# Patient Record
Sex: MALE | Race: BLACK OR AFRICAN AMERICAN | NOT HISPANIC OR LATINO | Employment: UNEMPLOYED | URBAN - METROPOLITAN AREA
[De-identification: names, ages, dates, MRNs, and addresses within clinical notes are randomized per-mention and may not be internally consistent; named-entity substitution may affect disease eponyms.]

---

## 2021-04-18 ENCOUNTER — HOSPITAL ENCOUNTER (EMERGENCY)
Facility: CLINIC | Age: 51
Discharge: HOME | End: 2021-04-18
Attending: EMERGENCY MEDICINE | Admitting: EMERGENCY MEDICINE
Payer: COMMERCIAL

## 2021-04-18 VITALS — SYSTOLIC BLOOD PRESSURE: 156 MMHG | OXYGEN SATURATION: 98 % | HEART RATE: 92 BPM | DIASTOLIC BLOOD PRESSURE: 80 MMHG

## 2021-04-18 DIAGNOSIS — I63.9 CEREBROVASCULAR ACCIDENT (CVA), UNSPECIFIED MECHANISM (H): ICD-10-CM

## 2021-04-18 DIAGNOSIS — I10 HYPERTENSION, UNSPECIFIED TYPE: ICD-10-CM

## 2021-04-18 DIAGNOSIS — Z91.148 NON COMPLIANCE W MEDICATION REGIMEN: ICD-10-CM

## 2021-04-18 LAB
ANION GAP SERPL CALCULATED.3IONS-SCNC: 5 MMOL/L (ref 3–14)
APTT PPP: 30 SEC (ref 22–37)
BASOPHILS # BLD AUTO: 0.1 10E9/L (ref 0–0.2)
BASOPHILS NFR BLD AUTO: 0.4 %
BUN SERPL-MCNC: 19 MG/DL (ref 7–30)
CALCIUM SERPL-MCNC: 9.9 MG/DL (ref 8.5–10.1)
CHLORIDE SERPL-SCNC: 97 MMOL/L (ref 94–109)
CO2 SERPL-SCNC: 30 MMOL/L (ref 20–32)
CREAT SERPL-MCNC: 0.8 MG/DL (ref 0.66–1.25)
DIFFERENTIAL METHOD BLD: ABNORMAL
EOSINOPHIL # BLD AUTO: 0.2 10E9/L (ref 0–0.7)
EOSINOPHIL NFR BLD AUTO: 1.2 %
ERYTHROCYTE [DISTWIDTH] IN BLOOD BY AUTOMATED COUNT: 11.4 % (ref 10–15)
GFR SERPL CREATININE-BSD FRML MDRD: >90 ML/MIN/{1.73_M2}
GLUCOSE SERPL-MCNC: 280 MG/DL (ref 70–99)
HCT VFR BLD AUTO: 41.2 % (ref 40–53)
HGB BLD-MCNC: 13.9 G/DL (ref 13.3–17.7)
IMM GRANULOCYTES # BLD: 0.1 10E9/L (ref 0–0.4)
IMM GRANULOCYTES NFR BLD: 0.4 %
INR PPP: 1.05 (ref 0.86–1.14)
LABORATORY COMMENT REPORT: NORMAL
LYMPHOCYTES # BLD AUTO: 2.8 10E9/L (ref 0.8–5.3)
LYMPHOCYTES NFR BLD AUTO: 20.8 %
MCH RBC QN AUTO: 32.5 PG (ref 26.5–33)
MCHC RBC AUTO-ENTMCNC: 33.7 G/DL (ref 31.5–36.5)
MCV RBC AUTO: 96 FL (ref 78–100)
MONOCYTES # BLD AUTO: 1 10E9/L (ref 0–1.3)
MONOCYTES NFR BLD AUTO: 6.9 %
NEUTROPHILS # BLD AUTO: 9.6 10E9/L (ref 1.6–8.3)
NEUTROPHILS NFR BLD AUTO: 70.3 %
NRBC # BLD AUTO: 0 10*3/UL
NRBC BLD AUTO-RTO: 0 /100
PLATELET # BLD AUTO: 366 10E9/L (ref 150–450)
POTASSIUM SERPL-SCNC: 3.7 MMOL/L (ref 3.4–5.3)
RBC # BLD AUTO: 4.28 10E12/L (ref 4.4–5.9)
SARS-COV-2 RNA RESP QL NAA+PROBE: NEGATIVE
SODIUM SERPL-SCNC: 132 MMOL/L (ref 133–144)
SPECIMEN SOURCE: NORMAL
TROPONIN I SERPL-MCNC: <0.015 UG/L (ref 0–0.04)
WBC # BLD AUTO: 13.7 10E9/L (ref 4–11)

## 2021-04-18 PROCEDURE — 84484 ASSAY OF TROPONIN QUANT: CPT | Performed by: EMERGENCY MEDICINE

## 2021-04-18 PROCEDURE — 250N000011 HC RX IP 250 OP 636: Performed by: EMERGENCY MEDICINE

## 2021-04-18 PROCEDURE — 80048 BASIC METABOLIC PNL TOTAL CA: CPT | Performed by: EMERGENCY MEDICINE

## 2021-04-18 PROCEDURE — 85025 COMPLETE CBC W/AUTO DIFF WBC: CPT | Performed by: EMERGENCY MEDICINE

## 2021-04-18 PROCEDURE — 85730 THROMBOPLASTIN TIME PARTIAL: CPT | Performed by: EMERGENCY MEDICINE

## 2021-04-18 PROCEDURE — 85610 PROTHROMBIN TIME: CPT | Performed by: EMERGENCY MEDICINE

## 2021-04-18 PROCEDURE — 87635 SARS-COV-2 COVID-19 AMP PRB: CPT | Performed by: EMERGENCY MEDICINE

## 2021-04-18 PROCEDURE — C9803 HOPD COVID-19 SPEC COLLECT: HCPCS

## 2021-04-18 PROCEDURE — 93005 ELECTROCARDIOGRAM TRACING: CPT

## 2021-04-18 PROCEDURE — 99284 EMERGENCY DEPT VISIT MOD MDM: CPT | Mod: 25

## 2021-04-18 PROCEDURE — 96374 THER/PROPH/DIAG INJ IV PUSH: CPT

## 2021-04-18 RX ORDER — ONDANSETRON 4 MG/1
4 TABLET, ORALLY DISINTEGRATING ORAL ONCE
Status: DISCONTINUED | OUTPATIENT
Start: 2021-04-18 | End: 2021-04-18 | Stop reason: HOSPADM

## 2021-04-18 RX ORDER — ONDANSETRON 2 MG/ML
4 INJECTION INTRAMUSCULAR; INTRAVENOUS ONCE
Status: COMPLETED | OUTPATIENT
Start: 2021-04-18 | End: 2021-04-18

## 2021-04-18 RX ADMIN — ONDANSETRON 4 MG: 2 INJECTION INTRAMUSCULAR; INTRAVENOUS at 17:03

## 2021-04-18 ASSESSMENT — ENCOUNTER SYMPTOMS
SHORTNESS OF BREATH: 0
UNEXPECTED WEIGHT CHANGE: 1
ABDOMINAL PAIN: 0
WEAKNESS: 1
NAUSEA: 1
VOMITING: 0
DIARRHEA: 0

## 2021-04-18 NOTE — CONSULTS
[facility]    Stroke Telephone Note    I was called by Eriberto Vaughn Md on 04/18/21 regarding patient Josue Parisi. The patient is a 50 year old male with residual right sided weakness from old stroke. Patient reported to ED team that about a week ago he could not walk because of worsening of hs right sided weakness. Since then he has improved and now is able to walk again but still not back to baseline. The patient indicated that he had stopped all his medications including blood thinners because of side effects. Patient is brought to the ED today accompanied by his mother.     Based on care everywhere, patient had stroke in 2013 affecting the right hemisphere secondary to right sided cervical carotid occlusion. Two weeks after that he had left lentiform infarction.     In 2019, he had another brain MRI due to increased right sided weakness, nausea, and altered mental state. The study showed no new pathology, just the old ischemic changes.     He was last seen by Dr Carlos A Shirley, neurologist at [facility], in 2019. At that time he was on plavix.     Of note, patient also has known DM, HTN, HLD, tobacco use, anxiety and depression.       Thrombolytic Treatment   outside the window for thrombolysis    Endovascular Treatment  outside the window for any therapy    Impression  Worsening baseline right sided weakness. Cause unknown yet. Patient has vascular risk factors, prior stroke, and is not compliant on his medications. He can be having another storke now. If this is a stroke, he is outside any treatment window (onset 1 week ago). Patient has history of presenting with increased weakness before and MRI was negative. Therefore, this presentation may also be not due to stroke.     Recommendations   MRI brain with contrast.   MRA head without contrast.   MRA neck with contrast.   Aspirin 325 mg/d.     If the MRI is negative for stroke, further workup per ED team. If the  "patient does have a stroke, then he needs an admission and routine stroke workup.       My recommendations are based on the information provided over the phone by Josue Parisi's in-person providers. They are not intended to replace the clinical judgment of his in-person providers. I was not requested to personally see or examine the patient at this time.      Marli Douglas MD, Msc, FAMARTY, FAAN   of Neurology  [facility]     04/18/2021 4:39 PM  To page me or covering stroke neurology team member, click here: AMCOM  Choose \"On Call\" tab at top, then search dropdown box for \"Neurology Adult\" & press Enter, look for Neuro ICU/Stroke  "

## 2021-04-18 NOTE — ED PROVIDER NOTES
History   Chief Complaint:  Transient Ischemic Attack     HPI   Josue Parisi is a 50 year old male with history of stroke x2 not currently anticoagulated, diabetes mellitus, internal carotid artery dissection, and hypertension who presents with balance issues and trouble walking. The patient reports that 6 days ago he woke up and found that he was unable to walk. He went back to sleep and upon waking was able to make himself walk. He states that his right foot and leg would not work. He notes that he was able to move his knee and leg, but that he was unsteady on his feet and felt off balance. The patient notes that this lasted a few hours, but never fully improved to his baseline. Additionally, the patient notes that he has had nausea and has lost more weight recently. He cites a recent diet change and working 12 hours a day on his feet as reasons for his weight loss. He also mentions having some back pain that has been ongoing for some time and some possible loss of taste/smell. The patient also reports having some boils under his left arm that have not improved on a medication that his doctor provided him. The patient has a history of 2 prior strokes and reports that he stopped taking his blood thinner without being advised by his physician several years ago. He notes having some right sided weakness in his arms and legs as a result of his prior strokes. The patient otherwise denies any vomiting, abdominal pain, diarrhea, chest pain, shortness of breath or urinary issues.     Review of Systems   Constitutional: Positive for unexpected weight change.   Respiratory: Negative for shortness of breath.    Cardiovascular: Negative for chest pain.   Gastrointestinal: Positive for nausea. Negative for abdominal pain, diarrhea and vomiting.   Genitourinary: Negative.    Musculoskeletal: Positive for gait problem.   Neurological: Positive for weakness.   All other systems reviewed and are  negative.        Allergies:  The patient has no known allergies.     Medications:  Albuterol  Alprazolam  Bacitracin  Phenergan   Propranolol    Past Medical History:    Cerebral infarction  Hypertension   Low back pain  Internal carotid artery dissection  Tobacco abuse  Dysarthria  Expressive aphasia  Diabetes mellitus  Anxiety   Marijuana use  Depressive disorder  Toxic metabolic encephalopathy     Past Surgical History:    The patient denies past surgical history.     Family History:    Father: depression    Social History:  He is with his mother.  The patient is a smoker.     Physical Exam   Vitals:  BP (!) 156/80   Pulse 92   SpO2 98%           Physical Exam  General: The patient is alert, in no respiratory distress.    HENT: Mucous membranes moist.    Cardiovascular: Regular rate and rhythm. Good pulses in all four extremities. Normal capillary refill and skin turgor.     Respiratory: Lungs are clear. No nasal flaring. No retractions. No wheezing, no crackles.    Gastrointestinal: Abdomen soft. No guarding, no rebound. No palpable hernias. Non tender.     Musculoskeletal: No gross deformity.     Skin: No rashes or petechiae.     Neurologic: The patient is alert and oriented x3. GCS 15. No testable cranial nerve deficit. Follows commands with clear and appropriate speech. Gives appropriate answers. Good strength in all extremities. No gross neurologic deficit. Gross sensation intact. Pupils are round and reactive. No meningismus.   Weaker hip extension, knee flexion and plantar flexion on the right. He can stand and walk and is steady.     Lymphatic: No cervical adenopathy. No lower extremity swelling.    Psychiatric: The patient is non-tearful.      Emergency Department Course   ECG  ECG taken at 1627, ECG read at 1627  Normal sinus rhythm  Possible left atrial enlargement    Rate 91 bpm. IA interval 158 ms. QRS duration 76 ms. QT/QTc 344/423 ms. P-R-T axes 58 21 28.     Imaging:  MR Brain WO & W  Contrast  Not completed    MRA Brain ([facility] [place]) WO & W Contrast  Not completed     MRA Neck ([place]) WO & W Contrast  Not completed    Laboratory:  CBC: WBC 13.7 (H), HGB 13.9,   BMP: Sodium 132 (L), Glucose 280 (H) o/w WNL (Creatinine 0.80)     Troponin (Collected 1553): <0.015  INR: 1.05    PTT: 30  UA with microscopic: Not completed    Drug abuse screen 77 urine: Not completed  Asymptomatic COVID19 Virus PCR by nasopharyngeal swab: Negative    Emergency Department Course:    Reviewed:  I reviewed nursing notes, vitals, past medical history and care everywhere    Assessments:  1600 I obtained history and examined the patient as noted above.   1630 I spoke with the patient again and discussed the importance of taking an Aspirin and the possibility of another stroke if he does not.   1910 I was informed that the patient was acting aggressively toward the nursing staff and asking to go home. I will discharge him AMA.     Consults:   1621 I spoke with Dr. Douglas, stroke neurology, regarding the patient. He states that if MRI scans are negative the patient should be started on Aspirin and that there is no reason to admit him at this time.     Interventions:  1703 Zofran 4 mg IV     Disposition:  The patient left AMA.       Impression & Plan     Medical Decision Making:  The patient's records were reviewed and I did interview him.  He originally had said that he could not walk and then on further questioning said that he had problems only in his right leg.  The right leg symptoms were there previously when he did have his previous stroke but are worse more recently.  It sounds like they have started a week ago and there has been some improvement.  Thankfully here his speech vision upper extremity strength was intact.  The patient was able to walk and there was no signs of a devastating injury.  I was concerned about a stroke and consulted stroke neurology who did recommend an MRI MRA.  I considered the  patient could have inflammation or infection worsening his symptoms.  I considered new diagnoses such as cord compromising lesions discitis amongst other processes.  The patient was counseled about his blood pressure being high the need to quit smoking and the risk to his health by not taking his medication as prescribed including his anticoagulants.  I discussed with him that stroke neurology had wanted him to take aspirin.  The patient however became agitated and while I was in the room with a critical patient he left AMA.  The patient walked out.  I already had the above discussions with him but was not able to give him any follow-up final follow-up instructions.  He was a capable decision maker and had already stressed the need to take aspirin.  He did leave AGAINST MEDICAL ADVICE and without current imaging of MRI MRA so I cannot exclude a current stroke.    Covid-19  Josue Parisi was evaluated during a global COVID-19 pandemic, which necessitated consideration that the patient might be at risk for infection with the SARS-CoV-2 virus that causes COVID-19.   Applicable protocols for evaluation were followed during the patient's care.   COVID-19 was considered as part of the patient's evaluation. The plan for testing is:  a test was obtained during this visit.    Diagnosis:    ICD-10-CM    1. Cerebrovascular accident (CVA), unspecified mechanism (H)  I63.9    2. Hypertension, unspecified type  I10    3. Non compliance w medication regimen  Z91.14        Discharge Medications:  None     Scribe Disclosure:  Shahriar VELASQUEZ, am serving as a scribe at 3:59 PM on 4/18/2021 to document services personally performed by Eriberto Vaughn MD based on my observations and the provider's statements to me.            Eriberto Vaughn MD  04/18/21 9085

## 2021-04-18 NOTE — ED TRIAGE NOTES
Pt presents for concern of an episode of being unable to walk. States that he woke up on Monday and noted he wasn't able to walk due to one leg being unable to move. Pt states this lasted for a few hours before improving. Pt states hx of 2 previous strokes in 2012. Also concerned because he has had some nausea for x2 weeks. Additionally states he has several boils in his left arm pit that are not improving with medications he was given by another doctor. ABC intact, A&Ox4.

## 2021-04-19 LAB — INTERPRETATION ECG - MUSE: NORMAL

## 2021-04-19 NOTE — ED NOTES
"Pt came out of room demanding IV again be removed \"because it hurts\". Attempted to explain to pt that the IV was needed in order to perform the MRI. Pt came twords me yelling stating \"I don't care, take it out\". Again attempted to explain need for IV, pt stood up and came at me when I requested security whom stepped in to deescalate the situation. Pt signed AMA form with charge nurse and left the dept.   "

## 2022-02-05 ENCOUNTER — APPOINTMENT (OUTPATIENT)
Dept: MRI IMAGING | Facility: CLINIC | Age: 52
DRG: 065 | End: 2022-02-05
Attending: PHYSICIAN ASSISTANT
Payer: COMMERCIAL

## 2022-02-05 ENCOUNTER — HOSPITAL ENCOUNTER (INPATIENT)
Facility: CLINIC | Age: 52
LOS: 2 days | Discharge: HOME | DRG: 065 | End: 2022-02-07
Attending: PHYSICIAN ASSISTANT | Admitting: INTERNAL MEDICINE
Payer: COMMERCIAL

## 2022-02-05 DIAGNOSIS — M79.2 NEUROPATHIC PAIN: Primary | ICD-10-CM

## 2022-02-05 DIAGNOSIS — I63.9 CEREBROVASCULAR ACCIDENT (H): ICD-10-CM

## 2022-02-05 DIAGNOSIS — I66.9 STENOSIS OF CEREBRAL ARTERY: ICD-10-CM

## 2022-02-05 DIAGNOSIS — I63.9 ACUTE CEREBROVASCULAR ACCIDENT (CVA) DUE TO ISCHEMIA (H): ICD-10-CM

## 2022-02-05 LAB
ALBUMIN SERPL-MCNC: 3.5 G/DL (ref 3.4–5)
ALP SERPL-CCNC: 78 U/L (ref 40–150)
ALT SERPL W P-5'-P-CCNC: 18 U/L (ref 0–70)
AMPHETAMINES UR QL SCN: ABNORMAL
ANION GAP SERPL CALCULATED.3IONS-SCNC: 6 MMOL/L (ref 3–14)
AST SERPL W P-5'-P-CCNC: 7 U/L (ref 0–45)
BARBITURATES UR QL: ABNORMAL
BASOPHILS # BLD AUTO: 0.1 10E3/UL (ref 0–0.2)
BASOPHILS NFR BLD AUTO: 1 %
BENZODIAZ UR QL: ABNORMAL
BILIRUB SERPL-MCNC: 0.3 MG/DL (ref 0.2–1.3)
BUN SERPL-MCNC: 15 MG/DL (ref 7–30)
CALCIUM SERPL-MCNC: 9.6 MG/DL (ref 8.5–10.1)
CANNABINOIDS UR QL SCN: ABNORMAL
CHLORIDE BLD-SCNC: 98 MMOL/L (ref 94–109)
CK SERPL-CCNC: 38 U/L (ref 30–300)
CO2 SERPL-SCNC: 28 MMOL/L (ref 20–32)
COCAINE UR QL: ABNORMAL
CREAT SERPL-MCNC: 0.74 MG/DL (ref 0.66–1.25)
EOSINOPHIL # BLD AUTO: 0.2 10E3/UL (ref 0–0.7)
EOSINOPHIL NFR BLD AUTO: 2 %
ERYTHROCYTE [DISTWIDTH] IN BLOOD BY AUTOMATED COUNT: 11.9 % (ref 10–15)
GFR SERPL CREATININE-BSD FRML MDRD: >90 ML/MIN/1.73M2
GLUCOSE BLD-MCNC: 383 MG/DL (ref 70–99)
HCT VFR BLD AUTO: 42.8 % (ref 40–53)
HGB BLD-MCNC: 14.3 G/DL (ref 13.3–17.7)
HOLD SPECIMEN: NORMAL
HOLD SPECIMEN: NORMAL
IMM GRANULOCYTES # BLD: 0.1 10E3/UL
IMM GRANULOCYTES NFR BLD: 1 %
INR PPP: 0.97 (ref 0.85–1.15)
LYMPHOCYTES # BLD AUTO: 2.7 10E3/UL (ref 0.8–5.3)
LYMPHOCYTES NFR BLD AUTO: 24 %
MCH RBC QN AUTO: 31.2 PG (ref 26.5–33)
MCHC RBC AUTO-ENTMCNC: 33.4 G/DL (ref 31.5–36.5)
MCV RBC AUTO: 93 FL (ref 78–100)
MONOCYTES # BLD AUTO: 0.7 10E3/UL (ref 0–1.3)
MONOCYTES NFR BLD AUTO: 6 %
NEUTROPHILS # BLD AUTO: 7.4 10E3/UL (ref 1.6–8.3)
NEUTROPHILS NFR BLD AUTO: 66 %
NRBC # BLD AUTO: 0 10E3/UL
NRBC BLD AUTO-RTO: 0 /100
OPIATES UR QL SCN: ABNORMAL
PLATELET # BLD AUTO: 348 10E3/UL (ref 150–450)
POTASSIUM BLD-SCNC: 3.7 MMOL/L (ref 3.4–5.3)
PROT SERPL-MCNC: 8 G/DL (ref 6.8–8.8)
RBC # BLD AUTO: 4.58 10E6/UL (ref 4.4–5.9)
SODIUM SERPL-SCNC: 132 MMOL/L (ref 133–144)
TROPONIN I SERPL HS-MCNC: 7 NG/L
TSH SERPL DL<=0.005 MIU/L-ACNC: 0.5 MU/L (ref 0.4–4)
WBC # BLD AUTO: 11.1 10E3/UL (ref 4–11)

## 2022-02-05 PROCEDURE — 250N000011 HC RX IP 250 OP 636: Performed by: PHYSICIAN ASSISTANT

## 2022-02-05 PROCEDURE — 85610 PROTHROMBIN TIME: CPT | Performed by: PHYSICIAN ASSISTANT

## 2022-02-05 PROCEDURE — 120N000001 HC R&B MED SURG/OB

## 2022-02-05 PROCEDURE — 36415 COLL VENOUS BLD VENIPUNCTURE: CPT | Performed by: PHYSICIAN ASSISTANT

## 2022-02-05 PROCEDURE — 96365 THER/PROPH/DIAG IV INF INIT: CPT

## 2022-02-05 PROCEDURE — 83036 HEMOGLOBIN GLYCOSYLATED A1C: CPT | Performed by: PHYSICIAN ASSISTANT

## 2022-02-05 PROCEDURE — 70547 MR ANGIOGRAPHY NECK W/O DYE: CPT

## 2022-02-05 PROCEDURE — 99223 1ST HOSP IP/OBS HIGH 75: CPT | Mod: AI | Performed by: PHYSICIAN ASSISTANT

## 2022-02-05 PROCEDURE — 80053 COMPREHEN METABOLIC PANEL: CPT | Performed by: PHYSICIAN ASSISTANT

## 2022-02-05 PROCEDURE — 84443 ASSAY THYROID STIM HORMONE: CPT | Performed by: PHYSICIAN ASSISTANT

## 2022-02-05 PROCEDURE — 70544 MR ANGIOGRAPHY HEAD W/O DYE: CPT

## 2022-02-05 PROCEDURE — 250N000013 HC RX MED GY IP 250 OP 250 PS 637: Performed by: PHYSICIAN ASSISTANT

## 2022-02-05 PROCEDURE — 80307 DRUG TEST PRSMV CHEM ANLYZR: CPT | Performed by: PHYSICIAN ASSISTANT

## 2022-02-05 PROCEDURE — C9803 HOPD COVID-19 SPEC COLLECT: HCPCS

## 2022-02-05 PROCEDURE — 70551 MRI BRAIN STEM W/O DYE: CPT

## 2022-02-05 PROCEDURE — 87635 SARS-COV-2 COVID-19 AMP PRB: CPT | Performed by: PHYSICIAN ASSISTANT

## 2022-02-05 PROCEDURE — 82550 ASSAY OF CK (CPK): CPT | Performed by: PHYSICIAN ASSISTANT

## 2022-02-05 PROCEDURE — 85004 AUTOMATED DIFF WBC COUNT: CPT | Performed by: PHYSICIAN ASSISTANT

## 2022-02-05 PROCEDURE — 99285 EMERGENCY DEPT VISIT HI MDM: CPT | Mod: 25

## 2022-02-05 PROCEDURE — 84484 ASSAY OF TROPONIN QUANT: CPT | Performed by: PHYSICIAN ASSISTANT

## 2022-02-05 RX ORDER — ATORVASTATIN CALCIUM 40 MG/1
80 TABLET, FILM COATED ORAL EVERY EVENING
Status: DISCONTINUED | OUTPATIENT
Start: 2022-02-05 | End: 2022-02-05

## 2022-02-05 RX ORDER — ATORVASTATIN CALCIUM 40 MG/1
80 TABLET, FILM COATED ORAL ONCE
Status: COMPLETED | OUTPATIENT
Start: 2022-02-05 | End: 2022-02-05

## 2022-02-05 RX ORDER — GADOBUTROL 604.72 MG/ML
10 INJECTION INTRAVENOUS ONCE
Status: DISCONTINUED | OUTPATIENT
Start: 2022-02-05 | End: 2022-02-05

## 2022-02-05 RX ORDER — GABAPENTIN 100 MG/1
200 CAPSULE ORAL AT BEDTIME
Status: DISCONTINUED | OUTPATIENT
Start: 2022-02-06 | End: 2022-02-06

## 2022-02-05 RX ORDER — HEPARIN SODIUM 10000 [USP'U]/100ML
0-5000 INJECTION, SOLUTION INTRAVENOUS CONTINUOUS
Status: DISCONTINUED | OUTPATIENT
Start: 2022-02-05 | End: 2022-02-06

## 2022-02-05 RX ORDER — ASPIRIN 81 MG/1
324 TABLET, CHEWABLE ORAL ONCE
Status: COMPLETED | OUTPATIENT
Start: 2022-02-05 | End: 2022-02-05

## 2022-02-05 RX ORDER — ASPIRIN 81 MG/1
81 TABLET, CHEWABLE ORAL ONCE
Status: DISCONTINUED | OUTPATIENT
Start: 2022-02-05 | End: 2022-02-05

## 2022-02-05 RX ORDER — CLOPIDOGREL BISULFATE 75 MG/1
300 TABLET ORAL ONCE
Status: DISCONTINUED | OUTPATIENT
Start: 2022-02-05 | End: 2022-02-05

## 2022-02-05 RX ORDER — HEPARIN SODIUM 10000 [USP'U]/100ML
0-5000 INJECTION, SOLUTION INTRAVENOUS CONTINUOUS
Status: DISCONTINUED | OUTPATIENT
Start: 2022-02-05 | End: 2022-02-05

## 2022-02-05 RX ADMIN — ASPIRIN 81 MG CHEWABLE TABLET 324 MG: 81 TABLET CHEWABLE at 22:38

## 2022-02-05 RX ADMIN — HEPARIN SODIUM AND DEXTROSE 800 UNITS/HR: 10000; 5 INJECTION INTRAVENOUS at 22:35

## 2022-02-05 RX ADMIN — ATORVASTATIN CALCIUM 80 MG: 40 TABLET, FILM COATED ORAL at 22:38

## 2022-02-05 ASSESSMENT — ACTIVITIES OF DAILY LIVING (ADL): ADLS_ACUITY_SCORE: 12

## 2022-02-06 ENCOUNTER — APPOINTMENT (OUTPATIENT)
Dept: CT IMAGING | Facility: CLINIC | Age: 52
DRG: 065 | End: 2022-02-06
Attending: INTERNAL MEDICINE
Payer: COMMERCIAL

## 2022-02-06 ENCOUNTER — APPOINTMENT (OUTPATIENT)
Dept: PHYSICAL THERAPY | Facility: CLINIC | Age: 52
DRG: 065 | End: 2022-02-06
Attending: PHYSICIAN ASSISTANT
Payer: COMMERCIAL

## 2022-02-06 ENCOUNTER — APPOINTMENT (OUTPATIENT)
Dept: CARDIOLOGY | Facility: CLINIC | Age: 52
DRG: 065 | End: 2022-02-06
Attending: PHYSICIAN ASSISTANT
Payer: COMMERCIAL

## 2022-02-06 LAB
ANION GAP SERPL CALCULATED.3IONS-SCNC: 5 MMOL/L (ref 3–14)
APTT PPP: 51 SECONDS (ref 22–38)
BUN SERPL-MCNC: 17 MG/DL (ref 7–30)
CALCIUM SERPL-MCNC: 9.2 MG/DL (ref 8.5–10.1)
CHLORIDE BLD-SCNC: 103 MMOL/L (ref 94–109)
CHOLEST SERPL-MCNC: 193 MG/DL
CO2 SERPL-SCNC: 28 MMOL/L (ref 20–32)
CREAT SERPL-MCNC: 0.78 MG/DL (ref 0.66–1.25)
ERYTHROCYTE [DISTWIDTH] IN BLOOD BY AUTOMATED COUNT: 11.9 % (ref 10–15)
GFR SERPL CREATININE-BSD FRML MDRD: >90 ML/MIN/1.73M2
GLUCOSE BLD-MCNC: 309 MG/DL (ref 70–99)
GLUCOSE BLDC GLUCOMTR-MCNC: 278 MG/DL (ref 70–99)
GLUCOSE BLDC GLUCOMTR-MCNC: 279 MG/DL (ref 70–99)
GLUCOSE BLDC GLUCOMTR-MCNC: 300 MG/DL (ref 70–99)
GLUCOSE BLDC GLUCOMTR-MCNC: 300 MG/DL (ref 70–99)
GLUCOSE BLDC GLUCOMTR-MCNC: 301 MG/DL (ref 70–99)
GLUCOSE BLDC GLUCOMTR-MCNC: 357 MG/DL (ref 70–99)
HBA1C MFR BLD: 10.4 % (ref 0–5.6)
HBA1C MFR BLD: 10.5 % (ref 0–5.6)
HCT VFR BLD AUTO: 40.2 % (ref 40–53)
HDLC SERPL-MCNC: 38 MG/DL
HGB BLD-MCNC: 12.9 G/DL (ref 13.3–17.7)
LDLC SERPL CALC-MCNC: 126 MG/DL
LVEF ECHO: NORMAL
MCH RBC QN AUTO: 30.4 PG (ref 26.5–33)
MCHC RBC AUTO-ENTMCNC: 32.1 G/DL (ref 31.5–36.5)
MCV RBC AUTO: 95 FL (ref 78–100)
NONHDLC SERPL-MCNC: 155 MG/DL
PLATELET # BLD AUTO: 340 10E3/UL (ref 150–450)
POTASSIUM BLD-SCNC: 3.9 MMOL/L (ref 3.4–5.3)
RBC # BLD AUTO: 4.24 10E6/UL (ref 4.4–5.9)
SARS-COV-2 RNA RESP QL NAA+PROBE: NEGATIVE
SODIUM SERPL-SCNC: 136 MMOL/L (ref 133–144)
TRIGL SERPL-MCNC: 144 MG/DL
UFH PPP CHRO-ACNC: 0.12 IU/ML
UFH PPP CHRO-ACNC: 0.17 IU/ML
WBC # BLD AUTO: 9.5 10E3/UL (ref 4–11)

## 2022-02-06 PROCEDURE — 250N000012 HC RX MED GY IP 250 OP 636 PS 637: Performed by: INTERNAL MEDICINE

## 2022-02-06 PROCEDURE — 250N000011 HC RX IP 250 OP 636: Performed by: INTERNAL MEDICINE

## 2022-02-06 PROCEDURE — 85520 HEPARIN ASSAY: CPT | Performed by: PHYSICIAN ASSISTANT

## 2022-02-06 PROCEDURE — 82310 ASSAY OF CALCIUM: CPT | Performed by: PHYSICIAN ASSISTANT

## 2022-02-06 PROCEDURE — 250N000011 HC RX IP 250 OP 636: Performed by: PHYSICIAN ASSISTANT

## 2022-02-06 PROCEDURE — 36415 COLL VENOUS BLD VENIPUNCTURE: CPT | Performed by: PHYSICIAN ASSISTANT

## 2022-02-06 PROCEDURE — 85520 HEPARIN ASSAY: CPT | Performed by: INTERNAL MEDICINE

## 2022-02-06 PROCEDURE — 80061 LIPID PANEL: CPT | Performed by: PHYSICIAN ASSISTANT

## 2022-02-06 PROCEDURE — 74177 CT ABD & PELVIS W/CONTRAST: CPT

## 2022-02-06 PROCEDURE — 97162 PT EVAL MOD COMPLEX 30 MIN: CPT | Mod: GP | Performed by: PHYSICAL THERAPIST

## 2022-02-06 PROCEDURE — 250N000013 HC RX MED GY IP 250 OP 250 PS 637: Performed by: INTERNAL MEDICINE

## 2022-02-06 PROCEDURE — 97116 GAIT TRAINING THERAPY: CPT | Mod: GP | Performed by: PHYSICAL THERAPIST

## 2022-02-06 PROCEDURE — 999N000208 ECHOCARDIOGRAM COMPLETE

## 2022-02-06 PROCEDURE — 36415 COLL VENOUS BLD VENIPUNCTURE: CPT | Performed by: INTERNAL MEDICINE

## 2022-02-06 PROCEDURE — 120N000001 HC R&B MED SURG/OB

## 2022-02-06 PROCEDURE — 85027 COMPLETE CBC AUTOMATED: CPT | Performed by: PHYSICIAN ASSISTANT

## 2022-02-06 PROCEDURE — 99232 SBSQ HOSP IP/OBS MODERATE 35: CPT | Performed by: INTERNAL MEDICINE

## 2022-02-06 PROCEDURE — 250N000012 HC RX MED GY IP 250 OP 636 PS 637: Performed by: PHYSICIAN ASSISTANT

## 2022-02-06 PROCEDURE — 85730 THROMBOPLASTIN TIME PARTIAL: CPT | Performed by: PHYSICIAN ASSISTANT

## 2022-02-06 PROCEDURE — 83036 HEMOGLOBIN GLYCOSYLATED A1C: CPT | Performed by: PHYSICIAN ASSISTANT

## 2022-02-06 PROCEDURE — 250N000013 HC RX MED GY IP 250 OP 250 PS 637: Performed by: PHYSICIAN ASSISTANT

## 2022-02-06 PROCEDURE — 250N000009 HC RX 250: Performed by: INTERNAL MEDICINE

## 2022-02-06 PROCEDURE — 93306 TTE W/DOPPLER COMPLETE: CPT

## 2022-02-06 PROCEDURE — 93306 TTE W/DOPPLER COMPLETE: CPT | Mod: 26 | Performed by: INTERNAL MEDICINE

## 2022-02-06 PROCEDURE — 97530 THERAPEUTIC ACTIVITIES: CPT | Mod: GP | Performed by: PHYSICAL THERAPIST

## 2022-02-06 PROCEDURE — 258N000001 HC RX 258: Performed by: INTERNAL MEDICINE

## 2022-02-06 RX ORDER — HYDRALAZINE HYDROCHLORIDE 20 MG/ML
10-20 INJECTION INTRAMUSCULAR; INTRAVENOUS
Status: DISCONTINUED | OUTPATIENT
Start: 2022-02-06 | End: 2022-02-07 | Stop reason: HOSPADM

## 2022-02-06 RX ORDER — HYDROCHLOROTHIAZIDE 25 MG/1
25 TABLET ORAL DAILY
COMMUNITY
End: 2022-06-24

## 2022-02-06 RX ORDER — ACETAMINOPHEN 325 MG/1
650 TABLET ORAL EVERY 4 HOURS PRN
Status: DISCONTINUED | OUTPATIENT
Start: 2022-02-06 | End: 2022-02-07 | Stop reason: HOSPADM

## 2022-02-06 RX ORDER — DEXTROSE MONOHYDRATE 25 G/50ML
25-50 INJECTION, SOLUTION INTRAVENOUS
Status: DISCONTINUED | OUTPATIENT
Start: 2022-02-06 | End: 2022-02-07 | Stop reason: HOSPADM

## 2022-02-06 RX ORDER — ACYCLOVIR 200 MG/1
30 CAPSULE ORAL ONCE
Status: COMPLETED | OUTPATIENT
Start: 2022-02-06 | End: 2022-02-06

## 2022-02-06 RX ORDER — DOXYCYCLINE 100 MG/1
100 CAPSULE ORAL 2 TIMES DAILY
Status: ON HOLD | COMMUNITY
End: 2022-03-20

## 2022-02-06 RX ORDER — ATORVASTATIN CALCIUM 20 MG/1
20 TABLET, FILM COATED ORAL DAILY
Status: ON HOLD | COMMUNITY
End: 2022-02-07

## 2022-02-06 RX ORDER — NICOTINE 21 MG/24HR
1 PATCH, TRANSDERMAL 24 HOURS TRANSDERMAL DAILY
Status: DISCONTINUED | OUTPATIENT
Start: 2022-02-06 | End: 2022-02-07 | Stop reason: HOSPADM

## 2022-02-06 RX ORDER — NICOTINE POLACRILEX 4 MG
15-30 LOZENGE BUCCAL
Status: DISCONTINUED | OUTPATIENT
Start: 2022-02-06 | End: 2022-02-07 | Stop reason: HOSPADM

## 2022-02-06 RX ORDER — GABAPENTIN 100 MG/1
100 CAPSULE ORAL 3 TIMES DAILY
Status: DISCONTINUED | OUTPATIENT
Start: 2022-02-06 | End: 2022-02-07 | Stop reason: HOSPADM

## 2022-02-06 RX ORDER — LABETALOL HYDROCHLORIDE 5 MG/ML
10-20 INJECTION, SOLUTION INTRAVENOUS EVERY 10 MIN PRN
Status: DISCONTINUED | OUTPATIENT
Start: 2022-02-06 | End: 2022-02-07 | Stop reason: HOSPADM

## 2022-02-06 RX ORDER — IOPAMIDOL 755 MG/ML
500 INJECTION, SOLUTION INTRAVASCULAR ONCE
Status: COMPLETED | OUTPATIENT
Start: 2022-02-06 | End: 2022-02-06

## 2022-02-06 RX ORDER — ACETAMINOPHEN 650 MG/1
650 SUPPOSITORY RECTAL EVERY 4 HOURS PRN
Status: DISCONTINUED | OUTPATIENT
Start: 2022-02-06 | End: 2022-02-07 | Stop reason: HOSPADM

## 2022-02-06 RX ORDER — ASPIRIN 81 MG/1
81 TABLET, CHEWABLE ORAL DAILY
Status: DISCONTINUED | OUTPATIENT
Start: 2022-02-06 | End: 2022-02-07 | Stop reason: HOSPADM

## 2022-02-06 RX ORDER — CETIRIZINE HYDROCHLORIDE 10 MG/1
10 TABLET ORAL DAILY
Status: ON HOLD | COMMUNITY
End: 2022-08-02

## 2022-02-06 RX ORDER — ALPRAZOLAM 1 MG
1 TABLET ORAL
Status: ON HOLD | COMMUNITY
End: 2022-08-02

## 2022-02-06 RX ORDER — ONDANSETRON 2 MG/ML
4 INJECTION INTRAMUSCULAR; INTRAVENOUS EVERY 6 HOURS PRN
Status: DISCONTINUED | OUTPATIENT
Start: 2022-02-06 | End: 2022-02-07 | Stop reason: HOSPADM

## 2022-02-06 RX ORDER — ONDANSETRON 4 MG/1
4 TABLET, ORALLY DISINTEGRATING ORAL EVERY 6 HOURS PRN
Status: DISCONTINUED | OUTPATIENT
Start: 2022-02-06 | End: 2022-02-07 | Stop reason: HOSPADM

## 2022-02-06 RX ORDER — ASPIRIN 81 MG/1
81 TABLET ORAL DAILY
Status: DISCONTINUED | OUTPATIENT
Start: 2022-02-06 | End: 2022-02-07 | Stop reason: HOSPADM

## 2022-02-06 RX ADMIN — INSULIN ASPART 3 UNITS: 100 INJECTION, SOLUTION INTRAVENOUS; SUBCUTANEOUS at 02:56

## 2022-02-06 RX ADMIN — GABAPENTIN 100 MG: 100 CAPSULE ORAL at 21:52

## 2022-02-06 RX ADMIN — INSULIN GLARGINE 15 UNITS: 100 INJECTION, SOLUTION SUBCUTANEOUS at 21:52

## 2022-02-06 RX ADMIN — METFORMIN HYDROCHLORIDE 500 MG: 500 TABLET, FILM COATED ORAL at 08:08

## 2022-02-06 RX ADMIN — INSULIN ASPART 3 UNITS: 100 INJECTION, SOLUTION INTRAVENOUS; SUBCUTANEOUS at 21:52

## 2022-02-06 RX ADMIN — SODIUM CHLORIDE 30 ML: 9 INJECTION, SOLUTION INTRAMUSCULAR; INTRAVENOUS; SUBCUTANEOUS at 09:03

## 2022-02-06 RX ADMIN — GABAPENTIN 200 MG: 100 CAPSULE ORAL at 01:04

## 2022-02-06 RX ADMIN — SODIUM CHLORIDE 60 ML: 9 INJECTION, SOLUTION INTRAVENOUS at 15:39

## 2022-02-06 RX ADMIN — NICOTINE 1 PATCH: 14 PATCH, EXTENDED RELEASE TRANSDERMAL at 08:07

## 2022-02-06 RX ADMIN — ACETAMINOPHEN 650 MG: 325 TABLET, FILM COATED ORAL at 16:35

## 2022-02-06 RX ADMIN — ENOXAPARIN SODIUM 70 MG: 80 INJECTION SUBCUTANEOUS at 23:01

## 2022-02-06 RX ADMIN — ASPIRIN 81 MG: 81 TABLET, COATED ORAL at 01:05

## 2022-02-06 RX ADMIN — HEPARIN SODIUM AND DEXTROSE 1100 UNITS/HR: 10000; 5 INJECTION INTRAVENOUS at 14:05

## 2022-02-06 RX ADMIN — GABAPENTIN 100 MG: 100 CAPSULE ORAL at 16:35

## 2022-02-06 RX ADMIN — IOPAMIDOL 79 ML: 755 INJECTION, SOLUTION INTRAVENOUS at 15:39

## 2022-02-06 RX ADMIN — NICOTINE 1 PATCH: 14 PATCH, EXTENDED RELEASE TRANSDERMAL at 01:05

## 2022-02-06 ASSESSMENT — ACTIVITIES OF DAILY LIVING (ADL)
ADLS_ACUITY_SCORE: 7
ADLS_ACUITY_SCORE: 5
ADLS_ACUITY_SCORE: 7
ADLS_ACUITY_SCORE: 5
ADLS_ACUITY_SCORE: 7
ADLS_ACUITY_SCORE: 5
ADLS_ACUITY_SCORE: 7
ADLS_ACUITY_SCORE: 12
ADLS_ACUITY_SCORE: 7

## 2022-02-06 NOTE — ED NOTES
[facility]  ED Nurse Handoff Report    Josue Parisi is a 51 year old male   ED Chief complaint: Abnormal Arm Movements  . ED Diagnosis:   Final diagnoses:   Cerebrovascular accident (H)   Stenosis of cerebral artery     Allergies: No Known Allergies    Code Status: Full Code  Activity level - Baseline/Home:  Independent. Activity Level - Current:   Stand by Assist. Lift room needed: No. Bariatric: No   Needed: No   Isolation: No. Infection: Not Applicable.     Vital Signs:   Vitals:    02/05/22 1915 02/05/22 1930 02/05/22 2000 02/05/22 2208   BP: (!) 155/94  (!) 147/94    Pulse: 110  105    Resp:       Temp:       TempSrc:       SpO2: 98% 98% 96%    Weight:    68 kg (150 lb)       Cardiac Rhythm:  ,      Pain level:    Patient confused: No. Patient Falls Risk: Yes.   Elimination Status: Has voided   Patient Report - Initial Complaint: Abnormal arm movements. Focused Assessment: Josue Parisi is a 51 year old male with history of hypertension, CVA, who presents after an episode of abnormal arm movement. The patient states that approximately 1 hour prior to arrival the patient was sitting on the couch when he had fairly sudden onset of involuntary movements in his left arm. He states that his arm was moving around rapidly and smacked himself in the face numerous times. This lasted for about 1/2-hour before resolving. He reports that he felt slightly dizzy during the episodes of this is now resolved. He denies vision changes, new numbness or weakness, headache, neck pain, chest pain, back pain, shortness of breath, fever, recent medication changes. He does not always take his hypertension medications like he should. He does not have a prior history of seizures. He denies alcohol or drug use. He has a prior history of strokes and TIAs with left-sided weakness and numbness though he notes this does not feel changed from his baseline.      General:          Awake, alert, pleasant,  non-toxic.  Head:              Scalp is NC/AT  Eyes:               Conjunctiva normal, PERRL  ENT:                The external nose and ears are normal.   Neck:              Normal range of motion without rigidity.  CV:                  Regular rate and rhythm                          No pathologic murmur, rubs, or gallops.  Resp:              Breath sounds are clear bilaterally                          Non-labored, no retractions or accessory muscle use  Abdomen:      Abdomen is soft, no distension, no tenderness, no masses. No CVA tenderness.  MS:                  No lower extremity edema or asymmetric calf swelling. Normal ROM in all joints without effusions.                          No midline cervical, thoracic, or lumbar tenderness  Skin:               Warm and dry, No rash or lesions noted.  Neuro: Alert and oriented x3.  GCS 15.  5/5 strength BL in UE and LE.  Decreased sensation to LUE (Baseline) Cranial nerves 2-12 intact.  Normal finger to nose testing.  Gait normal.  Psych:            Awake. Alert. Normal affect. Appropriate interactions.  Tests Performed: labs, MRI. Abnormal Results:   MRA Angiogram Neck w/o Contrast   Final Result   IMPRESSION:   1.  Occlusion of the right internal carotid artery near the origin, unchanged compared to the prior exam.      MR Head w/o Contrast Angiogram   Final Result   IMPRESSION:   1.  Occlusion of the right internal carotid artery with reconstitution at the ophthalmic segment, unchanged.   2.  Severe stenosis versus filling defect at the right middle cerebral artery bifurcation, new compared to the prior exam.   3.  Bilateral severe stenoses versus occlusions of the bilateral anterior cerebral arteries at the A1/A2 junctions with reconstitution of the right A2 segment, new compared to the prior exam.      MR Brain w/o Contrast   Final Result   IMPRESSION:   1.  Multiple acute infarcts involving the right hemisphere in a watershed distribution.   2.  No evidence of  hemorrhage or significant mass effect.   3.  Abnormal right internal carotid artery flow void and anterior cerebral artery flow-voids (refer to MRA report).   4.  Multiple small old infarcts.        Labs Ordered and Resulted from Time of ED Arrival to Time of ED Departure   COMPREHENSIVE METABOLIC PANEL - Abnormal       Result Value    Sodium 132 (*)     Potassium 3.7      Chloride 98      Carbon Dioxide (CO2) 28      Anion Gap 6      Urea Nitrogen 15      Creatinine 0.74      Calcium 9.6      Glucose 383 (*)     Alkaline Phosphatase 78      AST 7      ALT 18      Protein Total 8.0      Albumin 3.5      Bilirubin Total 0.3      GFR Estimate >90     CBC WITH PLATELETS AND DIFFERENTIAL - Abnormal    WBC Count 11.1 (*)     RBC Count 4.58      Hemoglobin 14.3      Hematocrit 42.8      MCV 93      MCH 31.2      MCHC 33.4      RDW 11.9      Platelet Count 348      % Neutrophils 66      % Lymphocytes 24      % Monocytes 6      % Eosinophils 2      % Basophils 1      % Immature Granulocytes 1      NRBCs per 100 WBC 0      Absolute Neutrophils 7.4      Absolute Lymphocytes 2.7      Absolute Monocytes 0.7      Absolute Eosinophils 0.2      Absolute Basophils 0.1      Absolute Immature Granulocytes 0.1      Absolute NRBCs 0.0     TSH WITH FREE T4 REFLEX - Normal    TSH 0.50     CK TOTAL - Normal    CK 38     TROPONIN I - Normal    Troponin I High Sensitivity 7     COVID-19 VIRUS (CORONAVIRUS) BY PCR   INR   PARTIAL THROMBOPLASTIN TIME   DRUG ABUSE SCREEN 1 URINE (ED)   .   Treatments provided: see MAR  Family Comments: Pt has been in contact with family  OBS brochure/video discussed/provided to patient:  N/A  ED Medications:   Medications   sodium chloride (PF) 0.9% PF flush 60 mL (has no administration in time range)   heparin infusion 25,000 units in D5W 250 mL ANTICOAGULANT (800 Units/hr Intravenous New Bag 2/5/22 2235)   aspirin (ASA) chewable tablet 324 mg (324 mg Oral Given 2/5/22 2238)   atorvastatin (LIPITOR) tablet 80  mg (80 mg Oral Given 2/5/22 2787)     Drips infusing:  Yes  For the majority of the shift, the patient's behavior Green. Interventions performed were N/a.    Sepsis treatment initiated: No     Patient tested for COVID 19 prior to admission: YES, pending    ED Nurse Name/Phone Number: Fanny Verduzco RN,   11:08 PM    RECEIVING UNIT ED HANDOFF REVIEW    Above ED Nurse Handoff Report was reviewed: Yes  Reviewed by: Leandra Stevens RN on February 5, 2022 at 11:29 PM

## 2022-02-06 NOTE — ED PROVIDER NOTES
Emergency Department Attending Supervision Note  2/5/2022  7:16 PM      I evaluated this patient in conjunction with CARRIE Capps    This is a 51-year-old gentleman with past medical history of hypertension, poorly controlled diabetes and multiple CVAs who presents emergency department with abnormal left arm movement.  Patient describes sudden onset of involuntary hyperbolic left arm movements approximately 1 hour prior to presentation.  These have since resolved.  He now only notes his residual weakness along the left side from his previous strokes.    General: Patient is alert, awake and interactive when I enter the room  Head: The scalp, face, and head appear normal  Eyes: Conjunctivae are normal  ENT: The nose is normal, Pinnae are normal, External acoustic canals are normal  Neck: Trachea midline  CV: Pulses are normal.   Resp: No respiratory distress   Musc: Normal muscular tone, moving all extremities.  Skin: No rash or lesions noted  Neuro: Speech is normal and fluent. Face is symmetric.   Psych: Normal affect.  Appropriate interactions.    Results:  Echocardiogram Complete w Bubble Study - For age < 60 yrs   Final Result      MRA Angiogram Neck w/o Contrast   Final Result   IMPRESSION:   1.  Occlusion of the right internal carotid artery near the origin, unchanged compared to the prior exam.      MR Head w/o Contrast Angiogram   Final Result   IMPRESSION:   1.  Occlusion of the right internal carotid artery with reconstitution at the ophthalmic segment, unchanged.   2.  Severe stenosis versus filling defect at the right middle cerebral artery bifurcation, new compared to the prior exam.   3.  Bilateral severe stenoses versus occlusions of the bilateral anterior cerebral arteries at the A1/A2 junctions with reconstitution of the right A2 segment, new compared to the prior exam.      MR Brain w/o Contrast   Final Result   IMPRESSION:   1.  Multiple acute infarcts involving the right hemisphere in a  watershed distribution.   2.  No evidence of hemorrhage or significant mass effect.   3.  Abnormal right internal carotid artery flow void and anterior cerebral artery flow-voids (refer to MRA report).   4.  Multiple small old infarcts.           My impression  This is a 51-year-old male with past medical history of hypertension, poorly controlled diabetes and multiple CVAs who presents to the emergency department with abnormal involuntary hyperbolic left arm movement.  This resolved prior to his presentation to the emergency department.  However given his past medical history this was highly concerning for stroke versus seizure pathology.  Case was discussed with stroke neurology who recommended MRI/MRA.  Unfortunately this shows multiple new acute infarcts along the right hemisphere in a watershed distribution.  Stroke recommended started on aspirin and heparin.  Will admit to the hospitalist team for further stabilization and work-up.  Patient does not require [facility] as it is unlikely that he will undergo any vascular interventions.        Diagnosis    ICD-10-CM    1. Cerebrovascular accident (H)  I63.9    2. Stenosis of cerebral artery  I66.9        MD Evette Persaud Christopher Joseph, MD  02/06/22 1251

## 2022-02-06 NOTE — PLAN OF CARE
Pt came to the floor at 0000. Pts neuros were intact other than c/o tingling on the lt side which pt reports having at baseline. Pt has a Heparin drip running at 950 units/hr. BS: 300 3 units given. Continue to monitor and continue with POC.

## 2022-02-06 NOTE — PLAN OF CARE
BP (!) 163/93 (BP Location: Right arm)   Pulse 98   Temp 98  F (36.7  C) (Oral)   Resp 18   Wt 71.8 kg (158 lb 6.4 oz)   SpO2 96% ;s    VSS except elevated BP, denies CP, SoB. Tele SR. Neuros intact, baseline numbness to upper/lower L extremity. Weeping cyst/ulcer under R armpit, dressing applied. Heparin running at 1100 unit(s)/hr, recheck at 2000. , 301. Will continue to monitor.       Addendum: IV pulled at 1430 d/t PT complain of pain. Heparin paused and will need to be restarted once IV access is re-established.

## 2022-02-06 NOTE — PROGRESS NOTES
02/06/22 1530   Quick Adds   Type of Visit Initial PT Evaluation   Living Environment   People in home alone   Current Living Arrangements apartment   Home Accessibility stairs within home   Number of Stairs, Within Home, Primary greater than 10 stairs  (2 flights up to apt)   Stair Railings, Within Home, Primary railings safe and in good condition   Transportation Anticipated car, drives self;family or friend will provide   Living Environment Comments lives in a 2nd story apt   Self-Care   Usual Activity Tolerance moderate   Current Activity Tolerance fair   Equipment Currently Used at Home cane, straight   Activity/Exercise/Self-Care Comment reports he has been ambulatory at home with a cane; recent use of cane more secondary to unsteadiness; stairs sometimes difficult   Disability/Function   Concentrating, Remembering or Making Decisions Difficulty yes   Walking or Climbing Stairs ambulation difficulty, requires equipment;stair climbing difficulty, requires equipment   Mobility Management cane   Dressing/Bathing Difficulty no   Toileting issues no   Fall history within last six months no   Change in Functional Status Since Onset of Current Illness/Injury yes   General Information   Onset of Illness/Injury or Date of Surgery 02/05/22   Referring Physician Morelia Garcia PA-C   Patient/Family Therapy Goals Statement (PT) return home to apt   Pertinent History of Current Problem (include personal factors and/or comorbidities that impact the POC) per chart:  Josue Parisi is a 51 year old male with a past medical history significant for multiple strokes in the past (2012, August 2021 ) with reported residual left-sided numbess, he had not follow-up with neurologist post discharge, poorly controlled hypertension, DM, with peripheral neuropathy, medication noncompliance who presents with episode of involuntary movement and jerking in his left arm;  MRI of the brain and neck was performed showed multiple  "small infarcts but also had multiple acute infarct involving the right hemisphere in a watershed distribution no evidence of hemorrhage or significant mass-effect.  MRI shows occlusion of the right internal carotid artery near the origin but that looks about the same as he did in 2014.  Stroke neurology was contacted and reviewed the images and felt that there was no immediate intervention that needed to be done. Also found to be (+) for UDS is positive for benzos and cocaine. He shares that he uses cocaine \"sometimes on the weekend\". See medical record for further information   Existing Precautions/Restrictions fall   Heart Disease Risk Factors Medical history   General Observations patient in bed; needing encouragement to participate   Cognition   Orientation Status (Cognition) oriented x 3;other (see comments)  (reports some STM impairment)   Safety Deficit (Cognition) insight into deficits/self-awareness   Cognitive Status Comments would benefit from OT for further screening of cognition and safety for living alone   Pain Assessment   Patient Currently in Pain Yes, see Vital Sign flowsheet  (L leg pain)   Range of Motion (ROM)   ROM Comment WFL   Strength   Strength Comments L sided weakness from prior stroke; further limited by L leg pain   Bed Mobility   Comment (Bed Mobility) independent   Transfers   Transfer Safety Comments SBA for sit>stand; no LOB   Gait/Stairs (Locomotion)   Comment (Gait/Stairs) CGA for gait; antalgic; L leg pain reported; some path deviations   Balance   Balance Comments 1 LOB which patient able to recover with CGA   Sensory Examination   Sensory Perception Comments decreased sensation on L side reported   Coordination   Coordination Comments no significant coordination deficits noted during gait   Clinical Impression   Criteria for Skilled Therapeutic Intervention yes, treatment indicated   PT Diagnosis (PT) impaired balance; impaired functional mobility and cares   Influenced by the " following impairments impaired balance; L sided weakness from prior stroke; L leg pain; impaired STM   Functional limitations due to impairments impaired independence with mobility and cares   Clinical Presentation Evolving/Changing   Clinical Presentation Rationale clinical judgement; level of assist   Clinical Decision Making (Complexity) moderate complexity   Therapy Frequency (PT) Daily   Predicted Duration of Therapy Intervention (days/wks) 3 days   Planned Therapy Interventions (PT) balance training;stair training;gait training;transfer training;progressive activity/exercise   Anticipated Equipment Needs at Discharge (PT) cane, straight   Risk & Benefits of therapy have been explained evaluation/treatment results reviewed;care plan/treatment goals reviewed;risks/benefits reviewed;current/potential barriers reviewed;participants voiced agreement with care plan;participants included;patient   PT Discharge Planning    PT Discharge Recommendation (DC Rec) home;home with outpatient physical therapy   PT Rationale for DC Rec Anticipate patient will be safe to discharge to home if patient able to complete 2 flights of stairs to demonstrate ability to access his home environment; appears patient close to reported recent level of function; would likely benefit from OP PT to further address balance deficits   PT Brief overview of current status  A x 1; falls risk   Total Evaluation Time   Total Evaluation Time (Minutes) 10

## 2022-02-06 NOTE — ED PROVIDER NOTES
History     Chief Complaint:  Abnormal Arm Movements      HPI   Josue Parisi is a 51 year old male with history of hypertension, CVA, who presents after an episode of abnormal arm movement. The patient states that approximately 1 hour prior to arrival the patient was sitting on the couch when he had fairly sudden onset of involuntary movements in his left arm. He states that his arm was moving around rapidly and smacked himself in the face numerous times. This lasted for about 1/2-hour before resolving. He reports that he felt slightly dizzy during the episodes of this is now resolved. He denies vision changes, new numbness or weakness, headache, neck pain, chest pain, back pain, shortness of breath, fever, recent medication changes. He does not always take his hypertension medications like he should. He does not have a prior history of seizures. He denies alcohol or drug use. He has a prior history of strokes and TIAs with left-sided weakness and numbness though he notes this does not feel changed from his baseline.    Review of Systems   All other systems reviewed and are negative.    Allergies:  No Known Allergies      Medications:    albuterol (2.5 MG/3ML) 0.083% nebulizer solution  ALPRAZOLAM PO  bacitracin 500 UNIT/GM OINT  Multiple Vitamin (MULTI-VITAMIN) per tablet  promethazine-codeine (PHENERGAN WITH CODEINE) 6.25-10 MG/5ML syrup  PROPRANOLOL HCL PO      Past Medical History:    Past Medical History:   Diagnosis Date     CVA (cerebral infarction)      Hypertension        Past Surgical History:    No past surgical history on file.    Family History:    HTN  CVA    Social History:  Presents alone  Denies ETOH or drug use.    Physical Exam     Patient Vitals for the past 24 hrs:   BP Temp Temp src Pulse Resp SpO2 Weight   02/05/22 2208 -- -- -- -- -- -- 68 kg (150 lb)   02/05/22 2000 (!) 147/94 -- -- 105 -- 96 % --   02/05/22 1930 -- -- -- -- -- 98 % --   02/05/22 1915 (!) 155/94 -- -- 110 -- 98 % --    02/05/22 1911 (!) 153/97 98.4  F (36.9  C) Oral 111 16 98 % --   02/05/22 1909 -- 98.3  F (36.8  C) -- -- -- -- --       Physical Exam  General: Awake, alert, pleasant, non-toxic.  Head:  Scalp is NC/AT  Eyes:  Conjunctiva normal, PERRL  ENT:  The external nose and ears are normal.   Neck:  Normal range of motion without rigidity.  CV:  Regular rate and rhythm    No pathologic murmur, rubs, or gallops.  Resp:  Breath sounds are clear bilaterally    Non-labored, no retractions or accessory muscle use  Abdomen: Abdomen is soft, no distension, no tenderness, no masses. No CVA tenderness.  MS:  No lower extremity edema or asymmetric calf swelling. Normal ROM in all joints without effusions.    No midline cervical, thoracic, or lumbar tenderness  Skin:  Warm and dry, No rash or lesions noted.  Neuro: Alert and oriented x3.  GCS 15.  5/5 strength BL in UE and LE.  Decreased sensation to LUE (Baseline) Cranial nerves 2-12 intact.  Normal finger to nose testing.  Gait normal.  Psych: Awake. Alert. Normal affect. Appropriate interactions.    Emergency Department Course   ECG:  ECG taken at 1929, ECG read at 2003  Sinus tachycardia   No significant change as compared to prior, dated 01/31/12.  Rate 109 bpm. SC interval 146 ms. QRS duration 74 ms. QT/QTc 334/449 ms. P-R-T axes 45 3 26.     Imaging:  MRA Angiogram Neck w/o Contrast   Final Result   IMPRESSION:   1.  Occlusion of the right internal carotid artery near the origin, unchanged compared to the prior exam.      MR Head w/o Contrast Angiogram   Final Result   IMPRESSION:   1.  Occlusion of the right internal carotid artery with reconstitution at the ophthalmic segment, unchanged.   2.  Severe stenosis versus filling defect at the right middle cerebral artery bifurcation, new compared to the prior exam.   3.  Bilateral severe stenoses versus occlusions of the bilateral anterior cerebral arteries at the A1/A2 junctions with reconstitution of the right A2 segment, new  compared to the prior exam.      MR Brain w/o Contrast   Final Result   IMPRESSION:   1.  Multiple acute infarcts involving the right hemisphere in a watershed distribution.   2.  No evidence of hemorrhage or significant mass effect.   3.  Abnormal right internal carotid artery flow void and anterior cerebral artery flow-voids (refer to MRA report).   4.  Multiple small old infarcts.          Laboratory:  Labs Ordered and Resulted from Time of ED Arrival to Time of ED Departure   COMPREHENSIVE METABOLIC PANEL - Abnormal       Result Value    Sodium 132 (*)     Potassium 3.7      Chloride 98      Carbon Dioxide (CO2) 28      Anion Gap 6      Urea Nitrogen 15      Creatinine 0.74      Calcium 9.6      Glucose 383 (*)     Alkaline Phosphatase 78      AST 7      ALT 18      Protein Total 8.0      Albumin 3.5      Bilirubin Total 0.3      GFR Estimate >90     CBC WITH PLATELETS AND DIFFERENTIAL - Abnormal    WBC Count 11.1 (*)     RBC Count 4.58      Hemoglobin 14.3      Hematocrit 42.8      MCV 93      MCH 31.2      MCHC 33.4      RDW 11.9      Platelet Count 348      % Neutrophils 66      % Lymphocytes 24      % Monocytes 6      % Eosinophils 2      % Basophils 1      % Immature Granulocytes 1      NRBCs per 100 WBC 0      Absolute Neutrophils 7.4      Absolute Lymphocytes 2.7      Absolute Monocytes 0.7      Absolute Eosinophils 0.2      Absolute Basophils 0.1      Absolute Immature Granulocytes 0.1      Absolute NRBCs 0.0     TSH WITH FREE T4 REFLEX - Normal    TSH 0.50     CK TOTAL - Normal    CK 38     TROPONIN I - Normal    Troponin I High Sensitivity 7     COVID-19 VIRUS (CORONAVIRUS) BY PCR   INR   PARTIAL THROMBOPLASTIN TIME   DRUG ABUSE SCREEN 1 URINE (ED)       Emergency Department Course:  Reviewed:  I reviewed nursing notes, vitals, past history and care everywhere    Assessments:  1920 I obtained history and examined the patient as noted above.   2210 I rechecked the patient and explained findings.  "    Consults:   1935 I spoke with Dr. Douglas stroke neurology regarding the patient.  2200 I spoke with Dr. Douglas stroke neurology regarding the patient.  2230 I spoke with Morelia Garcia PA-C  Interventions:  Medications   sodium chloride (PF) 0.9% PF flush 60 mL (has no administration in time range)   heparin infusion 25,000 units in D5W 250 mL ANTICOAGULANT (800 Units/hr Intravenous New Bag 2/5/22 2235)   aspirin (ASA) chewable tablet 324 mg (324 mg Oral Given 2/5/22 2238)   atorvastatin (LIPITOR) tablet 80 mg (80 mg Oral Given 2/5/22 2238)       Disposition:  The patient was admitted to the hospital under the care of Dr. Parker.    Impression & Plan      Medical Decision Making:  This is a 51-year-old male who presents with uncontrolled left upper extremity arm movements prior to arrival now resolved.  At present he is back to baseline.  His neuro exam is normal except for some left-sided numbness which is baseline for the patient from his prior strokes.  Given complete resolution of symptoms code stroke not activated as not thrombolytic candidate.  I did however discuss with stroke neuro who recommended MRI/MRA.  This was obtained which demonstrated multiple small acute strokes in watershed distribution.  Studies were ordered with contrast but per were not completed as MRI technician reported patient declined as \"He needed a break\".  Also shows unchanged right internal carotid artery occlusion, as well as stenosis versus filling defects of the middle cerebral and anterior cerebral artery areas.  Discussed with stroke neurology they recommend aspirin, heparin, no indication for emergent endovascular therapy or thrombolytics.  They feel he is appropriate for admission at [facility] and does not require transfer at this time.  Positive arm movement symptoms reported by the patient likely due to strokes however myoclonic seizure activity would be in the differential.  After discussion with neurology will hold on " anticonvulsants at this time given unwitnessed symptoms lower suspicion for seizure.  Remainder of work-up unremarkable.  Per stroke neuro will plan for permissive hypertension up to systolic 180, avoidance of hypotension.  Discussed with hospitalist who agrees to accept the patient for admission.  Covid-19  Josue Parisi was evaluated during a global COVID-19 pandemic, which necessitated consideration that the patient might be at risk for infection with the SARS-CoV-2 virus that causes COVID-19.   Applicable protocols for evaluation were followed during the patient's care.   COVID-19 was considered as part of the patient's evaluation. The plan for testing is:  a test was obtained during this visit.    Diagnosis:    ICD-10-CM    1. Cerebrovascular accident (H)  I63.9    2. Stenosis of cerebral artery  I66.9        Discharge Medications:  New Prescriptions    No medications on file         Scribe Disclosure:  Bob VELASQUEZ, am serving as a scribe on 2/5/2022 at 8:01 PM to document services personally performed by Sandeep Mao PA-C based on my observations and the provider's statements to me.        Sandeep Mao PA-C Etten, Clark Ellsworth, PA-C  02/06/22 0023

## 2022-02-06 NOTE — CONSULTS
"      [facility]    Stroke Consult Note    Reason for Consult:  stroke    Chief Complaint: Abnormal Arm Movements       HPI  Josue Parisi is a 51 year old male with past medical history significant for known R ICA occlusion, prior stroke, HTN, HLD, DM, and drug abuse. He presented to the [facility] ED 2/5 for evaluation of left arm spasticity. He tells me that he has a history of mild left hemibody weakness and numbness from a prior stroke. Over the past couple months he has had fluctuating left arm weakness that would resolve. On the day of presentation, he thinks the arm was not weak, but rather he was having difficulty controlling it. He denies other focal neurologic deficits.     Today he appears generally uncomfortable in bed. He denies pain and feels he has better control of his left arm today. UDS is positive for benzos and cocaine. He shares that he uses cocaine \"sometimes on the weekend\".     Stroke Evaluation Summarized    MRI/Head CT MRI brain with multiple acute infarcts in the right hemisphere watershed distribution   Intracranial Vasculature MRA brain with chronic occlusion of the R ICA (unchanged), severe stenosis vs filling defect in the R MCA, and bilateral severe stenosis vs occlusion of the bilateral ACAs at the A1/A2 junction   Cervical Vasculature MRA neck with occlusion of the R ICA near the origin.      Echocardiogram EF 60-65%, normal LA, negative bubble. Small, relatively immobile echodensity in the IVC, thrombus cannot be entirely excluded   EKG/Telemetry Sinus tachycardia   Other Testing Not Applicable     LDL  2/6/2022: 126 mg/dL   A1C  2/6/2022: 10.5 %   Troponin 2/5/2022: 7 ng/L       Impression  Acute ischemic stroke of R MCA watershed territory due to large-artery atherosclerosis - stroke likely secondary to significant intra and extracranial stenosis vs artery to artery embolism vs ESUS (given prior left hemisphere stroke)    Recommendations  - Continue heparin " "gtt for now. Final plan for antiplatelet vs anticoagulation pending further evaluation of IVC echodensity. Had a long conversation regarding the need for compliance with these medications.   - , has not been taking his prescribed statin. Discussed importance of taking Lipitor 80 mg daily. He denies side effects when taking this dose previously.   - Goal A1c <7.0  - Long term goal BP <130/80. Slow titration to be achieved over the next several weeks, would not acutely lower while inpatient given carotid and intracranial occlusions  - Discharge with 30 day cardiac event monitor to evaluate for atrial fibrillation  - Discussed cessation of both cocaine and tobacco for secondary stroke prevention    Patient Follow-up    - in the next 1-2 week(s) with PCP   - With [facility] neurology in 6 weeks (established with this clinic from prior stroke, please assist in scheduling appt)    Thank you for this consult. We will continue to follow peripherally.     SANDY Meraz, CNP  Neurology  To page me or covering stroke neurology team member, click here: AMCOM   Choose \"On Call\" tab at top, then search dropdown box for \"Neurology Adult\", select location, press Enter, then look for stroke/neuro ICU/telestroke.    _____________________________________________________    Clinically Significant Risk Factors Present on Admission                  Past Medical History   Past Medical History:   Diagnosis Date     CVA (cerebral infarction)      Hypertension      Past Surgical History   No past surgical history on file.  Medications   Home Meds  Prior to Admission medications    Medication Sig Start Date End Date Taking? Authorizing Provider   ALPRAZolam (XANAX) 1 MG tablet Take 1 mg by mouth nightly as needed for anxiety   Yes Unknown, Entered By History   atorvastatin (LIPITOR) 20 MG tablet Take 20 mg by mouth daily   Yes Unknown, Entered By History   cetirizine (ZYRTEC) 10 MG tablet Take 10 mg by mouth daily   Yes " Unknown, Entered By History   chlorhexidine (HIBICLENS) 4 % liquid Apply topically daily as needed for wound care   Yes Unknown, Entered By History   doxycycline hyclate (VIBRAMYCIN) 100 MG capsule Take 100 mg by mouth 2 times daily   Yes Unknown, Entered By History   hydrochlorothiazide (HYDRODIURIL) 25 MG tablet Take 25 mg by mouth daily   Yes Unknown, Entered By History   metFORMIN (GLUCOPHAGE) 1000 MG tablet Take 1,000 mg by mouth 2 times daily (with meals)   Yes Unknown, Entered By History       Scheduled Meds    aspirin  81 mg Oral Daily    Or     aspirin  81 mg Oral or NG Tube Daily     gabapentin  200 mg Oral At Bedtime     insulin aspart  1-7 Units Subcutaneous TID AC     insulin aspart  1-5 Units Subcutaneous At Bedtime     insulin glargine  15 Units Subcutaneous At Bedtime     [Held by provider] metFORMIN  500 mg Oral BID w/meals     nicotine  1 patch Transdermal Daily     nicotine   Transdermal Q8H       Infusion Meds    heparin 1,100 Units/hr (02/06/22 1405)     - MEDICATION INSTRUCTIONS -       - MEDICATION INSTRUCTIONS -         PRN Meds  glucose **OR** dextrose **OR** glucagon, labetalol **OR** hydrALAZINE, - MEDICATION INSTRUCTIONS -, - MEDICATION INSTRUCTIONS -, ondansetron **OR** ondansetron    Allergies   No Known Allergies  Family History   No family history on file.  Social History   Social History     Tobacco Use     Smoking status: Current Every Day Smoker     Packs/day: 0.50     Smokeless tobacco: Not on file   Substance Use Topics     Alcohol use: No     Drug use: No       Review of Systems   The 10 point Review of Systems is negative other than noted in the HPI or here.        PHYSICAL EXAMINATION   Temp:  [97.6  F (36.4  C)-98.5  F (36.9  C)] 98  F (36.7  C)  Pulse:  [] 98  Resp:  [16-20] 18  BP: (147-177)/(55-97) 163/93  SpO2:  [96 %-100 %] 96 %    Neuro Exam  Mental Status:  alert, oriented x 3, follows commands, speech clear and fluent, naming and repetition normal  Cranial  Nerves:  visual fields intact (tested by nurse), EOMI with normal smooth pursuit, facial sensation intact and symmetric (tested by nurse), hearing not formally tested but intact to conversation, no dysarthria, shoulder shrug equal bilaterally, tongue protrusion midline, L NLF flattening  Motor:  no abnormal movements, no drift in upper or lower extremities, decreased fine motor movements in LUE  Reflexes:  unable to test (telestroke)  Sensory:  no extinction on double simultaneous stimulation (assessed by nurse), reports sensation to left hemibody is 50% compared to right hemibody  Coordination:  decreased accuracy of HTN with LLE (suspect secondary to weakness)  Station/Gait:  unable to test due to telestroke    Dysphagia Screen  Per Nursing    Stroke Scales    NIHSS  Interval     Interval Comments     1a. Level of Consciousness 0-->Alert, keenly responsive   1b. LOC Questions 0-->Answers both questions correctly   1c. LOC Commands 0-->Performs both tasks correctly   2.   Best Gaze 0-->Normal   3.   Visual 0-->No visual loss   4.   Facial Palsy 1-->Minor paralysis (flattened nasolabial fold, asymmetry on smiling)   5a. Motor Arm, Left 0-->No drift, limb holds 90 (or 45) degrees for full 10 secs   5b. Motor Arm, Right 0-->No drift, limb holds 90 (or 45) degrees for full 10 secs   6a. Motor Leg, Left 0-->No drift, leg holds 30 degree position for full 5 secs   6b. Motor Leg, right 0-->No drift, leg holds 30 degree position for full 5 secs   7.   Limb Ataxia 0-->Absent   8.   Sensory 1-->Mild-to-moderate sensory loss, patient feels pinprick is less sharp or is dull on the affected side, or there is a loss of superficial pain with pinprick, but patient is aware of being touched   9.   Best Language 0-->No aphasia, normal   10. Dysarthria 0-->Normal   11. Extinction and Inattention  0-->No abnormality   Total 2 (02/06/22 1624)       Modified Roberto Score (Pre-morbid)  2-Slight disability; unable to carry out all  previous activities, but able to look after own affairs    Imaging  I personally reviewed all imaging; relevant findings per HPI.    Labs Data   CBC  Recent Labs   Lab 02/06/22  0617 02/05/22 1920   WBC 9.5 11.1*   RBC 4.24* 4.58   HGB 12.9* 14.3   HCT 40.2 42.8    348     Basic Metabolic Panel   Recent Labs   Lab 02/06/22  1214 02/06/22  0723 02/06/22  0617 02/06/22  0056 02/05/22 1920   NA  --   --  136  --  132*   POTASSIUM  --   --  3.9  --  3.7   CHLORIDE  --   --  103  --  98   CO2  --   --  28  --  28   BUN  --   --  17  --  15   CR  --   --  0.78  --  0.74   * 279* 309*   < > 383*   DAT  --   --  9.2  --  9.6    < > = values in this interval not displayed.     Liver Panel  Recent Labs   Lab 02/05/22 1920   PROTTOTAL 8.0   ALBUMIN 3.5   BILITOTAL 0.3   ALKPHOS 78   AST 7   ALT 18     INR    Recent Labs   Lab Test 02/05/22  1919 04/18/21  1553 09/17/14  1343   INR 0.97 1.05 1.06      Lipid Profile    Recent Labs   Lab Test 02/06/22  0617   CHOL 193   HDL 38*   *   TRIG 144     A1C    Recent Labs   Lab Test 02/06/22  0617 02/05/22 1920   A1C 10.5* 10.4*     Troponin I  No results for input(s): TROPONIN, GHTROP in the last 168 hours.       Stroke Consult Data Data   Telestroke Service Details  (for non-emergent stroke consult with tele)  Video start time 02/06/22   1000   Video end time 02/06/22   1020   Type of service telemedicine diagnostic assessment of acute neurological changes   Reason telemedicine is appropriate patient requires assessment with a specialist for diagnosis and treatment of neurological symptoms   Mode of transmission secure interactive audio and video communication per Nancy   Originating site (patient location) [facility]    Distant site (provider location) [facility]     I have personally spent a total of 50 minutes providing care and consulting with this patient's medical providers today, with more than 50% of this time  spent in consultation, coordination of care, and discussion with the patient and/or family regarding diagnostic results, prognosis, symptom management, risks and benefits of management options, and development of plan of care.

## 2022-02-06 NOTE — CONSULTS
"    [facility]    Stroke Telephone Note    I was called by Sandeep Mao on 02/05/22 regarding patient Josue Parisi. The patient is a 51 year old male with history of HTN, R cervical ICA occlusion and prior stroke. The patient reported that 1 hour before presenting to the ED he had difficulty using his left UE because it was moving repetitively against his will. Patient is not compliant on his medications.         Imaging Findings   MRI brain: multiple punctate infarctions in the R MCA territory with several of them appear in a watershed distribution.   MRA neck: R cervical ICA occlusion.   MRA head: Distal R MCA occlusion.       Impression  Acute ischemic stroke of R MCA territory due to undetermined etiology Possibly hemodynamic vs artery to artery embolization.       Recommendations   - Use orderset: \"Ischemic Stroke Routine Admission\" or \"Ischemic Stroke No Thrombolytics/No Thrombectomy ICU Admission\"  - Neurochecks and Vital Signs every 4 hours   - BP goal 140-180/ mmHg  - Heparin gtt (no bolus, low intensity)   - Aspirin 325 mg once now in the ED  - Statin: atorvastatin 80  - MRI Brain with and without contrast  - TTE (with Bubble Study if age 60 yrs or less)  - Telemetry, EKG  - Bedside Glucose Monitoring  - A1c, Lipid Panel, Troponin x 3  - PT/OT/SLP  - Stroke Education  - Euthermia, Euglycemia    My recommendations are based on the information provided over the phone by Josue Parisi's in-person providers. They are not intended to replace the clinical judgment of his in-person providers. I was not requested to personally see or examine the patient at this time.        Marli Douglas MD, Msc, FAHA, FAAN   of Neurology  [facility]     02/05/2022 10:52 PM  To page me or covering stroke neurology team member, click here: AMCOM  Choose \"On Call\" tab at top, then search dropdown box for \"Neurology Adult\" & press Enter, look for Neuro " ICU/Stroke

## 2022-02-06 NOTE — PHARMACY-ADMISSION MEDICATION HISTORY
Admission medication history interview status for this patient is complete. See [facility] admission navigator for allergy information, prior to admission medications and immunization status.     Medication history interview done, indicate source(s): Patient  Medication history resources (including written lists, pill bottles, clinic record):SureScripts and Care Everywhere  Pharmacy: [facility]    Changes made to PTA medication list:  Added: atorvastatin; cetirizine; chlorhexidine; doxycycline; hydrochlorothiazide; metformin  Changed: None  Reported as Not Taking: None  Removed: albuterol; bacitracin; multivitamin; promethazine-codeine; propranolol    Actions taken by pharmacist (provider contacted, etc): Spoke with pt to verify med list.     Additional medication history information: Per pt he only takes his atorvastatin when he thinks he needs it so every once and awhile. He states he takes too many medications. He stopped taking his plavix on his own because it was causing him to bruise easily. Pt also stated that he never started gabapentin, but he believes he has a bottle at home. Pt also said his provider won't prescribe his promethazine-codeine anymore.    Medication reconciliation/reorder completed by provider prior to medication history?  Y   (Y/N)     For patients on insulin therapy: N    Prior to Admission medications    Medication Sig Last Dose Taking? Auth Provider   ALPRAZolam (XANAX) 1 MG tablet Take 1 mg by mouth nightly as needed for anxiety 2/5/2022 at pm Yes Unknown, Entered By History   atorvastatin (LIPITOR) 20 MG tablet Take 20 mg by mouth daily Past Week at Unknown time Yes Unknown, Entered By History   cetirizine (ZYRTEC) 10 MG tablet Take 10 mg by mouth daily 2/5/2022 at Unknown time Yes Unknown, Entered By History   chlorhexidine (HIBICLENS) 4 % liquid Apply topically daily as needed for wound care 2/5/2022 at Unknown time Yes Unknown, Entered By History   doxycycline hyclate (VIBRAMYCIN)  100 MG capsule Take 100 mg by mouth 2 times daily 2/5/2022 at Unknown time Yes Unknown, Entered By History   hydrochlorothiazide (HYDRODIURIL) 25 MG tablet Take 25 mg by mouth daily 2/4/2022 Yes Unknown, Entered By History   metFORMIN (GLUCOPHAGE) 1000 MG tablet Take 1,000 mg by mouth 2 times daily (with meals) 2/5/2022 at Unknown time Yes Unknown, Entered By History

## 2022-02-06 NOTE — H&P
[facility]  Admission History and Physical Examination    NAME: Josue Parisi   : 1970   MRN: [id number]     Date of Admission:  2022    Assessment & Plan   Josue Parisi is a 51 year old male with a past medical history significant for multiple strokes in the past (, 2021 ) with reported residual left-sided numbess, he had not follow-up with neurologist post discharge, poorly controlled hypertension, DM, with peripheral neuropathy, medication noncompliance who presents with episode of involuntary movement and jerking in his left arm.     On arrival to emergency room his labs are fairly unremarkable except mild leukocytosis mild hyponatremia 132 but elevated blood glucose at 383.  EKG shows sinus tachycardia but no ischemic changes.  MRI of the brain and neck was performed showed multiple small infarcts but also had multiple acute infarct involving the right hemisphere in a watershed distribution no evidence of hemorrhage or significant mass-effect.  MRI shows occlusion of the right internal carotid artery near the origin but that looks about the same as he did in .  Stroke neurology was contacted and reviewed the images there is no obvious immediate intervention that need to be done.  He recommended initiation of heparin drip due to concerns for potential embolization and admitted to the hospital for further evaluation.     After patient was admitted to the hospital his drug screen test did come back positive for benzodiazepine and cocaine    #Acute right-sided stroke concerning for embolic etiology given watershed distribution  -Distribution of the stroke is concerning for embolic source.  Now with positive cocaine in urine tox raises the question of cardiac etiology  -Continue heparin drip as recommended by neurology  -TTE with bubble for now, however may need to consider FILEMON for better clot assessment  -Continue baby aspirin  -Monitor blood pressure, treat for  SBP greater than 180   - high-dose statin at 80 mg  -PT evaluation as patient states that he has baseline left-sided weakness and recent falls (?)    #Hypertension  -Blood pressure elevated with systolic in the 140s to 150s, okay for now allowing for permissive hypertension in the short-term  -His medication is not even listed he states that he takes hydrochlorothiazide and something else but is not consistent  -We'll add as needed hydralazine for SBP greater than 180 but otherwise we'll see where his blood pressure lies and reinitiate antihypertensive    #Diabetes mellitus  -He tells me he is on Metformin once a day doesn't know dosage  -We'll check hemoglobin A1c, currently blood sugar high in the 300s  -We'll start Metformin 500 twice daily for now, will need to titrate in the future    #Left leg paresthesia, numbness, thigh pain  -Patient is a very poor historian and is somewhat tangential.  I'm not getting a good sense of what is chronic and baseline versus acute  -He is describing left anterior thigh pain with left distal foot paresthesia  -Some notes states that this is residual of previous CVA or could be diabetic neuropathy  -No weakness on exam  -Trial of gabapentin for neuropathic pain  -If he does have significant left-sided weakness or true pain, consider imaging his LS spine to make sure were not missing anything there given his poor history    #Ongoing tobacco abuse  #Admits to cocaine use but doesn't remember when, confirmed by drug screen  -Half a pack a day tobacco   -Encourage smoking cessation    Awaiting formal pharmacy reconciliation to resume home medications.     DVT Prophylaxis: Heparin gtt  Code Status: Full Code  COVID PCR TESTING STATUS: Pending, Asymptomatic. No precautions.     Expected Discharge: 1-2 days   Anticipated discharge location:  Awaiting care coordination garrett Garcia PA-C    Primary Care Physician   Von Aguirre    Chief Complaint   Left arm  spasm/jerks    History is obtained from the patient    Discussed with Sandeep Mao in the ED, full chart review including lab work, imaging, and vital signs were reviewed.     History of Present Illness     Josue Parisi is a 51 year old male with a past medical history significant for multiple strokes in the past (2012, August 2021 ) with reported residual left-sided numbess, he had not follow-up with neurologist post discharge, poorly controlled hypertension, DM, with peripheral neuropathy, medication noncompliance who presents with episode of involuntary movement and jerking in his left arm.     Patient is a fairly poor historian and he is somewhat tangential.  Note that he became somewhat defensive during the middle of his conversation and mention that he did not trust doctors.     He states that he started having involuntary left arm movement and spasms this evening.  He felt a little dizzy but no vision changes no palpitation, no recent fevers chills or illness prior to presentation.  He states that he doesn't take all of his meds consistently and he felt like he doesn't need all of them.  He does recognize that he doesn't take his blood thinners namely his aspirin as he is supposed to and recognizes that he forgets.  He also complains of chronic left-sided paresthesia numbness and left leg pain.  Notes in the past have describe this is chronic but he tells me that this is worse in the last 6 months.    On arrival to emergency room his labs are fairly unremarkable except mild leukocytosis mild hyponatremia 132 but elevated blood glucose at 383.  EKG shows sinus tachycardia but no ischemic changes.  MRI of the brain and neck was performed showed multiple small infarcts but also had multiple acute infarct involving the right hemisphere in a watershed distribution no evidence of hemorrhage or significant mass-effect.  MRI shows occlusion of the right internal carotid artery near the origin but that looks  about the same as he did in 2014.  Stroke neurology was contacted and reviewed the images there is no obvious immediate intervention that need to be done.  He recommended initiation of heparin drip due to concerns for potential embolization and admitted to the hospital for further evaluation.     Past Medical History    I have reviewed this patient's medical history and updated it with pertinent information if needed.   Past Medical History:   Diagnosis Date     CVA (cerebral infarction)      Hypertension        Past Surgical History   I have reviewed this patient's surgical history and updated it with pertinent information if needed.  No past surgical history on file.    Prior to Admission Medications   Prior to Admission Medications   Prescriptions Last Dose Informant Patient Reported? Taking?   ALPRAZOLAM PO   Yes No   Sig: Take  by mouth.   Multiple Vitamin (MULTI-VITAMIN) per tablet   Yes No   Sig: Take 1 tablet by mouth daily.   PROPRANOLOL HCL PO   Yes No   Sig: Take  by mouth.   albuterol (2.5 MG/3ML) 0.083% nebulizer solution   Yes No   Sig: Take 1 ampule by nebulization every 6 hours as needed.   bacitracin 500 UNIT/GM OINT   No No   Sig: Apply 1 g topically 2 times daily   promethazine-codeine (PHENERGAN WITH CODEINE) 6.25-10 MG/5ML syrup   Yes No   Sig: Take 5 mLs by mouth 4 times daily as needed.      Facility-Administered Medications: None     Allergies   No Known Allergies    Social History   I have reviewed this patient's social history and updated it with pertinent information if needed. Josue JAUREGUI [place]  reports that he has been smoking. He has been smoking about 0.50 packs per day. He does not have any smokeless tobacco history on file. He reports that he does not drink alcohol and does not use drugs.    Family History   I have reviewed this patient's family history and updated it with pertinent information if needed.     Review of Systems   The 10 point Review of Systems is negative other than  noted in the HPI or here.     Physical Exam   Temp: 98.4  F (36.9  C) Temp src: Oral BP: (!) 147/94 Pulse: 105   Resp: 16 SpO2: 96 % O2 Device: None (Room air)    Vital Signs with Ranges  Temp:  [98.3  F (36.8  C)-98.4  F (36.9  C)] 98.4  F (36.9  C)  Pulse:  [105-111] 105  Resp:  [16] 16  BP: (147-155)/(94-97) 147/94  SpO2:  [96 %-98 %] 96 %  150 lbs 0 oz    Constitutional: Awake, alert,  no apparent distress.  Eyes: Conjunctiva and pupils examined and normal.  HEENT: Moist mucous membranes, normal dentition.  Respiratory: Clear to auscultation bilaterally, no crackles or wheezing.  Cardiovascular: Regular rate and rhythm, normal S1 and S2, and no murmur noted.  GI: Soft, non-distended, non-tender, bowel sounds present. No rebound tenderness or guarding.  Lymph/Hematologic: No anterior cervical or supraclavicular adenopathy.  Skin: No rashes, no cyanosis, no edema.  Musculoskeletal: No deformities noted.  No erythema or tenderness. Moving all extremities.  Neurologic:  Slight dec strength on left leg 4/5, +TTP over the anterior left thigh, No focal deficits noted. Speech is clear. Coordination and strength grossly normal.   Psychiatric: Appropriate affect.    Data   Data reviewed today:      EKG: NSR  Imaging:   Recent Results (from the past 24 hour(s))   MR Brain w/o Contrast    Narrative    EXAM: MR BRAIN W/O CONTRAST  LOCATION: [facility]  DATE/TIME: 2/5/2022 8:20 PM    INDICATION: Transient ischemic attack (TIA).  COMPARISON: None.  TECHNIQUE: Routine multiplanar multisequence head MRI without intravenous contrast.    FINDINGS:  Multiple small areas of diffusion restriction are present scattered throughout the right cerebral hemisphere in a relative watershed distribution involving the frontal and right parietal lobes. No convincing left hemispheric infarcts. No evidence of   hemorrhage or significant mass effect. Old infarcts are present involving the left basal ganglia/centrum  semiovale, right total periventricular white matter thalamus, and left cerebellar hemisphere.    Marrow signal is within normal limits. Mild paranasal sinus mucosal thickening. Abnormal right internal carotid artery flow void and bilateral anterior cerebral artery flow-voids.      Impression    IMPRESSION:  1.  Multiple acute infarcts involving the right hemisphere in a watershed distribution.  2.  No evidence of hemorrhage or significant mass effect.  3.  Abnormal right internal carotid artery flow void and anterior cerebral artery flow-voids (refer to MRA report).  4.  Multiple small old infarcts.   MR Head w/o Contrast Angiogram    Narrative    EXAM: MRA BRAIN ([place] [facility]) WO CONTRAST  LOCATION: [facility]  DATE/TIME: 2/5/2022 8:20 PM    INDICATION: Neuro deficit, acute, stroke suspected.  COMPARISON: CTA of the head and neck 09/17/2014.  TECHNIQUE: 3D time-of-flight head MRA without intravenous contrast.    FINDINGS:  Right internal carotid artery is occluded and reconstituted at the opthalmic segment, unchanged. Severe stenosis versus filling defect at the right middle cerebral artery M1/M2 junction, new compared to the prior exam. The left internal carotid artery   and left middle cerebral artery are patent. Bilateral severe stenoses versus occlusions of the bilateral lateral anterior cerebral arteries at the A1/A2 junctions with reconstitution of the right A2 segment, new compared to the prior exam. The bilateral   vertebral arteries, basilar artery, and posterior cerebral arteries are patent. Fetal origin of the right posterior cerebral artery. No evidence of aneurysm or vascular malformation.      Impression    IMPRESSION:  1.  Occlusion of the right internal carotid artery with reconstitution at the ophthalmic segment, unchanged.  2.  Severe stenosis versus filling defect at the right middle cerebral artery bifurcation, new compared to the prior exam.  3.  Bilateral severe  stenoses versus occlusions of the bilateral anterior cerebral arteries at the A1/A2 junctions with reconstitution of the right A2 segment, new compared to the prior exam.   MRA Angiogram Neck w/o Contrast    Narrative    EXAM: MRA NECK (CAROTIDS) WO CONTRAST  LOCATION: [facility]  DATE/TIME: 2/5/2022 8:19 PM    INDICATION: Neuro deficit, acute, stroke suspected.  COMPARISON: CTA images from 09/17/2014.  TECHNIQUE: Neck MRA without IV contrast. Stenosis measurements made according to NASCET criteria unless otherwise specified.    FINDINGS:  The right internal carotid artery is occluded near the origin. The bilateral common carotid, left internal carotid, bilateral external carotid, and bilateral vertebral arteries are patent. Left vertebral artery is dominant. Cervical spine degenerative   changes.      Impression    IMPRESSION:  1.  Occlusion of the right internal carotid artery near the origin, unchanged compared to the prior exam.       Recent Labs   Lab 02/05/22  1920   WBC 11.1*   HGB 14.3   MCV 93      *   POTASSIUM 3.7   CHLORIDE 98   CO2 28   BUN 15   CR 0.74   ANIONGAP 6   DAT 9.6   *   ALBUMIN 3.5   PROTTOTAL 8.0   BILITOTAL 0.3   ALKPHOS 78   ALT 18   AST 7           Morelia Garcia PA-C  [facility]  February 5, 2022  Securely message with the [employer] Web Console (learn more here)  Text page via [employer] Paging/Directory

## 2022-02-06 NOTE — PROGRESS NOTES
[facility]  Hospitalist Progress Note  Israel Neal MD 02/06/2022    Reason for Stay (Diagnosis): CVA         Assessment and Plan:      Summary of Stay: Josue Parisi is a 51 year old male with a past medical history significant for multiple strokes in the past (2012, August 2021 ) with reported residual left-sided numbess, he had not follow-up with neurologist post discharge, poorly controlled hypertension, DM, with peripheral neuropathy, medication noncompliance who presents with episode of involuntary movement and jerking in his left arm.      On arrival to emergency room his labs are fairly unremarkable except mild leukocytosis mild hyponatremia 132 but elevated blood glucose at 383.  EKG showed sinus tachycardia but no ischemic changes.  MRI of the brain and neck was performed showed multiple small infarcts but also had multiple acute infarct involving the right hemisphere in a watershed distribution no evidence of hemorrhage or significant mass-effect.  MRI shows occlusion of the right internal carotid artery near the origin but that looks about the same as he did in 2014.  Stroke neurology was contacted and reviewed the images and felt that there was no immediate intervention that needed to be done.  Neuroloogy recommended initiation of heparin drip due to concerns for potential embolization and admitted to the hospital for further evaluation.      After patient was admitted to the hospital his drug screen test did come back positive for benzodiazepine and cocaine    TTE today shows no source of embolization but did shows possible artifact vs thrombus in IVC.  I spoke with cardiology who recommends CT to further evaluate this.  It is not thought that this potential abnormality is a cause of CVA as bubble study on TTE was neg    Plans today:  - neuro consult  - pt counseled on stopping cocaine use in the setting of acute CVA  - abd CT with contrast - evaluate abnormality of IVC  noted on TTE (artifact vs thrombus)  - start lantus 15 unit(s) qhs     #Acute right-sided stroke concerning for embolic etiology given watershed distribution  -Distribution of the stroke is concerning for embolic source.    -Continue heparin drip as recommended by neurology  -TTE with bubble neg for embolic source  -Continue baby aspirin  - high-dose statin at 80 mg  -PT evaluation as patient states that he has baseline left-sided weakness and recent falls    #LUE jerking/involuntary movement  - unclear etiology and await neuro input.  Consider partial seizure related to CVA    #abnormal TTE  - see above  - chest CT ordered     #Hypertension  -Blood pressure elevated with systolic in the  150-160ss, okay for now allowing for permissive hypertension in the short-term     #Diabetes mellitus  - metformin started; hold for now given plans for CT     #Left leg paresthesia, numbness, thigh pain  - chronic per pt report  - possible neuropathic pain     #Ongoing tobacco abuse    #Cocaine use          DVT Prophylaxis: Heparin gtt  Code Status: Full Code  DISPO:  When OK with neurology and after work up completed.  Tomorrow anticipated        Interval History (Subjective):      suboptimal historian, tangential speech.  Describes that he came to ER due to jerking and uncontrolled movements of LUE.  No hx of seizure and never had sx like this before                  Physical Exam:      Last Vital Signs:  BP (!) 163/93 (BP Location: Right arm)   Pulse 98   Temp 98  F (36.7  C) (Oral)   Resp 18   Wt 71.8 kg (158 lb 6.4 oz)   SpO2 96%       Intake/Output Summary (Last 24 hours) at 2/6/2022 1419  Last data filed at 2/6/2022 1205  Gross per 24 hour   Intake 360 ml   Output --   Net 360 ml       Constitutional: Awake, alert, cooperative, no apparent distress   Respiratory: Clear to auscultation bilaterally, no crackles or wheezing   Cardiovascular: Regular rate and rhythm, normal S1 and S2, and no murmur noted   Abdomen: Normal  bowel sounds, soft, non-distended, non-tender   Skin: No rashes, no cyanosis, dry to touch   Neuro: Alert and oriented x3, no weakness, numbness, memory loss   Extremities: No edema, normal range of motion   Other(s):        All other systems: Negative          Medications:      All current medications were reviewed with changes reflected in problem list.         Data:      All new lab and imaging data was reviewed.   Labs:  Recent Labs   Lab 02/06/22  1214 02/06/22  0723 02/06/22  0617 02/06/22  0056 02/05/22  1920   NA  --   --  136  --  132*   POTASSIUM  --   --  3.9  --  3.7   CHLORIDE  --   --  103  --  98   CO2  --   --  28  --  28   ANIONGAP  --   --  5  --  6   * 279* 309*   < > 383*   BUN  --   --  17  --  15   CR  --   --  0.78  --  0.74   GFRESTIMATED  --   --  >90  --  >90   DAT  --   --  9.2  --  9.6    < > = values in this interval not displayed.     Recent Labs   Lab 02/06/22  0617   WBC 9.5   HGB 12.9*   HCT 40.2   MCV 95         Imaging:   Recent Results (from the past 24 hour(s))   MR Brain w/o Contrast    Narrative    EXAM: MR BRAIN W/O CONTRAST  LOCATION: [facility]  DATE/TIME: 2/5/2022 8:20 PM    INDICATION: Transient ischemic attack (TIA).  COMPARISON: None.  TECHNIQUE: Routine multiplanar multisequence head MRI without intravenous contrast.    FINDINGS:  Multiple small areas of diffusion restriction are present scattered throughout the right cerebral hemisphere in a relative watershed distribution involving the frontal and right parietal lobes. No convincing left hemispheric infarcts. No evidence of   hemorrhage or significant mass effect. Old infarcts are present involving the left basal ganglia/centrum semiovale, right total periventricular white matter thalamus, and left cerebellar hemisphere.    Marrow signal is within normal limits. Mild paranasal sinus mucosal thickening. Abnormal right internal carotid artery flow void and bilateral anterior cerebral  artery flow-voids.      Impression    IMPRESSION:  1.  Multiple acute infarcts involving the right hemisphere in a watershed distribution.  2.  No evidence of hemorrhage or significant mass effect.  3.  Abnormal right internal carotid artery flow void and anterior cerebral artery flow-voids (refer to MRA report).  4.  Multiple small old infarcts.   MR Head w/o Contrast Angiogram    Narrative    EXAM: MRA BRAIN ([place] OF FOY) WO CONTRAST  LOCATION: [facility]  DATE/TIME: 2/5/2022 8:20 PM    INDICATION: Neuro deficit, acute, stroke suspected.  COMPARISON: CTA of the head and neck 09/17/2014.  TECHNIQUE: 3D time-of-flight head MRA without intravenous contrast.    FINDINGS:  Right internal carotid artery is occluded and reconstituted at the opthalmic segment, unchanged. Severe stenosis versus filling defect at the right middle cerebral artery M1/M2 junction, new compared to the prior exam. The left internal carotid artery   and left middle cerebral artery are patent. Bilateral severe stenoses versus occlusions of the bilateral lateral anterior cerebral arteries at the A1/A2 junctions with reconstitution of the right A2 segment, new compared to the prior exam. The bilateral   vertebral arteries, basilar artery, and posterior cerebral arteries are patent. Fetal origin of the right posterior cerebral artery. No evidence of aneurysm or vascular malformation.      Impression    IMPRESSION:  1.  Occlusion of the right internal carotid artery with reconstitution at the ophthalmic segment, unchanged.  2.  Severe stenosis versus filling defect at the right middle cerebral artery bifurcation, new compared to the prior exam.  3.  Bilateral severe stenoses versus occlusions of the bilateral anterior cerebral arteries at the A1/A2 junctions with reconstitution of the right A2 segment, new compared to the prior exam.   MRA Angiogram Neck w/o Contrast    Narrative    EXAM: MRA NECK (CAROTIDS) WO  CONTRAST  LOCATION: [facility]  DATE/TIME: 2022 8:19 PM    INDICATION: Neuro deficit, acute, stroke suspected.  COMPARISON: CTA images from 2014.  TECHNIQUE: Neck MRA without IV contrast. Stenosis measurements made according to NASCET criteria unless otherwise specified.    FINDINGS:  The right internal carotid artery is occluded near the origin. The bilateral common carotid, left internal carotid, bilateral external carotid, and bilateral vertebral arteries are patent. Left vertebral artery is dominant. Cervical spine degenerative   changes.      Impression    IMPRESSION:  1.  Occlusion of the right internal carotid artery near the origin, unchanged compared to the prior exam.   Echocardiogram Complete w Bubble Study - For age < 60 yrs   Result Value    LVEF  60-65%    Narrative    [id number]  [id number]  [id number]  [id number]^VINICIO^DELIA^PARISH                                                                       Version 2     [facility]  Echocardiography Laboratory  [address] [place] [address]     Name: JERONIMO CEJA  MRN: [id number]  : 1970  Study Date: 2022 08:06 AM  Age: 51 yrs  Gender: Male  Patient [facility]: [facility]  Reason For Study: Cerebrovascular Incident  Ordering Physician: DELIA MOONEY  Referring Physician: Von Aguirre  Performed By: Verona Jacobson RDCS     BSA: 1.8 m2  Height: 65 in  Weight: 158 lb  HR: 81  BP: 154/84 mmHg  ______________________________________________________________________________  Procedure  Complete Portable Bubble Echo Adult.  ______________________________________________________________________________  Interpretation Summary     1. No etiology for patient's present neurological symptoms identified on the  present exam.  2. Normal LV size, wall thickness, and systolic function.  3. No regional wall motion abnormalities noted.  4. Normal RV size and function.  5. Normal bi-atrial size. Left  atrial appendage cannot be assessed for  thrombus on this study.  6. Mild trileaflet aortic stenosis and moderate-severe mitral annular  calcification, but no evidence of vegetation or hemodynamically significant  valve dysfunction.  7. Negative bubble study.  8. There is a small, relatively immobile echodensity in the IVC, approximately  2 cm from the IVC/RA junction. This could be an artifact, but thrombus cannot  be entirely excluded. Given the negative bubble study, even if this were to  represent thrombus, paradoxical embolism as a source for patient's recurrent  CVA is extremely unlikely. This echodensity could be evaluated further with CT  Venogram.     Results were discussed with patient's hospital medicine physician, Dr. Israel Neal.     No prior study available for comparison.  ______________________________________________________________________________  Left Ventricle  The left ventricle is normal in size. There is normal left ventricular wall  thickness. Left ventricular systolic function is normal. The visual ejection  fraction is 60-65%. Diastolic function not assessed due to significant mitral  annular calcification. No regional wall motion abnormalities noted. Subtle  wall motion abnormalities cannot be entirely excluded due to lack of echo  contrast.     Right Ventricle  The right ventricle is normal size. The right ventricular systolic function is  normal.     Atria  Normal left atrial size. Right atrial size is normal. There is no color  Doppler evidence of an atrial shunt. A contrast injection (Bubble Study) was  performed that was negative for flow across the interatrial septum.     Mitral Valve  There is moderate to severe mitral annular calcification. There is trace  mitral regurgitation. There is no mitral valve stenosis.     Tricuspid Valve  The tricuspid valve is not well visualized, but is grossly normal. There is  trace tricuspid regurgitation. Right ventricular systolic pressure  could not  be approximated due to inadequate tricuspid regurgitation. IVC diameter <2.1  cm collapsing >50% with sniff suggests a normal RA pressure of 3 mmHg.     Aortic Valve  There is mild trileaflet aortic sclerosis. No aortic regurgitation is present.  No aortic stenosis is present.     Pulmonic Valve  The pulmonic valve is not well seen, but is grossly normal. There is no  pulmonic valvular regurgitation.     Vessels  The aortic root is normal size. Normal size ascending aorta.     Pericardium  There is no pericardial effusion.     ______________________________________________________________________________  MMode/2D Measurements & Calculations  IVSd: 0.96 cm  LVIDd: 4.2 cm  LVIDs: 3.5 cm  LVPWd: 0.96 cm  FS: 15.2 %  LV mass(C)d: 127.9 grams  LV mass(C)dI: 71.5 grams/m2     Ao root diam: 2.9 cm  LA dimension: 3.3 cm  asc Aorta Diam: 2.9 cm  LA/Ao: 1.2  LVOT diam: 2.1 cm  LVOT area: 3.5 cm2     EF(MOD-bp): 58.0 %  LA Volume (BP): 29.5 ml  LA Volume Index (BP): 16.5 ml/m2  RWT: 0.46     Doppler Measurements & Calculations  MV E max alla: 70.8 cm/sec  MV A max alla: 87.3 cm/sec  MV E/A: 0.81     MV dec time: 0.17 sec  LV V1 max P.2 mmHg  LV V1 max: 74.4 cm/sec  LV V1 VTI: 13.8 cm  SV(LVOT): 48.0 ml  SI(LVOT): 26.8 ml/m2  PA acc time: 0.10 sec  E/E' av.0  Lateral E/e': 11.4  Medial E/e': 12.5     ______________________________________________________________________________  Report approved by: MD Christiano Mccormick 2022 12:34 PM

## 2022-02-06 NOTE — ED TRIAGE NOTES
"Pt poor historian. Pt states he does not take his medications as prescribed because he believes medical providers, \"Are trying to just get money.\" Pt has possibly had multiple TIA's but they are self diagnosed. Pt is poor historian and when asked questions responds with answers not related to the question. Pt very suspicious of medical staff and untrusting. ABC in tact. A/OX4  "

## 2022-02-06 NOTE — ED NOTES
"Writer gave pt turkey sandwich meal. Pt rolled eyes and states, \"Argh.\" Writer left sandwich in room. Pt requested a phone. Writer provided a phone.   "

## 2022-02-06 NOTE — ED NOTES
Bed: ED35  Expected date: 2/5/22  Expected time: 6:43 PM  Means of arrival: Ambulance  Comments:  [id number]

## 2022-02-07 ENCOUNTER — APPOINTMENT (OUTPATIENT)
Dept: PHYSICAL THERAPY | Facility: CLINIC | Age: 52
DRG: 065 | End: 2022-02-07
Payer: COMMERCIAL

## 2022-02-07 VITALS
HEART RATE: 85 BPM | RESPIRATION RATE: 18 BRPM | WEIGHT: 158.4 LBS | SYSTOLIC BLOOD PRESSURE: 169 MMHG | TEMPERATURE: 98.1 F | OXYGEN SATURATION: 100 % | DIASTOLIC BLOOD PRESSURE: 99 MMHG

## 2022-02-07 LAB
GLUCOSE BLDC GLUCOMTR-MCNC: 261 MG/DL (ref 70–99)
GLUCOSE BLDC GLUCOMTR-MCNC: 316 MG/DL (ref 70–99)
GLUCOSE BLDC GLUCOMTR-MCNC: 332 MG/DL (ref 70–99)

## 2022-02-07 PROCEDURE — 97116 GAIT TRAINING THERAPY: CPT | Mod: GP | Performed by: PHYSICAL THERAPIST

## 2022-02-07 PROCEDURE — 250N000011 HC RX IP 250 OP 636: Performed by: INTERNAL MEDICINE

## 2022-02-07 PROCEDURE — 250N000013 HC RX MED GY IP 250 OP 250 PS 637: Performed by: INTERNAL MEDICINE

## 2022-02-07 PROCEDURE — 99238 HOSP IP/OBS DSCHRG MGMT 30/<: CPT | Performed by: INTERNAL MEDICINE

## 2022-02-07 PROCEDURE — 97530 THERAPEUTIC ACTIVITIES: CPT | Mod: GP | Performed by: PHYSICAL THERAPIST

## 2022-02-07 PROCEDURE — 250N000013 HC RX MED GY IP 250 OP 250 PS 637: Performed by: PHYSICIAN ASSISTANT

## 2022-02-07 RX ORDER — ASPIRIN 325 MG
325 TABLET ORAL DAILY
Status: ON HOLD | COMMUNITY
Start: 2022-02-07 | End: 2022-08-02

## 2022-02-07 RX ORDER — CLOPIDOGREL BISULFATE 75 MG/1
75 TABLET ORAL DAILY
Qty: 90 TABLET | Refills: 0 | Status: SHIPPED | OUTPATIENT
Start: 2022-02-07 | End: 2022-06-24

## 2022-02-07 RX ORDER — GABAPENTIN 100 MG/1
100 CAPSULE ORAL 3 TIMES DAILY
Qty: 90 CAPSULE | Refills: 1 | Status: ON HOLD | OUTPATIENT
Start: 2022-02-07 | End: 2022-06-19

## 2022-02-07 RX ORDER — ATORVASTATIN CALCIUM 80 MG/1
80 TABLET, FILM COATED ORAL DAILY
Qty: 30 TABLET | Refills: 1 | Status: SHIPPED | OUTPATIENT
Start: 2022-02-07

## 2022-02-07 RX ADMIN — ENOXAPARIN SODIUM 70 MG: 80 INJECTION SUBCUTANEOUS at 09:03

## 2022-02-07 RX ADMIN — GABAPENTIN 100 MG: 100 CAPSULE ORAL at 09:04

## 2022-02-07 RX ADMIN — ASPIRIN 81 MG: 81 TABLET, COATED ORAL at 09:04

## 2022-02-07 RX ADMIN — NICOTINE 1 PATCH: 14 PATCH, EXTENDED RELEASE TRANSDERMAL at 09:04

## 2022-02-07 RX ADMIN — SALINE NASAL SPRAY 1 SPRAY: 1.5 SOLUTION NASAL at 00:42

## 2022-02-07 ASSESSMENT — ACTIVITIES OF DAILY LIVING (ADL)
ADLS_ACUITY_SCORE: 7

## 2022-02-07 NOTE — PLAN OF CARE
BP (!) 169/99 (BP Location: Left arm)   Pulse 85   Temp 98.1  F (36.7  C) (Oral)   Resp 18   Wt 71.8 kg (158 lb 6.4 oz)   SpO2 100%     PT cleared for discharge by provider. Medications and discharge paperwork reviewed with PT, and PT verbalized understanding. PT left via cab at 1400.

## 2022-02-07 NOTE — PROGRESS NOTES
Pt seen and examined.      He is upset that nursing staff knows about his cocaine use.  I explained that the nursing staff has access to lab results and it is important for both physician and nursing staff to be aware of results like this as it impacts patient care.    I strongly encouraged him to stop using cocaine, stop smoking, and take his medications as prescribed.    Can discharge home today

## 2022-02-07 NOTE — PROVIDER NOTIFICATION
"Admitting MD paged:   \"FYI - Pt requested to remove tele and stop heparin gtt to use bathroom. Now refusing reapplication of tele. And asking if he could \"get a break from the IV.\"  Hep10A to be drawn at 2245, but gtt is paused. Do we want to d/c tele? Thanks.\"  "

## 2022-02-07 NOTE — PLAN OF CARE
Physical Therapy Discharge Summary    Reason for therapy discharge:    Discharged to home.    Progress towards therapy goal(s). See goals on Care Plan in [facility] electronic health record for goal details.  Goals partially met.  Barriers to achieving goals:   discharge from facility.    Therapy recommendation(s):    Continued therapy is recommended.  Rationale/Recommendations:  OP PT recommended to increase L LE strength and progress gait and balance.

## 2022-02-07 NOTE — PLAN OF CARE
VSS. Left sided wkns, otherwise neuros intact. Tele: SR. Refuses PIV access. Refuses alarms. Pt makes needs known, will cont plan of care.

## 2022-02-07 NOTE — PROGRESS NOTES
Discussed with hospitalist after CT abdomen and pelvis w contrast unremarkable for IVC thrombus. From stroke neurology standpoint given that no PFO, an IVC thrombus would not be suspected etiology of stroke and from our standpoint would recommend DAPT with ASA 81 plavix  x 90 days then  mg daily monotherapy thereafter. If ongoing concerns of IVC thrombus could consider further work-up with CT venogram, and would be okay from our standpoint to anticoagulate if thrombus indeed found. No further stroke work-up needed. Stroke neurology will sign off. Please refer to stroke neurology note 2/6/22 for further discharge recommendations.

## 2022-02-07 NOTE — DISCHARGE SUMMARY
Service Date: 02/07/2022  Discharge Date: 02/07/2022    PRINCIPAL FINAL DIAGNOSES:  1.  Acute ischemic cerebrovascular accident.  2.  Polysubstance abuse.  Urine toxicology screen positive for cocaine this admission.  3.  Hypertension.  4.  Diabetes mellitus, with hemoglobin A1c near 10.  5.  Left lower extremity neuropathic pain symptoms, chronic.  6.  Active tobacco user/smoker.  7.  Abnormal finding on transthoracic echocardiogram of the IVC, suspect likely artifact.  Followup abdominal CT scan with contrast demonstrated no abnormality of IVC.  8.  History of previous ischemic cerebrovascular accident.  9.  Peripheral neuropathy.    PRINCIPAL PROCEDURES THIS ADMISSION:  1.  COVID-19 testing that was negative.  2.  Urine toxicology screen positive for benzodiazepines and cocaine.  3.  Stroke Neurology consultation.  4.  MRI of the brain showing multiple acute infarcts involving the right hemisphere in a watershed distribution.  5.  MRA of the neck showing occlusion of the right internal carotid artery near the origin, unchanged compared to prior study from 2014.  6.  Head MRA showing occlusion of the right internal carotid artery with reconstitution of the ophthalmic segment, unchanged from previous study 2014.  Also found to have severe stenosis versus filling defect of the right middle cerebral artery bifurcation and bilateral severe stenosis versus occlusion of the bilateral anterior cerebral arteries of the A1-A2 junctions.  7.  Transthoracic echocardiogram showing ejection fraction 60% to 65%.  A small relatively mobile echodensity in the IVC approximately 2 cm from the IVC-RA junction noted.  This could be artifact, but thrombus cannot be entirely excluded.  8.  Abdominal pelvic CT scan with contrast, which was performed to evaluate possible abnormality on echocardiogram of the IVC noted above.  Abdominopelvic CT scan showed no abnormality of the IVC.  9.  PT consult.    REASON FOR ADMISSION:  Please see  dictated history and physical.  In brief, Mr. Parisi is a 51-year-old male with the above medical history including previous stroke, who presented to the hospital with concerns about weakness and lack of control of left upper extremity.    HOSPITAL COURSE:  Acute ischemic CVA:  The patient's workup included a brain MRI, which demonstrated findings consistent with acute CVA.  The patient was seen by Neurology.  Neurology felt that the etiology of his CVA is intracranial atherosclerosis, which appeared to be progressive based on previous imaging studies.  Neurology recommended discharge on aspirin full dose and Plavix together for 3 months and then aspirin alone thereafter.    The patient was also strongly encouraged to discontinue cigarette smoking, discontinue cocaine use, and compliance with medications.  There have been issues with poor compliance with medications in the past.    The patient's symptoms improved while hospitalized and appears stable for discharge from the hospital today.    DISCHARGE MEDICATIONS:  1.  Aspirin 325 mg daily.  2.  Plavix 75 mg daily.  3.  Gabapentin 100 mg 3 times a day.  New medication for neuropathic pain symptoms.  4.  Atorvastatin 80 mg daily.  Dose increased from 20 mg previously.  5.  Xanax 1 mg nightly as needed for anxiety.  6.  Zyrtec 10 mg daily.  7.  Doxycycline 100 mg twice a day.  8.  Hydrochlorothiazide 25 mg daily.  9.  Metformin 1000 mg twice a day.    FOLLOWUP INSTRUCTIONS:  1.  See primary MD in 1-2 weeks after discharge from the hospital to review blood pressure and diabetic management.  2.  Take aspirin every day.  3.  Being prescribed Plavix, take this once a day for the next 3 months, then stop.  4.  Stop smoking cigarettes and stop using cocaine.  Both will put him at much higher risk for developing another stroke or having a heart attack.  5.  Do not take metformin today, as the dye he received from the CT scan can interfere with this medication.  Okay to  resume metformin on 2022.    I examined the patient on the day of discharge.    Israel Neal MD        D: 2022   T: 2022   MT: [id number]    Name:     JERONIMO CEJA  MRN:      [id number]        Account:      [id number]   :      1970           Service Date: 2022                                  Discharge Date: 2022     Document: [id number]

## 2022-02-07 NOTE — PROGRESS NOTES
Cross cover note.    Notified patient is refusing telemetry monitoring and further heparin drip at this time.    Chart reviewed.  Currently here with acute stroke.  Stroke neurology recommending aggressive anticoagulation with heparin products at this time.    Stop heparin drip.  Start subcutaneous enoxaparin 1 mg/kg every 12 hours.

## 2022-02-08 ENCOUNTER — PATIENT OUTREACH (OUTPATIENT)
Dept: CARE COORDINATION | Facility: CLINIC | Age: 52
End: 2022-02-08
Payer: COMMERCIAL

## 2022-02-08 DIAGNOSIS — Z71.89 OTHER SPECIFIED COUNSELING: ICD-10-CM

## 2022-02-08 LAB
ATRIAL RATE - MUSE: 109 BPM
DIASTOLIC BLOOD PRESSURE - MUSE: NORMAL MMHG
INTERPRETATION ECG - MUSE: NORMAL
P AXIS - MUSE: 45 DEGREES
PR INTERVAL - MUSE: 146 MS
QRS DURATION - MUSE: 74 MS
QT - MUSE: 334 MS
QTC - MUSE: 449 MS
R AXIS - MUSE: 3 DEGREES
SYSTOLIC BLOOD PRESSURE - MUSE: NORMAL MMHG
T AXIS - MUSE: 26 DEGREES
VENTRICULAR RATE- MUSE: 109 BPM

## 2022-02-08 NOTE — PROGRESS NOTES
Clinic Care Coordination Contact  [facility]/Voicemail       Clinical Data: Care Coordinator Outreach  Outreach attempted x 1.  Left message on patient's voicemail with call back information and requested return call.  Plan:Care Coordinator will try to reach patient again in 1-2 business days.    Reva Amaya  Community Health Worker  [facility] Care [facility]  [phone]

## 2022-02-09 NOTE — PROGRESS NOTES
Clinic Care Coordination Contact  [facility]/Voicemail       Clinical Data: Care Coordinator Outreach  Outreach attempted x 2.  Left message on patient's voicemail with call back information and requested return call.  Plan: Care Coordinator will do no further outreaches at this time.    Reva Amaya  Community Health Worker  [facility] Care [facility]  [phone]

## 2022-03-19 ENCOUNTER — APPOINTMENT (OUTPATIENT)
Dept: CT IMAGING | Facility: CLINIC | Age: 52
DRG: 064 | End: 2022-03-19
Attending: EMERGENCY MEDICINE
Payer: COMMERCIAL

## 2022-03-19 ENCOUNTER — APPOINTMENT (OUTPATIENT)
Dept: MRI IMAGING | Facility: CLINIC | Age: 52
DRG: 064 | End: 2022-03-19
Attending: EMERGENCY MEDICINE
Payer: COMMERCIAL

## 2022-03-19 ENCOUNTER — HOSPITAL ENCOUNTER (INPATIENT)
Facility: CLINIC | Age: 52
LOS: 4 days | Discharge: LEFT BEFORE DISCHARGE | DRG: 064 | End: 2022-03-23
Attending: EMERGENCY MEDICINE | Admitting: INTERNAL MEDICINE
Payer: COMMERCIAL

## 2022-03-19 DIAGNOSIS — M25.562 LEFT KNEE PAIN, UNSPECIFIED CHRONICITY: Primary | ICD-10-CM

## 2022-03-19 DIAGNOSIS — R29.898 LEFT LEG WEAKNESS: ICD-10-CM

## 2022-03-19 DIAGNOSIS — I63.9 ACUTE STROKE DUE TO ISCHEMIA (H): ICD-10-CM

## 2022-03-19 LAB
ANION GAP SERPL CALCULATED.3IONS-SCNC: 10 MMOL/L (ref 3–14)
BASOPHILS # BLD AUTO: 0.1 10E3/UL (ref 0–0.2)
BASOPHILS NFR BLD AUTO: 0 %
BUN SERPL-MCNC: 17 MG/DL (ref 7–30)
CALCIUM SERPL-MCNC: 9.2 MG/DL (ref 8.5–10.1)
CHLORIDE BLD-SCNC: 102 MMOL/L (ref 94–109)
CO2 SERPL-SCNC: 26 MMOL/L (ref 20–32)
CREAT SERPL-MCNC: 0.85 MG/DL (ref 0.66–1.25)
EOSINOPHIL # BLD AUTO: 0.4 10E3/UL (ref 0–0.7)
EOSINOPHIL NFR BLD AUTO: 3 %
ERYTHROCYTE [DISTWIDTH] IN BLOOD BY AUTOMATED COUNT: 12 % (ref 10–15)
GFR SERPL CREATININE-BSD FRML MDRD: >90 ML/MIN/1.73M2
GLUCOSE BLD-MCNC: 193 MG/DL (ref 70–99)
HCT VFR BLD AUTO: 36.5 % (ref 40–53)
HGB BLD-MCNC: 11.7 G/DL (ref 13.3–17.7)
HOLD SPECIMEN: NORMAL
HOLD SPECIMEN: NORMAL
IMM GRANULOCYTES # BLD: 0.1 10E3/UL
IMM GRANULOCYTES NFR BLD: 0 %
LYMPHOCYTES # BLD AUTO: 4.4 10E3/UL (ref 0.8–5.3)
LYMPHOCYTES NFR BLD AUTO: 33 %
MCH RBC QN AUTO: 30.9 PG (ref 26.5–33)
MCHC RBC AUTO-ENTMCNC: 32.1 G/DL (ref 31.5–36.5)
MCV RBC AUTO: 96 FL (ref 78–100)
MONOCYTES # BLD AUTO: 1.1 10E3/UL (ref 0–1.3)
MONOCYTES NFR BLD AUTO: 8 %
NEUTROPHILS # BLD AUTO: 7.4 10E3/UL (ref 1.6–8.3)
NEUTROPHILS NFR BLD AUTO: 56 %
NRBC # BLD AUTO: 0 10E3/UL
NRBC BLD AUTO-RTO: 0 /100
PLATELET # BLD AUTO: 326 10E3/UL (ref 150–450)
POTASSIUM BLD-SCNC: 3.5 MMOL/L (ref 3.4–5.3)
RBC # BLD AUTO: 3.79 10E6/UL (ref 4.4–5.9)
SARS-COV-2 RNA RESP QL NAA+PROBE: NEGATIVE
SODIUM SERPL-SCNC: 138 MMOL/L (ref 133–144)
WBC # BLD AUTO: 13.3 10E3/UL (ref 4–11)

## 2022-03-19 PROCEDURE — 250N000011 HC RX IP 250 OP 636: Performed by: EMERGENCY MEDICINE

## 2022-03-19 PROCEDURE — 258N000003 HC RX IP 258 OP 636: Performed by: EMERGENCY MEDICINE

## 2022-03-19 PROCEDURE — 99285 EMERGENCY DEPT VISIT HI MDM: CPT | Mod: 25

## 2022-03-19 PROCEDURE — 87635 SARS-COV-2 COVID-19 AMP PRB: CPT | Performed by: EMERGENCY MEDICINE

## 2022-03-19 PROCEDURE — 96374 THER/PROPH/DIAG INJ IV PUSH: CPT | Mod: 59

## 2022-03-19 PROCEDURE — A9585 GADOBUTROL INJECTION: HCPCS | Performed by: EMERGENCY MEDICINE

## 2022-03-19 PROCEDURE — 80048 BASIC METABOLIC PNL TOTAL CA: CPT | Performed by: EMERGENCY MEDICINE

## 2022-03-19 PROCEDURE — 255N000002 HC RX 255 OP 636: Performed by: EMERGENCY MEDICINE

## 2022-03-19 PROCEDURE — 120N000001 HC R&B MED SURG/OB

## 2022-03-19 PROCEDURE — 70450 CT HEAD/BRAIN W/O DYE: CPT

## 2022-03-19 PROCEDURE — 85025 COMPLETE CBC W/AUTO DIFF WBC: CPT | Performed by: EMERGENCY MEDICINE

## 2022-03-19 PROCEDURE — 70553 MRI BRAIN STEM W/O & W/DYE: CPT

## 2022-03-19 PROCEDURE — 36415 COLL VENOUS BLD VENIPUNCTURE: CPT | Performed by: EMERGENCY MEDICINE

## 2022-03-19 PROCEDURE — 93005 ELECTROCARDIOGRAM TRACING: CPT

## 2022-03-19 PROCEDURE — 96361 HYDRATE IV INFUSION ADD-ON: CPT

## 2022-03-19 PROCEDURE — 99223 1ST HOSP IP/OBS HIGH 75: CPT | Mod: AI | Performed by: INTERNAL MEDICINE

## 2022-03-19 PROCEDURE — 70549 MR ANGIOGRAPH NECK W/O&W/DYE: CPT

## 2022-03-19 PROCEDURE — 70544 MR ANGIOGRAPHY HEAD W/O DYE: CPT

## 2022-03-19 PROCEDURE — C9803 HOPD COVID-19 SPEC COLLECT: HCPCS

## 2022-03-19 RX ORDER — LOPERAMIDE HCL 2 MG
4 CAPSULE ORAL ONCE
Status: COMPLETED | OUTPATIENT
Start: 2022-03-19 | End: 2022-03-20

## 2022-03-19 RX ORDER — NICOTINE 21 MG/24HR
1 PATCH, TRANSDERMAL 24 HOURS TRANSDERMAL DAILY
Status: DISCONTINUED | OUTPATIENT
Start: 2022-03-20 | End: 2022-03-19

## 2022-03-19 RX ORDER — NICOTINE 21 MG/24HR
1 PATCH, TRANSDERMAL 24 HOURS TRANSDERMAL DAILY
Status: DISCONTINUED | OUTPATIENT
Start: 2022-03-19 | End: 2022-03-22

## 2022-03-19 RX ORDER — GADOBUTROL 604.72 MG/ML
10 INJECTION INTRAVENOUS ONCE
Status: COMPLETED | OUTPATIENT
Start: 2022-03-19 | End: 2022-03-19

## 2022-03-19 RX ORDER — LORAZEPAM 2 MG/ML
1 INJECTION INTRAMUSCULAR ONCE
Status: COMPLETED | OUTPATIENT
Start: 2022-03-19 | End: 2022-03-19

## 2022-03-19 RX ADMIN — LORAZEPAM 1 MG: 2 INJECTION INTRAMUSCULAR; INTRAVENOUS at 22:25

## 2022-03-19 RX ADMIN — SODIUM CHLORIDE 1000 ML: 9 INJECTION, SOLUTION INTRAVENOUS at 21:59

## 2022-03-19 RX ADMIN — GADOBUTROL 10 ML: 604.72 INJECTION INTRAVENOUS at 22:27

## 2022-03-19 ASSESSMENT — ACTIVITIES OF DAILY LIVING (ADL)
DOING_ERRANDS_INDEPENDENTLY_DIFFICULTY: NO
DIFFICULTY_EATING/SWALLOWING: NO
EQUIPMENT_CURRENTLY_USED_AT_HOME: CANE, STRAIGHT
WEAR_GLASSES_OR_BLIND: NO
FALL_HISTORY_WITHIN_LAST_SIX_MONTHS: YES
TRANSFERRING: 1-->ASSISTANCE (EQUIPMENT/PERSON) NEEDED
CONCENTRATING,_REMEMBERING_OR_MAKING_DECISIONS_DIFFICULTY: YES
TRANSFERRING: 0-->ASSISTANCE NEEDED (DEVELOPMETNALLY APPROPRIATE)
NUMBER_OF_TIMES_PATIENT_HAS_FALLEN_WITHIN_LAST_SIX_MONTHS: 5
ADLS_ACUITY_SCORE: 12
DRESSING/BATHING_DIFFICULTY: NO
CHANGE_IN_FUNCTIONAL_STATUS_SINCE_ONSET_OF_CURRENT_ILLNESS/INJURY: YES
TOILETING_ISSUES: NO
WALKING_OR_CLIMBING_STAIRS_DIFFICULTY: YES
WALKING_OR_CLIMBING_STAIRS: TRANSFERRING DIFFICULTY, REQUIRES EQUIPMENT
ADLS_ACUITY_SCORE: 12

## 2022-03-19 ASSESSMENT — ENCOUNTER SYMPTOMS
HEADACHES: 0
VOMITING: 0
WEAKNESS: 1
WOUND: 1
COLOR CHANGE: 1
DIARRHEA: 0

## 2022-03-19 NOTE — ED TRIAGE NOTES
"Pt here with c/o L leg pain after falling from standing. \"whole fucking leg hurts\". Ecchymosis to L underarm. States didn't hit head, LOC.  Pt on blood thinner, unsure which one. Recent falls, yesterday most recent. Hx CVA and sustained issues with ambulation since. CMS intact. ABC intact.   "

## 2022-03-19 NOTE — ED PROVIDER NOTES
History     Chief Complaint:  Gait Concern    The history is provided by the patient. History limited by: patient is a poor historian due to his frustration with my questions.      Josue Parisi is an 51 year old male, anticoagulated on Plavix and Aspirin, with history of multiple CVA's , carotid artery dissection, type II diabetes mellitus, hypertension, hyperlipidemia, drug use,  tobacco abuse who presents to the ED as he has been concerned about his stability while ambulating and is requesting crutches. Upon further questioning, patient states that he has had multiple falls recently and tripped and fell multiple times yesterday because of generalized weakness in his legs, primarily the left leg which tends to get wobbly on him and has been unable to support his weight without buckling.  He states has had left leg weakness for quite some time which is a residual following a prior stroke. However he states that his left leg has been weaker than usual, although he cannot say for how long. He states he does not have any bone pain from the falls and he does not have any concern for fractures.  He denies hitting his head during any of the falls. He states that he recently signed up for rehabilitation to help with his weakness following his prior stroke but has not yet been able to attend his first appointment, which is scheduled for Monday. Here in the ED, the patient denies any pain symptoms and is primarily concerned about his leg weakness and his ability to ambulate.  The patient briefly mentions concerns regarding bruising to his upper left arm and some drainage from chronic wounds located in his right axilla. He denies any headaches, vision changes, new numbness,abdominal pain, or vomiting.  He has had some diarrhea today.    Per chart review, the patient was last admitted to the ED on 02/07/2022 for an acute ischemic cerebrovascular accident. He has a history of ischemic cerebrovascular accidents prior to  last month as well. At the time of discharge. he was prescribed Plavix and aspirin to take daily. To reduce the chances of future CVA, he was advised to quit smoking and using cocaine.    Review of Systems   Gastrointestinal: Negative for diarrhea and vomiting.   Musculoskeletal: Positive for gait problem.   Skin: Positive for color change and wound.   Neurological: Positive for weakness. Negative for headaches.   All other systems reviewed and are negative.    Allergies:  The patient has no known allergies.    Medications:  Dulcolax  Promethazine-codeine   Chlorhexidine gluconate   Metformin  Canagliflozin  Amlodipine  Alprazolam   Augmentin  Lipitor  Cetirizine  Plavix  Doxycycline hyclate  Gabapentin  Hydrochlorothiazide  Viagra    Past Medical History:     Toxic metabolic encephalopathy  Depression  Anxiety  Allergic rhinitis  CVA  Hypertension  Dysarthria  Tobacco abuse  Expressive aphasia  Hyperlipidemia  Type 2 diabetes  Internal carotid artery dissection   Marijuana use    Family History:    Father: depression    Social History:  Presents to the ED alone.   Arrived to the ED via car.  He smokes over one pack of cigarettes per day.    Physical Exam     Patient Vitals for the past 24 hrs:   BP Temp Temp src Pulse Resp SpO2   03/19/22 1930 (!) 152/91 -- -- -- -- --   03/19/22 1836 (!) 131/93 98.2  F (36.8  C) Temporal 106 16 99 %     Physical Exam  Nursing note and vitals reviewed.  Constitutional:  Appears well-developed and well-nourished.   HENT:   Head:    Atraumatic.   Mouth/Throat:   Oropharynx is clear and moist. No oropharyngeal exudate.   Eyes:    Pupils are equal, round, and reactive to light.   Neck:    Normal range of motion. Neck supple.      No tracheal deviation present. No thyromegaly present.   Cardiovascular:  Normal rate, regular rhythm, no murmur   Pulmonary/Chest: Breath sounds are clear and equal without wheezes or crackles.  Abdominal:   Soft. Bowel sounds are normal. Exhibits no  distension and      no mass. There is no tenderness.      There is no rebound and no guarding.   Musculoskeletal:  Exhibits no edema. Bruising to his left upper arm medially. No bony tenderness to the arms, legs, or back.  Lymphadenopathy:  No cervical adenopathy.   Neurological:   Alert and oriented to person, place, and time. GCS 15.  CN 2-12 intact.  and proximal upper extremity strength strong and equal.  He has left leg weakness which he states is due to previous stroke. Strength is intact in the right leg. 4/5 strength on the left leg and 5/5 strength on the right leg. Sensation intact and equal to the face, arms and legs.  No facial droop or weakness. Normal speech.  Follows commands and answers questions normally.   Skin:    Skin is warm and dry. No rash noted. No pallor.     Emergency Department Course     ECG:  Taken: 21:56, Read 22:00  Normal sinus rhythm   Normal ECG  Rate 96 bpm. MO interval 158. QRS duration 76. QT/QTc 336/424. P-R-T axes 58 16 26.      Imaging:  Head CT w/o contrast   Final Result   IMPRESSION:   1.  A few subtle hypodensities right cerebral hemisphere, corresponding to acute/subacute infarctions on prior MRI.   2.  A mild, more conspicuous hypodensity parasagittal right frontal lobe near vertex appears tube in the area which appeared normal on prior MRI and could be due to new mild acute/subacute infarction.   3.  No definite evidence of hemorrhage or significant mass effect.      MR Brain w/o & w Contrast    (Results Pending)   MRA Neck (Carotids) wo & w Contrast    (Results Pending)   MR Head w/o Contrast Angiogram    (Results Pending)   Report per radiology    Laboratory:  Labs Ordered and Resulted from Time of ED Arrival to Time of ED Departure   BASIC METABOLIC PANEL - Abnormal       Result Value    Sodium 138      Potassium 3.5      Chloride 102      Carbon Dioxide (CO2) 26      Anion Gap 10      Urea Nitrogen 17      Creatinine 0.85      Calcium 9.2      Glucose 193 (*)      GFR Estimate >90     CBC WITH PLATELETS AND DIFFERENTIAL - Abnormal    WBC Count 13.3 (*)     RBC Count 3.79 (*)     Hemoglobin 11.7 (*)     Hematocrit 36.5 (*)     MCV 96      MCH 30.9      MCHC 32.1      RDW 12.0      Platelet Count 326      % Neutrophils 56      % Lymphocytes 33      % Monocytes 8      % Eosinophils 3      % Basophils 0      % Immature Granulocytes 0      NRBCs per 100 WBC 0      Absolute Neutrophils 7.4      Absolute Lymphocytes 4.4      Absolute Monocytes 1.1      Absolute Eosinophils 0.4      Absolute Basophils 0.1      Absolute Immature Granulocytes 0.1      Absolute NRBCs 0.0     COVID-19 VIRUS (CORONAVIRUS) BY PCR - Normal    SARS CoV2 PCR Negative          Emergency Department Course:       Reviewed:  I reviewed nursing notes, vitals, past medical history and Care Everywhere    Assessments:  1840 I obtained history and examined the patient as noted above.   1925 I rechecked the patient and explained findings.   2128 The patient was rechecked and updated.   2135 Nurse informed me that the patient wants to leave AMA as he would like to go smoke a cigarette. I rechecked the patient.    Consults:  2121 I spoke with Dr. Douglas from the stroke neurology service regarding the patient's presentation and plan of care.   2153 I spoke with Dr. Win from the hospitalist service regarding the patient's presentation, findings here in the ED, and plan of care.     Interventions:  22:25 Ativan 1 mg IV  22:28 NS 1 L IV   Ordered Nicotine patch transdermal    Disposition:  The patient was admitted to the hospital under the care of Dr. Win.     Impression & Plan     Medical Decision Making:  I found this patient to have CT findings concerning for a recent stroke in the right frontal region, which would account for his worsening left leg weakness, therefore I consulted Stroke Neurology and the patient will be admitted to the hospital under the care of the Hospitalist Dr. Win.  I ordered MRI/MRA  of his brain and head and neck blood vessels, and basic blood work.  The plan is that he will be kept on his Plavix and Aspirin, which he reports that he has been taking.  The patient has had a couple episodes of diarrhea today, but has not been on any antibiotics and denies fever/chills, N/V, abd pain.    Diagnosis:    ICD-10-CM    1. Acute stroke due to ischemia (H)  I63.9    2. Left leg weakness  R29.898      Scribe Disclosure:  Dedra VELASQUEZ, am serving as the scribe  for Sandro Ron, the scribe trainee, at 8:58 PM on 3/19/2022 to document services personally performed by Honey Garcia MD based on our observations and the provider's statements to us.     Honey Garcia MD  03/19/22 4338

## 2022-03-20 ENCOUNTER — APPOINTMENT (OUTPATIENT)
Dept: CT IMAGING | Facility: CLINIC | Age: 52
DRG: 064 | End: 2022-03-20
Attending: NURSE PRACTITIONER
Payer: COMMERCIAL

## 2022-03-20 LAB
ALBUMIN SERPL-MCNC: 2.9 G/DL (ref 3.4–5)
ALBUMIN UR-MCNC: NEGATIVE MG/DL
ALP SERPL-CCNC: 58 U/L (ref 40–150)
ALT SERPL W P-5'-P-CCNC: 19 U/L (ref 0–70)
ANION GAP SERPL CALCULATED.3IONS-SCNC: 6 MMOL/L (ref 3–14)
APPEARANCE UR: CLEAR
AST SERPL W P-5'-P-CCNC: 9 U/L (ref 0–45)
BILIRUB DIRECT SERPL-MCNC: 0.1 MG/DL (ref 0–0.2)
BILIRUB SERPL-MCNC: 0.4 MG/DL (ref 0.2–1.3)
BILIRUB UR QL STRIP: NEGATIVE
BUN SERPL-MCNC: 15 MG/DL (ref 7–30)
CALCIUM SERPL-MCNC: 8.9 MG/DL (ref 8.5–10.1)
CHLORIDE BLD-SCNC: 104 MMOL/L (ref 94–109)
CHOLEST SERPL-MCNC: 93 MG/DL
CO2 SERPL-SCNC: 28 MMOL/L (ref 20–32)
COLOR UR AUTO: ABNORMAL
CREAT SERPL-MCNC: 0.71 MG/DL (ref 0.66–1.25)
ERYTHROCYTE [DISTWIDTH] IN BLOOD BY AUTOMATED COUNT: 11.5 % (ref 10–15)
GFR SERPL CREATININE-BSD FRML MDRD: >90 ML/MIN/1.73M2
GLUCOSE BLD-MCNC: 271 MG/DL (ref 70–99)
GLUCOSE BLDC GLUCOMTR-MCNC: 193 MG/DL (ref 70–99)
GLUCOSE BLDC GLUCOMTR-MCNC: 195 MG/DL (ref 70–99)
GLUCOSE BLDC GLUCOMTR-MCNC: 222 MG/DL (ref 70–99)
GLUCOSE BLDC GLUCOMTR-MCNC: 268 MG/DL (ref 70–99)
GLUCOSE BLDC GLUCOMTR-MCNC: 302 MG/DL (ref 70–99)
GLUCOSE UR STRIP-MCNC: >=1000 MG/DL
HBA1C MFR BLD: 8.8 % (ref 0–5.6)
HCT VFR BLD AUTO: 34.3 % (ref 40–53)
HDLC SERPL-MCNC: 34 MG/DL
HGB BLD-MCNC: 11 G/DL (ref 13.3–17.7)
HGB UR QL STRIP: NEGATIVE
KETONES UR STRIP-MCNC: NEGATIVE MG/DL
LDLC SERPL CALC-MCNC: 46 MG/DL
LEUKOCYTE ESTERASE UR QL STRIP: NEGATIVE
MCH RBC QN AUTO: 30.6 PG (ref 26.5–33)
MCHC RBC AUTO-ENTMCNC: 32.1 G/DL (ref 31.5–36.5)
MCV RBC AUTO: 96 FL (ref 78–100)
NITRATE UR QL: NEGATIVE
NONHDLC SERPL-MCNC: 59 MG/DL
PH UR STRIP: 6 [PH] (ref 5–7)
PLATELET # BLD AUTO: 324 10E3/UL (ref 150–450)
POTASSIUM BLD-SCNC: 3.4 MMOL/L (ref 3.4–5.3)
POTASSIUM BLD-SCNC: 3.6 MMOL/L (ref 3.4–5.3)
PROT SERPL-MCNC: 6.7 G/DL (ref 6.8–8.8)
RBC # BLD AUTO: 3.59 10E6/UL (ref 4.4–5.9)
RBC URINE: <1 /HPF
SODIUM SERPL-SCNC: 138 MMOL/L (ref 133–144)
SP GR UR STRIP: 1.02 (ref 1–1.03)
TRIGL SERPL-MCNC: 66 MG/DL
UROBILINOGEN UR STRIP-MCNC: NORMAL MG/DL
WBC # BLD AUTO: 10.6 10E3/UL (ref 4–11)
WBC URINE: <1 /HPF

## 2022-03-20 PROCEDURE — 999N000226 HC STATISTIC SLP IP EVAL DEFER: Performed by: SPEECH-LANGUAGE PATHOLOGIST

## 2022-03-20 PROCEDURE — 99233 SBSQ HOSP IP/OBS HIGH 50: CPT | Performed by: INTERNAL MEDICINE

## 2022-03-20 PROCEDURE — 250N000013 HC RX MED GY IP 250 OP 250 PS 637: Performed by: EMERGENCY MEDICINE

## 2022-03-20 PROCEDURE — 81003 URINALYSIS AUTO W/O SCOPE: CPT | Performed by: INTERNAL MEDICINE

## 2022-03-20 PROCEDURE — 82248 BILIRUBIN DIRECT: CPT | Performed by: INTERNAL MEDICINE

## 2022-03-20 PROCEDURE — 250N000013 HC RX MED GY IP 250 OP 250 PS 637: Performed by: INTERNAL MEDICINE

## 2022-03-20 PROCEDURE — BW191ZZ FLUOROSCOPY OF HEAD AND NECK USING LOW OSMOLAR CONTRAST: ICD-10-PCS | Performed by: RADIOLOGY

## 2022-03-20 PROCEDURE — 85027 COMPLETE CBC AUTOMATED: CPT | Performed by: INTERNAL MEDICINE

## 2022-03-20 PROCEDURE — 80053 COMPREHEN METABOLIC PANEL: CPT | Performed by: INTERNAL MEDICINE

## 2022-03-20 PROCEDURE — 250N000009 HC RX 250: Performed by: INTERNAL MEDICINE

## 2022-03-20 PROCEDURE — 70496 CT ANGIOGRAPHY HEAD: CPT

## 2022-03-20 PROCEDURE — 250N000011 HC RX IP 250 OP 636: Performed by: INTERNAL MEDICINE

## 2022-03-20 PROCEDURE — 250N000012 HC RX MED GY IP 250 OP 636 PS 637: Performed by: INTERNAL MEDICINE

## 2022-03-20 PROCEDURE — 36415 COLL VENOUS BLD VENIPUNCTURE: CPT | Performed by: INTERNAL MEDICINE

## 2022-03-20 PROCEDURE — 80061 LIPID PANEL: CPT | Performed by: INTERNAL MEDICINE

## 2022-03-20 PROCEDURE — G0427 INPT/ED TELECONSULT70: HCPCS | Performed by: PSYCHIATRY & NEUROLOGY

## 2022-03-20 PROCEDURE — 83036 HEMOGLOBIN GLYCOSYLATED A1C: CPT | Performed by: NURSE PRACTITIONER

## 2022-03-20 PROCEDURE — 120N000001 HC R&B MED SURG/OB

## 2022-03-20 PROCEDURE — 84132 ASSAY OF SERUM POTASSIUM: CPT | Performed by: INTERNAL MEDICINE

## 2022-03-20 PROCEDURE — 99207 PR SC NO CHARGE VISIT: CPT | Performed by: PSYCHIATRY & NEUROLOGY

## 2022-03-20 RX ORDER — NALOXONE HYDROCHLORIDE 0.4 MG/ML
0.4 INJECTION, SOLUTION INTRAMUSCULAR; INTRAVENOUS; SUBCUTANEOUS
Status: DISCONTINUED | OUTPATIENT
Start: 2022-03-20 | End: 2022-03-23 | Stop reason: HOSPADM

## 2022-03-20 RX ORDER — POLYETHYLENE GLYCOL 3350 17 G/17G
17 POWDER, FOR SOLUTION ORAL DAILY
Status: DISCONTINUED | OUTPATIENT
Start: 2022-03-20 | End: 2022-03-23 | Stop reason: HOSPADM

## 2022-03-20 RX ORDER — CETIRIZINE HYDROCHLORIDE 10 MG/1
10 TABLET ORAL DAILY
Status: DISCONTINUED | OUTPATIENT
Start: 2022-03-20 | End: 2022-03-23 | Stop reason: HOSPADM

## 2022-03-20 RX ORDER — ALPRAZOLAM 1 MG
1 TABLET ORAL
Status: DISCONTINUED | OUTPATIENT
Start: 2022-03-20 | End: 2022-03-23 | Stop reason: HOSPADM

## 2022-03-20 RX ORDER — CALCIUM CARBONATE 500 MG/1
1000 TABLET, CHEWABLE ORAL 4 TIMES DAILY PRN
Status: DISCONTINUED | OUTPATIENT
Start: 2022-03-20 | End: 2022-03-23 | Stop reason: HOSPADM

## 2022-03-20 RX ORDER — DEXTROSE MONOHYDRATE 25 G/50ML
25-50 INJECTION, SOLUTION INTRAVENOUS
Status: DISCONTINUED | OUTPATIENT
Start: 2022-03-20 | End: 2022-03-23 | Stop reason: HOSPADM

## 2022-03-20 RX ORDER — LIDOCAINE 40 MG/G
CREAM TOPICAL
Status: DISCONTINUED | OUTPATIENT
Start: 2022-03-20 | End: 2022-03-23 | Stop reason: HOSPADM

## 2022-03-20 RX ORDER — NICOTINE POLACRILEX 4 MG
15-30 LOZENGE BUCCAL
Status: DISCONTINUED | OUTPATIENT
Start: 2022-03-20 | End: 2022-03-23 | Stop reason: HOSPADM

## 2022-03-20 RX ORDER — ONDANSETRON 2 MG/ML
4 INJECTION INTRAMUSCULAR; INTRAVENOUS EVERY 6 HOURS PRN
Status: DISCONTINUED | OUTPATIENT
Start: 2022-03-20 | End: 2022-03-23 | Stop reason: HOSPADM

## 2022-03-20 RX ORDER — LABETALOL 20 MG/4 ML (5 MG/ML) INTRAVENOUS SYRINGE
10-20 EVERY 10 MIN PRN
Status: DISCONTINUED | OUTPATIENT
Start: 2022-03-20 | End: 2022-03-23 | Stop reason: HOSPADM

## 2022-03-20 RX ORDER — AMOXICILLIN 250 MG
1-2 CAPSULE ORAL 2 TIMES DAILY
Status: DISCONTINUED | OUTPATIENT
Start: 2022-03-20 | End: 2022-03-23 | Stop reason: HOSPADM

## 2022-03-20 RX ORDER — NALOXONE HYDROCHLORIDE 0.4 MG/ML
0.2 INJECTION, SOLUTION INTRAMUSCULAR; INTRAVENOUS; SUBCUTANEOUS
Status: DISCONTINUED | OUTPATIENT
Start: 2022-03-20 | End: 2022-03-23 | Stop reason: HOSPADM

## 2022-03-20 RX ORDER — PROCHLORPERAZINE 25 MG
25 SUPPOSITORY, RECTAL RECTAL EVERY 12 HOURS PRN
Status: DISCONTINUED | OUTPATIENT
Start: 2022-03-20 | End: 2022-03-23 | Stop reason: HOSPADM

## 2022-03-20 RX ORDER — ONDANSETRON 4 MG/1
4 TABLET, ORALLY DISINTEGRATING ORAL EVERY 6 HOURS PRN
Status: DISCONTINUED | OUTPATIENT
Start: 2022-03-20 | End: 2022-03-23 | Stop reason: HOSPADM

## 2022-03-20 RX ORDER — HYDRALAZINE HYDROCHLORIDE 20 MG/ML
10-20 INJECTION INTRAMUSCULAR; INTRAVENOUS
Status: DISCONTINUED | OUTPATIENT
Start: 2022-03-20 | End: 2022-03-23 | Stop reason: HOSPADM

## 2022-03-20 RX ORDER — BISACODYL 10 MG
10 SUPPOSITORY, RECTAL RECTAL DAILY PRN
Status: DISCONTINUED | OUTPATIENT
Start: 2022-03-20 | End: 2022-03-23 | Stop reason: HOSPADM

## 2022-03-20 RX ORDER — ATORVASTATIN CALCIUM 40 MG/1
80 TABLET, FILM COATED ORAL DAILY
Status: DISCONTINUED | OUTPATIENT
Start: 2022-03-20 | End: 2022-03-23 | Stop reason: HOSPADM

## 2022-03-20 RX ORDER — GABAPENTIN 100 MG/1
100 CAPSULE ORAL 3 TIMES DAILY
Status: DISCONTINUED | OUTPATIENT
Start: 2022-03-20 | End: 2022-03-23 | Stop reason: HOSPADM

## 2022-03-20 RX ORDER — ACETAMINOPHEN 325 MG/1
650 TABLET ORAL EVERY 4 HOURS PRN
Status: DISCONTINUED | OUTPATIENT
Start: 2022-03-20 | End: 2022-03-23 | Stop reason: HOSPADM

## 2022-03-20 RX ORDER — PROCHLORPERAZINE MALEATE 5 MG
10 TABLET ORAL EVERY 6 HOURS PRN
Status: DISCONTINUED | OUTPATIENT
Start: 2022-03-20 | End: 2022-03-23 | Stop reason: HOSPADM

## 2022-03-20 RX ORDER — IOPAMIDOL 755 MG/ML
500 INJECTION, SOLUTION INTRAVASCULAR ONCE
Status: COMPLETED | OUTPATIENT
Start: 2022-03-20 | End: 2022-03-20

## 2022-03-20 RX ORDER — CLOPIDOGREL BISULFATE 75 MG/1
75 TABLET ORAL DAILY
Status: DISCONTINUED | OUTPATIENT
Start: 2022-03-20 | End: 2022-03-20

## 2022-03-20 RX ORDER — OXYCODONE HYDROCHLORIDE 5 MG/1
5 TABLET ORAL EVERY 6 HOURS PRN
Status: DISCONTINUED | OUTPATIENT
Start: 2022-03-20 | End: 2022-03-23 | Stop reason: HOSPADM

## 2022-03-20 RX ORDER — POTASSIUM CHLORIDE 1500 MG/1
40 TABLET, EXTENDED RELEASE ORAL ONCE
Status: COMPLETED | OUTPATIENT
Start: 2022-03-20 | End: 2022-03-20

## 2022-03-20 RX ORDER — ASPIRIN 325 MG
325 TABLET ORAL DAILY
Status: DISCONTINUED | OUTPATIENT
Start: 2022-03-20 | End: 2022-03-23 | Stop reason: HOSPADM

## 2022-03-20 RX ADMIN — GABAPENTIN 100 MG: 100 CAPSULE ORAL at 20:08

## 2022-03-20 RX ADMIN — CETIRIZINE HYDROCHLORIDE 10 MG: 10 TABLET, FILM COATED ORAL at 12:57

## 2022-03-20 RX ADMIN — INSULIN ASPART 3 UNITS: 100 INJECTION, SOLUTION INTRAVENOUS; SUBCUTANEOUS at 03:37

## 2022-03-20 RX ADMIN — POTASSIUM CHLORIDE 40 MEQ: 1500 TABLET, EXTENDED RELEASE ORAL at 16:13

## 2022-03-20 RX ADMIN — CLOPIDOGREL BISULFATE 75 MG: 75 TABLET ORAL at 12:57

## 2022-03-20 RX ADMIN — LOPERAMIDE HYDROCHLORIDE 4 MG: 2 CAPSULE ORAL at 00:03

## 2022-03-20 RX ADMIN — NICOTINE 1 PATCH: 14 PATCH, EXTENDED RELEASE TRANSDERMAL at 00:04

## 2022-03-20 RX ADMIN — GABAPENTIN 100 MG: 100 CAPSULE ORAL at 12:57

## 2022-03-20 RX ADMIN — METFORMIN HYDROCHLORIDE 1000 MG: 500 TABLET, FILM COATED ORAL at 12:57

## 2022-03-20 RX ADMIN — ATORVASTATIN CALCIUM 80 MG: 40 TABLET, FILM COATED ORAL at 12:58

## 2022-03-20 RX ADMIN — SODIUM CHLORIDE 80 ML: 9 INJECTION, SOLUTION INTRAVENOUS at 12:19

## 2022-03-20 RX ADMIN — IOPAMIDOL 70 ML: 755 INJECTION, SOLUTION INTRAVENOUS at 12:19

## 2022-03-20 RX ADMIN — ASPIRIN 325 MG ORAL TABLET 325 MG: 325 PILL ORAL at 12:57

## 2022-03-20 ASSESSMENT — ACTIVITIES OF DAILY LIVING (ADL)
ADLS_ACUITY_SCORE: 12
ADLS_ACUITY_SCORE: 7
ADLS_ACUITY_SCORE: 12
ADLS_ACUITY_SCORE: 7
ADLS_ACUITY_SCORE: 12
ADLS_ACUITY_SCORE: 7
ADLS_ACUITY_SCORE: 7

## 2022-03-20 NOTE — CONSULTS
[facility]    Stroke Consult Note    Reason for Consult:  Stroke    Chief Complaint: No chief complaint on file.       HPI   Josue Parisi is a 51 year old male with PMH of multiple strokes (2012, 7/13, 7/21, and 2/5/2022), DM2, HTN, HLD, polysubstance abuse, and depression. He presented to [facility] on 3/19/2022 with unsteadiness, left leg weakness and falls. Per chart review the symptoms started over one week ago. ED /93. Patient was recently hospitalized in 2/2022 for R MCA stroke, and was started on  mg, Plavix 75 mg and Lipitor 80 mg during that admission.     On tele exam, patient stated he was not sure when the L sided numbness and tingling began, but he remembers last Thursday, he fell while trying to get in his car and since then he has been falling more frequently. Patient also stated he was not taking his ASA until last Wednesday. He also endorsed ongoing cocaine use and he is current smoker. Left leg drift appreciated on exam, per patient decreased sensation in LUE and LLE. NIHSS=2.     Stroke Evaluation Summarized    MRI/Head CT CT head: A few subtle hypodensities right cerebral hemisphere, corresponding to acute/subacute infarctions on prior MRI. A mild, more conspicuous hypodensity parasagittal right frontal lobe near vertex appears tube in the area which appeared normal on prior MRI and could be due to new mild acute/subacute infarction.    MRI head: New watershed territory infarcts in the deep right frontal/parietal centrum semiovale and right frontal/parietal vertex. Late subacute watershed territory infarcts are resolving with some enhancement of these late subacute infarcts in the posterior right parietal lobe.   Intracranial Vasculature MRA head:   1 No flow in the right ICA as it enters the skull base. It is reconstituted at the ophthalmic segment via right posterior communicating artery collateralization.  2.  Progressive stenosis involving  the right A1 segment with severe/critical stenosis of the bilateral A1-A2 junctions.  3.  High-grade stenosis/near occlusion involving the right M1/M2 junction, unchanged from prior either due to atheromatous disease or persistent intraluminal thrombus. There is arborization of the right MCA branches.   Cervical Vasculature MRA neck: Stable occlusion of the right ICA 1 cm from its origin     Echocardiogram TTE pending   EKG/Telemetry SR   Other Testing Not Applicable     LDL  3/20/2022: 46 mg/dL   A1C  3/20/2022: 8.8 %   Troponin No lab value available in past 48 hrs       Impression  Acute ischemic stroke of R KWADWO territory due to large-artery atherosclerosis, likely secondary to significant intracranial stenosis and cocaine use    Recommendations  - Neurochecks and Vital Signs every 4 hours   - Permissive HTN; goal SBP < 220 mmHg  - Continue daily aspirin 325 mg for secondary stroke prevention     - For compliance, patient can continue with only  mg daily   - Continue PTA statin: atorvastatin 40 mg daily, goal LDL 40-70, <40 increases risk of ICH, titrate outpatient with PCP  - TTE (with Bubble Study if age 60 yrs or less)  - Continue inpatient telemetry, discharge with 30 day cardiac event monitor to evaluate for atrial fibrillation    - Bedside Glucose Monitoring, A1c goal <7.0%, follow up with PCP to achieve goal  - PT/OT/SLP therapies   - Stroke Education  - Euthermia, Euglycemia  - Discussed cessation of both cocaine and tobacco for secondary stroke prevention      Patient Follow-up    - in the next 1-2 week(s) with PCP  - in 4-6 weeks with [facility] or other local neurologist of patient's choice    Thank you for this consult. We will follow peripherally for results of TTE and if no concerns then will sign off. Please contact us with any additional questions.    SANDY Schmitz, CNP  Vascular Neurology  To page me or covering stroke neurology team member, click here: AMCOM  "  Choose \"On Call\" tab at top, then search dropdown box for \"Neurology Adult\", select location, press Enter, then look for stroke/neuro ICU/telestroke.    _____________________________________________________    Clinically Significant Risk Factors Present on Admission              # Platelet Defect: home medication list includes an antiplatelet medication       Past Medical History   Past Medical History:   Diagnosis Date     CVA (cerebral infarction)      Hypertension      Past Surgical History   No past surgical history on file.  Medications   Home Meds  Prior to Admission medications    Medication Sig Start Date End Date Taking? Authorizing Provider   ALPRAZolam (XANAX) 1 MG tablet Take 1 mg by mouth nightly as needed for anxiety    Unknown, Entered By History   aspirin (ASA) 325 MG tablet Take 1 tablet (325 mg) by mouth daily 2/7/22   Israel Neal MD   atorvastatin (LIPITOR) 80 MG tablet Take 1 tablet (80 mg) by mouth daily 2/7/22   Israel Neal MD   cetirizine (ZYRTEC) 10 MG tablet Take 10 mg by mouth daily    Unknown, Entered By History   chlorhexidine (HIBICLENS) 4 % liquid Apply topically daily as needed for wound care    Unknown, Entered By History   clopidogrel (PLAVIX) 75 MG tablet Take 1 tablet (75 mg) by mouth daily Take for 90 days, then stop 2/7/22 5/8/22  Israel Neal MD   doxycycline hyclate (VIBRAMYCIN) 100 MG capsule Take 100 mg by mouth 2 times daily    Unknown, Entered By History   gabapentin (NEURONTIN) 100 MG capsule Take 1 capsule (100 mg) by mouth 3 times daily 2/7/22   Israel Neal MD   hydrochlorothiazide (HYDRODIURIL) 25 MG tablet Take 25 mg by mouth daily    Unknown, Entered By History   metFORMIN (GLUCOPHAGE) 1000 MG tablet Take 1,000 mg by mouth 2 times daily (with meals)    Unknown, Entered By History       Scheduled Meds    aspirin  325 mg Oral Daily     atorvastatin  80 mg Oral Daily     cetirizine  10 mg Oral Daily     clopidogrel  75 mg Oral " Daily     gabapentin  100 mg Oral TID     insulin aspart  1-7 Units Subcutaneous TID AC     insulin aspart  1-5 Units Subcutaneous At Bedtime     metFORMIN  1,000 mg Oral BID w/meals     nicotine  1 patch Transdermal Daily     polyethylene glycol  17 g Oral or NG Tube Daily     senna-docusate  1-2 tablet Oral or NG Tube BID     sodium chloride (PF)  3 mL Intracatheter Q8H       Infusion Meds    - MEDICATION INSTRUCTIONS -       - MEDICATION INSTRUCTIONS -         PRN Meds  acetaminophen, ALPRAZolam, bisacodyl, calcium carbonate, glucose **OR** dextrose **OR** glucagon, labetalol **OR** hydrALAZINE, lidocaine 4%, lidocaine (buffered or not buffered), - MEDICATION INSTRUCTIONS -, - MEDICATION INSTRUCTIONS -, nicotine, ondansetron **OR** ondansetron, prochlorperazine **OR** prochlorperazine **OR** prochlorperazine, sodium chloride (PF)    Allergies   No Known Allergies  Family History   No family history on file.  Social History   Social History     Tobacco Use     Smoking status: Current Every Day Smoker     Packs/day: 0.50     Smokeless tobacco: Not on file   Substance Use Topics     Alcohol use: No     Drug use: No       Review of Systems   The 10 point Review of Systems is negative other than noted in the HPI or here.        PHYSICAL EXAMINATION   Temp:  [97.3  F (36.3  C)-98.2  F (36.8  C)] 97.4  F (36.3  C)  Pulse:  [] 99  Resp:  [16-20] 20  BP: (131-158)/(81-93) 158/88  SpO2:  [96 %-99 %] 97 %    General Exam  General:  patient lying in bed without any acute distress    HEENT:  normocephalic/atraumatic  Pulmonary:  no respiratory distress      Neuro Exam  Mental Status:  alert, oriented x 3, follows commands, speech clear and fluent, naming and repetition normal  Cranial Nerves:  visual fields intact (tested by nurse), EOMI with normal smooth pursuit, facial sensation intact and symmetric (tested by nurse), facial movements symmetric, hearing not formally tested but intact to conversation, no dysarthria,  shoulder shrug equal bilaterally, tongue protrusion midline  Motor:  no abnormal movements, able to move all limbs antigravity spontaneously with no signs of hemiparesis observed, LUE drift   Reflexes:  unable to test (telestroke)  Sensory:  no extinction on double simultaneous stimulation (assessed by nurse), light touch sensation intact, LUE and LLE sensation decreased per patient   Coordination:  normal finger-to-nose and heel-to-shin bilaterally without dysmetria, LLE slowed rapida alternating movements  Station/Gait:  unable to test due to telestroke    Dysphagia Screen  Per Nursing    Stroke Scales    NIHSS  1a. Level of Consciousness 0-->Alert, keenly responsive   1b. LOC Questions 0-->Answers both questions correctly   1c. LOC Commands 0-->Performs both tasks correctly   2.   Best Gaze 0-->Normal   3.   Visual 0-->No visual loss   4.   Facial Palsy 0-->Normal symmetrical movements   5a. Motor Arm, Left 0-->No drift, limb holds 90 (or 45) degrees for full 10 secs   5b. Motor Arm, Right 0-->No drift, limb holds 90 (or 45) degrees for full 10 secs   6a. Motor Leg, Left 1-->Drift, leg falls by the end of the 5-sec period but does not hit bed   6b. Motor Leg, right 0-->No drift, leg holds 30 degree position for full 5 secs   7.   Limb Ataxia 0-->Absent   8.   Sensory 1-->Mild-to-moderate sensory loss, patient feels pinprick is less sharp or is dull on the affected side, or there is a loss of superficial pain with pinprick, but patient is aware of being touched   9.   Best Language 0-->No aphasia, normal   10. Dysarthria 0-->Normal   11. Extinction and Inattention  0-->No abnormality   Total 2 (03/20/22 1510)       Modified Roberto Score (Pre-morbid)  1-No significant disability despite symptoms    Imaging  I personally reviewed all imaging; relevant findings per HPI.    Labs Data   CBC  Recent Labs   Lab 03/20/22  0558 03/19/22  2151   WBC 10.6 13.3*   RBC 3.59* 3.79*   HGB 11.0* 11.7*   HCT 34.3* 36.5*     326     Basic Metabolic Panel   Recent Labs   Lab 03/20/22  1253 03/20/22  0758 03/20/22  0558 03/20/22  0259 03/19/22  2151   NA  --   --  138  --  138   POTASSIUM  --   --  3.4  --  3.5   CHLORIDE  --   --  104  --  102   CO2  --   --  28  --  26   BUN  --   --  15  --  17   CR  --   --  0.71  --  0.85   * 268* 271*   < > 193*   DAT  --   --  8.9  --  9.2    < > = values in this interval not displayed.     Liver Panel  Recent Labs   Lab 03/20/22  0558   PROTTOTAL 6.7*   ALBUMIN 2.9*   BILITOTAL 0.4   ALKPHOS 58   AST 9   ALT 19     INR    Recent Labs   Lab Test 02/05/22  1919 04/18/21  1553 09/17/14  1343   INR 0.97 1.05 1.06      Lipid Profile    Recent Labs   Lab Test 03/20/22  0558 02/06/22  0617   CHOL 93 193   HDL 34* 38*   LDL 46 126*   TRIG 66 144     A1C    Recent Labs   Lab Test 03/20/22  0558 02/06/22  0617 02/05/22  1920   A1C 8.8* 10.5* 10.4*     Troponin I  No results for input(s): TROPONIN, GHTROP in the last 168 hours.       Stroke Consult Data Data   Telestroke Service Details  (for non-emergent stroke consult with tele)  Video start time 03/20/22   1353   Video end time 03/20/22   1419   Type of service telemedicine diagnostic assessment of acute neurological changes   Reason telemedicine is appropriate patient requires assessment with a specialist for diagnosis and treatment of neurological symptoms   Mode of transmission secure interactive audio and video communication per Nancy   Originating site (patient location) [facility]    Distant site (provider location) [facility]     I have personally spent a total of 70 minutes providing care and consulting with this patient's medical providers today, with more than 50% of this time spent in consultation, coordination of care, and discussion with the patient and/or family regarding diagnostic results, prognosis, symptom management, risks and benefits of management options, and development of plan of  care.

## 2022-03-20 NOTE — ED NOTES
Patient found on ground by ERT after sustaining a fall in room. Patient reports that he slid down the side of the bed, landing on butt, after attempting to reposition himself. He denies hitting head or any acute injury. After fall, patient's assessment remains unchanged, vitals stable. Patient reminded to use call light when he needs assistance repositioning. Patient agreeable. Charge RN notified.

## 2022-03-20 NOTE — CONSULTS
"    [facility]    Stroke Telephone Note    I was called by Honey Garcia on 03/19/22 regarding patient Josue Parisi. The patient is a 51 year old male who had R MCA watershed stroke a month ago and was found to have R ICA occlusion. The patient is now presenting with worsening left LE weakness that have caused him to fall frequently over the last few days but can't specify when it started exactly.       Imaging Findings   Head CT:  Possible new R KWADWO territory hypodensity.       Impression  Acute ischemic stroke of the right KWADWO territory due to undetermined etiology   Patient has known right ICA occlusion and his recent stroke was in a watershed distribution, he is at risk for more strokes if he becomes hypotensive or if there is artery-to-artery embolization. Also given his risk factors, he may have small vessel disease stroke.     Recommendations   - Use orderset: \"Ischemic Stroke Routine Admission\" or \"Ischemic Stroke No Thrombolytics/No Thrombectomy ICU Admission\"  - Neurochecks and Vital Signs every 4 hours   - Permissive HTN; goal SBP < 220 mmHg  - Daily aspirin 325 mg for secondary stroke prevention  - Plavix (clopidogrel) 75 mg PO Daily  - Statin: atorvastatin 40  - MRI Brain with and without contrast  - MRA Brain without contrast  - MRA Neck with and without contrast  - TTE (with Bubble Study if age 60 yrs or less)  - Telemetry, EKG  - Bedside Glucose Monitoring  - A1c, Lipid Panel, Troponin x 3  - PT/OT/SLP  - Stroke Education  - Euthermia, Euglycemia    My recommendations are based on the information provided over the phone by Josue Parisi's in-person providers. They are not intended to replace the clinical judgment of his in-person providers. I was not requested to personally see or examine the patient at this time.        Marli Douglas MD, Msc, FAHA, FAAN   of Neurology  [facility]     03/19/2022 9:28 PM  To page me or " "covering stroke neurology team member, click here: AMCOM  Choose \"On Call\" tab at top, then search dropdown box for \"Neurology Adult\" & press Enter, look for Neuro ICU/Stroke      "

## 2022-03-20 NOTE — ED NOTES
[facility]  ED Nurse Handoff Report    Josue Parisi is a 51 year old male   ED Chief complaint: No chief complaint on file.  . ED Diagnosis:   Final diagnoses:   Acute stroke due to ischemia (H)   Left leg weakness     Allergies: No Known Allergies    Code Status: Full Code  Activity level - Baseline/Home:  Independent. Activity Level - Current:   Stand by Assist. Lift room needed: No. Bariatric: No   Needed: No   Isolation: Yes. Infection: Not Applicable.     Vital Signs:   Vitals:    03/19/22 1836 03/19/22 1930   BP: (!) 131/93 (!) 152/91   Pulse: 106    Resp: 16    Temp: 98.2  F (36.8  C)    TempSrc: Temporal    SpO2: 99%        Cardiac Rhythm:  ,      Pain level:    Patient confused: No. Patient Falls Risk: Yes.   Elimination Status: Has voided   Patient Report - Initial Complaint: leg pain. Focused Assessment: increased leg weakness  Tests Performed:   Abnormal Labs Resulted from Time of ED Arrival to Time of ED Departure   BASIC METABOLIC PANEL - Abnormal       Result Value    Sodium 138      Potassium 3.5      Chloride 102      Carbon Dioxide (CO2) 26      Anion Gap 10      Urea Nitrogen 17      Creatinine 0.85      Calcium 9.2      Glucose 193 (*)     GFR Estimate >90     CBC WITH PLATELETS AND DIFFERENTIAL - Abnormal    WBC Count 13.3 (*)     RBC Count 3.79 (*)     Hemoglobin 11.7 (*)     Hematocrit 36.5 (*)     MCV 96      MCH 30.9      MCHC 32.1      RDW 12.0      Platelet Count 326      % Neutrophils 56      % Lymphocytes 33      % Monocytes 8      % Eosinophils 3      % Basophils 0      % Immature Granulocytes 0      NRBCs per 100 WBC 0      Absolute Neutrophils 7.4      Absolute Lymphocytes 4.4      Absolute Monocytes 1.1      Absolute Eosinophils 0.4      Absolute Basophils 0.1      Absolute Immature Granulocytes 0.1      Absolute NRBCs 0.0     . Abnormal Results: see chart.   Treatments provided: see MAR  Family Comments: n/a  OBS brochure/video discussed/provided to  patient:  N/A  ED Medications:   Medications   nicotine Patch in Place (has no administration in time range)   nicotine (NICODERM CQ) 14 MG/24HR 24 hr patch 1 patch (has no administration in time range)   0.9% sodium chloride BOLUS (1,000 mLs Intravenous New Bag 3/19/22 2159)   LORazepam (ATIVAN) injection 1 mg (1 mg Intravenous Given 3/19/22 2225)   gadobutrol (GADAVIST) injection 10 mL (10 mLs Intravenous Given 3/19/22 2227)   sodium chloride (PF) 0.9% PF flush 60 mL (60 mLs Intravenous Given 3/19/22 2228)     Drips infusing:  Yes  For the majority of the shift, the patient's behavior Green. Interventions performed were n/a.    Sepsis treatment initiated: No     Patient tested for COVID 19 prior to admission: YES    ED Nurse Name/Phone Number: Luz Marina Castañeda RN,   10:47 PM    RECEIVING UNIT ED HANDOFF REVIEW    Above ED Nurse Handoff Report was reviewed: Yes  Reviewed by: Pao Ulrich RN on March 20, 2022 at 1:47 AM

## 2022-03-20 NOTE — PROGRESS NOTES
[facility]    Medicine Progress Note - Hospitalist Service    Date of Admission:  3/19/2022    Assessment & Plan          Josue Parisi is a 51 year old male with history of multiple strokes (2012, 7/13, 7/21, and 2/5/2022), type 2 diabetes, hypertension, dyslipidemia, internal carotid artery dissection, polysubstance abuse, and depression.  He presented to the hospital with increased unsteadiness.  Found to have acute stroke.    1.  Acute recurrent stroke.  -Appreciate neurology input.  -Continue aspirin 325 mg daily.  -Stop clopidogrel.  -Continue atorvastatin 80 mg a day.  -Physical therapy consult.  -Occupational Therapy consult.  -Speech pathology consult.  -Would benefit from long-term tobacco cessation.  -Allow permissive hypertension.  -IV hydralazine if systolic pressure greater than 220.    2.  Ongoing tobacco use disorder.  -Nicotine patch.  -Additional nicotine gum if needed.    3.  Cocaine abuse.  -Needs long-term cocaine cessation.  -Social work following.    4.  Diabetes mellitus type 2.  -Hold Metformin 1000 mg twice a day.  -Start glargine insulin 10 units a day.  -Start aspart insulin 1 unit per 15 g of carbohydrates 3 times a day with meals.  -NovoLog sliding scale while in hospital.    5.  Hypertension.  Now with acute stroke.  -Hold hydrochlorothiazide for now.  -Allow permissive hypertension.  -IV hydralazine if needed for systolic pressure greater than 220.       Diet: Combination Diet Moderate Consistent Carb (60 g CHO per Meal) Diet    DVT Prophylaxis: Pneumatic Compression Devices  Cortez Catheter: Not present  Central Lines: None  Cardiac Monitoring: ACTIVE order. Indication: Stroke, acute (48 hours)  Code Status: Full Code          Hector Dover DO  Hospitalist Service  [facility]  Securely message with the Vocera Web Console (learn more here)  Text page via [facility] Paging/Directory         Clinically Significant Risk Factors Present  on Admission               # Platelet Defect: home medication list includes an antiplatelet medication   # Diabetes, type II: last A1C 8.8 % (Ref range: 0.0 - 5.6 %)      ______________________________________________________________________    Interval History   Denies chest pain, shortness of breath, fevers, chills, nausea, or vomiting.    Data reviewed today: I reviewed all medications, new labs and imaging results over the last 24 hours.    Physical Exam   Vital Signs: Temp: 97.4  F (36.3  C) Temp src: Temporal BP: (!) 158/88 Pulse: 99   Resp: 20 SpO2: 97 % O2 Device: None (Room air)    Weight: 157 lbs 6.4 oz  Gen:  NAD, A&Ox3.  Eyes:  PERRL, sclera anicteric.  OP:  MMM, no lesions.  Neck:  Supple.  CV:  Regular, no loud murmurs.  Lung:  CTA b/l, normal effort.  Ab:  +BS, soft.  Skin:  Warm, dry to touch.  No rash.  Ext:  No pitting edema LE b/l.      Data   Recent Labs   Lab 03/20/22  1253 03/20/22  0758 03/20/22  0558 03/20/22  0259 03/19/22  2151   WBC  --   --  10.6  --  13.3*   HGB  --   --  11.0*  --  11.7*   MCV  --   --  96  --  96   PLT  --   --  324  --  326   NA  --   --  138  --  138   POTASSIUM  --   --  3.4  --  3.5   CHLORIDE  --   --  104  --  102   CO2  --   --  28  --  26   BUN  --   --  15  --  17   CR  --   --  0.71  --  0.85   ANIONGAP  --   --  6  --  10   DAT  --   --  8.9  --  9.2   * 268* 271*   < > 193*   ALBUMIN  --   --  2.9*  --   --    PROTTOTAL  --   --  6.7*  --   --    BILITOTAL  --   --  0.4  --   --    ALKPHOS  --   --  58  --   --    ALT  --   --  19  --   --    AST  --   --  9  --   --     < > = values in this interval not displayed.

## 2022-03-20 NOTE — PLAN OF CARE
"A&O. Flat affect. Ax2 pivot transfer. +BS/gas, tolerating diet. BG monitoring. Voiding. Patient reports numbness, tingling, and weakness to left side of body. See flowsheets for full neuro assessment. Bruising to underside of LUE. Patient has gauze and tape under right arm, per patient underneath is what appear to be a boil, has had \"for awhile\", asked nurse to not examine under dressing. Will continue to monitor.     "

## 2022-03-20 NOTE — PLAN OF CARE
Pt is alert and oriented x4, assist of 1-2 for transfers, refused cele belt, fall risk, fell in ER, neuros intact, pain to buttocks L upper arm(bruised), weakness to L side, mild to LUE and moderate to RLE, , 3 unit s.s given, IV saline locked to L ac. No discharge plan at this time.

## 2022-03-20 NOTE — PHARMACY-ADMISSION MEDICATION HISTORY
Admission medication history interview status for this patient is complete. See [facility] admission navigator for allergy information, prior to admission medications and immunization status.     Medication history interview done, indicate source(s): Patient  Medication history resources (including written lists, pill bottles, clinic record): [facility] and Care Everywhere  Pharmacy: [facility] in [place]    Changes made to PTA medication list:  Added: Augmentin  Changed: None  Reported as Not Taking: None  Removed: Doxycycline    Actions taken by pharmacist (provider contacted, etc):None     Additional medication history information:None    Medication reconciliation/reorder completed by provider prior to medication history?  No       Prior to Admission medications    Medication Sig Last Dose Taking? Auth Provider   ALPRAZolam (XANAX) 1 MG tablet Take 1 mg by mouth nightly as needed for anxiety 3/19/2022 at Unknown time Yes Unknown, Entered By History   amoxicillin-clavulanate (AUGMENTIN) 875-125 MG tablet Take 1 tablet by mouth 2 times daily 3/19/2022 at Unknown time Yes Unknown, Entered By History   aspirin (ASA) 325 MG tablet Take 1 tablet (325 mg) by mouth daily 3/19/2022 at Unknown time Yes Israel Neal MD   atorvastatin (LIPITOR) 80 MG tablet Take 1 tablet (80 mg) by mouth daily 3/19/2022 at Unknown time Yes Israel Neal MD   cetirizine (ZYRTEC) 10 MG tablet Take 10 mg by mouth daily Past Week at Unknown time Yes Unknown, Entered By History   chlorhexidine (HIBICLENS) 4 % liquid Apply topically daily as needed for wound care Past Week at Unknown time Yes Unknown, Entered By History   clopidogrel (PLAVIX) 75 MG tablet Take 1 tablet (75 mg) by mouth daily Take for 90 days, then stop 3/19/2022 at Unknown time Yes Israel Neal MD   gabapentin (NEURONTIN) 100 MG capsule Take 1 capsule (100 mg) by mouth 3 times daily Past Week at Unknown time Yes Israel Neal MD   hydrochlorothiazide  (HYDRODIURIL) 25 MG tablet Take 25 mg by mouth daily 3/19/2022 at Unknown time Yes Unknown, Entered By History   metFORMIN (GLUCOPHAGE) 1000 MG tablet Take 1,000 mg by mouth 2 times daily (with meals) 3/19/2022 at Unknown time Yes Unknown, Entered By History

## 2022-03-20 NOTE — ED NOTES
This writer called into room by ERT d/t pt fall. Pt was found sitting on his buttocks on the ground facing the bed. Pt denies hitting head or LOC. Pt denies any acute pain related to fall. Pt picked up and helped into bed with AX2 - bilateral bed rails raised, call light in reach and bedside RN updated.

## 2022-03-20 NOTE — PROGRESS NOTES
SLP  - Orders received for swallow evaluation, speech/lang evaluation s/p new CVA.     Per chart review, pt passed bedside RN dysphagia screen. Current order for regular diet.     No facial weakness per chart or speech/lang deficits as presenting symptoms.     Formal swallow/speech/lang evaluations not likely indicated at this time. Unable to discuss with pt at this time. Will check-in with RN tomorrow if any SLP needs.      Addendum 3/21/22 0900:  Patient requesting to rest per RN.  Patient reported to tolerate regular diet intake and communicating without issues at basic conversation/personal needs level.   Will complete SLP order without indication for formal evaluation at this time.  Patient ok to discharge to previous environment per speech and swallowing needs and defer to care team for other safety and mobility needs, etc.

## 2022-03-20 NOTE — H&P
[facility]    History and Physical - Hospitalist Service       Date of Admission:  3/19/2022    Assessment & Plan      Josue Parisi is a 51 year old male with history of multiple strokes (2012, 7/13, 7/21, and 2/5/2022), type 2 diabetes, hypertension, dyslipidemia, internal carotid artery dissection, polysubstance abuse, and depression.  He was recently hospitalized here from 2/5/2022 through 2/7/2022 with acute cerebrovascular infarct (2 right cerebral territory strokes).  At that time he tested positive for cocaine.  Echocardiogram was done and showed normal LV function and no patent foramen ovale.  There was a relatively immobile appearing echodensity in the IVC measuring 2 cm.  This was thought to be artifact or thrombus.  A follow-up CT scan was obtained and did not see it so it was ultimately thought to be artifact.  Josue discharged home.  He had been doing reasonably well until about a week ago when he started to feel more unsteady.  He confided in me that he had started using cocaine again.  It was shortly after starting this that he began to have left leg weakness and falls.  This is what brought him to the emergency department today.  He denied chest pain, shortness of breath, cough, abdominal pain, nausea, vomiting, diarrhea, and dysuria.  Emergency department evaluation showed blood pressure 131/93, heart rate 106, respiratory rate 16, oxygen saturations 99% on room air, and temperature 98.2.  Basic metabolic panel was unremarkable.  CBC showed white blood cells of 13.3, hemoglobin 11.7, and normal platelets of 326.  Testing for COVID-19 was negative.  CT of head was obtained and showed acute to subacute right frontal lobe stroke.  This was not seen on MRI from 2/5/2022.  Stroke neurology was contacted and recommended admission for MRI of brain, MRA of head and neck, therapy consults, telemetry monitoring, and neurology evaluation.    Problem list    Recurrent  strokes  Ongoing cocaine use  -Admit as inpatient  -MRI of brain and MRA of head and neck  -Speech therapy, physical therapy, and occupational therapy  -Continue prior to admission Plavix 75 mg daily and aspirin 325 mg daily.  Patient reports compliance.  -Continue prior to admission Lipitor 80 mg daily.  Patient reports compliance  -Smoking cessation (patient continues to smoke  -Nicotine patch was started in the emergency department but in the setting of acute stroke I will discontinue.  -Check liver function tests and fasting lipid panel in the morning  -Consult stroke neurology  -Patient had echocardiogram on 2/6/2022.  I did not order repeat echo.  Defer to stroke neurology of thought to be indicated.    Hypertension  Dyslipidemia  -Hold prior to admission hydrochlorothiazide  -Resume prior to admission Lipitor    Type 2 diabetes  -Resume prior to admission Glucophage  -I have ordered NovoLog sliding scale insulin    Depression with anxiety  -Resume prior to admission Ativan    Cocaine abuse  Tobacco use disorder  History of polysubstance abuse  -I offered chemical dependency consult but patient declined  -Smoking cessation enoucouraged    COVID-19 PCR testing was negative     Diet:  Diabetic diet  DVT Prophylaxis: Pneumatic Compression Devices  Cortez Catheter: Not present  Central Lines: None  Cardiac Monitoring: None  Code Status:  Full code    Clinically Significant Risk Factors Present on Admission     # Platelet Defect: home medication list includes an antiplatelet medication   # Diabetes, type II: last A1C 10.5 % (Ref range: 0.0 - 5.6 %)      Disposition Plan   Expected Discharge: Home in 2 days, likely         The patient's care was discussed with the patient and Dr. Perez.    Wiley Win MD  Hospitalist Service  [facility]  Securely message with the Vocera Web Console (learn more here)  Text page via [employer] Paging/Directory          ______________________________________________________________________    Chief Complaint   Left leg weakness, unsteadiness, falls    History is obtained from the patient, Dr. Garcia, and the medical record    History of Present Illness   Josue Parisi is a 51 year old male with history of multiple strokes (2012, 7/13, 7/21, and 2/5/2022), type 2 diabetes, hypertension, dyslipidemia, internal carotid artery dissection, polysubstance abuse, and depression.  He was recently hospitalized here from 2/5/2022 through 2/7/2022 with acute cerebrovascular infarct (2 right cerebral territory strokes).  At that time he tested positive for cocaine.  Echocardiogram was done and showed normal LV function and no patent foramen ovale.  There was a relatively immobile appearing echodensity in the IVC measuring 2 cm.  This was thought to be artifact or thrombus.  A follow-up CT scan was obtained and did not see it so it was ultimately thought to be artifact.  Josue discharged home.  He had been doing reasonably well until about a week ago when he started to feel more unsteady.  He confided in me that he had started using cocaine again.  It was shortly after starting this that he began to have left leg weakness and falls.  This is what brought him to the emergency department today.  He denied chest pain, shortness of breath, cough, abdominal pain, nausea, vomiting, diarrhea, and dysuria.  Emergency department evaluation showed blood pressure 131/93, heart rate 106, respiratory rate 16, oxygen saturations 99% on room air, and temperature 98.2.  Basic metabolic panel was unremarkable.  CBC showed white blood cells of 13.3, hemoglobin 11.7, and normal platelets of 326.  Testing for COVID-19 was negative.  CT of head was obtained and showed acute to subacute right frontal lobe stroke.  This was not seen on MRI from 2/5/2022.  Stroke neurology was contacted and recommended admission for MRI of brain, MRA of head and neck,  therapy consults, telemetry monitoring, and neurology evaluation.    Review of Systems    The 10 point Review of Systems is negative other than noted in the HPI or here.     Past Medical History    I have reviewed this patient's medical history and updated it with pertinent information if needed.   Past Medical History:   Diagnosis Date     CVA (cerebral infarction)      Hypertension        Past Surgical History   I have reviewed this patient's surgical history and updated it with pertinent information if needed.  No past surgical history on file.    Social History   I have reviewed this patient's social history and updated it with pertinent information if needed.  Social History     Tobacco Use     Smoking status: Current Every Day Smoker     Packs/day: 0.50     Smokeless tobacco: Not on file   Substance Use Topics     Alcohol use: No     Drug use: No       Family History   Depression, CAD, and DM2    Prior to Admission Medications   Prior to Admission Medications   Prescriptions Last Dose Informant Patient Reported? Taking?   ALPRAZolam (XANAX) 1 MG tablet   Yes No   Sig: Take 1 mg by mouth nightly as needed for anxiety   aspirin (ASA) 325 MG tablet   No No   Sig: Take 1 tablet (325 mg) by mouth daily   atorvastatin (LIPITOR) 80 MG tablet   No No   Sig: Take 1 tablet (80 mg) by mouth daily   cetirizine (ZYRTEC) 10 MG tablet   Yes No   Sig: Take 10 mg by mouth daily   chlorhexidine (HIBICLENS) 4 % liquid   Yes No   Sig: Apply topically daily as needed for wound care   clopidogrel (PLAVIX) 75 MG tablet   No No   Sig: Take 1 tablet (75 mg) by mouth daily Take for 90 days, then stop   doxycycline hyclate (VIBRAMYCIN) 100 MG capsule   Yes No   Sig: Take 100 mg by mouth 2 times daily   gabapentin (NEURONTIN) 100 MG capsule   No No   Sig: Take 1 capsule (100 mg) by mouth 3 times daily   hydrochlorothiazide (HYDRODIURIL) 25 MG tablet   Yes No   Sig: Take 25 mg by mouth daily   metFORMIN (GLUCOPHAGE) 1000 MG tablet   Yes  No   Sig: Take 1,000 mg by mouth 2 times daily (with meals)      Facility-Administered Medications: None     Allergies   No Known Allergies    Physical Exam   Vital Signs: Temp: 98.2  F (36.8  C) Temp src: Temporal BP: (!) 152/91 Pulse: 106   Resp: 16 SpO2: 99 % O2 Device: None (Room air)    Weight: 0 lbs 0 oz    GENERAL: Pleasant and cooperative. No acute distress.  EYES: Pupils equal and round. No scleral erythema or icterus.  ENT: External ears are normal without deformity. Posterior oropharynx is without erythem, swelling, or exudate.  NECK: Supple. No masses or swelling. No tenderness. Thyroid is normal without mass or tenderness.  CHEST: Clear to auscultation. Normal breath sounds. No retractions.   CV: Regular rate and rhythm. No JVD. Pulses normal.  ABDOMEN: Bowel sounds present. No tenderness. No masses or hernia.  EXTREMETIES: No clubbing, cyanosis, or ischemia.  SKIN: Warm and dry to touch. No wounds or rashes. Bruising under left upper amr  NEUROLOGIC: Subjective weakness of left leg. Deep tendon reflexes are normal. Cranial nerves are normal.        Data   Data reviewed today: I reviewed all medications, new labs and imaging results over the last 24 hours.  Recent Labs   Lab 03/19/22  2151   WBC 13.3*   HGB 11.7*   MCV 96         POTASSIUM 3.5   CHLORIDE 102   CO2 26   BUN 17   CR 0.85   ANIONGAP 10   DAT 9.2   *     Recent Results (from the past 24 hour(s))   Head CT w/o contrast    Narrative    EXAM: CT HEAD W/O CONTRAST  LOCATION: [facility]  DATE/TIME: 3/19/2022 8:31 PM    INDICATION: Head injury, on Plavix, history of multiple strokes with worsening left leg weakness  COMPARISON: MRI head 02/05/2022  TECHNIQUE: Routine CT Head without IV contrast. Multiplanar reformats. Dose reduction techniques were used.    FINDINGS:  INTRACRANIAL CONTENTS: No intracranial hemorrhage, extraaxial collection, or mass effect.  A few subtle hypodensities right cerebral  hemisphere, corresponding to acute/subacute infarctions on prior MRI. A mild, more conspicuous hypodensity parasagittal   right frontal lobe near vertex appears tube in the area which appeared normal on prior MRI and could be due to new mild acute/subacute infarction. No definite associated hemorrhage or significant mass effect. Normal ventricles and sulci. Mild chronic   infarction parasagittal left frontal lobe and superior aspect of left basal ganglia; similar to prior MRI.    VISUALIZED ORBITS/SINUSES/MASTOIDS: No intraorbital abnormality. No paranasal sinus mucosal disease. No middle ear or mastoid effusion.    BONES/SOFT TISSUES: No acute abnormality.      Impression    IMPRESSION:  1.  A few subtle hypodensities right cerebral hemisphere, corresponding to acute/subacute infarctions on prior MRI.  2.  A mild, more conspicuous hypodensity parasagittal right frontal lobe near vertex appears tube in the area which appeared normal on prior MRI and could be due to new mild acute/subacute infarction.  3.  No definite evidence of hemorrhage or significant mass effect.

## 2022-03-20 NOTE — ED NOTES
PT asked ERT to check under his right arm for a boil that has been leaking. No boil was noted or redness. ERT applied an ABD pad per patient request.

## 2022-03-21 ENCOUNTER — APPOINTMENT (OUTPATIENT)
Dept: OCCUPATIONAL THERAPY | Facility: CLINIC | Age: 52
DRG: 064 | End: 2022-03-21
Attending: INTERNAL MEDICINE
Payer: COMMERCIAL

## 2022-03-21 ENCOUNTER — APPOINTMENT (OUTPATIENT)
Dept: PHYSICAL THERAPY | Facility: CLINIC | Age: 52
DRG: 064 | End: 2022-03-21
Attending: INTERNAL MEDICINE
Payer: COMMERCIAL

## 2022-03-21 ENCOUNTER — APPOINTMENT (OUTPATIENT)
Dept: CARDIOLOGY | Facility: CLINIC | Age: 52
DRG: 064 | End: 2022-03-21
Attending: NURSE PRACTITIONER
Payer: COMMERCIAL

## 2022-03-21 LAB
ATRIAL RATE - MUSE: 96 BPM
DIASTOLIC BLOOD PRESSURE - MUSE: NORMAL MMHG
GLUCOSE BLDC GLUCOMTR-MCNC: 208 MG/DL (ref 70–99)
GLUCOSE BLDC GLUCOMTR-MCNC: 208 MG/DL (ref 70–99)
GLUCOSE BLDC GLUCOMTR-MCNC: 209 MG/DL (ref 70–99)
GLUCOSE BLDC GLUCOMTR-MCNC: 222 MG/DL (ref 70–99)
GLUCOSE BLDC GLUCOMTR-MCNC: 291 MG/DL (ref 70–99)
INTERPRETATION ECG - MUSE: NORMAL
LVEF ECHO: NORMAL
P AXIS - MUSE: 58 DEGREES
POTASSIUM BLD-SCNC: 3.7 MMOL/L (ref 3.4–5.3)
PR INTERVAL - MUSE: 158 MS
QRS DURATION - MUSE: 76 MS
QT - MUSE: 336 MS
QTC - MUSE: 424 MS
R AXIS - MUSE: 16 DEGREES
SYSTOLIC BLOOD PRESSURE - MUSE: NORMAL MMHG
T AXIS - MUSE: 26 DEGREES
VENTRICULAR RATE- MUSE: 96 BPM

## 2022-03-21 PROCEDURE — 84132 ASSAY OF SERUM POTASSIUM: CPT | Performed by: INTERNAL MEDICINE

## 2022-03-21 PROCEDURE — 93321 DOPPLER ECHO F-UP/LMTD STD: CPT | Mod: 26 | Performed by: INTERNAL MEDICINE

## 2022-03-21 PROCEDURE — 99232 SBSQ HOSP IP/OBS MODERATE 35: CPT | Performed by: INTERNAL MEDICINE

## 2022-03-21 PROCEDURE — 36415 COLL VENOUS BLD VENIPUNCTURE: CPT | Performed by: INTERNAL MEDICINE

## 2022-03-21 PROCEDURE — 250N000012 HC RX MED GY IP 250 OP 636 PS 637: Performed by: INTERNAL MEDICINE

## 2022-03-21 PROCEDURE — 93325 DOPPLER ECHO COLOR FLOW MAPG: CPT | Mod: 26 | Performed by: INTERNAL MEDICINE

## 2022-03-21 PROCEDURE — 250N000013 HC RX MED GY IP 250 OP 250 PS 637: Performed by: INTERNAL MEDICINE

## 2022-03-21 PROCEDURE — 97535 SELF CARE MNGMENT TRAINING: CPT | Mod: GO

## 2022-03-21 PROCEDURE — 120N000001 HC R&B MED SURG/OB

## 2022-03-21 PROCEDURE — 97530 THERAPEUTIC ACTIVITIES: CPT | Mod: GP | Performed by: PHYSICAL THERAPIST

## 2022-03-21 PROCEDURE — 93325 DOPPLER ECHO COLOR FLOW MAPG: CPT

## 2022-03-21 PROCEDURE — 97116 GAIT TRAINING THERAPY: CPT | Mod: GP | Performed by: PHYSICAL THERAPIST

## 2022-03-21 PROCEDURE — 97165 OT EVAL LOW COMPLEX 30 MIN: CPT | Mod: GO

## 2022-03-21 PROCEDURE — 97162 PT EVAL MOD COMPLEX 30 MIN: CPT | Mod: GP | Performed by: PHYSICAL THERAPIST

## 2022-03-21 PROCEDURE — 93308 TTE F-UP OR LMTD: CPT

## 2022-03-21 PROCEDURE — 93308 TTE F-UP OR LMTD: CPT | Mod: 26 | Performed by: INTERNAL MEDICINE

## 2022-03-21 RX ADMIN — INSULIN ASPART 2 UNITS: 100 INJECTION, SOLUTION INTRAVENOUS; SUBCUTANEOUS at 21:25

## 2022-03-21 RX ADMIN — ATORVASTATIN CALCIUM 80 MG: 40 TABLET, FILM COATED ORAL at 08:40

## 2022-03-21 RX ADMIN — CETIRIZINE HYDROCHLORIDE 10 MG: 10 TABLET, FILM COATED ORAL at 08:40

## 2022-03-21 RX ADMIN — INSULIN GLARGINE 15 UNITS: 100 INJECTION, SOLUTION SUBCUTANEOUS at 21:25

## 2022-03-21 RX ADMIN — ASPIRIN 325 MG ORAL TABLET 325 MG: 325 PILL ORAL at 08:39

## 2022-03-21 RX ADMIN — OXYCODONE HYDROCHLORIDE 5 MG: 5 TABLET ORAL at 21:21

## 2022-03-21 RX ADMIN — ACETAMINOPHEN 650 MG: 325 TABLET, FILM COATED ORAL at 08:40

## 2022-03-21 RX ADMIN — GABAPENTIN 100 MG: 100 CAPSULE ORAL at 13:38

## 2022-03-21 RX ADMIN — OXYCODONE HYDROCHLORIDE 5 MG: 5 TABLET ORAL at 08:40

## 2022-03-21 RX ADMIN — OXYCODONE HYDROCHLORIDE 5 MG: 5 TABLET ORAL at 00:08

## 2022-03-21 RX ADMIN — ALPRAZOLAM 1 MG: 1 TABLET ORAL at 00:08

## 2022-03-21 RX ADMIN — ALPRAZOLAM 1 MG: 1 TABLET ORAL at 21:21

## 2022-03-21 RX ADMIN — GABAPENTIN 100 MG: 100 CAPSULE ORAL at 08:40

## 2022-03-21 RX ADMIN — GABAPENTIN 100 MG: 100 CAPSULE ORAL at 21:21

## 2022-03-21 ASSESSMENT — ACTIVITIES OF DAILY LIVING (ADL)
ADLS_ACUITY_SCORE: 7
ADLS_ACUITY_SCORE: 9
ADLS_ACUITY_SCORE: 7

## 2022-03-21 NOTE — PLAN OF CARE
Pt is alert and oriented, assist of 1 with transfers, did not get up during night, c/o pain to L side of his body, requested prn Oxycodone for pain and Xanax for anxiety, given with effect, neuros intact, VSS, BG at 2am 209. Pt is uncooperative with cares at times, after encouragement will let staff do assessment or BG check. Wants staff to leave him along. Voiding. No IV access. No discharge plan at this time.

## 2022-03-21 NOTE — PLAN OF CARE
Vitals are Temp: 97.6  F (36.4  C) Temp src: Temporal BP: (!) 156/86 Pulse: 86   Resp: 20 SpO2: 96 %.    Patient is Alert and Oriented x4. Irritable and anxious at times. SBA, refused gb and walker.  Pt is a Diabetic diet.  They are complaining of pain in their L arm.  Paged MD for pain medication. Patient has no IV access. Pt allowed to go out & smoke. . Refused lantus. Neuros intact, except L sided weakness and numbness/tingling. Potassium 3.6. Recheck ordered for AM. Tele SR. Will continue to monitor.

## 2022-03-21 NOTE — PROGRESS NOTES
03/21/22 1407   Quick Adds   Type of Visit Initial PT Evaluation   Living Environment   People in Home alone   Current Living Arrangements apartment   Home Accessibility stairs to enter home   Number of Stairs, Main Entrance other (see comments)  (more than 10)   Transportation Anticipated car, drives self;family or friend will provide   Living Environment Comments pt lives in 2nd floor apt. tub shower combo. standard toilet. lives alone   Self-Care   Usual Activity Tolerance moderate   Current Activity Tolerance fair   Equipment Currently Used at Home cane, straight   Fall history within last six months yes   Number of times patient has fallen within last six months 5   Activity/Exercise/Self-Care Comment no use of AD at baseline. now has AD from ED MD? pt reports indp at baseline with ADL and self care   General Information   Onset of Illness/Injury or Date of Surgery 03/19/22   Referring Physician Wiley Win MD   Patient/Family Therapy Goals Statement (PT) Improve functional mobility   Pertinent History of Current Problem (include personal factors and/or comorbidities that impact the POC) Josue Parisi is a 51 year old male with history of multiple strokes (2012, 7/13, 7/21, and 2/5/2022), type 2 diabetes, hypertension, dyslipidemia, internal carotid artery dissection, polysubstance abuse, and depression.  He presented to the hospital with increased unsteadiness.  Found to have acute stroke.   General Observations L hemiparesis   Cognition   Affect/Mental Status (Cognition) WFL   Orientation Status (Cognition) oriented x 3   Pain Assessment   Patient Currently in Pain Yes, see Vital Sign flowsheet  (L LE hypersensitivity)   Range of Motion (ROM)   Range of Motion ROM deficits secondary to weakness;ROM is WFL   Strength Comprehensive (MMT)   Comment, General Manual Muscle Testing (MMT) Assessment L LE 3/5 hip flexion, knee extension, dorsi/plantarflexion    Strength (Manual Muscle Testing)    Strength (Manual Muscle Testing) Deficits observed during functional mobility   Strength Comments WFL   Bed Mobility   Comment, (Bed Mobility) Not assessed   Transfers   Transfers sit-stand transfer   Sit-Stand Transfer   Sit-Stand [place] (Transfers) supervision   Assistive Device (Sit-Stand Transfers) walker, front-wheeled   Gait/Stairs (Locomotion)   [place] Level (Gait) supervision   Assistive Device (Gait) walker, front-wheeled;crutches, axillary  (L AFO)   Distance in Feet (Required for LE Total Joints) 90' +40' + 90'    Comment, (Gait/Stairs) Amb 4 stairs with Bi rails with CGA.    Balance   Balance Comments Good seated balance, Good standing balance with FWW, Fair standing balance with No AD/ Bi crutches   Sensory Examination   Sensory Perception Comments L LE hypersensitive   Clinical Impression   Criteria for Skilled Therapeutic Intervention Yes, treatment indicated   PT Diagnosis (PT) Impaired functional mobility   Influenced by the following impairments Decreased strength, ROM, impaired balance and pain   Functional limitations due to impairments Impaired bed mobility, transfers, stairs, and gait   Clinical Presentation (PT Evaluation Complexity) Stable/Uncomplicated   Clinical Presentation Rationale PMHx and PLOF   Clinical Decision Making (Complexity) moderate complexity   Planned Therapy Interventions (PT) balance training;bed mobility training;gait training;neuromuscular re-education;postural re-education;stair training;strengthening;ROM (range of motion);transfer training   Anticipated Equipment Needs at Discharge (PT) crutches, axillary;walker, rolling   Risk & Benefits of therapy have been explained evaluation/treatment results reviewed;care plan/treatment goals reviewed;risks/benefits reviewed;current/potential barriers reviewed;participants voiced agreement with care plan;participants included;patient   PT Discharge Planning   PT Discharge Recommendation (DC Rec) Acute Rehab  Center-Motivated patient will benefit from intensive, interdisciplinary therapy.  Anticipate will be able to tolerate 3 hours of therapy per day;Transitional Care Facility;home with home care physical therapy   PT Rationale for DC Rec Pt requiring SBA with gait and transfers and CGA with stairs. Pt significantly below baseline mobility and would greatly benefit from ongoing intensive PT at ARU. If not able to d/c to ARU, Pt would benefit from ongoing PT at TCU to improve strength and gait/stair training. Pt agreeable to more rehab at a facility.    PT Brief overview of current status A x 1 with FWW   Total Evaluation Time   Total Evaluation Time (Minutes) 5   Physical Therapy Goals   PT Frequency Daily   PT Predicated Duration/Target Date for Goal Attainment 03/24/22   PT Goals Bed Mobility;Transfers;Gait;Stairs   PT: Bed Mobility Modified independent;Supine to/from sit   PT: Transfers Modified independent;Sit to/from stand;Assistive device   PT: Gait Modified independent;Assistive device;100 feet   PT: Stairs Modified independent;Greater than 10 stairs;Rail on both sides;Assistive device

## 2022-03-21 NOTE — PROGRESS NOTES
"   03/21/22 1201   Quick Adds   Type of Visit Initial Occupational Therapy Evaluation   Living Environment   People in Home alone   Current Living Arrangements apartment   Home Accessibility stairs to enter home   Number of Stairs, Main Entrance   (more than 10 )   Transportation Anticipated car, drives self;family or friend will provide   Living Environment Comments pt lives in 2nd floor apt. tub shower combo. standard toilet. lives alone   Self-Care   Usual Activity Tolerance moderate   Current Activity Tolerance fair   Equipment Currently Used at Home cane, straight   Fall history within last six months yes   Number of times patient has fallen within last six months 5   Activity/Exercise/Self-Care Comment no use of AD at baseline. now has AD from ED MD? pt reports indp at baseline with ADL and self care   Instrumental Activities of Daily Living (IADL)   IADL Comments reports baseline though now stating that he doesn't know how he going to do his IADL and will need assistance. wants assistance for IADL at home    General Information   Onset of Illness/Injury or Date of Surgery 03/19/22   Referring Physician Wiley Win MD   Patient/Family Therapy Goal Statement (OT) \"I need some help at home\"   Additional Occupational Profile Info/Pertinent History of Current Problem Josue Parisi is a 51 year old male with history of multiple strokes (2012, 7/13, 7/21, and 2/5/2022), type 2 diabetes, hypertension, dyslipidemia, internal carotid artery dissection, polysubstance abuse, and depression.  He presented to the hospital with increased unsteadiness.  Found to have acute stroke.   General Observations and Info agreeable to OT. has been somewhat non compliant during hospital stay per chart    Cognitive Status Examination   Orientation Status orientation to person, place and time   Behavioral Issues inappropriate language   Affect/Mental Status (Cognitive) WFL   Follows Commands follows one-step commands;over " "90% accuracy   Safety Deficit judgment;safety precautions awareness;safety precautions follow-through/compliance   Memory Deficit   (\"bad memory\" per patient report)   Cognitive Status Comments assume at baseline. reports \"bad memory\". anticipate baseline deficits given social history and polysubstance use    Visual Perception   Visual Impairment/Limitations WFL   Sensory   Sensory Quick Adds Two point discrimination;Sharp dull discrimination   Sensory Comments correct on 6/10 trails on UE    Sensation Sharp Dull Discrimination, Rehab Eval   LUE within normal limits   Pain Assessment   Patient Currently in Pain Yes, see Vital Sign flowsheet  (pain in foot )   Integumentary/Edema   Integumentary/Edema no deficits were identifed   Posture   Posture forward head position;protracted shoulders   Range of Motion Comprehensive   Comment, General Range of Motion full ROM BUE   Strength Comprehensive (MMT)   Comment, General Manual Muscle Testing (MMT) Assessment L 4/5. R 5/5    Coordination   Upper Extremity Coordination Left UE impaired   Functional Limitations Decreased speed   Coordination Comments mild    Bed Mobility   Bed Mobility sit-supine;supine-sit   Supine-Sit [place] (Bed Mobility) independent   Sit-Supine [place] (Bed Mobility) independent   Transfers   Transfers sit-stand transfer   Sit-Stand Transfer   Sit-Stand [place] (Transfers) contact guard   Assistive Device (Sit-Stand Transfers) walker, front-wheeled   Activities of Daily Living   BADL Assessment/Intervention upper body dressing;lower body dressing   Upper Body Dressing Assessment/Training   [place] Level (Upper Body Dressing) independent   Lower Body Dressing Assessment/Training   [place] Level (Lower Body Dressing) independent   Clinical Impression   Criteria for Skilled Therapeutic Interventions Met (OT) Yes, treatment indicated   OT Diagnosis decreased function in ADL and safety    OT Problem List-Impairments impacting ADL " problems related to;activity tolerance impaired;mobility;pain;sensation;coordination  (baseline cog)   Assessment of Occupational Performance 1-3 Performance Deficits   Identified Performance Deficits bathing, tub transfer, IADL   Planned Therapy Interventions (OT) ADL retraining;IADL retraining;risk factor education;transfer training   Clinical Decision Making Complexity (OT) low complexity   Anticipated Equipment Needs Upon Discharge (OT) tub bench  (defer mobility device to PT)   Risk & Benefits of therapy have been explained evaluation/treatment results reviewed;care plan/treatment goals reviewed;risks/benefits reviewed;current/potential barriers reviewed;participants voiced agreement with care plan;participants included;patient   Clinical Impression Comments pt below baseline function in ADL and self care. skilled IP OT warranted at this time   OT Discharge Planning   OT Discharge Recommendation (DC Rec) home with assist;home with home care occupational therapy;Leaving home requires significant taxing effort   OT Rationale for DC Rec pt below baseline function at this time but anticipate will be able to progress to home with continued skilled Ip OT and medical mgmt. rec HH OT to address deficits in sensation, coordination on L UE. rec home with assist for ADL and tub/shower tx given pt hx of falls and recent hospitilizations. complex discharge planning given psychosocial and polysubstance abuse. has stairs which anticipate will be a barrier. defer mobility and stairs to PT.    Total Evaluation Time (Minutes)   Total Evaluation Time (Minutes) 10   OT Goals   Therapy Frequency (OT) Daily   OT Predicated Duration/Target Date for Goal Attainment 03/24/22   OT Goals OT Goal 1;Transfers   OT: Transfer Supervision/stand-by assist  (tub shower with bench)   OT: Goal 1 Pt will identify 3 safety strategies for discharge to home

## 2022-03-21 NOTE — CONSULTS
Care Management Note    Discharge Date: 03/22/2022       Discharge Disposition: home       Handoff Referral Completed: No    Additional Information:  Pt likely discharge to home. Chart review states pt declining CD resources for cocaine use. PT/OT to eval. Will watch for discharge needs if they should arise.    Melani Sierra RN, BSN CTS  Care Coordinator  [facility]   [phone]

## 2022-03-21 NOTE — PROGRESS NOTES
[facility]    Medicine Progress Note - Hospitalist Service    Date of Admission:  3/19/2022    Assessment & Plan          Josue Parisi is a 51 year old male with history of multiple strokes (2012, 7/13, 7/21, and 2/5/2022), type 2 diabetes, hypertension, dyslipidemia, internal carotid artery dissection, polysubstance abuse, and depression.  He presented to the hospital with increased unsteadiness.  Found to have acute stroke.     1.  Acute recurrent stroke.  -Appreciate neurology input.  -Continue aspirin 325 mg daily.  -Stopped clopidogrel to try to increase medication compliance in the outpatient setting.  -Continue atorvastatin 80 mg a day.  -Physical therapy consult.  -Occupational Therapy consult.  -Speech pathology consult.  -Would benefit from long-term tobacco cessation.  -Allow permissive hypertension.  -IV hydralazine if systolic pressure greater than 220.     2.  Ongoing tobacco use disorder.  -Nicotine patch.  -Additional nicotine gum if needed.     3.  Cocaine abuse.  -Needs long-term cocaine cessation.  -Social work following.     4.  Diabetes mellitus type 2.  -Hold Metformin 1000 mg twice a day.  -Increase glargine insulin to 15 units a day.  -Increase scheduled aspart insulin to 1 unit per 10 g of carbohydrates 3 times a day with meals.  -Continue additional NovoLog sliding scale.     5.  Hypertension.  Now with acute stroke.  -Hold hydrochlorothiazide for now.  -Allow permissive hypertension.  -IV hydralazine if needed for systolic pressure greater than 220.             Diet: Combination Diet Moderate Consistent Carb (60 g CHO per Meal) Diet    DVT Prophylaxis: Pneumatic Compression Devices, aspirin  Cortez Catheter: Not present  Central Lines: None  Cardiac Monitoring: ACTIVE order. Indication: Stroke, acute (48 hours)  Code Status: Full Code        Hector Dover DO  Hospitalist Service  [facility]  Securely message with the Vocera Web  Console (learn more here)  Text page via [facility] Paging/Directory         Clinically Significant Risk Factors Present on Admission             # Diabetes, type II: last A1C 8.8 % (Ref range: 0.0 - 5.6 %)      ______________________________________________________________________    Interval History   Feels generally fatigued.  Having generalized body aches.  Denies specific chest pain, shortness of breath, fevers, chills, nausea, or vomiting.    Data reviewed today: I reviewed all medications, new labs and imaging results over the last 24 hours.    Physical Exam   Vital Signs: Temp: 96.9  F (36.1  C) Temp src: Temporal BP: (!) 156/91 Pulse: 84   Resp: 18 SpO2: 98 % O2 Device: None (Room air)    Weight: 151 lbs 9.6 oz  Gen:  NAD, A&Ox3.  Eyes:  PERRL, sclera anicteric.  OP:  MMM, no lesions.  Neck:  Supple.  CV:  Regular, no murmurs.  Lung:  CTA b/l, normal effort.  Ab:  +BS, soft.  Skin:  Warm, dry to touch.  No rash.  Ext:  No pitting edema LE b/l.      Data   Recent Labs   Lab 03/21/22  1142 03/21/22  1053 03/21/22  0757 03/21/22  0151 03/20/22  2148 03/20/22 2019 03/20/22  0758 03/20/22  0558 03/20/22  0259 03/19/22  2151   WBC  --   --   --   --   --   --   --  10.6  --  13.3*   HGB  --   --   --   --   --   --   --  11.0*  --  11.7*   MCV  --   --   --   --   --   --   --  96  --  96   PLT  --   --   --   --   --   --   --  324  --  326   NA  --   --   --   --   --   --   --  138  --  138   POTASSIUM  --  3.7  --   --   --  3.6  --  3.4  --  3.5   CHLORIDE  --   --   --   --   --   --   --  104  --  102   CO2  --   --   --   --   --   --   --  28  --  26   BUN  --   --   --   --   --   --   --  15  --  17   CR  --   --   --   --   --   --   --  0.71  --  0.85   ANIONGAP  --   --   --   --   --   --   --  6  --  10   DAT  --   --   --   --   --   --   --  8.9  --  9.2   *  --  208* 209*   < >  --    < > 271*   < > 193*   ALBUMIN  --   --   --   --   --   --   --  2.9*  --   --    PROTTOTAL  --   --   --    --   --   --   --  6.7*  --   --    BILITOTAL  --   --   --   --   --   --   --  0.4  --   --    ALKPHOS  --   --   --   --   --   --   --  58  --   --    ALT  --   --   --   --   --   --   --  19  --   --    AST  --   --   --   --   --   --   --  9  --   --     < > = values in this interval not displayed.

## 2022-03-21 NOTE — PLAN OF CARE
"[id number]    VS /77 (BP Location: Right arm)   Pulse 87   Temp 97.8  F (36.6  C) (Temporal)   Resp 16   Ht 1.676 m (5' 6\")   Wt 68.8 kg (151 lb 9.6 oz)   SpO2 97%   BMI 24.47 kg/m    Lung sounds clear  O2 room air  Tele SR  Bowel sounds active  Tolerating mod carb diet  IVF no IV access  Dressings R axilla dressing for what patient identifies has having boils  Tests echo completed today  CMS left leg weakness  Activity up independently in room with walker  Pain one dose of oxycodone given this AM  Patient/family centered care mother present at bedside this afternoon  Discharge plan possible discharge tomorrow, TCU vs home      "

## 2022-03-21 NOTE — PHARMACY
Pharmacy Consult to evaluate for medication related stroke core measures    Josue Parisi, 51 year old male admitted for acute recurrent stroke on 3/19/2022.    Thrombolytic was not given because of not needed.    VTE Prophylaxis SCDs /PCDs placed on 3/20, as appropriate prior to end of hospital day 2.    Antithrombotic: aspirin and clopidogrel, but will use ASA only to try and improve compliance; started on pta, as appropriate by end of hospital day 2. Continue antithrombotic therapy on discharge to meet quality measures, unless contraindicated.    Anticoagulation if history of A-fib/flutter: Patient does not have history of A-fib/flutter - anticoagulation not required for medication related stroke core measures.     LDL Cholesterol Calculated   Date Value Ref Range Status   03/20/2022 46 <=100 mg/dL Final       Patient's home statin, Lipitor (atorvastatin) restarted; continue statin on discharge to meet quality measures, unless contraindicated.     Recommendations: None at this time    Thank you for the consult.    Samir Rooney [facility] 3/21/2022 3:35 PM

## 2022-03-22 ENCOUNTER — APPOINTMENT (OUTPATIENT)
Dept: EDUCATION SERVICES | Facility: CLINIC | Age: 52
End: 2022-03-22
Attending: INTERNAL MEDICINE
Payer: COMMERCIAL

## 2022-03-22 ENCOUNTER — APPOINTMENT (OUTPATIENT)
Dept: OCCUPATIONAL THERAPY | Facility: CLINIC | Age: 52
DRG: 064 | End: 2022-03-22
Payer: COMMERCIAL

## 2022-03-22 LAB
ANION GAP SERPL CALCULATED.3IONS-SCNC: 4 MMOL/L (ref 3–14)
BUN SERPL-MCNC: 15 MG/DL (ref 7–30)
CALCIUM SERPL-MCNC: 8.8 MG/DL (ref 8.5–10.1)
CHLORIDE BLD-SCNC: 107 MMOL/L (ref 94–109)
CO2 SERPL-SCNC: 27 MMOL/L (ref 20–32)
CREAT SERPL-MCNC: 0.81 MG/DL (ref 0.66–1.25)
ERYTHROCYTE [DISTWIDTH] IN BLOOD BY AUTOMATED COUNT: 11.9 % (ref 10–15)
GFR SERPL CREATININE-BSD FRML MDRD: >90 ML/MIN/1.73M2
GLUCOSE BLD-MCNC: 231 MG/DL (ref 70–99)
GLUCOSE BLDC GLUCOMTR-MCNC: 168 MG/DL (ref 70–99)
GLUCOSE BLDC GLUCOMTR-MCNC: 178 MG/DL (ref 70–99)
GLUCOSE BLDC GLUCOMTR-MCNC: 200 MG/DL (ref 70–99)
GLUCOSE BLDC GLUCOMTR-MCNC: 269 MG/DL (ref 70–99)
GLUCOSE BLDC GLUCOMTR-MCNC: 270 MG/DL (ref 70–99)
HCT VFR BLD AUTO: 36.9 % (ref 40–53)
HGB BLD-MCNC: 11.6 G/DL (ref 13.3–17.7)
MCH RBC QN AUTO: 30.1 PG (ref 26.5–33)
MCHC RBC AUTO-ENTMCNC: 31.4 G/DL (ref 31.5–36.5)
MCV RBC AUTO: 96 FL (ref 78–100)
PLATELET # BLD AUTO: 356 10E3/UL (ref 150–450)
POTASSIUM BLD-SCNC: 3.8 MMOL/L (ref 3.4–5.3)
RBC # BLD AUTO: 3.85 10E6/UL (ref 4.4–5.9)
SODIUM SERPL-SCNC: 138 MMOL/L (ref 133–144)
WBC # BLD AUTO: 10.3 10E3/UL (ref 4–11)

## 2022-03-22 PROCEDURE — 99233 SBSQ HOSP IP/OBS HIGH 50: CPT | Performed by: PHYSICIAN ASSISTANT

## 2022-03-22 PROCEDURE — 120N000001 HC R&B MED SURG/OB

## 2022-03-22 PROCEDURE — 250N000013 HC RX MED GY IP 250 OP 250 PS 637: Performed by: INTERNAL MEDICINE

## 2022-03-22 PROCEDURE — 97535 SELF CARE MNGMENT TRAINING: CPT | Mod: GO | Performed by: REHABILITATION PRACTITIONER

## 2022-03-22 PROCEDURE — 85027 COMPLETE CBC AUTOMATED: CPT | Performed by: INTERNAL MEDICINE

## 2022-03-22 PROCEDURE — 36415 COLL VENOUS BLD VENIPUNCTURE: CPT | Performed by: INTERNAL MEDICINE

## 2022-03-22 PROCEDURE — 250N000013 HC RX MED GY IP 250 OP 250 PS 637: Performed by: PHYSICIAN ASSISTANT

## 2022-03-22 PROCEDURE — 80048 BASIC METABOLIC PNL TOTAL CA: CPT | Performed by: INTERNAL MEDICINE

## 2022-03-22 RX ORDER — HYDROCHLOROTHIAZIDE 25 MG/1
25 TABLET ORAL DAILY
Status: DISCONTINUED | OUTPATIENT
Start: 2022-03-22 | End: 2022-03-23 | Stop reason: HOSPADM

## 2022-03-22 RX ADMIN — ALPRAZOLAM 1 MG: 1 TABLET ORAL at 21:35

## 2022-03-22 RX ADMIN — HYDROCHLOROTHIAZIDE 25 MG: 25 TABLET ORAL at 13:48

## 2022-03-22 RX ADMIN — GABAPENTIN 100 MG: 100 CAPSULE ORAL at 09:23

## 2022-03-22 RX ADMIN — GABAPENTIN 100 MG: 100 CAPSULE ORAL at 13:48

## 2022-03-22 RX ADMIN — OXYCODONE HYDROCHLORIDE 5 MG: 5 TABLET ORAL at 21:35

## 2022-03-22 RX ADMIN — ASPIRIN 325 MG ORAL TABLET 325 MG: 325 PILL ORAL at 09:23

## 2022-03-22 RX ADMIN — CETIRIZINE HYDROCHLORIDE 10 MG: 10 TABLET, FILM COATED ORAL at 09:23

## 2022-03-22 RX ADMIN — GABAPENTIN 100 MG: 100 CAPSULE ORAL at 20:48

## 2022-03-22 RX ADMIN — ATORVASTATIN CALCIUM 80 MG: 40 TABLET, FILM COATED ORAL at 09:23

## 2022-03-22 RX ADMIN — METFORMIN HYDROCHLORIDE 1000 MG: 500 TABLET, FILM COATED ORAL at 18:53

## 2022-03-22 ASSESSMENT — ACTIVITIES OF DAILY LIVING (ADL)
ADLS_ACUITY_SCORE: 9
ADLS_ACUITY_SCORE: 7
ADLS_ACUITY_SCORE: 9
ADLS_ACUITY_SCORE: 7
ADLS_ACUITY_SCORE: 9

## 2022-03-22 NOTE — CONSULTS
Stroke Education Note    The following information has been reviewed with the patient:    1. Warning signs of stroke    2. Calling 911 if having warning signs of stroke    3. All modifiable risk factors: hypertension, CAD, atrial fib, diabetes, hypercholesterolemia, smoking, substance abuse, diet, physical inactivity, obesity, sleep apnea.    4. Patient's risk factors for stroke which include: Type 2 DM, HTN, CAD, HLD, nicotine use, substance abuse, unhealthy diet, physical inactivity    5. Follow-up plan for after discharge    6. Discharge medications which include: ASA, Lipitor, insulin hydrochlorothiazide    In addition, the above information was given to the patient in writing as a part of the [facility] Stroke Class Handout.    Learner's response to risk factors / lifestyle modification education: NA - Precontemplative     Nina Goode RN

## 2022-03-22 NOTE — PROGRESS NOTES
Brief Progress Note:  Request from bedside RN to come evaluate patient.  Situation, patient during telestroke education over iPad complained of tingling in bilateral lower extremities while in the recliner.  (15:30) RN assessed patient.  Patient got up to go to the bathroom without change in ambulation by exam.  He reports feeling tired and wanting to go back to bed. Pt examined by me earlier today, admitted 3/19 with ongoing tingling sensation in left upper and lower extremity, gait instability found to have acute CVA.     Pt evaluated. Neurologic exam is nonfocal.  He reports tingling in lower extremities may have been secondary to the positioning in the recliner. He has no ha, visual symptoms. Requested POCT glucose check.  Symptoms non specific and exam is reassuring. Would not re image at this time in the absence of new neurologic symptoms or change in exam.   Discussed with Stroke Neurology, in agreement with plan.     Plan: Monitor, d/w RN. q 4 neuro checks. Low threshold to re-image if change in exam or new neurologic symptoms.

## 2022-03-22 NOTE — PROGRESS NOTES
While talking with patient during telestroke education over ipad, patient complained of sudden numbness and tingling in bilateral lower extremities and said he wanted to get back in bed from his chair. Patient pushed the call light and this RN called the unit to have the bedside RN come to bedside to assess patient. He was alert and oriented x4 and responding appropriately to questions. Patient c/o staff taking too long to come into the room and saying he's tired and wants to go to bed. This RN asked that he wait for staff to come in to safely transfer back into bed. Patient complied and said he would wait for nursing staff. RN came into the room to assess patient and assist him back to bed and video call was ended.    Nina España, RN, BSN, CPN  [facility] [place]

## 2022-03-22 NOTE — PROGRESS NOTES
Cross Cover    Notified that patient is refusing telemetry monitoring.  Should have this due to recent stroke but he is unwilling to continue with this.    Phoebe Bran MD

## 2022-03-22 NOTE — PLAN OF CARE
"Goal Outcome Evaluation:       BP (!) 163/81 (BP Location: Right arm)   Pulse 81   Temp 97  F (36.1  C) (Tympanic)   Resp 16   Ht 1.676 m (5' 6\")   Wt 68.8 kg (151 lb 9.6 oz)   SpO2 98%   BMI 24.47 kg/m      Pt is A&O x4.  Elevated BP, afebrile. SBA with walker, pt self transfers and refused bed alarm. PRN oxycodone given for LUE/LLE pain with relief. Xanax given for anxiety. Voiding well. Pt removed tele, writer explain the rational for cardiac monitor but pt refused and stated \"He can't sleep with that thing on\" , provider made aware. Neuro's intact. Noted L upper arm bruise. R axilla dressing intact. BG checks. Discharge plan TB, home with PT vs TCU.                 "

## 2022-03-22 NOTE — PROGRESS NOTES
[facility]  Hospitalist Progress Note      Assessment & Plan   Josue Parisi is a 51 year old male with past medical history significant for polysubstance abuse and tobacco dependence, multiple strokes most recently February 2022, type 2 diabetes mellitus, hypertension, hyperlipidemia, depression.  He was admitted on 3/19/2022 with acute ischemic stroke of R KWAD[place] territory.     Acute CVA   Suspected etiology secondary to significant intracranial stenosis and cocaine use. Imaging demonstrated acute ischemic stroke of R KWAD[place] territory due to large-artery atherosclerosis.  Residual paresthesias in left upper and lower extremities, gait instability. Awaiting ARU.   - Neurochecks and Vital Signs every 4 hours   - Permissive HTN; goal SBP < 220 mmHg. Resume home hydrochlorothiazide. Consider ACEI.   - Continue daily aspirin 325 mg for secondary stroke prevention, plavix deferred to aid in pt compliance  - PT/OT  - Continue inpatient telemetry, pt educated on purpose of telemetry IP. He had refused, no met 48  Hours of monitoring. discharge with 30 day cardiac event monitor to evaluate for atrial fibrillation. Have not ordered due to awaiting dismissal disposition.   - has stroke education today   - f/up in 4-6 weeks with [facility] or other local neurologist of patient's choice   - educated importance of polysubstance cessation, pt contemplative  - sw consulted for disposition assistance to ARU     Uncontrolled Type II DM  A1c 8.8  -291  PTA on Metformin 1000 mg twice a day.   -glargine insulin to increased to 20 units a day.  -resume metformin  -poct glucose checks  -mealtime sliding scale insulin      Polysubstance Use  Tobacco Dependence  No withdrawal sx or acute intoxication. Reports prior exacerbation of CVA symptoms with cocaine use.   Contemplative regarding drug cessation.  -sw consult  -nrt     HTN  Initially, permissive hypertension given CVA.  -Resume prior  to admission hydrochlorothiazide  -consider ACE inhibitor going forward if bp suboptimally controlled as outpatient.     DVT Prophylaxis: Pneumatic Compression Devices  Code Status: Full Code  Expected Discharge/Location: 03/23/2022  ARU once accepted.       Flores Abreu PA-C      Interval History   Assumed care  No tele while admitted, refused despite education.  Reports 20% improvement in paraesthesias   No HA or visual symptoms  No CP, dyspnea, palpitations  No e/o of withdrawal or intoxication   Discussed TCU rehab at dismissal, pt agreeable.    Patient continues to request brace for his left knee.  He feels that his left knee is unstable when he is walking.  He has a knee brace at home but has not brought it with him or tried yet.    Reviewed falls at home PTA, reported mechanical over his TV. Sustaining bruising under upper extremities    -Data reviewed today: I reviewed all new labs and imaging results over the last 24 hours.    Physical Exam   Temp: 97  F (36.1  C) Temp src: Temporal BP: (!) 171/97 Pulse: 80   Resp: 16 SpO2: 98 % O2 Device: None (Room air)    Vitals:    03/20/22 0100 03/21/22 0502   Weight: 71.4 kg (157 lb 6.4 oz) 68.8 kg (151 lb 9.6 oz)     Vital Signs with Ranges  Temp:  [97  F (36.1  C)-97.9  F (36.6  C)] 97  F (36.1  C)  Pulse:  [80-95] 80  Resp:  [16] 16  BP: (136-171)/(77-97) 171/97  SpO2:  [95 %-98 %] 98 %  I/O last 3 completed shifts:  In: 960 [P.O.:960]  Out: 1650 [Urine:1650]      Constitutional:Awake, alert, no apparent distress  Respiratory:  Normal work of breathing. Lungs clear to auscultation bilaterally, no crackles or wheezing.  Cardiovascular: Regular rate and rhythm, normal S1 and S2, and no murmur appreciated.   GI: Normal bowel sounds, soft, non-distended, non-tender.   Skin/Integument: No rashes, no cyanosis, no peripheral edema.  Neuro: Moving all extremities with normal strength. Subjective paraesthesias Left upper and lower extremity. Strength symmetrical.  Coordination and sensation grossly intact. Speech clear. No focal deficits.   Psych: Appropriate affect.      Medications     - MEDICATION INSTRUCTIONS -       - MEDICATION INSTRUCTIONS -         aspirin  325 mg Oral Daily     atorvastatin  80 mg Oral Daily     cetirizine  10 mg Oral Daily     gabapentin  100 mg Oral TID     hydrochlorothiazide  25 mg Oral Daily     insulin aspart  1-7 Units Subcutaneous TID AC     insulin aspart  1-5 Units Subcutaneous At Bedtime     insulin aspart   Subcutaneous TID AC     insulin glargine  15 Units Subcutaneous At Bedtime     nicotine  1 patch Transdermal Daily     polyethylene glycol  17 g Oral or NG Tube Daily     senna-docusate  1-2 tablet Oral or NG Tube BID       Data   Recent Labs   Lab 03/22/22  0753 03/22/22  0722 03/22/22  0213 03/21/22  1142 03/21/22  1053 03/20/22  2148 03/20/22 2019 03/20/22  0758 03/20/22  0558 03/20/22  0259 03/19/22  2151   WBC  --  10.3  --   --   --   --   --   --  10.6  --  13.3*   HGB  --  11.6*  --   --   --   --   --   --  11.0*  --  11.7*   MCV  --  96  --   --   --   --   --   --  96  --  96   PLT  --  356  --   --   --   --   --   --  324  --  326   NA  --  138  --   --   --   --   --   --  138  --  138   POTASSIUM  --  3.8  --   --  3.7  --  3.6  --  3.4  --  3.5   CHLORIDE  --  107  --   --   --   --   --   --  104  --  102   CO2  --  27  --   --   --   --   --   --  28  --  26   BUN  --  15  --   --   --   --   --   --  15  --  17   CR  --  0.81  --   --   --   --   --   --  0.71  --  0.85   ANIONGAP  --  4  --   --   --   --   --   --  6  --  10   DAT  --  8.8  --   --   --   --   --   --  8.9  --  9.2   * 231* 269*   < >  --    < >  --    < > 271*   < > 193*   ALBUMIN  --   --   --   --   --   --   --   --  2.9*  --   --    PROTTOTAL  --   --   --   --   --   --   --   --  6.7*  --   --    BILITOTAL  --   --   --   --   --   --   --   --  0.4  --   --    ALKPHOS  --   --   --   --   --   --   --   --  58  --   --    ALT  --    --   --   --   --   --   --   --  19  --   --    AST  --   --   --   --   --   --   --   --  9  --   --     < > = values in this interval not displayed.       Recent Results (from the past 24 hour(s))   Echocardiogram Limited   Result Value    LVEF  55%    [facility]    [id number]  [id number]  [id number]  [id number]^MAL^MARIANN^LEVAR     [facility]  Echocardiography Laboratory  [address] [place] [address]     Name: JERONIMO CEJA  MRN: [id number]  : 1970  Study Date: 2022 03:18 PM  Age: 51 yrs  Gender: Male  Patient Location: [facility]  Reason For Study: Other, Please Specify in Comments  Ordering Physician: MARIANN RESENDEZ  Referring Physician: Von Aguirre  Performed By: Verona Jacobson     BSA: 1.8 m2  Height: 65 in  Weight: 157 lb  HR: 99  BP: 152/84 mmHg  ______________________________________________________________________________  Procedure  Limited Portable Echo Adult.  ______________________________________________________________________________  Interpretation Summary     The visual ejection fraction is estimated at 55%.  The right ventricle is normal in structure, function and size.  There is trace mitral regurgitation.  ______________________________________________________________________________  Left Ventricle  The left ventricle is normal in structure, function and size. There is normal  left ventricular wall thickness. Left ventricular systolic function is normal.  The visual ejection fraction is estimated at 55%. Left ventricular diastolic  function is normal. No regional wall motion abnormalities noted.     Right Ventricle  The right ventricle is normal in structure, function and size.     Atria  Normal left atrial size. Right atrial size is normal. There is no color  Doppler evidence of an atrial shunt.     Mitral Valve  The mitral valve is normal in structure and function. There is trace mitral  regurgitation.     Tricuspid Valve  The tricuspid  valve is normal in structure and function. There is trace  tricuspid regurgitation.     Aortic Valve  There is mild trileaflet aortic sclerosis. No aortic regurgitation is present.     Pulmonic Valve  The pulmonic valve is not well seen, but is grossly normal.     Vessels  Normal size aorta.     Pericardium  There is no pericardial effusion.     Rhythm  Sinus rhythm was noted.  ______________________________________________________________________________  MMode/2D Measurements & Calculations  IVSd: 0.91 cm     LVIDd: 3.7 cm  LVIDs: 3.0 cm  LVPWd: 0.92 cm  FS: 20.3 %  LV mass(C)d: 100.2 grams  LV mass(C)dI: 56.1 grams/m2  RWT: 0.49     ______________________________________________________________________________  Report approved by: Devon Wills 03/21/2022 03:51 PM

## 2022-03-22 NOTE — PROGRESS NOTES
Care Management Follow Up    Length of Stay (days): 3    Expected Discharge Date: 03/23/2022     Concerns to be Addressed: discharge planning  Patient plan of care discussed at interdisciplinary rounds: Yes    Anticipated Discharge Disposition:  Home vs ARU     Anticipated Discharge Services:    Anticipated Discharge DME:      Education Provided on the Discharge Plan:    Patient/Family in Agreement with the Plan:      Referrals Placed by CM/SW:  ARU  Private pay costs discussed: Not applicable    Additional Information:  Reviewed pt's status and discussed in rounds. Call has been made to ARU and they are currently reviewing.     Social work will continue to follow and assist with discharge planning as needed.    ZAK Mancera, LSW  Inpatient Care Coordination  [facility], [facility]  [phone]    ZAK Freeman

## 2022-03-23 ENCOUNTER — APPOINTMENT (OUTPATIENT)
Dept: OCCUPATIONAL THERAPY | Facility: CLINIC | Age: 52
DRG: 064 | End: 2022-03-23
Payer: COMMERCIAL

## 2022-03-23 VITALS
WEIGHT: 151.6 LBS | TEMPERATURE: 97.4 F | OXYGEN SATURATION: 97 % | BODY MASS INDEX: 24.36 KG/M2 | HEIGHT: 66 IN | RESPIRATION RATE: 18 BRPM | SYSTOLIC BLOOD PRESSURE: 146 MMHG | DIASTOLIC BLOOD PRESSURE: 84 MMHG | HEART RATE: 97 BPM

## 2022-03-23 LAB
GLUCOSE BLDC GLUCOMTR-MCNC: 162 MG/DL (ref 70–99)
GLUCOSE BLDC GLUCOMTR-MCNC: 293 MG/DL (ref 70–99)
POTASSIUM BLD-SCNC: 3.5 MMOL/L (ref 3.4–5.3)

## 2022-03-23 PROCEDURE — 36415 COLL VENOUS BLD VENIPUNCTURE: CPT | Performed by: PHYSICIAN ASSISTANT

## 2022-03-23 PROCEDURE — 250N000013 HC RX MED GY IP 250 OP 250 PS 637: Performed by: PHYSICIAN ASSISTANT

## 2022-03-23 PROCEDURE — 84132 ASSAY OF SERUM POTASSIUM: CPT | Performed by: PHYSICIAN ASSISTANT

## 2022-03-23 PROCEDURE — 250N000013 HC RX MED GY IP 250 OP 250 PS 637: Performed by: INTERNAL MEDICINE

## 2022-03-23 PROCEDURE — 99239 HOSP IP/OBS DSCHRG MGMT >30: CPT | Performed by: HOSPITALIST

## 2022-03-23 PROCEDURE — 97530 THERAPEUTIC ACTIVITIES: CPT | Mod: GO | Performed by: REHABILITATION PRACTITIONER

## 2022-03-23 PROCEDURE — 97535 SELF CARE MNGMENT TRAINING: CPT | Mod: GO | Performed by: REHABILITATION PRACTITIONER

## 2022-03-23 RX ADMIN — GABAPENTIN 100 MG: 100 CAPSULE ORAL at 07:45

## 2022-03-23 RX ADMIN — HYDROCHLOROTHIAZIDE 25 MG: 25 TABLET ORAL at 07:45

## 2022-03-23 RX ADMIN — METFORMIN HYDROCHLORIDE 1000 MG: 500 TABLET, FILM COATED ORAL at 07:44

## 2022-03-23 RX ADMIN — CETIRIZINE HYDROCHLORIDE 10 MG: 10 TABLET, FILM COATED ORAL at 07:45

## 2022-03-23 RX ADMIN — ATORVASTATIN CALCIUM 80 MG: 40 TABLET, FILM COATED ORAL at 07:45

## 2022-03-23 RX ADMIN — ASPIRIN 325 MG ORAL TABLET 325 MG: 325 PILL ORAL at 07:45

## 2022-03-23 RX ADMIN — OXYCODONE HYDROCHLORIDE 5 MG: 5 TABLET ORAL at 07:45

## 2022-03-23 ASSESSMENT — ACTIVITIES OF DAILY LIVING (ADL)
ADLS_ACUITY_SCORE: 7

## 2022-03-23 NOTE — DISCHARGE SUMMARY
[facility]  Hospitalist Discharge Summary      Date of Admission:  3/19/2022  Date of Discharge:  3/23/2022 11:35 AM  Discharging Provider: Reina Campuzano MD  Discharge Service: Hospitalist Service    Discharge Diagnoses   Active Problems:    Left leg weakness    Acute stroke due to ischemia (H)  Uncontrolled diabetes  Polysubstance abuse  Tobacco use        Follow-ups Needed After Discharge   Left AMA    Unresulted Labs Ordered in the Past 30 Days of this Admission     No orders found from 2/17/2022 to 3/20/2022.          Discharge Disposition   Left AMA(Against Medical Advice)    Hospital Course   Acute CVA   Suspected etiology secondary to significant intracranial stenosis and cocaine use. Imaging demonstrated acute ischemic stroke of R [place] territory due to large-artery atherosclerosis.  Residual paresthesias in left upper and lower extremities, gait instability. Awaiting ARU.   - Neurochecks and Vital Signs every 4 hours   - Permissive HTN; goal SBP < 220 mmHg. Resume home hydrochlorothiazide. Consider ACEI.   - Continue daily aspirin 325 mg for secondary stroke prevention, plavix deferred to aid in pt compliance  - PT/OT consulted but patient is not participating  - Continue inpatient telemetry, pt educated on purpose of telemetry IP. He had refused, no met 48  Hours of monitoring. discharge with 30 day cardiac event monitor to evaluate for atrial fibrillation.patient has been non compliant.  - has stroke education on 3/22   - f/up in 4-6 weeks with [facility] or other local neurologist of patient's choice   - educated importance of polysubstance cessation, pt contemplative  - sw consulted for disposition assistance to ARU but patient didn't like and wants to leave AMA     Uncontrolled Type II DM  A1c 8.8  -291  He will resume his meds     Polysubstance Use  Tobacco Dependence  No withdrawal sx or acute intoxication.   He stepping out of the hospital and  smoking consistently.  He wants to leave AMA     HTN  Initially, permissive hypertension given CVA.  Continued hydrochlorothiazide  In the hospital    Consultations This Hospital Stay   PATIENT LEARNING CENTER IP CONSULT  NEUROLOGY IP CONSULT  SPEECH LANGUAGE PATH ADULT IP CONSULT  PHARMACY IP CONSULT  PHARMACY IP CONSULT  PHARMACY IP CONSULT  PHYSICAL THERAPY ADULT IP CONSULT  OCCUPATIONAL THERAPY ADULT IP CONSULT  REHAB ADMISSIONS LIAISON IP CONSULT  CARE MANAGEMENT / SOCIAL WORK IP CONSULT  SOCIAL WORK IP CONSULT  SMOKING CESSATION PROGRAM IP CONSULT    Code Status   Full Code    Time Spent on this Encounter   I, Reina Campuzano MD, personally saw the patient today and spent greater than 30 minutes discharging this patient.       Reina Campuzano MD  [facility]  [address] [facility] [address]  Phone: [phone]  Fax: [phone]  ______________________________________________________________________    Physical Exam   Vital Signs: Temp: 97.4  F (36.3  C) Temp src: Temporal BP: (!) 146/84 Pulse: 97   Resp: 18 SpO2: 97 % O2 Device: None (Room air)    Weight: 151 lbs 9.6 oz  Constitutional: awake, alert, cooperative, no apparent distress, and appears stated age  Eyes: Lids and lashes normal, pupils equal, round and reactive to light, extra ocular muscles intact, sclera clear, conjunctiva normal  Respiratory: No increased work of breathing, good air exchange, clear to auscultation bilaterally, no crackles or wheezing  Cardiovascular: Normal apical impulse, regular rate and rhythm, normal S1 and S2, no S3 or S4, and no murmur noted  GI: No scars, normal bowel sounds, soft, non-distended, non-tender, no masses palpated, no hepatosplenomegally  Neurologic: left sided weakness and using walker       Primary Care Physician   Von Aguirre    Discharge Orders      Knee Supplies Order    Bracing Documentation:   Patient requires the use of the ordered bracing device due to  following medical need/condition: knee instability.    I, the undersigned, certify that the above prescribed supplies are medically necessary for this patient and is both reasonable and necessary in reference to accepted standards of medical and necessary in reference to accepted standards of medical practice in the treatment of this patient's condition and is not prescribed as a convenience.       Significant Results and Procedures   Results for orders placed or performed during the hospital encounter of 03/19/22   Head CT w/o contrast    Narrative    EXAM: CT HEAD W/O CONTRAST  LOCATION: [facility]  DATE/TIME: 3/19/2022 8:31 PM    INDICATION: Head injury, on Plavix, history of multiple strokes with worsening left leg weakness  COMPARISON: MRI head 02/05/2022  TECHNIQUE: Routine CT Head without IV contrast. Multiplanar reformats. Dose reduction techniques were used.    FINDINGS:  INTRACRANIAL CONTENTS: No intracranial hemorrhage, extraaxial collection, or mass effect.  A few subtle hypodensities right cerebral hemisphere, corresponding to acute/subacute infarctions on prior MRI. A mild, more conspicuous hypodensity parasagittal   right frontal lobe near vertex appears tube in the area which appeared normal on prior MRI and could be due to new mild acute/subacute infarction. No definite associated hemorrhage or significant mass effect. Normal ventricles and sulci. Mild chronic   infarction parasagittal left frontal lobe and superior aspect of left basal ganglia; similar to prior MRI.    VISUALIZED ORBITS/SINUSES/MASTOIDS: No intraorbital abnormality. No paranasal sinus mucosal disease. No middle ear or mastoid effusion.    BONES/SOFT TISSUES: No acute abnormality.      Impression    IMPRESSION:  1.  A few subtle hypodensities right cerebral hemisphere, corresponding to acute/subacute infarctions on prior MRI.  2.  A mild, more conspicuous hypodensity parasagittal right frontal lobe near vertex  appears tube in the area which appeared normal on prior MRI and could be due to new mild acute/subacute infarction.  3.  No definite evidence of hemorrhage or significant mass effect.   MR Brain w/o & w Contrast    Narrative    EXAM: MRA BRAIN ([place] OF FOY) WO CONTRAST, MR BRAIN W/O AND W CONTRAST, MRA NECK (CAROTIDS) WO AND W CONTRAST  LOCATION: [facility]  DATE/TIME: 3/19/2022 10:19 PM    INDICATION: New stroke with left leg weakness.  COMPARISON: 2/5/2022.  CONTRAST: 10 mL Gadavist.  TECHNIQUE:   1) Routine multiplanar multisequence head MRI without and with intravenous contrast.  2) 3D time-of-flight head MRA without intravenous contrast.  3) Neck MRA without and with IV contrast. Stenosis measurements made according to NASCET criteria unless otherwise specified.    FINDINGS:  HEAD MRI:  INTRACRANIAL CONTENTS: There are new watershed territory patchy areas of restricted diffusion in the parasagittal right frontal/parietal centrum semiovale and a new 11 x 8 mm (AP x TR) ovoid focus of restricted diffusion at the right frontal/parietal   vertex. Resolving previously seen watershed territory infarcts. No mass, acute hemorrhage, or extra-axial fluid collections. Patchy nonspecific T2/FLAIR hyperintensities within the cerebral white matter most consistent with mild to moderate chronic   microvascular ischemic change. Chronic infarct right middle frontal gyrus. Chronic infarct left corona radiata. Tiny chronic lacunar infarct left cerebellar hemisphere. Mild generalized cerebral atrophy. No hydrocephalus. Normal position of the   cerebellar tonsils. There is some enhancement of the late subacute watershed territory infarcts first identified on 2/5/2022.    SELLA: No abnormality accounting for technique.    OSSEOUS STRUCTURES/SOFT TISSUES: Normal marrow signal. Abnormal right ICA flow void compatible with proximal occlusion.     ORBITS: No abnormality accounting for technique.      SINUSES/MASTOIDS: Mild mucosal thickening scattered about the paranasal sinuses. No middle ear or mastoid effusion.     HEAD MRA:   ANTERIOR CIRCULATION: No flow within the right ICA as it enters the skull base. It is reconstituted at the ophthalmic segment via posterior communicating artery collateral. There is severe stenosis bilaterally of the bilateral A1-A2 origins. There is new   occlusion of the right A1 segment. Again seen is severe stenosis at the right A1-A2 junction with the possibility of a nonocclusive intraluminal thrombus, similar to prior. Standard [place] of [place] anatomy.    POSTERIOR CIRCULATION: No stenosis/occlusion, aneurysm, or high flow vascular malformation. Dominant left and smaller right vertebral artery contribute to a normal basilar artery.     NECK MRA:   RIGHT CAROTID: The right ICA is occluded approximately 1 cm from its origin, stable from prior.    LEFT CAROTID: No measurable stenosis or dissection.    VERTEBRAL ARTERIES: No focal stenosis or dissection. Dominant left and smaller right vertebral arteries.    AORTIC ARCH: Classic aortic arch anatomy with no significant stenosis at the origin of the great vessels.      Impression    IMPRESSION:  HEAD MRI:   1.  New watershed territory infarcts in the deep right frontal/parietal centrum semiovale and right frontal/parietal vertex.  2.  Late subacute watershed territory infarcts are resolving with some enhancement of these late subacute infarcts in the posterior right parietal lobe.  3.  Chronic infarcts left corona radiata, right middle frontal gyrus and left cerebellar hemisphere.    HEAD MRA:   1.  No flow in the right ICA as it enters the skull base. It is reconstituted at the ophthalmic segment via right posterior communicating artery collateralization.  2.  Progressive stenosis involving the right A1 segment with severe/critical stenosis of the bilateral A1-A2 junctions.  3.  High-grade stenosis/near occlusion involving the  right M1/M2 junction, unchanged from prior either due to atheromatous disease or persistent intraluminal thrombus. There is arborization of the right MCA branches.    NECK MRA:  1.  Stable occlusion of the right ICA 1 cm from its origin.  2.  Otherwise unremarkable.   MRA Neck (Carotids) wo & w Contrast    Narrative    EXAM: MRA BRAIN ([place] OF FOY) WO CONTRAST, MR BRAIN W/O AND W CONTRAST, MRA NECK (CAROTIDS) WO AND W CONTRAST  LOCATION: [facility]  DATE/TIME: 3/19/2022 10:19 PM    INDICATION: New stroke with left leg weakness.  COMPARISON: 2/5/2022.  CONTRAST: 10 mL Gadavist.  TECHNIQUE:   1) Routine multiplanar multisequence head MRI without and with intravenous contrast.  2) 3D time-of-flight head MRA without intravenous contrast.  3) Neck MRA without and with IV contrast. Stenosis measurements made according to NASCET criteria unless otherwise specified.    FINDINGS:  HEAD MRI:  INTRACRANIAL CONTENTS: There are new watershed territory patchy areas of restricted diffusion in the parasagittal right frontal/parietal centrum semiovale and a new 11 x 8 mm (AP x TR) ovoid focus of restricted diffusion at the right frontal/parietal   vertex. Resolving previously seen watershed territory infarcts. No mass, acute hemorrhage, or extra-axial fluid collections. Patchy nonspecific T2/FLAIR hyperintensities within the cerebral white matter most consistent with mild to moderate chronic   microvascular ischemic change. Chronic infarct right middle frontal gyrus. Chronic infarct left corona radiata. Tiny chronic lacunar infarct left cerebellar hemisphere. Mild generalized cerebral atrophy. No hydrocephalus. Normal position of the   cerebellar tonsils. There is some enhancement of the late subacute watershed territory infarcts first identified on 2/5/2022.    SELLA: No abnormality accounting for technique.    OSSEOUS STRUCTURES/SOFT TISSUES: Normal marrow signal. Abnormal right ICA flow void compatible  with proximal occlusion.     ORBITS: No abnormality accounting for technique.     SINUSES/MASTOIDS: Mild mucosal thickening scattered about the paranasal sinuses. No middle ear or mastoid effusion.     HEAD MRA:   ANTERIOR CIRCULATION: No flow within the right ICA as it enters the skull base. It is reconstituted at the ophthalmic segment via posterior communicating artery collateral. There is severe stenosis bilaterally of the bilateral A1-A2 origins. There is new   occlusion of the right A1 segment. Again seen is severe stenosis at the right A1-A2 junction with the possibility of a nonocclusive intraluminal thrombus, similar to prior. Standard [place] of [place] anatomy.    POSTERIOR CIRCULATION: No stenosis/occlusion, aneurysm, or high flow vascular malformation. Dominant left and smaller right vertebral artery contribute to a normal basilar artery.     NECK MRA:   RIGHT CAROTID: The right ICA is occluded approximately 1 cm from its origin, stable from prior.    LEFT CAROTID: No measurable stenosis or dissection.    VERTEBRAL ARTERIES: No focal stenosis or dissection. Dominant left and smaller right vertebral arteries.    AORTIC ARCH: Classic aortic arch anatomy with no significant stenosis at the origin of the great vessels.      Impression    IMPRESSION:  HEAD MRI:   1.  New watershed territory infarcts in the deep right frontal/parietal centrum semiovale and right frontal/parietal vertex.  2.  Late subacute watershed territory infarcts are resolving with some enhancement of these late subacute infarcts in the posterior right parietal lobe.  3.  Chronic infarcts left corona radiata, right middle frontal gyrus and left cerebellar hemisphere.    HEAD MRA:   1.  No flow in the right ICA as it enters the skull base. It is reconstituted at the ophthalmic segment via right posterior communicating artery collateralization.  2.  Progressive stenosis involving the right A1 segment with severe/critical stenosis of the  bilateral A1-A2 junctions.  3.  High-grade stenosis/near occlusion involving the right M1/M2 junction, unchanged from prior either due to atheromatous disease or persistent intraluminal thrombus. There is arborization of the right MCA branches.    NECK MRA:  1.  Stable occlusion of the right ICA 1 cm from its origin.  2.  Otherwise unremarkable.   MR Head w/o Contrast Angiogram    Narrative    EXAM: MRA BRAIN ([place] OF FOY) WO CONTRAST, MR BRAIN W/O AND W CONTRAST, MRA NECK (CAROTIDS) WO AND W CONTRAST  LOCATION: [facility]  DATE/TIME: 3/19/2022 10:19 PM    INDICATION: New stroke with left leg weakness.  COMPARISON: 2/5/2022.  CONTRAST: 10 mL Gadavist.  TECHNIQUE:   1) Routine multiplanar multisequence head MRI without and with intravenous contrast.  2) 3D time-of-flight head MRA without intravenous contrast.  3) Neck MRA without and with IV contrast. Stenosis measurements made according to NASCET criteria unless otherwise specified.    FINDINGS:  HEAD MRI:  INTRACRANIAL CONTENTS: There are new watershed territory patchy areas of restricted diffusion in the parasagittal right frontal/parietal centrum semiovale and a new 11 x 8 mm (AP x TR) ovoid focus of restricted diffusion at the right frontal/parietal   vertex. Resolving previously seen watershed territory infarcts. No mass, acute hemorrhage, or extra-axial fluid collections. Patchy nonspecific T2/FLAIR hyperintensities within the cerebral white matter most consistent with mild to moderate chronic   microvascular ischemic change. Chronic infarct right middle frontal gyrus. Chronic infarct left corona radiata. Tiny chronic lacunar infarct left cerebellar hemisphere. Mild generalized cerebral atrophy. No hydrocephalus. Normal position of the   cerebellar tonsils. There is some enhancement of the late subacute watershed territory infarcts first identified on 2/5/2022.    SELLA: No abnormality accounting for technique.    OSSEOUS  STRUCTURES/SOFT TISSUES: Normal marrow signal. Abnormal right ICA flow void compatible with proximal occlusion.     ORBITS: No abnormality accounting for technique.     SINUSES/MASTOIDS: Mild mucosal thickening scattered about the paranasal sinuses. No middle ear or mastoid effusion.     HEAD MRA:   ANTERIOR CIRCULATION: No flow within the right ICA as it enters the skull base. It is reconstituted at the ophthalmic segment via posterior communicating artery collateral. There is severe stenosis bilaterally of the bilateral A1-A2 origins. There is new   occlusion of the right A1 segment. Again seen is severe stenosis at the right A1-A2 junction with the possibility of a nonocclusive intraluminal thrombus, similar to prior. Standard [place] of [place] anatomy.    POSTERIOR CIRCULATION: No stenosis/occlusion, aneurysm, or high flow vascular malformation. Dominant left and smaller right vertebral artery contribute to a normal basilar artery.     NECK MRA:   RIGHT CAROTID: The right ICA is occluded approximately 1 cm from its origin, stable from prior.    LEFT CAROTID: No measurable stenosis or dissection.    VERTEBRAL ARTERIES: No focal stenosis or dissection. Dominant left and smaller right vertebral arteries.    AORTIC ARCH: Classic aortic arch anatomy with no significant stenosis at the origin of the great vessels.      Impression    IMPRESSION:  HEAD MRI:   1.  New watershed territory infarcts in the deep right frontal/parietal centrum semiovale and right frontal/parietal vertex.  2.  Late subacute watershed territory infarcts are resolving with some enhancement of these late subacute infarcts in the posterior right parietal lobe.  3.  Chronic infarcts left corona radiata, right middle frontal gyrus and left cerebellar hemisphere.    HEAD MRA:   1.  No flow in the right ICA as it enters the skull base. It is reconstituted at the ophthalmic segment via right posterior communicating artery collateralization.  2.   Progressive stenosis involving the right A1 segment with severe/critical stenosis of the bilateral A1-A2 junctions.  3.  High-grade stenosis/near occlusion involving the right M1/M2 junction, unchanged from prior either due to atheromatous disease or persistent intraluminal thrombus. There is arborization of the right MCA branches.    NECK MRA:  1.  Stable occlusion of the right ICA 1 cm from its origin.  2.  Otherwise unremarkable.   CTA Head Neck with Contrast    Narrative    EXAM: CTA  HEAD NECK WITH CONTRAST  LOCATION: [facility]  DATE/TIME: 03/20/2022, 12:16 PM    INDICATION: Stroke/TIA, assess extracranial arteries.  COMPARISON: Head and neck MRA 03/19/2021, 02/05/2022.  CONTRAST: 70 mL Isovue 370.  TECHNIQUE: Head and neck CT angiogram with IV contrast. Axial helical CT images of the head and neck vessels obtained during the arterial phase of intravenous contrast administration. Axial 2D reconstructed images and multiplanar 3D MIP reconstructed   images of the head and neck vessels were performed by the technologist. Dose reduction techniques were used. All stenosis measurements made according to NASCET criteria unless otherwise specified.    FINDINGS:   HEAD CTA:  ANTERIOR CIRCULATION: Chronic right internal carotid artery occlusion with reconstitution at the level of the supraclinoid segment via collateral filling. Severe focal stenosis of the distal right M1 segment/proximal M2 segment junction. Severe right and   moderate to severe left segmental stenoses of the anterior cerebral artery A1/proximal A2 segments. The left middle cerebral artery branches are widely patent. No aneurysm or evidence of vascular malformation.    POSTERIOR CIRCULATION: No stenosis/occlusion, aneurysm, or high-flow vascular malformation. Dominant left and smaller right vertebral artery contribute to a normal basilar artery.     DURAL VENOUS SINUSES: Expected enhancement of the major dural venous  sinuses.    NECK CTA:  RIGHT CAROTID: Chronic occlusion of the right internal carotid artery approximately 1 cm from its origin at the level of the carotid bifurcation.    LEFT CAROTID: No measurable stenosis or dissection.    VERTEBRAL ARTERIES: No focal stenosis or dissection. Dominant left and smaller right vertebral arteries.    AORTIC ARCH: Classic aortic arch anatomy with no significant stenosis at the origin of the great vessels.    NONVASCULAR STRUCTURES: Unremarkable.      Impression    IMPRESSION:   1.  No significant change dating back to 2022. Chronic occlusion of the right internal carotid artery with reconstitution at the level of its supraclinoid segment.  2.  Severe focal stenosis of the right middle cerebral artery M1/M2 segment junction.  3.  Severe right and moderate to severe left anterior cerebral artery A1/proximal A2 segments.  4.  Widely patent vertebral arteries and intracranial posterior circulation.     Echocardiogram Limited     Value    LVEF  55%    [facility]    [id number]  [id number]  [id number]  [id number]^MAL^MARIANN^LEVAR     [facility]  Echocardiography Laboratory  [address] [place] [address]     Name: JERONIMO CEJA  MRN: [id number]  : 1970  Study Date: 2022 03:18 PM  Age: 51 yrs  Gender: Male  Patient Location: [facility]  Reason For Study: Other, Please Specify in Comments  Ordering Physician: MARIANN RESENDEZ  Referring Physician: Von Aguirre  Performed By: Verona Jacobson     BSA: 1.8 m2  Height: 65 in  Weight: 157 lb  HR: 99  BP: 152/84 mmHg  ______________________________________________________________________________  Procedure  Limited Portable Echo Adult.  ______________________________________________________________________________  Interpretation Summary     The visual ejection fraction is estimated at 55%.  The right ventricle is normal in structure, function and size.  There is trace mitral  regurgitation.  ______________________________________________________________________________  Left Ventricle  The left ventricle is normal in structure, function and size. There is normal  left ventricular wall thickness. Left ventricular systolic function is normal.  The visual ejection fraction is estimated at 55%. Left ventricular diastolic  function is normal. No regional wall motion abnormalities noted.     Right Ventricle  The right ventricle is normal in structure, function and size.     Atria  Normal left atrial size. Right atrial size is normal. There is no color  Doppler evidence of an atrial shunt.     Mitral Valve  The mitral valve is normal in structure and function. There is trace mitral  regurgitation.     Tricuspid Valve  The tricuspid valve is normal in structure and function. There is trace  tricuspid regurgitation.     Aortic Valve  There is mild trileaflet aortic sclerosis. No aortic regurgitation is present.     Pulmonic Valve  The pulmonic valve is not well seen, but is grossly normal.     Vessels  Normal size aorta.     Pericardium  There is no pericardial effusion.     Rhythm  Sinus rhythm was noted.  ______________________________________________________________________________  MMode/2D Measurements & Calculations  IVSd: 0.91 cm     LVIDd: 3.7 cm  LVIDs: 3.0 cm  LVPWd: 0.92 cm  FS: 20.3 %  LV mass(C)d: 100.2 grams  LV mass(C)dI: 56.1 grams/m2  RWT: 0.49     ______________________________________________________________________________  Report approved by: Devon Wills 03/21/2022 03:51 PM               Discharge Medications   Discharge Medication List as of 3/23/2022 11:36 AM      CONTINUE these medications which have NOT CHANGED    Details   ALPRAZolam (XANAX) 1 MG tablet Take 1 mg by mouth nightly as needed for anxiety, Historical      amoxicillin-clavulanate (AUGMENTIN) 875-125 MG tablet Take 1 tablet by mouth 2 times daily, Historical      aspirin (ASA) 325 MG tablet Take 1  tablet (325 mg) by mouth daily, OTC      atorvastatin (LIPITOR) 80 MG tablet Take 1 tablet (80 mg) by mouth daily, Disp-30 tablet, R-1, E-Prescribe      cetirizine (ZYRTEC) 10 MG tablet Take 10 mg by mouth daily, Historical      chlorhexidine (HIBICLENS) 4 % liquid Apply topically daily as needed for wound careHistorical      clopidogrel (PLAVIX) 75 MG tablet Take 1 tablet (75 mg) by mouth daily Take for 90 days, then stop, Disp-90 tablet, R-0, E-Prescribe      gabapentin (NEURONTIN) 100 MG capsule Take 1 capsule (100 mg) by mouth 3 times daily, Disp-90 capsule, R-1, E-Prescribe      hydrochlorothiazide (HYDRODIURIL) 25 MG tablet Take 25 mg by mouth daily, Historical      metFORMIN (GLUCOPHAGE) 1000 MG tablet Take 1,000 mg by mouth 2 times daily (with meals), Historical           Allergies   No Known Allergies

## 2022-03-23 NOTE — PLAN OF CARE
Goal Outcome Evaluation:    Pt A/O x4, VSS on RA. CMS intact except baseline numbness to LLE. Neuro intact. Pain managed with PRN oxycodone. Dressing to right armpit for boil. Refusing tele. Independent in room, refusing assistance/GB. Voiding adequately. Tolerating regular diet,  and 293. Plan to discharge to acute rehab or TCU. Will continue to monitor.

## 2022-03-23 NOTE — PLAN OF CARE
Pt A&O, vitals and neuros monitored.  Up IND in room as pt refusing bed alarm and assistance.  Refusing tele.  BG checks, insulin administered per orders.  Dorsi/plantar weaker on L vs R.  No IV access.  Pt hopeful to discharge Wednesday as he is frustrated with his mod/carb diet restrictions.  Will monitor.

## 2022-03-23 NOTE — PLAN OF CARE
Goal Outcome Evaluation:  MD notified pt  requesting to talk with her.  Pt did not want to wait for her to come back.  Pt signed AMA form. Pt discharged to home.

## 2022-03-23 NOTE — PLAN OF CARE
Occupational Therapy Discharge Summary    Reason for therapy discharge:    Patient left hospital AMA.    Progress towards therapy goal(s). See goals on Care Plan in [facility] electronic health record for goal details.  Goals partially met.  Barriers to achieving goals:   leaving facility.    Therapy recommendation(s):    Continued therapy is recommended.  Rationale/Recommendations:  Patient below baseline function at this time but making progress in therapy. Although patient left AMA, recommendation would be continued therapy in ARU setting to address deficits in sensation of L LE and safety with ADLs and mobility. If unable to d/c to ARU; would recommend TCU for continued therapy. Patient unsafe to discharge home as he requires assistance with mobility/showering, is a fall risk, and has a history of falls, recent hospitalizations, and polysubstance abuse.

## 2022-03-24 ENCOUNTER — PATIENT OUTREACH (OUTPATIENT)
Dept: CARE COORDINATION | Facility: CLINIC | Age: 52
End: 2022-03-24
Payer: COMMERCIAL

## 2022-03-24 DIAGNOSIS — Z71.89 OTHER SPECIFIED COUNSELING: ICD-10-CM

## 2022-03-24 NOTE — PLAN OF CARE
"Physical Therapy Discharge Summary    Reason for therapy discharge:    Discharged to home.    Progress towards therapy goal(s). See goals on Care Plan in [facility] electronic health record for goal details.  Goals partially met.  Barriers to achieving goals:   Needing SBA/CGA with mobility.    Therapy recommendation(s):    Continued therapy is recommended.  Rationale/Recommendations:  Per evaluating PT \"Pt requiring SBA with gait and transfers and CGA with stairs. Pt significantly below baseline mobility and would greatly benefit from ongoing intensive PT at ARU. If not able to d/c to ARU, Pt would benefit from ongoing PT at TCU to improve strength and gait/stair training. Pt agreeable to more rehab at a facility.\" .      "

## 2022-03-24 NOTE — PROGRESS NOTES
Clinic Care Coordination Contact    Background: Care Coordination referral placed from [facility] discharge report for reason of patient meeting criteria for a TCM outreach call by [facility] [place] team.    Assessment: Upon chart review, CCRC Team member will cancel/close the referral for TCM outreach due to reason below:    Patient isn't interested in speaking with care team today and disconnected call    Plan: Care Coordination referral for TCM outreach canceled.    Michelle Benítez MA  Connected [facility] [place], [facility]

## 2022-06-18 ENCOUNTER — HOSPITAL ENCOUNTER (OUTPATIENT)
Facility: CLINIC | Age: 52
Setting detail: OBSERVATION
Discharge: LEFT BEFORE DISCHARGE | End: 2022-06-24
Attending: EMERGENCY MEDICINE | Admitting: INTERNAL MEDICINE
Payer: COMMERCIAL

## 2022-06-18 DIAGNOSIS — I63.89 CEREBROVASCULAR ACCIDENT (CVA) DUE TO OTHER MECHANISM (H): ICD-10-CM

## 2022-06-18 DIAGNOSIS — E11.65 TYPE 2 DIABETES MELLITUS WITH HYPERGLYCEMIA, WITHOUT LONG-TERM CURRENT USE OF INSULIN (H): ICD-10-CM

## 2022-06-18 DIAGNOSIS — I10 HYPERTENSION, UNSPECIFIED TYPE: ICD-10-CM

## 2022-06-18 DIAGNOSIS — R53.1 GENERALIZED WEAKNESS: ICD-10-CM

## 2022-06-18 DIAGNOSIS — R32 URINARY INCONTINENCE, UNSPECIFIED TYPE: ICD-10-CM

## 2022-06-18 DIAGNOSIS — I63.9 ACUTE STROKE DUE TO ISCHEMIA (H): Primary | ICD-10-CM

## 2022-06-18 LAB
ANION GAP SERPL CALCULATED.3IONS-SCNC: 7 MMOL/L (ref 3–14)
BASE EXCESS BLDV CALC-SCNC: 1.8 MMOL/L (ref -7.7–1.9)
BASOPHILS # BLD AUTO: 0.1 10E3/UL (ref 0–0.2)
BASOPHILS NFR BLD AUTO: 1 %
BUN SERPL-MCNC: 13 MG/DL (ref 7–30)
CALCIUM SERPL-MCNC: 9.7 MG/DL (ref 8.5–10.1)
CHLORIDE BLD-SCNC: 105 MMOL/L (ref 94–109)
CO2 SERPL-SCNC: 26 MMOL/L (ref 20–32)
CREAT SERPL-MCNC: 0.66 MG/DL (ref 0.66–1.25)
EOSINOPHIL # BLD AUTO: 0.7 10E3/UL (ref 0–0.7)
EOSINOPHIL NFR BLD AUTO: 6 %
ERYTHROCYTE [DISTWIDTH] IN BLOOD BY AUTOMATED COUNT: 12 % (ref 10–15)
GFR SERPL CREATININE-BSD FRML MDRD: >90 ML/MIN/1.73M2
GLUCOSE BLD-MCNC: 345 MG/DL (ref 70–99)
GLUCOSE BLDC GLUCOMTR-MCNC: 346 MG/DL (ref 70–99)
HCO3 BLDV-SCNC: 26 MMOL/L (ref 21–28)
HCT VFR BLD AUTO: 38.3 % (ref 40–53)
HGB BLD-MCNC: 12.4 G/DL (ref 13.3–17.7)
HOLD SPECIMEN: NORMAL
HOLD SPECIMEN: NORMAL
IMM GRANULOCYTES # BLD: 0 10E3/UL
IMM GRANULOCYTES NFR BLD: 0 %
KETONES BLD-SCNC: 0 MMOL/L (ref 0–0.6)
LYMPHOCYTES # BLD AUTO: 2.8 10E3/UL (ref 0.8–5.3)
LYMPHOCYTES NFR BLD AUTO: 27 %
MAGNESIUM SERPL-MCNC: 1.7 MG/DL (ref 1.6–2.3)
MCH RBC QN AUTO: 31.1 PG (ref 26.5–33)
MCHC RBC AUTO-ENTMCNC: 32.4 G/DL (ref 31.5–36.5)
MCV RBC AUTO: 96 FL (ref 78–100)
MONOCYTES # BLD AUTO: 0.9 10E3/UL (ref 0–1.3)
MONOCYTES NFR BLD AUTO: 8 %
NEUTROPHILS # BLD AUTO: 6.1 10E3/UL (ref 1.6–8.3)
NEUTROPHILS NFR BLD AUTO: 58 %
NRBC # BLD AUTO: 0 10E3/UL
NRBC BLD AUTO-RTO: 0 /100
O2/TOTAL GAS SETTING VFR VENT: 98 %
OXYHGB MFR BLDV: 93 % (ref 70–75)
PCO2 BLDV: 39 MM HG (ref 40–50)
PH BLDV: 7.43 [PH] (ref 7.32–7.43)
PLATELET # BLD AUTO: 395 10E3/UL (ref 150–450)
PO2 BLDV: 66 MM HG (ref 25–47)
POTASSIUM BLD-SCNC: 3.8 MMOL/L (ref 3.4–5.3)
RBC # BLD AUTO: 3.99 10E6/UL (ref 4.4–5.9)
SODIUM SERPL-SCNC: 138 MMOL/L (ref 133–144)
TSH SERPL DL<=0.005 MIU/L-ACNC: 1.26 MU/L (ref 0.4–4)
WBC # BLD AUTO: 10.5 10E3/UL (ref 4–11)

## 2022-06-18 PROCEDURE — 82010 KETONE BODYS QUAN: CPT | Performed by: EMERGENCY MEDICINE

## 2022-06-18 PROCEDURE — C9803 HOPD COVID-19 SPEC COLLECT: HCPCS

## 2022-06-18 PROCEDURE — 85025 COMPLETE CBC W/AUTO DIFF WBC: CPT | Performed by: EMERGENCY MEDICINE

## 2022-06-18 PROCEDURE — 258N000003 HC RX IP 258 OP 636: Performed by: EMERGENCY MEDICINE

## 2022-06-18 PROCEDURE — 99285 EMERGENCY DEPT VISIT HI MDM: CPT | Mod: 25

## 2022-06-18 PROCEDURE — 82805 BLOOD GASES W/O2 SATURATION: CPT | Performed by: EMERGENCY MEDICINE

## 2022-06-18 PROCEDURE — 36415 COLL VENOUS BLD VENIPUNCTURE: CPT | Performed by: EMERGENCY MEDICINE

## 2022-06-18 PROCEDURE — 83036 HEMOGLOBIN GLYCOSYLATED A1C: CPT | Performed by: INTERNAL MEDICINE

## 2022-06-18 PROCEDURE — 84443 ASSAY THYROID STIM HORMONE: CPT | Performed by: EMERGENCY MEDICINE

## 2022-06-18 PROCEDURE — 82310 ASSAY OF CALCIUM: CPT | Performed by: EMERGENCY MEDICINE

## 2022-06-18 PROCEDURE — 83735 ASSAY OF MAGNESIUM: CPT | Performed by: EMERGENCY MEDICINE

## 2022-06-18 PROCEDURE — 93005 ELECTROCARDIOGRAM TRACING: CPT

## 2022-06-18 PROCEDURE — 96361 HYDRATE IV INFUSION ADD-ON: CPT

## 2022-06-18 PROCEDURE — 96360 HYDRATION IV INFUSION INIT: CPT

## 2022-06-18 RX ADMIN — SODIUM CHLORIDE, POTASSIUM CHLORIDE, SODIUM LACTATE AND CALCIUM CHLORIDE 1000 ML: 600; 310; 30; 20 INJECTION, SOLUTION INTRAVENOUS at 23:37

## 2022-06-18 RX ADMIN — SODIUM CHLORIDE 1000 ML: 9 INJECTION, SOLUTION INTRAVENOUS at 22:43

## 2022-06-18 ASSESSMENT — ENCOUNTER SYMPTOMS
NUMBNESS: 0
APPETITE CHANGE: 0
VOMITING: 0
SHORTNESS OF BREATH: 0
DIARRHEA: 0
BACK PAIN: 0
HEMATURIA: 0
FEVER: 0
DYSURIA: 0
CHILLS: 0
COUGH: 0

## 2022-06-18 NOTE — LETTER
Transition Communication Hand-off for Care Transitions to Next Level of Care Provider    Name: Josue Parisi  : 1970  MRN #: [id number]  Primary Care Provider: Von Aguirre     Primary Clinic: [address]  [place] [address]     Reason for Hospitalization:  Generalized weakness [R53.1]  Type 2 diabetes mellitus with hyperglycemia, without long-term current use of insulin (H) [E11.65]  Urinary incontinence, unspecified type [R32]  Hypertension, unspecified type [I10]  Admit Date/Time: 2022  9:33 PM  Discharge Date: 22  Payor Source: Payor: [employer] / Plan: [email or web address] [place] / Product Type: HMO /          Reason for Communication Hand-off Referral: No support or lacking a support system    Discharge Plan:       Concern for non-adherence with plan of care:   Yes  Discharge Needs Assessment:  Needs    Flowsheet Row Most Recent Value   Equipment Currently Used at Home cane, straight   # of Referrals Placed by CTS Post Acute Facilities        Follow-up specialty is recommended: OP PT/OT    Follow-up plan:    Future Appointments   Date Time Provider Department Center   2022  1:30 PM Shelia Ríos, PT [facility] R[facility]       Any outstanding tests or procedures:        Referrals     Future Labs/Procedures    Occupational Therapy Referral     Process Instructions:    Work Related Injury: Functional Capacity and Work Conditioning are only offered at [facility] and [facility] (service can be provided by PT or OT).    *This therapy referral will be filtered to a centralized scheduling office at [facility] and the patient will receive a call to schedule an appointment at a [facility] location most convenient for them. *    Comments:    Please be aware that coverage of these services is subject to the terms and limitations of your health insurance plan.  Call member services at your health plan with any benefit or coverage questions.  Please call to  "schedule your appointment      Physical Therapy Referral     Process Instructions:    Work Related Injury: Functional Capacity and Work Conditioning are only offered at [facility] and [facility] (service can be provided by PT or OT).    *This therapy referral will be filtered to a centralized scheduling office at [facility] and the patient will receive a call to schedule an appointment at a [facility] location most convenient for them. *    Comments:    Please be aware that coverage of these services is subject to the terms and limitations of your health insurance plan.  Call member services at your health plan with any benefit or coverage questions.  Please call to schedule your appointment            Supplies     Future Labs/Procedures    ALCOHOL WIPES PER BOX           Key Recommendations:  Pt admitted with failure to thrive and cute on chronic generalized weakness in the setting of previous left sided weakness due to Ischemic CVA. Pt had CVA in March and was advised to discharge to ARU, pt declined and ultimately discharged to home AMA. Prior to admission he was able to mobilize at home with walker but was becoming weaker. He does not have any meaningful function of his left hand.    PT/OT during this stay have recommended ARU or TCU, unable to place thus far. Per provider, pt now declining ARU/TCU and is planning to discharge to home with OP PT/OT. Provided pt with [facility] Resource Guide to explore additional services/support at home. PCP follow-up arranged and PT/OT referral sent to [facility]. Pt would benefit from ongoing CM/SW follow-up and support after discharge.     \"Your hospital follow up appointment has been scheduled for you with Von Aguirre PA-C at the [facility] for Thursday 6/30 at 2pm. Please bring your hospital discharge instructions, a photo ID, and insurance information with you to your appointment. Please call the clinic at " "[phone] if you need to reschedule.      [address]  [place] [address]        We have sent your therapy referrals to [facility] per your request, they should contact you within 24-48 hours. You will want to check with your insurance to see what is in network. If you have not heard from them or you prefer to schedule yourself, please call them at:      [facility]  [address]  [address]  [place] [address]  P: [phone]  F: [phone]\"    Nasra Mosher RN BSN   Inpatient Care Coordination  [facility]     AVS/Discharge Summary is the source of truth; this is a helpful guide for improved communication of patient story          "

## 2022-06-19 ENCOUNTER — APPOINTMENT (OUTPATIENT)
Dept: PHYSICAL THERAPY | Facility: CLINIC | Age: 52
End: 2022-06-19
Attending: INTERNAL MEDICINE
Payer: COMMERCIAL

## 2022-06-19 PROBLEM — E11.65 TYPE 2 DIABETES MELLITUS WITH HYPERGLYCEMIA, WITHOUT LONG-TERM CURRENT USE OF INSULIN (H): Status: ACTIVE | Noted: 2022-06-19

## 2022-06-19 PROBLEM — R53.1 GENERALIZED WEAKNESS: Status: ACTIVE | Noted: 2022-06-19

## 2022-06-19 PROBLEM — I10 HYPERTENSION, UNSPECIFIED TYPE: Status: ACTIVE | Noted: 2022-06-19

## 2022-06-19 PROBLEM — R32 URINARY INCONTINENCE, UNSPECIFIED TYPE: Status: ACTIVE | Noted: 2022-06-19

## 2022-06-19 LAB
ALBUMIN UR-MCNC: NEGATIVE MG/DL
APPEARANCE UR: CLEAR
BILIRUB UR QL STRIP: NEGATIVE
COLOR UR AUTO: ABNORMAL
GLUCOSE BLDC GLUCOMTR-MCNC: 149 MG/DL (ref 70–99)
GLUCOSE BLDC GLUCOMTR-MCNC: 156 MG/DL (ref 70–99)
GLUCOSE BLDC GLUCOMTR-MCNC: 223 MG/DL (ref 70–99)
GLUCOSE BLDC GLUCOMTR-MCNC: 240 MG/DL (ref 70–99)
GLUCOSE BLDC GLUCOMTR-MCNC: 282 MG/DL (ref 70–99)
GLUCOSE UR STRIP-MCNC: >=1000 MG/DL
HBA1C MFR BLD: 7.8 % (ref 0–5.6)
HGB UR QL STRIP: NEGATIVE
KETONES UR STRIP-MCNC: NEGATIVE MG/DL
LEUKOCYTE ESTERASE UR QL STRIP: NEGATIVE
NITRATE UR QL: NEGATIVE
PH UR STRIP: 6.5 [PH] (ref 5–7)
RBC URINE: 0 /HPF
SARS-COV-2 RNA RESP QL NAA+PROBE: NEGATIVE
SP GR UR STRIP: 1.02 (ref 1–1.03)
UROBILINOGEN UR STRIP-MCNC: NORMAL MG/DL
WBC URINE: <1 /HPF

## 2022-06-19 PROCEDURE — 97116 GAIT TRAINING THERAPY: CPT | Mod: GP | Performed by: PHYSICAL THERAPIST

## 2022-06-19 PROCEDURE — 97162 PT EVAL MOD COMPLEX 30 MIN: CPT | Mod: GP | Performed by: PHYSICAL THERAPIST

## 2022-06-19 PROCEDURE — 82962 GLUCOSE BLOOD TEST: CPT | Mod: 91

## 2022-06-19 PROCEDURE — 99207 PR APP CREDIT; MD BILLING SHARED VISIT: CPT | Performed by: NURSE PRACTITIONER

## 2022-06-19 PROCEDURE — G0378 HOSPITAL OBSERVATION PER HR: HCPCS

## 2022-06-19 PROCEDURE — 96372 THER/PROPH/DIAG INJ SC/IM: CPT | Performed by: INTERNAL MEDICINE

## 2022-06-19 PROCEDURE — 258N000003 HC RX IP 258 OP 636: Performed by: INTERNAL MEDICINE

## 2022-06-19 PROCEDURE — 96361 HYDRATE IV INFUSION ADD-ON: CPT

## 2022-06-19 PROCEDURE — 250N000012 HC RX MED GY IP 250 OP 636 PS 637: Performed by: INTERNAL MEDICINE

## 2022-06-19 PROCEDURE — 250N000013 HC RX MED GY IP 250 OP 250 PS 637: Performed by: INTERNAL MEDICINE

## 2022-06-19 PROCEDURE — 250N000013 HC RX MED GY IP 250 OP 250 PS 637: Performed by: NURSE PRACTITIONER

## 2022-06-19 PROCEDURE — 99220 PR INITIAL OBSERVATION CARE,LEVEL III: CPT | Performed by: INTERNAL MEDICINE

## 2022-06-19 PROCEDURE — 81001 URINALYSIS AUTO W/SCOPE: CPT | Performed by: EMERGENCY MEDICINE

## 2022-06-19 PROCEDURE — U0005 INFEC AGEN DETEC AMPLI PROBE: HCPCS | Performed by: EMERGENCY MEDICINE

## 2022-06-19 PROCEDURE — 96372 THER/PROPH/DIAG INJ SC/IM: CPT | Mod: XU

## 2022-06-19 RX ORDER — DEXTROSE MONOHYDRATE 25 G/50ML
25-50 INJECTION, SOLUTION INTRAVENOUS
Status: DISCONTINUED | OUTPATIENT
Start: 2022-06-19 | End: 2022-06-24 | Stop reason: HOSPADM

## 2022-06-19 RX ORDER — ASPIRIN 325 MG
325 TABLET ORAL DAILY
Status: DISCONTINUED | OUTPATIENT
Start: 2022-06-19 | End: 2022-06-24 | Stop reason: HOSPADM

## 2022-06-19 RX ORDER — ALPRAZOLAM 0.5 MG
1 TABLET ORAL
Status: DISCONTINUED | OUTPATIENT
Start: 2022-06-19 | End: 2022-06-23

## 2022-06-19 RX ORDER — GABAPENTIN 300 MG/1
300 CAPSULE ORAL 3 TIMES DAILY
Status: DISCONTINUED | OUTPATIENT
Start: 2022-06-19 | End: 2022-06-24 | Stop reason: HOSPADM

## 2022-06-19 RX ORDER — ONDANSETRON 4 MG/1
4 TABLET, ORALLY DISINTEGRATING ORAL EVERY 6 HOURS PRN
Status: DISCONTINUED | OUTPATIENT
Start: 2022-06-19 | End: 2022-06-24 | Stop reason: HOSPADM

## 2022-06-19 RX ORDER — ACETAMINOPHEN 325 MG/1
650 TABLET ORAL EVERY 6 HOURS PRN
Status: DISCONTINUED | OUTPATIENT
Start: 2022-06-19 | End: 2022-06-24 | Stop reason: HOSPADM

## 2022-06-19 RX ORDER — ONDANSETRON 2 MG/ML
4 INJECTION INTRAMUSCULAR; INTRAVENOUS EVERY 6 HOURS PRN
Status: DISCONTINUED | OUTPATIENT
Start: 2022-06-19 | End: 2022-06-24 | Stop reason: HOSPADM

## 2022-06-19 RX ORDER — GABAPENTIN 300 MG/1
300 CAPSULE ORAL 3 TIMES DAILY
Status: ON HOLD | COMMUNITY
Start: 2021-05-27 | End: 2022-08-02

## 2022-06-19 RX ORDER — HYDROCHLOROTHIAZIDE 25 MG/1
25 TABLET ORAL DAILY
Status: DISCONTINUED | OUTPATIENT
Start: 2022-06-19 | End: 2022-06-24 | Stop reason: HOSPADM

## 2022-06-19 RX ORDER — NICOTINE POLACRILEX 4 MG
15-30 LOZENGE BUCCAL
Status: DISCONTINUED | OUTPATIENT
Start: 2022-06-19 | End: 2022-06-24 | Stop reason: HOSPADM

## 2022-06-19 RX ORDER — ATORVASTATIN CALCIUM 40 MG/1
80 TABLET, FILM COATED ORAL DAILY
Status: DISCONTINUED | OUTPATIENT
Start: 2022-06-19 | End: 2022-06-24 | Stop reason: HOSPADM

## 2022-06-19 RX ORDER — ACETAMINOPHEN 650 MG/1
650 SUPPOSITORY RECTAL EVERY 6 HOURS PRN
Status: DISCONTINUED | OUTPATIENT
Start: 2022-06-19 | End: 2022-06-24 | Stop reason: HOSPADM

## 2022-06-19 RX ORDER — CLOPIDOGREL BISULFATE 75 MG/1
75 TABLET ORAL DAILY
Status: DISCONTINUED | OUTPATIENT
Start: 2022-06-19 | End: 2022-06-23

## 2022-06-19 RX ORDER — GABAPENTIN 100 MG/1
100 CAPSULE ORAL 3 TIMES DAILY
Status: DISCONTINUED | OUTPATIENT
Start: 2022-06-19 | End: 2022-06-19

## 2022-06-19 RX ORDER — AMOXICILLIN 250 MG
2 CAPSULE ORAL 2 TIMES DAILY PRN
Status: DISCONTINUED | OUTPATIENT
Start: 2022-06-19 | End: 2022-06-24 | Stop reason: HOSPADM

## 2022-06-19 RX ORDER — SODIUM CHLORIDE 9 MG/ML
INJECTION, SOLUTION INTRAVENOUS CONTINUOUS
Status: DISCONTINUED | OUTPATIENT
Start: 2022-06-19 | End: 2022-06-19

## 2022-06-19 RX ORDER — AMOXICILLIN 250 MG
1 CAPSULE ORAL 2 TIMES DAILY PRN
Status: DISCONTINUED | OUTPATIENT
Start: 2022-06-19 | End: 2022-06-24 | Stop reason: HOSPADM

## 2022-06-19 RX ADMIN — INSULIN GLARGINE 10 UNITS: 100 INJECTION, SOLUTION SUBCUTANEOUS at 04:34

## 2022-06-19 RX ADMIN — HYDROCHLOROTHIAZIDE 25 MG: 25 TABLET ORAL at 12:04

## 2022-06-19 RX ADMIN — GABAPENTIN 300 MG: 300 CAPSULE ORAL at 13:00

## 2022-06-19 RX ADMIN — METFORMIN HYDROCHLORIDE 1000 MG: 500 TABLET, FILM COATED ORAL at 10:48

## 2022-06-19 RX ADMIN — ASPIRIN 325 MG ORAL TABLET 325 MG: 325 PILL ORAL at 10:48

## 2022-06-19 RX ADMIN — SODIUM CHLORIDE: 9 INJECTION, SOLUTION INTRAVENOUS at 03:50

## 2022-06-19 RX ADMIN — GABAPENTIN 300 MG: 300 CAPSULE ORAL at 20:20

## 2022-06-19 RX ADMIN — INSULIN ASPART 5 UNITS: 100 INJECTION, SOLUTION INTRAVENOUS; SUBCUTANEOUS at 12:57

## 2022-06-19 RX ADMIN — ACETAMINOPHEN 650 MG: 325 TABLET, FILM COATED ORAL at 10:53

## 2022-06-19 RX ADMIN — CLOPIDOGREL BISULFATE 75 MG: 75 TABLET ORAL at 12:04

## 2022-06-19 RX ADMIN — ATORVASTATIN CALCIUM 80 MG: 40 TABLET, FILM COATED ORAL at 10:48

## 2022-06-19 RX ADMIN — INSULIN ASPART 1 UNITS: 100 INJECTION, SOLUTION INTRAVENOUS; SUBCUTANEOUS at 17:25

## 2022-06-19 RX ADMIN — METFORMIN HYDROCHLORIDE 1000 MG: 500 TABLET, FILM COATED ORAL at 17:25

## 2022-06-19 RX ADMIN — INSULIN GLARGINE 10 UNITS: 100 INJECTION, SOLUTION SUBCUTANEOUS at 22:42

## 2022-06-19 NOTE — PLAN OF CARE
PRIMARY DIAGNOSIS: GENERALIZED WEAKNESS    OUTPATIENT/OBSERVATION GOALS TO BE MET BEFORE DISCHARGE  1. Orthostatic performed: No    2. Tolerating PO medications: Yes    3. Return to near baseline physical activity: No    4. Cleared for discharge by consultants (if involved): No    Discharge Planner Nurse   Safe discharge environment identified: Yes  Barriers to discharge: Yes       Entered by: Yady Mercado RN 06/19/2022 4:03 AM      Pt alert and oriented X 3. Per Pt he has hard time remembering stings due to previous CVA.  Lungs sounds, abdominal sound present X 4. Denies chest pain, SOB, headache, and dizziness, Pt report under right armpit discomfort due to boil. R armpit noted small drainage and discolored. Skin cleaned, pat dry and covered with 4 X 4. Will report to morning nurse to update provider. Pt use a single cane for all ambulation. ROM intact on right side of body but extremely weak on left side body due to history of ischemic CVA with residual left sided weakness. Pt ate box lunch. VSS.    Please review provider order for any additional goals.   Nurse to notify provider when observation goals have been met and patient is ready for discharge.

## 2022-06-19 NOTE — PROGRESS NOTES
Medicine Progress Note - Hospitalist Service       Date of Admission:  6/18/2022    Assessment & Plan          Mr. Parisi is a 52-year-old male with a history of a previous CVA with residual left-sided weakness.  The patient lives alone and uses a cane to mobilize.  He came to the ER this evening for concerns about worsening generalized weakness and the inability to get to the bathroom on time.     The patient reports that he has been getting up to go to the bathroom, but has not been making it to the restroom on time and been urinating on himself.  He feels he is too weak right now to be by himself.  He does have a residual left-sided weakness due to previous CVA.     On arrival to the ER, vital signs included blood pressure 145/85 with a heart rate of 88.  No fever.  Saturation 100% on room air.  Workup there included labs and imaging.  CBC unremarkable.  Glucose 345.  Ketones negative.  TSH normal.  Magnesium normal.  Urinalysis shows no evidence of infection, but does show glycosuria with greater than 1000 glucose in the urine.  Asymptomatic COVID-19 is pending.  There was no imaging performed.     EKG was done, which I reviewed and shows sinus rhythm, heart rate of 73.  No acute ST changes or pathologic Q-waves.    Generalized weakness with known history of Ischemic CVA affecting left side   Assessment: Patient presents with weakness and not being able to go to the bathroom on time due to his weakness. He had a CVA on 3/19/2022 secondary to significant intracranial stenosis and cocaine use and affecting his left side.   Plan:    Discontinued IVF    PT eval and treat- recommending ARU and OT eval    OT eval and treat    Social work consult for final disposition- Lives alone currently    Continue home ASA, atorvastatin, Plavix, gabapenin    Urinary incontinence, unspecified type  Assessment: UA negative on 6/19/2022. Denies any urinary urgency or frequency or burning. WBC  10.5 on 6/18/2022. This sounds like is due to weakness and not being able to get to bathroom on time instead of infectious etiology.   Plan:    Monitor for now    Type 2 diabetes mellitus with hyperglycemia, without long-term current use of insulin (H)  Assessment: Last A1C 7.8 on 6/18/2022. Home medication include metformin.   Plan:    Glucose monitoring four times daily & at bedtime    Continue supplemental sliding scale insulin    Started on Lantus 10 units at bedtime    Hypoglycemic protocol     Continue home metformin    Diabetic diet    Hypertension, unspecified type    Continue home hydrochlorothiazide    Anxiety    Continue home Xanax prn       Diet: Moderate Consistent Carb (60 g CHO per Meal) Diet    DVT Prophylaxis: Plavix  Central Lines: None  Cardiac Monitoring: None  Cortez Catheter: Not present  Code Status: Full Code           Disposition Plan   Expected Discharge: 06/19/2022     Anticipated discharge location:  Pending therapy eval  Delays: Therapy eval and recommendation    The patient's care was discussed with the Bedside Nurse, Patient and Primary team.    Eulalia Miner DNP, NP-C  Hospitalist Service  [facility]  Securely message with the Vocera Web Console (learn more here)  Text page via [employer] Paging/Directory   ______________________________________________________________________    Interval History   Patient was admitted over night. Patient reports being tired and not being able to sleep due to interruption throughout the night. Patient is eating and drinking with no issues swallowing. Left arm continues to have numbness and tingling per his baseline since his CVA.     Data reviewed today: I reviewed all medications, new labs and imaging results over the last 24 hours.    Physical Exam   Vital Signs: Temp: 97  F (36.1  C) Temp src: Oral BP: (!) 168/93 Pulse: 88   Resp: 14 SpO2: 100 % O2 Device: None (Room air)    GENERAL: Patient is in fair condition, in no apparent  distress.  HEENT: Patient is normocephalic, atraumatic.  EYES: Anicteric without injection.  RESPIRATORY: Clear to auscultation bilaterally.  CARDIOVASCULAR: Regular rate and rhythm.  Normal S1, S2 - no m/g/r.  GASTROINTESTINAL: The abdomen was normal in contour. Bowel sounds were present. Soft, non-tender without distension.  EXTREMITIES: Left arm and left leg with 3/5 strength with diminished sensation to LUE and LLE  NEUROLOGIC: The patient was alert and conversation was appropriate. Grossly non-focal.  PSYCHIATRIC: Mood and affect congruent.  SKIN: Exposed skin is within normal limits.      Data   Recent Labs   Lab 06/19/22  1030 06/19/22  0320 06/18/22  2150   WBC  --   --  10.5   HGB  --   --  12.4*   MCV  --   --  96   PLT  --   --  395   NA  --   --  138   POTASSIUM  --   --  3.8   CHLORIDE  --   --  105   CO2  --   --  26   BUN  --   --  13   CR  --   --  0.66   ANIONGAP  --   --  7   DAT  --   --  9.7   * 223* 345*     No results found for this or any previous visit (from the past 24 hour(s)).  Medications       aspirin  325 mg Oral Daily     atorvastatin  80 mg Oral Daily     insulin aspart  1-10 Units Subcutaneous TID AC     insulin aspart  1-7 Units Subcutaneous At Bedtime     insulin glargine  10 Units Subcutaneous At Bedtime     metFORMIN  1,000 mg Oral BID w/meals

## 2022-06-19 NOTE — PLAN OF CARE
OT: Orders received. Chart reviewed and discussed with care team.  IP OT not indicated as patient will require TCU at discharge as patient was having difficulty to manage ADl/IADls and safe mobility.  Defer discharge recommendations to care team and OT to next level of care.  Will complete orders.

## 2022-06-19 NOTE — ED NOTES
[facility]  ED Nurse Handoff Report    Josue Parisi is a 52 year old male   ED Chief complaint: Generalized Weakness  . ED Diagnosis:   Final diagnoses:   Generalized weakness   Urinary incontinence, unspecified type   Hypertension, unspecified type     Allergies: No Known Allergies    Code Status: Full Code  Activity level - Baseline/Home:  Independent. Activity Level - Current:   Assist X 1. Lift room needed: No. Bariatric: No   Needed: No   Isolation: No. Infection: Not Applicable.     Vital Signs:   Vitals:    06/18/22 2315 06/18/22 2330 06/18/22 2345 06/19/22 0000   BP: (!) 155/89 (!) 156/79 (!) 160/85 (!) 161/117   Pulse: 73 75 71 82   Resp:       Temp:       TempSrc:       SpO2:           Cardiac Rhythm:  ,      Pain level:    Patient confused: No. Patient Falls Risk: Yes.   Elimination Status: Has voided   Patient Report - Initial Complaint: Generalized weakness.   Focused Assessment:  Josue Parisi is a 52 year old male with history of diabetes who presents via EMS with generalized weakness and incontinence. Per the patient, for the past 3 days he has had exacerbation of his left-sided generalized weakness. Also in the past 3 days when he has had the need to urinate he has been unable to make it to the bathroom in time. He denies any new back pain, numbness, loss of function of his legs, fever, chills, dysuria or hematuria, vomiting, diarrhea or changes in appetite. He also denies cough, shortness of breath, congestion or chest pain. EMS recorded blood glucose of 377 and 346 upon arrival. He reports smoking half a pack a day, occasional alcohol use and frequent cocaine as recently as yesterday.   Tests Performed: Labs, EKG.   Abnormal Results:   Labs Ordered and Resulted from Time of ED Arrival to Time of ED Departure   BASIC METABOLIC PANEL - Abnormal       Result Value    Sodium 138      Potassium 3.8      Chloride 105      Carbon Dioxide (CO2) 26      Anion Gap 7       Urea Nitrogen 13      Creatinine 0.66      Calcium 9.7      Glucose 345 (*)     GFR Estimate >90     CBC WITH PLATELETS AND DIFFERENTIAL - Abnormal    WBC Count 10.5      RBC Count 3.99 (*)     Hemoglobin 12.4 (*)     Hematocrit 38.3 (*)     MCV 96      MCH 31.1      MCHC 32.4      RDW 12.0      Platelet Count 395      % Neutrophils 58      % Lymphocytes 27      % Monocytes 8      % Eosinophils 6      % Basophils 1      % Immature Granulocytes 0      NRBCs per 100 WBC 0      Absolute Neutrophils 6.1      Absolute Lymphocytes 2.8      Absolute Monocytes 0.9      Absolute Eosinophils 0.7      Absolute Basophils 0.1      Absolute Immature Granulocytes 0.0      Absolute NRBCs 0.0     GLUCOSE BY METER - Abnormal    GLUCOSE BY METER POCT 346 (*)    BLOOD GAS VENOUS WITH OXYHEMOGLOBIN - Abnormal    pH Venous 7.43      pCO2 Venous 39 (*)     pO2 Venous 66 (*)     Bicarbonate Venous 26      FIO2 98      Oxyhemoglobin Venous 93 (*)     Base Excess/Deficit (+/-) 1.8     ROUTINE UA WITH MICROSCOPIC REFLEX TO CULTURE - Abnormal    Color Urine Light Yellow      Appearance Urine Clear      Glucose Urine >=1000 (*)     Bilirubin Urine Negative      Ketones Urine Negative      Specific Gravity Urine 1.021      Blood Urine Negative      pH Urine 6.5      Protein Albumin Urine Negative      Urobilinogen Urine Normal      Nitrite Urine Negative      Leukocyte Esterase Urine Negative      RBC Urine 0      WBC Urine <1     KETONE BETA-HYDROXYBUTYRATE QUANTITATIVE, RAPID - Normal    Ketone (Beta-Hydroxybutyrate) Quantitative 0.0     MAGNESIUM - Normal    Magnesium 1.7     TSH WITH FREE T4 REFLEX - Normal    TSH 1.26     COVID-19 VIRUS (CORONAVIRUS) BY PCR     No orders to display      Treatments provided: See MAR  Family Comments:   OBS brochure/video discussed/provided to patient:  Yes  ED Medications:   Medications   0.9% sodium chloride BOLUS (0 mLs Intravenous Stopped 6/18/22 8986)   lactated ringers BOLUS 1,000 mL (1,000 mLs  Intravenous New Bag 6/18/22 1264)     Drips infusing:  No  For the majority of the shift, the patient's behavior Green. Interventions performed were Frequent rounding.    Sepsis treatment initiated: No     Patient tested for COVID 19 prior to admission: YES    ED Nurse Name/Phone Number: Amee Mahmood RN,   12:38 AM    RECEIVING UNIT ED HANDOFF REVIEW    Above ED Nurse Handoff Report was reviewed: Yes  Reviewed by: Yady Mercado RN on June 19, 2022 at 2:50 AM

## 2022-06-19 NOTE — H&P
Admitted: 06/18/2022    CHIEF COMPLAINT:  Generalized weakness.    HISTORY OF PRESENT ILLNESS:  Mr. Parisi is a 52-year-old male with a history of a previous CVA with residual left-sided weakness.  The patient lives alone and uses a cane to mobilize.  He came to the ER this evening for concerns about worsening generalized weakness and the inability to get to the bathroom on time.    The patient reports that he has been getting up to go to the bathroom, but has not been making it to the restroom on time and been urinating on himself.  He feels he is too weak right now to be by himself.  He does have a residual left-sided weakness due to previous CVA.    On arrival to the ER, vital signs included blood pressure 145/85 with a heart rate of 88.  No fever.  Saturation 100% on room air.  Workup there included labs and imaging.  CBC unremarkable.  Glucose 345.  Ketones negative.  TSH normal.  Magnesium normal.  Urinalysis shows no evidence of infection, but does show glycosuria with greater than 1000 glucose in the urine.  Asymptomatic COVID-19 is pending.  There was no imaging performed.    EKG was done, which I reviewed and shows sinus rhythm, heart rate of 73.  No acute ST changes or pathologic Q-waves.    I spoke with Dr. Lynn of the ER.  Plan is for admission to observation.    PAST MEDICAL HISTORY:    1.  Diabetes mellitus type II, on metformin, but admits to poor compliance recently with the medication.  2.  History of polysubstance abuse, on chart review.  3.  History of cerebrovascular accident with residual left-sided weakness, most recently admitted to the hospital 03/19/2022 through 03/23/2022 for ischemic cerebrovascular accident.  4.  History of tobacco use.    MEDICATIONS:  Await pharmacy reconciliation medication list.  He admits to using metformin, although a bit erratically and admits to some noncompliance with medications.    ALLERGIES:  NONE.    FAMILY HISTORY:  Reviewed.  Nothing contributory to  this admission.    SOCIAL HISTORY:  The patient lives by himself.  He has a history of tobacco use.  He mobilizes with the use of a cane.  Denies excessive alcohol use.    REVIEW OF SYSTEMS:  See HPI for details.  Comprehensive greater than 10-point review of systems is otherwise negative besides that detailed above.    PHYSICAL EXAMINATION:    VITAL SIGNS:  Blood pressure is currently 160/85 with a heart rate of 71.  No fever.  Saturation 100% on room air.    GENERAL:  The patient appears to be in no distress.  I saw him in the Emergency Department prior to admission to the hospital.  He is awake, alert and oriented x3.  He is a good historian.  HEENT:  Head is atraumatic.  Sclerae white.  Eyelids normal.  Conjunctivae normal.  Extraocular movements are intact.  NECK:  Supple.  No cervical or supraclavicular lymphadenopathy.  CARDIOVASCULAR:  Regular rate and rhythm.  No significant murmurs.  No lower extremity edema.  LUNGS:  Clear to auscultation bilaterally.  No intercostal retractions.  No conversational dyspnea.  ABDOMEN:  Nontender, nondistended bedside.  No masses.  No organomegaly.   EXTREMITIES:  Show no edema.  SKIN:  Reveals no rashes.  No jaundice.  Skin is dry to touch.  NEUROLOGIC:  Cranial nerves II through XII appear to be intact.  On strength testing, right-sided strength including , biceps, triceps, hip flexor strength, and dorsi and plantarflexion appear to be normal.  Left-sided strength is extremely weak in the upper extremity and lower extremity.  I do detect that there may be some effort-related weakness involved here.  On  strength of the left side, he is able to barely grasp my fingers.  He is able to lift his arm off of the bed fairly easily, however.  Left leg has minimal movement when I asked him to dorsi and plantarflex.  Decreased sensation in the lower extremities.  PSYCHIATRIC:  Awake, alert, oriented x3.  Appears to be a good historian.    LABORATORY DATA:  Reviewed above in  HPI.  EKG personally reviewed as above and shows no acute changes.    IMPRESSION:  Mr. Parisi is a 52-year-old male with a history of residual left-sided weakness following ischemic CVA, diabetes mellitus, polysubstance use, who presented to the hospital today for concerns about generalized weakness and inability to make it to the bathroom on time.  His primary concern is that when he needs to go to the bathroom, he takes so long to get to the restroom because of weakness, that he urinates signed himself.    Workup in the ER notable for presence of glycosuria on urinalysis with what appears to be poorly controlled diabetes.    1.  Generalized weakness and deconditioning:  I suspect this is related to dehydration and glycosuria resulting from poorly controlled diabetes mellitus.  I also suspect he may not be getting to the bathroom on time because of polyuria related to his uncontrolled diabetes mellitus.  His generalized weakness is also exacerbated by his recent CVA that resulted in chronic left sided weakness.    2.  Diabetes mellitus, appears to be poorly controlled based on current blood sugars of 350.  Admits to poor compliance with his metformin recently.  3.  Polysubstance abuse history.  4.  History of ischemic CVA with residual left-sided weakness.  Most recently admitted to the hospital 03/2022 for this issue.    PLAN:     1.  Admit to observation.  2.  PT consult.  3.  Social work consult to assist with disposition.  The patient lives alone.  Currently mobilizes with the use of a cane.  4.  Check hemoglobin A1c to get an idea of his baseline diabetic management.  Suspect this will be elevated.  5.  We will give a dose of Lantus 10 units at bedtime, first dose this evening.  6.  Resume metformin 1000 mg twice a day as he takes at home.  Again, compliance has been an issue with this medicine recently.  7.  IV fluids will be ordered.  We will order normal saline at 100 mL per hour for a total of 2  liters.  8.  COVID-19 testing is pending.  He reports he is vaccinated.  9.  I discussed with him that better control of his blood sugars going forward will likely be helpful with prevention of dehydration and improvement of his weakness and deconditioning going forward.  Also suspect physical therapy will be helpful in the setting of his recent CVA.    Israel Neal MD        D: 2022   T: 2022   MT: [id number]    Name:     JERONIMO CEJA  MRN:      [id number]        Account:     [id number]   :      1970           Admitted:    2022       Document: [id number]

## 2022-06-19 NOTE — CONSULTS
Care Management Initial Consult    General Information  Assessment completed with: Patient,    Type of CM/SW Visit: Initial Assessment    Primary Care Provider verified and updated as needed: Yes   Readmission within the last 30 days: no previous admission in last 30 days      Reason for Consult: care coordination/care conference, discharge planning    Communication Assessment  Patient's communication style: spoken language (English or Bilingual)    Hearing Difficulty or Deaf: no   Wear Glasses or Blind: yes    Cognitive  Cognitive/Neuro/Behavioral: WDL                    Living Environment:   People in home: alone     Current living Arrangements: house      Able to return to prior arrangements: no     Family/Social Support:  Care provided by: self  Provides care for: no one  Marital Status: Single        Current Resources:   Patient receiving home care services: No  Community Resources: None  Equipment currently used at home: cane, straight    Lifestyle & Psychosocial Needs:  Social Determinants of Health     Tobacco Use: Not on file   Alcohol Use: Not on file   Financial Resource Strain: Not on file   Food Insecurity: Not on file   Transportation Needs: Not on file   Physical Activity: Not on file   Stress: Not on file   Social Connections: Not on file   Intimate Partner Violence: Not on file   Depression: Not on file   Housing Stability: Not on file       Additional Information:  CM consulted for discharge planning, per bedside RN, PT is recommending ARU at discharge. Met with pt at bedside to discuss, reviewed ARU philosophy and admission process. Pt is agreeable to ARU, [facility]. Referrals sent, awaiting responses.     Reviewed with Roxanne OT, will have provider re-enter OT consult so pt can be seen on 6/20 if OT assessment is needed by ARU. CM will continue to follow.     Nasra Mosher RN BSN   Inpatient Care Coordination  [facility]  [facility]   Phone [phone]

## 2022-06-19 NOTE — PLAN OF CARE
"  PRIMARY DIAGNOSIS: GENERALIZED WEAKNESS     OUTPATIENT/OBSERVATION GOALS TO BE MET BEFORE DISCHARGE  1. Orthostatic performed: No     2. Tolerating PO medications: Yes     3. Return to near baseline physical activity: No     4. Cleared for discharge by consultants (if involved): No     Discharge Planner Nurse   Safe discharge environment identified: Yes  Barriers to discharge: Yes, PT and safe discharge plan        Entered by: Ro aguayo RN      BP (!) 150/82 (BP Location: Right arm)   Pulse 81   Temp 97.9  F (36.6  C) (Oral)   Resp 16   Ht 1.676 m (5' 6\")   Wt 66.6 kg (146 lb 12.8 oz)   SpO2 96%   BMI 23.69 kg/m      Lethargic, arouses to voice. Wound to R armpit,bandaid c,d,i. Left side dorsi/plantar flex and  strength significantly weaker than right. Pt able to move with assist of 1 and cane on day shift per day shift RN. Will continue to monitor and provide supportive cares.       Please review provider order for any additional goals.   Nurse to notify provider when observation goals have been met and patient is ready for discharge.  "

## 2022-06-19 NOTE — ED TRIAGE NOTES
Patient presents via EMS from home for evaluation of generalized weakness X3 days. Patient reports frequent urination over past few days and states he has been unable to make it to the bathroom. Of note patient has hx of CVA with left sided weakness, generally using cane to ambulate. Patient reports diagnosis of diabetes but denies use of insulin. VSS en route. Blood glucose 377 for EMS and 346 upon arrival to ED.     Triage Assessment     Row Name 06/18/22 2198       Triage Assessment (Adult)    Airway WDL WDL       Respiratory WDL    Respiratory WDL WDL       Skin Circulation/Temperature WDL    Skin Circulation/Temperature WDL WDL       Cardiac WDL    Cardiac WDL WDL       Peripheral/Neurovascular WDL    Peripheral Neurovascular WDL WDL       Cognitive/Neuro/Behavioral WDL    Cognitive/Neuro/Behavioral WDL WDL

## 2022-06-19 NOTE — PLAN OF CARE
"  PRIMARY DIAGNOSIS: GENERALIZED WEAKNESS     OUTPATIENT/OBSERVATION GOALS TO BE MET BEFORE DISCHARGE  1. Orthostatic performed: No     2. Tolerating PO medications: Yes     3. Return to near baseline physical activity: No     4. Cleared for discharge by consultants (if involved): No     Discharge Planner Nurse   Safe discharge environment identified: Yes  Barriers to discharge: Yes, PT and safe discharge plan        Entered by: Ro aguayo RN      /83 (BP Location: Right arm)   Pulse 71   Temp 98.1  F (36.7  C) (Oral)   Resp 16   Ht 1.676 m (5' 6\")   Wt 66.6 kg (146 lb 12.8 oz)   SpO2 98%   BMI 23.69 kg/m         Vitals stable, BP improved. Ax1 with cane and gait belt. Pt alert and oriented x4, very tired from not sleeping much last night. Continues to sleep in-between cares. Wound to R armpit, pt reports this is a boil. Band-aid placed. Pt reports feeling \"weird and pain all over,\" tylenol given. Reports that he feels weak, needed encouragement to work with PT. PT recommending ARU. Plan for OT to see. SW for safe discharge plan. Will continue to monitor and provide supportive cares.       Please review provider order for any additional goals.   Nurse to notify provider when observation goals have been met and patient is ready for discharge.    "

## 2022-06-19 NOTE — PHARMACY-ADMISSION MEDICATION HISTORY
Admission medication history interview status for this patient is complete. See [facility] admission navigator for allergy information, prior to admission medications and immunization status.     Medication history interview done, indicate source(s): Patient  Medication history resources (including written lists, pill bottles, clinic record): [facility] and [employer]  Pharmacy: [id number] [address] [facility] [address]        Changes made to PTA medication list:  Added: Deep Sea Nasal Spray  Changed: Gabapentin 100 mg --> 300 mg  Reported as Not Taking: None  Removed: None    Actions taken by pharmacist (provider contacted, etc): Sticky note to provider     Additional medication history information: Pt. Stated still taking Plavix still with last dose taken yesterday. RX for 90 days then stop date of 5/8/22.    Medication reconciliation/reorder completed by provider prior to medication history?  N   (Y/N)     Prior to Admission medications    Medication Sig Last Dose Taking? Auth Provider Long Term End Date   aspirin (ASA) 325 MG tablet Take 1 tablet (325 mg) by mouth daily Past Week at Unknown time Yes Israel Neal MD     atorvastatin (LIPITOR) 80 MG tablet Take 1 tablet (80 mg) by mouth daily Past Week at Unknown time Yes Israel Neal MD Yes    cetirizine (ZYRTEC) 10 MG tablet Take 10 mg by mouth daily Past Week at Unknown time Yes Unknown, Entered By History     clopidogrel (PLAVIX) 75 MG tablet Take 1 tablet (75 mg) by mouth daily Take for 90 days, then stop 6/18/2022 at Unknown time Yes Israel Neal MD Yes 5/8/22   gabapentin (NEURONTIN) 300 MG capsule Take 300 mg by mouth 3 times daily Past Week at Unknown time Yes Unknown, Entered By History Yes    hydrochlorothiazide (HYDRODIURIL) 25 MG tablet Take 25 mg by mouth daily Past Week at Unknown time Yes Unknown, Entered By History Yes    metFORMIN (GLUCOPHAGE) 1000 MG tablet Take 1,000 mg by  mouth 2 times daily (with meals) Past Week at Unknown time Yes Unknown, Entered By History     sodium chloride (OCEAN) 0.65 % nasal spray Spray 2 sprays in nostril every 2 hours as needed for congestion  at PRN Yes Unknown, Entered By History     ALPRAZolam (XANAX) 1 MG tablet Take 1 mg by mouth nightly as needed for anxiety  at PRN  Unknown, Entered By History     chlorhexidine (HIBICLENS) 4 % liquid Apply topically daily as needed for wound care  at PRN  Unknown, Entered By History

## 2022-06-19 NOTE — ED PROVIDER NOTES
History   Chief Complaint:  Generalized Weakness and Incontinence.     HPI   Josue Parisi is a 52 year old male with history of diabetes who presents via EMS with generalized weakness and incontinence. Per the patient, for the past 3 days he has had exacerbation of his left-sided generalized weakness. Also in the past 3 days when he has had the need to urinate he has been unable to make it to the bathroom in time. He denies any new back pain, numbness, loss of function of his legs, fever, chills, dysuria or hematuria, vomiting, diarrhea or changes in appetite. He also denies cough, shortness of breath, congestion or chest pain. EMS recorded blood glucose of 377 and 346 upon arrival. He reports smoking half a pack a day, occasional alcohol use and frequent cocaine as recently as yesterday.     Review of Systems   Constitutional: Negative for appetite change, chills and fever.   HENT: Negative for congestion.    Respiratory: Negative for cough and shortness of breath.    Cardiovascular: Negative for chest pain.   Gastrointestinal: Negative for diarrhea and vomiting.   Genitourinary: Positive for urgency (incontinence). Negative for dysuria and hematuria.   Musculoskeletal: Negative for back pain.        Positive for Generalized Weakness.    Neurological: Negative for numbness.   All other systems reviewed and are negative.        Allergies:  The patient has no known allergies.     Medications:  Alprazolam  Atorvastatin  Cetirizine  Chlorhexidine  Clopidogrel  Gabapentin  Hydrochlorothiazide  Metformin    Past Medical History:     Acute stroke due to ischemia  Anxiety  Cerebral Infarction   Diabetes, Type 2  Dysarthria  Expressive Aphasia  Hyperlipidemia  Hypertension  Internal Carotid Artery Disection  Left-sided weakness after stroke  Major Depressive Disorder    Family History:    Depression - Father    Social History:  Patient is unaccompanied.   Patient arrived via EMS.     Physical Exam     Patient  Vitals for the past 24 hrs:   BP Temp Temp src Pulse Resp SpO2   06/19/22 0000 (!) 161/117 -- -- 82 -- --   06/18/22 2345 (!) 160/85 -- -- 71 -- --   06/18/22 2330 (!) 156/79 -- -- 75 -- --   06/18/22 2315 (!) 155/89 -- -- 73 -- --   06/18/22 2300 (!) 149/83 -- -- 90 -- 100 %   06/18/22 2245 (!) 156/92 -- -- 84 -- 97 %   06/18/22 2230 (!) 164/104 -- -- 83 -- 100 %   06/18/22 2215 (!) 152/101 -- -- 89 -- 99 %   06/18/22 2200 (!) 151/101 -- -- 93 -- 100 %   06/18/22 2145 (!) 144/86 -- -- 88 -- 100 %   06/18/22 2142 (!) 144/84 97.5  F (36.4  C) Oral 89 16 100 %       Physical Exam  General: Adult male laying flat on stretcher  Eyes: PERRL, Conjunctive within normal limits  ENT: Moist mucous membranes, oropharynx clear.   CV: Normal S1S2, no murmur, rub or gallop. Regular rate and rhythm  Resp: Clear to auscultation bilaterally, no wheezes, rales or rhonchi. Normal respiratory effort.  GI: Abdomen is soft, nontender and nondistended. No palpable masses. No rebound or guarding.  MSK: No edema. Nontender.  Decreased fine motor in the left arm specifically the hand.  Decreased movement of the left leg.  Normal active range of motion of the right upper extremity and right leg.  Skin: Warm and dry. No rashes or lesions or ecchymoses on visible skin.  Neuro: Alert and oriented. Responds appropriately to all questions and commands.  Equal sensation to light touch over all dermatomes of bilateral lower extremities.  Psych: Normal mood and affect.     Emergency Department Course   ECG  ECG (22:18:32):  Indication: Screening for cardiovascular disease.   Rate 73 bpm. OH interval 154. QRS duration 78. QT/QTc 364/401. P-R-T axes 49 20 38.   Interpretation:    Normal sinus rhythm   Normal ECG  Agree with computer interpretation.    Interpreted at 2228 by myself.     Laboratory:  Labs Ordered and Resulted from Time of ED Arrival to Time of ED Departure   BASIC METABOLIC PANEL - Abnormal       Result Value    Sodium 138      Potassium  3.8      Chloride 105      Carbon Dioxide (CO2) 26      Anion Gap 7      Urea Nitrogen 13      Creatinine 0.66      Calcium 9.7      Glucose 345 (*)     GFR Estimate >90     CBC WITH PLATELETS AND DIFFERENTIAL - Abnormal    WBC Count 10.5      RBC Count 3.99 (*)     Hemoglobin 12.4 (*)     Hematocrit 38.3 (*)     MCV 96      MCH 31.1      MCHC 32.4      RDW 12.0      Platelet Count 395      % Neutrophils 58      % Lymphocytes 27      % Monocytes 8      % Eosinophils 6      % Basophils 1      % Immature Granulocytes 0      NRBCs per 100 WBC 0      Absolute Neutrophils 6.1      Absolute Lymphocytes 2.8      Absolute Monocytes 0.9      Absolute Eosinophils 0.7      Absolute Basophils 0.1      Absolute Immature Granulocytes 0.0      Absolute NRBCs 0.0     GLUCOSE BY METER - Abnormal    GLUCOSE BY METER POCT 346 (*)    BLOOD GAS VENOUS WITH OXYHEMOGLOBIN - Abnormal    pH Venous 7.43      pCO2 Venous 39 (*)     pO2 Venous 66 (*)     Bicarbonate Venous 26      FIO2 98      Oxyhemoglobin Venous 93 (*)     Base Excess/Deficit (+/-) 1.8     ROUTINE UA WITH MICROSCOPIC REFLEX TO CULTURE - Abnormal    Color Urine Light Yellow      Appearance Urine Clear      Glucose Urine >=1000 (*)     Bilirubin Urine Negative      Ketones Urine Negative      Specific Gravity Urine 1.021      Blood Urine Negative      pH Urine 6.5      Protein Albumin Urine Negative      Urobilinogen Urine Normal      Nitrite Urine Negative      Leukocyte Esterase Urine Negative      RBC Urine 0      WBC Urine <1     KETONE BETA-HYDROXYBUTYRATE QUANTITATIVE, RAPID - Normal    Ketone (Beta-Hydroxybutyrate) Quantitative 0.0     MAGNESIUM - Normal    Magnesium 1.7     TSH WITH FREE T4 REFLEX - Normal    TSH 1.26     COVID-19 VIRUS (CORONAVIRUS) BY PCR        Emergency Department Course:    Reviewed:  I reviewed nursing notes, vitals and past medical history    Assessments:  2150 I obtained history and examined the patient as noted above.   0030 I rechecked the  patient and explained findings.  He notes he does not feel comfortable with the thought of going home due to his weakness and problems with urination.    Interventions:  2336 0.9% sodium chloride BOLUS  2337 lactated ringers BOLUS 1,000 mL    Disposition:  The patient was admitted to the hospital under the care of Dr. Neal.     Impression & Plan       Medical Decision Making:  Josue Parisi is a 52-year-old male with left-sided neurologic deficits due to past CVA and diabetes mellitus who presents emergency department with concerns for change in urination which she reports as incontinence in which she is unable to make it to the bathroom in time to urinate.  This is in the setting of generalized weakness.  He is also noted to be hyperglycemic on arrival here.  No ketosis or acidosis.  No sign of urinary tract infection.  He has no abdominal pain.  He is denying any new back pain, fevers, IV drug abuse.  He is a regular cocaine user but seems unlikely to contribute to his symptoms.  The hyperglycemia may be causing urinary frequency, which is slightly different which he is describing, but this could be the cause of his symptoms.  Despite no clear worrisome findings on evaluation here, patient felt too weak to go home and will be admitted for further care given fall risk in the setting of living alone with chronic left-sided deficits.  He felt comfortable this plan.  All questions were answered prior to admission.      Diagnosis:    ICD-10-CM    1. Generalized weakness  R53.1    2. Urinary incontinence, unspecified type  R32    3. Hypertension, unspecified type  I10    4. Type 2 diabetes mellitus with hyperglycemia, without long-term current use of insulin (H)  E11.65        Scribe Disclosure:  I, Scooby Lovett, am serving as a scribe at 9:50 PM on 6/18/2022 to document services personally performed by Radha Lynn MD based on my observations and the provider's statements to me.        Radha Lynn  MD Asha  06/19/22 0123

## 2022-06-19 NOTE — PLAN OF CARE
"  PRIMARY DIAGNOSIS: GENERALIZED WEAKNESS     OUTPATIENT/OBSERVATION GOALS TO BE MET BEFORE DISCHARGE  1. Orthostatic performed: No     2. Tolerating PO medications: Yes     3. Return to near baseline physical activity: No     4. Cleared for discharge by consultants (if involved): No     Discharge Planner Nurse   Safe discharge environment identified: Yes  Barriers to discharge: Yes, PT and safe discharge plan        Entered by: Ro aguayo RN      BP (!) 168/93 (BP Location: Right arm)   Pulse 88   Temp 97  F (36.1  C)   Resp 14   Ht 1.676 m (5' 6\")   Wt 66.6 kg (146 lb 12.8 oz)   SpO2 100%   BMI 23.69 kg/m      Vitals stable, except BP elevated. Plan for am BP meds once verified. Pt alert and oriented x4, very tired from not sleeping much last night. Wound to R armpit, pt reports this is a boil. Dressing in place. Pt denies pain but reports that he feels weak. Has not been up yet this shift. Plan for Ax2 with cane when up first. Plan for PT and SW for safe discharge plan. Will continue to monitor and provide supportive cares.       Please review provider order for any additional goals.   Nurse to notify provider when observation goals have been met and patient is ready for discharge.  "

## 2022-06-19 NOTE — PLAN OF CARE
ROOM # 226    Living Situation (if not independent, order SW consult):  Facility name:  : None    Activity level at baseline: Independent with cane  Activity level on admit: Assist of one    Who will be transporting you at discharge: Friends or family, not sure which one.     Patient registered to observation; given Patient Bill of Rights; given the opportunity to ask questions about observation status and their plan of care.  Patient has been oriented to the observation room, bathroom and call light is in place.    Discussed discharge goals and expectations with patient/family.         Goal Outcome Evaluation:

## 2022-06-19 NOTE — ED NOTES
"RN asked patient to attempt ambulation trial. Patient initially declined, stating \"I can't get up and that's why I am here\", also requesting to \"just let him sleep\". Patient then stated he needed to use the urinal. Patient able to stand at bedside independently with cane to void in urinal. No urinary incontinence observed.   "

## 2022-06-20 ENCOUNTER — APPOINTMENT (OUTPATIENT)
Dept: PHYSICAL THERAPY | Facility: CLINIC | Age: 52
End: 2022-06-20
Payer: COMMERCIAL

## 2022-06-20 ENCOUNTER — APPOINTMENT (OUTPATIENT)
Dept: OCCUPATIONAL THERAPY | Facility: CLINIC | Age: 52
End: 2022-06-20
Attending: NURSE PRACTITIONER
Payer: COMMERCIAL

## 2022-06-20 LAB
ATRIAL RATE - MUSE: 73 BPM
DIASTOLIC BLOOD PRESSURE - MUSE: NORMAL MMHG
GLUCOSE BLDC GLUCOMTR-MCNC: 110 MG/DL (ref 70–99)
GLUCOSE BLDC GLUCOMTR-MCNC: 156 MG/DL (ref 70–99)
GLUCOSE BLDC GLUCOMTR-MCNC: 167 MG/DL (ref 70–99)
GLUCOSE BLDC GLUCOMTR-MCNC: 208 MG/DL (ref 70–99)
GLUCOSE BLDC GLUCOMTR-MCNC: 232 MG/DL (ref 70–99)
INTERPRETATION ECG - MUSE: NORMAL
P AXIS - MUSE: 49 DEGREES
PR INTERVAL - MUSE: 154 MS
QRS DURATION - MUSE: 78 MS
QT - MUSE: 364 MS
QTC - MUSE: 401 MS
R AXIS - MUSE: 20 DEGREES
SYSTOLIC BLOOD PRESSURE - MUSE: NORMAL MMHG
T AXIS - MUSE: 38 DEGREES
VENTRICULAR RATE- MUSE: 73 BPM

## 2022-06-20 PROCEDURE — G0378 HOSPITAL OBSERVATION PER HR: HCPCS

## 2022-06-20 PROCEDURE — 82962 GLUCOSE BLOOD TEST: CPT

## 2022-06-20 PROCEDURE — 97116 GAIT TRAINING THERAPY: CPT | Mod: GP | Performed by: PHYSICAL THERAPIST

## 2022-06-20 PROCEDURE — 250N000013 HC RX MED GY IP 250 OP 250 PS 637: Performed by: NURSE PRACTITIONER

## 2022-06-20 PROCEDURE — 99226 PR SUBSEQUENT OBSERVATION CARE,LEVEL III: CPT | Performed by: PHYSICIAN ASSISTANT

## 2022-06-20 PROCEDURE — 97535 SELF CARE MNGMENT TRAINING: CPT | Mod: GO | Performed by: OCCUPATIONAL THERAPIST

## 2022-06-20 PROCEDURE — 96372 THER/PROPH/DIAG INJ SC/IM: CPT | Mod: XU

## 2022-06-20 PROCEDURE — 97165 OT EVAL LOW COMPLEX 30 MIN: CPT | Mod: GO | Performed by: OCCUPATIONAL THERAPIST

## 2022-06-20 PROCEDURE — 250N000013 HC RX MED GY IP 250 OP 250 PS 637: Performed by: INTERNAL MEDICINE

## 2022-06-20 RX ADMIN — ATORVASTATIN CALCIUM 80 MG: 40 TABLET, FILM COATED ORAL at 08:14

## 2022-06-20 RX ADMIN — INSULIN ASPART 2 UNITS: 100 INJECTION, SOLUTION INTRAVENOUS; SUBCUTANEOUS at 08:14

## 2022-06-20 RX ADMIN — INSULIN GLARGINE 10 UNITS: 100 INJECTION, SOLUTION SUBCUTANEOUS at 22:26

## 2022-06-20 RX ADMIN — ASPIRIN 325 MG ORAL TABLET 325 MG: 325 PILL ORAL at 08:23

## 2022-06-20 RX ADMIN — METFORMIN HYDROCHLORIDE 1000 MG: 500 TABLET, FILM COATED ORAL at 08:14

## 2022-06-20 RX ADMIN — CLOPIDOGREL BISULFATE 75 MG: 75 TABLET ORAL at 08:14

## 2022-06-20 RX ADMIN — GABAPENTIN 300 MG: 300 CAPSULE ORAL at 20:32

## 2022-06-20 RX ADMIN — INSULIN ASPART 1 UNITS: 100 INJECTION, SOLUTION INTRAVENOUS; SUBCUTANEOUS at 14:06

## 2022-06-20 RX ADMIN — INSULIN ASPART 3 UNITS: 100 INJECTION, SOLUTION INTRAVENOUS; SUBCUTANEOUS at 17:23

## 2022-06-20 RX ADMIN — HYDROCHLOROTHIAZIDE 25 MG: 25 TABLET ORAL at 08:14

## 2022-06-20 RX ADMIN — GABAPENTIN 300 MG: 300 CAPSULE ORAL at 08:14

## 2022-06-20 RX ADMIN — METFORMIN HYDROCHLORIDE 1000 MG: 500 TABLET, FILM COATED ORAL at 17:23

## 2022-06-20 RX ADMIN — GABAPENTIN 300 MG: 300 CAPSULE ORAL at 14:06

## 2022-06-20 ASSESSMENT — ACTIVITIES OF DAILY LIVING (ADL): PREVIOUS_RESPONSIBILITIES: DRIVING;MEAL PREP;HOUSEKEEPING;LAUNDRY;SHOPPING;MEDICATION MANAGEMENT;FINANCES

## 2022-06-20 NOTE — PLAN OF CARE
PRIMARY DIAGNOSIS: GENERALIZED WEAKNESS    OUTPATIENT/OBSERVATION GOALS TO BE MET BEFORE DISCHARGE  1. Orthostatic performed: No    2. Tolerating PO medications: Yes    3. Return to near baseline physical activity: No    4. Cleared for discharge by consultants (if involved): Yes    Discharge Planner Nurse   Safe discharge environment identified: Yes  Barriers to discharge: Yes       Entered by: KAY GONGORA RN 06/20/2022    Vital signs:  Temp: 98.1  F (36.7  C) Temp src: Oral BP: 133/70 Pulse: 69   Resp: 16 SpO2: 98 % O2 Device: None (Room air) Alert and oriented x4. Denies pain. Up in a chair for breakfast SBA with a cane. Flat affect. Needs encouragement to do activities. Baseline left sided weakness due hx of CVA. Seen by PT. Acute rehab recommended. SW following with placement. On Mod carb 60 gram per meal diet. Good appetite. BG ACHS. No IV access. Will continue to monitor.       Please review provider order for any additional goals.   Nurse to notify provider when observation goals have been met and patient is ready for discharge.

## 2022-06-20 NOTE — PLAN OF CARE
[facility]      OUTPATIENT OCCUPATIONAL THERAPY  EVALUATION  PLAN OF TREATMENT FOR OUTPATIENT REHABILITATION  (COMPLETE FOR INITIAL CLAIMS ONLY)  Patient's Last Name, First Name, M.I.  YOB: 1970  Josue Parisi                          Provider's Name  [facility] Medical Record No.  [id number]                               Onset Date:  06/18/22   Start of Care Date:  06/20/22     Type:     ___PT   _X_OT   ___SLP Medical Diagnosis:  Weakness                        OT Diagnosis:  Decline in ADL independence and safety   Visits from SOC:  1   _________________________________________________________________________________  Plan of Treatment/Functional Goals    Planned Interventions: ADL retraining, cognition, progressive activity/exercise, neuromuscular re-education   Goals: See Occupational Therapy Goals on Care Plan in [facility] electronic health record.    Therapy Frequency: 5 times/wk  Predicted Duration of Therapy Intervention: 06/24/22  _________________________________________________________________________________    I CERTIFY THE NEED FOR THESE SERVICES FURNISHED UNDER        THIS PLAN OF TREATMENT AND WHILE UNDER MY CARE     (Physician co-signature of this document indicates review and certification of the therapy plan).              Certification date from: 06/20/22, Certification date to: 06/20/22    Referring Physician: Eulalia Miner APRN CNP            Initial Assessment        See Occupational Therapy evaluation dated 06/20/22 in [facility] electronic health record.

## 2022-06-20 NOTE — PROGRESS NOTES
"   06/20/22 1300   Quick Adds   Type of Visit Initial Occupational Therapy Evaluation   Living Environment   People in Home alone   Current Living Arrangements apartment   Home Accessibility stairs to enter home   Number of Stairs, Main Entrance greater than 10 stairs   Stair Railings, Main Entrance railings on both sides of stairs   Transportation Anticipated family or friend will provide   Living Environment Comments Pt has a tub shower with shower chair (sits inside shower), standard height   Self-Care   Usual Activity Tolerance poor   Current Activity Tolerance poor   Regular Exercise No   Equipment Currently Used at Home cane, straight   Fall history within last six months yes   Number of times patient has fallen within last six months 5   Instrumental Activities of Daily Living (IADL)   Previous Responsibilities driving;meal prep;housekeeping;laundry;shopping;medication management;finances   IADL Comments Pt reports he had a home health aid but recently let her go.   General Information   Onset of Illness/Injury or Date of Surgery 06/18/22   Referring Physician Eulalia Miner APRN CNP   Patient/Family Therapy Goal Statement (OT) Patient would like to \"get stronger\"   Additional Occupational Profile Info/Pertinent History of Current Problem 52-year-old male with a history of a previous CVA with residual left-sided weakness.  The patient lives alone and uses a cane to mobilize.  He came to the ER  for concerns about worsening generalized weakness and the inability to get to the bathroom on time.   Existing Precautions/Restrictions fall   Limitations/Impairments safety/cognitive   Cognitive Status Examination   Orientation Status orientation to person, place and time   Behavioral Issues uncooperative   Affect/Mental Status (Cognitive) confused;emotionally labile   Follows Commands follows one-step commands;over 90% accuracy   Safety Deficit minimal deficit   Memory Deficit minimal deficit   Attention Deficit " minimal deficit   Executive Function Deficit minimal deficit   Cognitive Status Comments Pt scored -6 on Short Blessed indicating mild cognitive impairment. Pt demonstrated unsafe behavior during session requiring redirection. Pt intermittently uncooperative and would ignore therapist direction   Visual Perception   Visual Impairment/Limitations WFL   Pain Assessment   Patient Currently in Pain Yes, see Vital Sign flowsheet   Integumentary/Edema   Integumentary/Edema no deficits were identifed   Posture   Posture forward head position   Range of Motion Comprehensive   Comment, General Range of Motion LUE demonstrated spasticity with elbow and shoulder flexion, impaired AROM in L hand, RUE and RLE WFL   Strength Comprehensive (MMT)   Comment, General Manual Muscle Testing (MMT) Assessment LUE and LLE weak in all major muscle groups, RLE And RUE WFL   Coordination   Coordination Comments Impaired UE and LE   Bed Mobility   Comment (Bed Mobility) Pt seated upright in chair upon OT evaluation, will assess at a later time   Transfers   Transfer Comments Min assist   Balance   Balance Comments Significant impairment in standing   Activities of Daily Living   BADL Assessment/Intervention bathing;lower body dressing;upper body dressing;grooming;toileting   Bathing Assessment/Intervention   Comment, (Bathing) Mod assist for L UE   Upper Body Dressing Assessment/Training   Comment, (Upper Body Dressing) Min assist   Lower Body Dressing Assessment/Training   Comment, (Lower Body Dressing) Max assist   Grooming Assessment/Training   Comment, (Grooming) Min assist for LUE   Toileting   Comment, (Toileting) Min assist   Clinical Impression   Criteria for Skilled Therapeutic Interventions Met (OT) Yes, treatment indicated   OT Diagnosis Decline in ADL independence and safety   Influenced by the following impairments Impaired LUE and LLE strength and coordination, cognitive concerns   OT Problem List-Impairments impacting ADL  problems related to;activity tolerance impaired;balance;cognition;mobility;strength;pain   Assessment of Occupational Performance 5 or more Performance Deficits   Identified Performance Deficits Impaired dressing bathing toileting and grooming independence with cognitive concerns   Planned Therapy Interventions (OT) ADL retraining;cognition;progressive activity/exercise;neuromuscular re-education   Clinical Decision Making Complexity (OT) low complexity   Anticipated Equipment Needs Upon Discharge (OT)   (TBD)   Risk & Benefits of therapy have been explained evaluation/treatment results reviewed;care plan/treatment goals reviewed;risks/benefits reviewed;current/potential barriers reviewed;participants voiced agreement with care plan;participants included;patient   OT Discharge Planning   OT Discharge Recommendation (DC Rec) Transitional Care Facility   OT Rationale for DC Rec Pt presents below baseline with ADLs and safety with cognitive concerns. Pt demonstrates emotional lability and intermittent lack of cooperation. At this time, patient appears as though they would not tolerate the intesity of ARU. Pt would benefit from TCU to progress ADL independence and safety prior to discharging home.   OT Brief overview of current status Min-mod assist in standing for balance   Therapy Certification   Start of Care Date 06/20/22   Certification date from 06/20/22   Certification date to 06/20/22   Medical Diagnosis Weakness   Total Evaluation Time (Minutes)   Total Evaluation Time (Minutes) 11   OT Goals   Therapy Frequency (OT) 5 times/wk   OT Predicted Duration/Target Date for Goal Attainment 06/24/22   OT Goals Upper Body Dressing;Lower Body Dressing;Toilet Transfer/Toileting;Cognition   OT: Upper Body Dressing Modified independent;using adaptive equipment   OT: Lower Body Dressing Modified independent;using adaptive equipment   OT: Toilet Transfer/Toileting Modified independent;toilet transfer;cleaning and garment  management;using adaptive equipment   OT: Cognitive Patient/caregiver will verbalize understanding of cognitive assessment results/recommendations as needed for safe discharge planning

## 2022-06-20 NOTE — PLAN OF CARE
PRIMARY DIAGNOSIS: GENERALIZED WEAKNESS    OUTPATIENT/OBSERVATION GOALS TO BE MET BEFORE DISCHARGE  1. Orthostatic performed: No    2. Tolerating PO medications: Yes    3. Return to near baseline physical activity: No    4. Cleared for discharge by consultants (if involved): No    Discharge Planner Nurse   Safe discharge environment identified: Yes  Barriers to discharge: Yes       Entered by: Yady Mercado RN 06/20/2022 12:33 AM      Pt sleeping, continue to provide support.   Please review provider order for any additional goals.   Nurse to notify provider when observation goals have been met and patient is ready for discharge.

## 2022-06-20 NOTE — PROGRESS NOTES
"   06/19/22 1130   Quick Adds   Quick Adds Certification   Type of Visit Initial PT Evaluation   Living Environment   People in Home alone   Current Living Arrangements apartment   Home Accessibility stairs to enter home   Number of Stairs, Main Entrance greater than 10 stairs   Stair Railings, Main Entrance railings on both sides of stairs   Transportation Anticipated family or friend will provide   Living Environment Comments PTA, pt. was living alone in 2nd floor apartment with 20 IVORY (B HRs) with tubshower with the following DME: SPC, FWW, tub chair.   Self-Care   Usual Activity Tolerance poor   Current Activity Tolerance poor   Regular Exercise No   Equipment Currently Used at Home cane, straight;tub bench;walker, rolling   Fall history within last six months yes   Number of times patient has fallen within last six months 5   Activity/Exercise/Self-Care Comment PTA, pt. was receiving assistance for ADLs/IADLs from aide who pt. reports was living with him up until recently when he let her go. PTA, pt. was independent with ambulation with/without SPC for short community distances.   General Information   Onset of Illness/Injury or Date of Surgery 06/18/22   Referring Physician Dr. Israel Neal   Patient/Family Therapy Goals Statement (PT) \"I want to be able to walk better.\"   Pertinent History of Current Problem (include personal factors and/or comorbidities that impact the POC) Mr. Parisi is a 52 y.o.  male who presents to [facility] secondary to generalized weakness secondary to dehydration/glycosuria with associated urinary incontinence with PMH significant for subacute L CVA, DM II, polysubstance abuse, HTN, anxiety.   Existing Precautions/Restrictions fall   General Observations Pt. presents lying semi-supine in hospital bed in no acute distress.   Cognition   Affect/Mental Status (Cognition) flat/blunted affect;low arousal/lethargic;sad/depressed affect   Orientation Status (Cognition) disoriented " to;time   Follows Commands (Cognition) follows two-step commands;75-90% accuracy;delayed response/completion;increased processing time needed;initiation impaired;physical/tactile prompts required;repetition of directions required   Safety Deficit (Cognition) moderate deficit;awareness of need for assistance;insight into deficits/self-awareness;judgment;problem-solving;safety precautions awareness   Pain Assessment   Patient Currently in Pain No   Posture    Posture Forward head position;Protracted shoulders;Kyphosis   Range of Motion (ROM)   Range of Motion ROM deficits secondary to weakness   ROM Comment L UE/LE AROM minimally limited secondary to L hemiparesis.   Strength (Manual Muscle Testing)   Strength (Manual Muscle Testing) Deficits observed during functional mobility   Strength Comments L LE strength moderately impaired throughout L LE   Bed Mobility   Bed Mobility supine-sit   Supine-Sit [place] (Bed Mobility) modified independence   Comment, (Bed Mobility) Mod. I due to increased time to perform task.   Gait/Stairs (Locomotion)   [place] Level (Gait) minimum assist (75% patient effort);verbal cues   Assistive Device (Gait) cane, straight;other (see comments)  (L HHA)   Distance in Feet (Required for LE Total Joints) 40   Pattern (Gait) 3-point   Deviations/Abnormal Patterns (Gait) narciso decreased;gait speed decreased;stride length decreased;weight shifting decreased   Negotiation (Stairs) other (see comments)  (Unasble to formally assess due to time constraints)   Balance   Balance other (describe)   Standing Balance: Static fair balance   Standing Balance: Dynamic fair balance   Systems Impairment Contributing to Balance Disturbance neuromuscular;musculoskeletal   Identified Impairments Contributing to Balance Disturbance impaired coordination;impaired motor control;impaired postural control;decreased ROM;decreased strength   Balance Quick Add Standing balance: static;Standing balance:  dynamic;Systems impairment contributing to balance disturbance;Identified impairments contributing to balance disturbance   Sensory Examination   Sensory Perception patient reports no sensory changes   Coordination   Coordination other (see comments)   Coordination Comments L LE coordination moderately impaired secondary to L hemiparesis   Clinical Impression   Criteria for Skilled Therapeutic Intervention Yes, treatment indicated   PT Diagnosis (PT) Pt. presents with the aforementioned impairments, necessitating the need for skilled PT services to assist in his return to his PLOF. Given the chronicity of his deficits, his prognosis to return to his PLOF is fair.   Influenced by the following impairments L hemiparesis, decreased postural control in standing   Functional limitations due to impairments L hemiparesis, decreased postural control in standing   Clinical Presentation (PT Evaluation Complexity) Evolving/Changing   Clinical Presentation Rationale Pt.'s clinical presentation is evolving secondary to potential recovery of motor control prior to discharge.   Clinical Decision Making (Complexity) moderate complexity   Planned Therapy Interventions (PT) balance training;bed mobility training;gait training;home exercise program;motor coordination training;neuromuscular re-education;patient/family education;postural re-education;stair training;strengthening;transfer training   Risk & Benefits of therapy have been explained evaluation/treatment results reviewed;care plan/treatment goals reviewed;risks/benefits reviewed;current/potential barriers reviewed;participants voiced agreement with care plan;participants included;patient   PT Discharge Planning   PT Discharge Recommendation (DC Rec) Acute Rehab Center-Motivated patient will benefit from intensive, interdisciplinary therapy.  Anticipate will be able to tolerate 3 hours of therapy per day;Transitional Care Facility   PT Rationale for DC Rec Recommend discharge  to ARU secondary to pt.'s high burden of care and being significantly below baseline function. If pt. is unable to be accepted to ARU, pt. would benefit from TCU transition to promote increased independence with mobility skills.   PT Brief overview of current status Assist of 1 with SPC   Plan of Care Review   Plan of Care Reviewed With patient   Therapy Certification   Start of care date 06/19/22   Certification date from 06/19/22   Certification date to 06/25/22   Medical Diagnosis Greneralized weaknerss   Total Evaluation Time   Total Evaluation Time (Minutes) 15   Physical Therapy Goals   PT Frequency Daily   PT Predicted Duration/Target Date for Goal Attainment 06/24/22   PT Goals Bed Mobility;Transfers;Gait;Stairs   PT: Bed Mobility Modified independent   PT: Transfers Supervision/stand-by assist;Assistive device   PT: Gait Supervision/stand-by assist;Assistive device;Greater than 200 feet   PT: Stairs Minimal assist;Greater than 10 stairs;Rail on both sides

## 2022-06-20 NOTE — UTILIZATION REVIEW
"Admission Status; Secondary Review Determination     Admission Date: 6/18/2022  9:33 PM       Under the authority of the Utilization Management Committee, the utilization review process indicated a secondary review on the above patient.  The review outcome is based on review of the medical records, discussions with staff, and applying clinical experience noted on the date of the review.          (x) Observation Status Appropriate - This patient does not meet hospital inpatient criteria and is placed in observation status. If this patient's primary payer is Medicare and was admitted as an inpatient, Condition Code 44 should be used and patient status changed to \"observation\".       RATIONALE FOR DETERMINATION      Brief clinical presentation, information copied from the chart, abbreviated and edited for relevant content:     Waiting for TCU placement. No inpatient criteria.     Mr. Parisi is a 52-year-old male with a history of a previous CVA with residual left-sided weakness.  The patient lives alone and uses a cane to mobilize.  He came to the ER  for concerns about worsening generalized weakness and the inability to get to the bathroom on time.  Hemodynamically stable. Labs and imaging unremarkable.  Admitted for Generalized weakness with known history of Ischemic CVA affecting left side. PT eval and treat- recommending ARU and OT eval. OT eval and treat. Social work consult for final disposition- Lives alone currently          The severity of illness, intensity of cares provided, risk for adverse outcome, and expected LOS make the care appropriate for observation.       The information on this document is developed by the utilization review team in order for the business office to ensure compliance.  This only denotes the appropriateness of proper admission status and does not reflect the quality of care rendered.         The definitions of Inpatient Status and Observation Status used in making the " determination above are those provided in the CMS Coverage Manual, Chapter 1 and Chapter 6, section 70.4.      Sincerely,     Rosa Avila MD   Utilization Review/ Case Management  [facility].

## 2022-06-20 NOTE — PLAN OF CARE
[facility]      OUTPATIENT PHYSICAL THERAPY EVALUATION  PLAN OF TREATMENT FOR OUTPATIENT REHABILITATION  (COMPLETE FOR INITIAL CLAIMS ONLY)  Patient's Last Name, First Name, M.I.  YOB: 1970  Josue Parisi                        Provider's Name  [facility] Medical Record No.  [id number]                               Onset Date:  06/18/22   Start of Care Date:  06/19/22      Type:     _X_PT   ___OT   ___SLP Medical Diagnosis:  Greneralized weaknerss                        PT Diagnosis:  Pt. presents with the aforementioned impairments, necessitating the need for skilled PT services to assist in his return to his PLOF. Given the chronicity of his deficits, his prognosis to return to his PLOF is fair.   Visits from SOC:  1   _________________________________________________________________________________  Plan of Treatment/Functional Goals    Planned Interventions: balance training, bed mobility training, gait training, home exercise program, motor coordination training, neuromuscular re-education, patient/family education, postural re-education, stair training, strengthening, transfer training     Goals: See Physical Therapy Goals on Care Plan in [facility] electronic health record.    Therapy Frequency: Daily  Predicted Duration of Therapy Intervention: 06/24/22  _________________________________________________________________________________    I CERTIFY THE NEED FOR THESE SERVICES FURNISHED UNDER        THIS PLAN OF TREATMENT AND WHILE UNDER MY CARE     (Physician co-signature of this document indicates review and certification of the therapy plan).              Certification date from: 06/19/22, Certification date to: 06/25/22    Referring Physician: Dr. Israel Neal            Initial Assessment        See Physical Therapy evaluation dated 06/19/22 in [facility]  electronic health record.

## 2022-06-20 NOTE — PLAN OF CARE
PRIMARY DIAGNOSIS: GENERALIZED WEAKNESS    OUTPATIENT/OBSERVATION GOALS TO BE MET BEFORE DISCHARGE  1. Orthostatic performed: No    2. Tolerating PO medications: Yes    3. Return to near baseline physical activity: No    4. Cleared for discharge by consultants (if involved): Yes    Discharge Planner Nurse   Safe discharge environment identified: Yes  Barriers to discharge: Yes       Entered by: KAY GONGORA RN 06/20/2022    Vital signs:  Temp: 98.1  F (36.7  C) Temp src: Oral BP: 125/85 Pulse: 110   Resp: 16 SpO2: 98 % O2 Device: None (Room air) Alert and oriented x4. Denies pain. Up in a chair for meals SBA with a cane. Flat affect. Needs encouragement to do activities. Baseline left sided weakness due hx of CVA. Seen by PT. Acute rehab recommended. SW following with placement. On Mod carb 60 gram per meal diet. Good appetite. BG ACHS. Pt requested to go down to the cafeteria to buy own food due to the kitchen is not serving what he needed. Explained to pt that hospital rule prohibits patient leaving hospital room/unit. Pt was angry but able to agree not to leave the unit. BG up to 232 at supper time after eating food came from the outside by family. No IV access. Will continue to monitor.      Please review provider order for any additional goals.   Nurse to notify provider when observation goals have been met and patient is ready for discharge.Goal Outcome Evaluation:

## 2022-06-20 NOTE — PLAN OF CARE
Goal Outcome Evaluation:PRIMARY DIAGNOSIS: GENERALIZED WEAKNESS    OUTPATIENT/OBSERVATION GOALS TO BE MET BEFORE DISCHARGE  1. Orthostatic performed: No    2. Tolerating PO medications: Yes    3. Return to near baseline physical activity: No    4. Cleared for discharge by consultants (if involved): Yes    Discharge Planner Nurse   Safe discharge environment identified: Yes  Barriers to discharge: Yes       Entered by: KAY GONGORA RN 06/20/2022    Vital signs:  Temp: 98.1  F (36.7  C) Temp src: Oral BP: 125/85 Pulse: 110   Resp: 16 SpO2: 98 % O2 Device: None (Room air)  Alert and oriented x4. Denies pain. Up in a chair for meals SBA with a cane. Flat affect. Needs encouragement to do activities. Baseline left sided weakness due hx of CVA. Seen by PT. Acute rehab recommended. SW following with placement. On Mod carb 60 gram per meal diet. Good appetite. BG ACHS. Pt requested to go down to the cafeteria to buy own food due to the kitchen is not serving what he needed. Explained to pt that hospital rule prohibits patient leaving hospital room/unit. Pt was angry but able to agree not to leave the unit. Pt is on Mod carb diet but told his mother to bring the food he wanted to eat. No IV access. Will continue to monitor.     Please review provider order for any additional goals.   Nurse to notify provider when observation goals have been met and patient is ready for discharge.

## 2022-06-20 NOTE — PLAN OF CARE
"PRIMARY DIAGNOSIS: GENERALIZED WEAKNESS    OUTPATIENT/OBSERVATION GOALS TO BE MET BEFORE DISCHARGE  1. Orthostatic performed: No    2. Tolerating PO medications: Yes    3. Return to near baseline physical activity: No    4. Cleared for discharge by consultants (if involved): No    Discharge Planner Nurse   Safe discharge environment identified: Yes  Barriers to discharge: Yes       Entered by: Yady Mercado RN 06/19/2022 8:52 PM      Pt alert and oriented X 3. Lungs sounds clear, abdominal sound present X 4. Denies chest pain, SOB, headache, and dizziness, Pt requested to take a shower. Pt requested IV to be removed. Pt refused IV site to be covered and removed his IV independently. Pt took shower with staff present. Pt use a single cane for all ambulation. Significant left side weakness continue to be present due to history of ischemic CVA with residual left sided weakness. ROM intact on right side of body.   BP (!) 150/82 (BP Location: Right arm)   Pulse 81   Temp 97.9  F (36.6  C) (Oral)   Resp 16   Ht 1.676 m (5' 6\")   Wt 66.6 kg (146 lb 12.8 oz)   SpO2 96%   BMI 23.69 kg/m      Please review provider order for any additional goals.   Nurse to notify provider when observation goals have been met and patient is ready for discharge.    "

## 2022-06-20 NOTE — PLAN OF CARE
"PRIMARY DIAGNOSIS: GENERALIZED WEAKNESS    OUTPATIENT/OBSERVATION GOALS TO BE MET BEFORE DISCHARGE  1. Orthostatic performed: No    2. Tolerating PO medications: Yes    3. Return to near baseline physical activity: No    4. Cleared for discharge by consultants (if involved): No    Discharge Planner Nurse   Safe discharge environment identified: Yes  Barriers to discharge: Yes       Entered by: Yady Mercado RN 06/20/2022 3:43 AM      Pt alert and oriented X 3. Lungs sounds clear, abdominal sound present X 4. Denies chest pain, SOB, headache, and dizziness, Pt requested to take a shower. Pt requested IV to be removed. Pt refused IV site to be covered and removed his IV. Pt refused for replacement of IV site, attempted X 2. Pt took shower with staff present. Pt use a single cane for all ambulation. Significant left side weakness continue to be present due to history of ischemic CVA with residual left sided weakness. ROM intact on right side of body. Pt void X 2 ambulating to the bathroom with SBA.   /82 (BP Location: Right arm)   Pulse 69   Temp 97.9  F (36.6  C) (Oral)   Resp 16   Ht 1.676 m (5' 6\")   Wt 66.6 kg (146 lb 12.8 oz)   SpO2 93%   BMI 23.69 kg/m      Please review provider order for any additional goals.   Nurse to notify provider when observation goals have been met and patient is ready for discharge.    "

## 2022-06-21 ENCOUNTER — APPOINTMENT (OUTPATIENT)
Dept: OCCUPATIONAL THERAPY | Facility: CLINIC | Age: 52
End: 2022-06-21
Payer: COMMERCIAL

## 2022-06-21 LAB
GLUCOSE BLDC GLUCOMTR-MCNC: 127 MG/DL (ref 70–99)
GLUCOSE BLDC GLUCOMTR-MCNC: 133 MG/DL (ref 70–99)
GLUCOSE BLDC GLUCOMTR-MCNC: 189 MG/DL (ref 70–99)
GLUCOSE BLDC GLUCOMTR-MCNC: 207 MG/DL (ref 70–99)
GLUCOSE BLDC GLUCOMTR-MCNC: 214 MG/DL (ref 70–99)
GLUCOSE BLDC GLUCOMTR-MCNC: 255 MG/DL (ref 70–99)

## 2022-06-21 PROCEDURE — 97530 THERAPEUTIC ACTIVITIES: CPT | Mod: GO

## 2022-06-21 PROCEDURE — G0378 HOSPITAL OBSERVATION PER HR: HCPCS

## 2022-06-21 PROCEDURE — 96372 THER/PROPH/DIAG INJ SC/IM: CPT | Mod: XU

## 2022-06-21 PROCEDURE — 250N000013 HC RX MED GY IP 250 OP 250 PS 637: Performed by: NURSE PRACTITIONER

## 2022-06-21 PROCEDURE — 82962 GLUCOSE BLOOD TEST: CPT

## 2022-06-21 PROCEDURE — 250N000013 HC RX MED GY IP 250 OP 250 PS 637: Performed by: PHYSICIAN ASSISTANT

## 2022-06-21 PROCEDURE — 250N000013 HC RX MED GY IP 250 OP 250 PS 637: Performed by: INTERNAL MEDICINE

## 2022-06-21 PROCEDURE — 99225 PR SUBSEQUENT OBSERVATION CARE,LEVEL II: CPT | Performed by: PHYSICIAN ASSISTANT

## 2022-06-21 RX ORDER — NICOTINE 21 MG/24HR
1 PATCH, TRANSDERMAL 24 HOURS TRANSDERMAL DAILY
Status: DISCONTINUED | OUTPATIENT
Start: 2022-06-21 | End: 2022-06-24 | Stop reason: HOSPADM

## 2022-06-21 RX ORDER — POLYETHYLENE GLYCOL 3350 17 G
2-4 POWDER IN PACKET (EA) ORAL
Status: DISCONTINUED | OUTPATIENT
Start: 2022-06-21 | End: 2022-06-24 | Stop reason: HOSPADM

## 2022-06-21 RX ADMIN — ALPRAZOLAM 1 MG: 0.5 TABLET ORAL at 23:01

## 2022-06-21 RX ADMIN — ASPIRIN 325 MG ORAL TABLET 325 MG: 325 PILL ORAL at 08:01

## 2022-06-21 RX ADMIN — INSULIN ASPART 5 UNITS: 100 INJECTION, SOLUTION INTRAVENOUS; SUBCUTANEOUS at 08:38

## 2022-06-21 RX ADMIN — METFORMIN HYDROCHLORIDE 1000 MG: 500 TABLET, FILM COATED ORAL at 17:59

## 2022-06-21 RX ADMIN — NICOTINE 1 PATCH: 14 PATCH, EXTENDED RELEASE TRANSDERMAL at 18:05

## 2022-06-21 RX ADMIN — INSULIN ASPART 2 UNITS: 100 INJECTION, SOLUTION INTRAVENOUS; SUBCUTANEOUS at 18:04

## 2022-06-21 RX ADMIN — GABAPENTIN 300 MG: 300 CAPSULE ORAL at 19:18

## 2022-06-21 RX ADMIN — METFORMIN HYDROCHLORIDE 1000 MG: 500 TABLET, FILM COATED ORAL at 08:01

## 2022-06-21 RX ADMIN — HYDROCHLOROTHIAZIDE 25 MG: 25 TABLET ORAL at 08:01

## 2022-06-21 RX ADMIN — GABAPENTIN 300 MG: 300 CAPSULE ORAL at 08:01

## 2022-06-21 RX ADMIN — ATORVASTATIN CALCIUM 80 MG: 40 TABLET, FILM COATED ORAL at 08:01

## 2022-06-21 RX ADMIN — GABAPENTIN 300 MG: 300 CAPSULE ORAL at 14:16

## 2022-06-21 RX ADMIN — CLOPIDOGREL BISULFATE 75 MG: 75 TABLET ORAL at 08:01

## 2022-06-21 NOTE — PROGRESS NOTES
Care Management Follow Up    Length of Stay (days): 0    Expected Discharge Date: 06/22/2022     Concerns to be Addressed: care coordination/care conferences, discharge planning     Patient plan of care discussed at interdisciplinary rounds: Yes    Anticipated Discharge Disposition: Acute Rehab vs TCU     Anticipated Discharge Services: None  Anticipated Discharge DME: None    Additional Information:  Received vm from Geraldine at [facility] Acute Rehab admission ph#[phone]. She states [facility] has reviewed patient. Patient does not meet criteria for ARU at this time. She expressed concerns of patient not meeting criteria d/t non-compliance with therapy and ignoring direction from therapy team. Patient also not showing a clear plan to return home after services completed, as he has fired his PCA and multiple care givers in the past.     Patient will need to show improvement in therapy participation and have a plan for returning home. Discussed with nursing.     Will continue to follow for discharge plan of care.     Veronica Plata RN Case Manager  Inpatient Care Coordination   [facility]   [phone]      Veronica Plata RN

## 2022-06-21 NOTE — PROGRESS NOTES
[facility]  Hospitalist Progress Note  Morelia Garcia PA-C 06/21/2022    Reason for Stay (Diagnosis): weakness  Date of admission 6/19/22         Assessment and Plan:      Mr. Parisi is a 52-year-old male with a history of a previous CVA with residual left-sided weakness.  The patient lives alone and uses a cane to mobilize.  Presented to ER for concerns about worsening generalized weakness and increased urinary frequency and inability to get to the bathroom on time. He;s been frustrated that he has not been able to make it to the bathroom and thus soiling himself.      #Acute on chronic generalized weakness in the setting of previous left sided weakness due to Ischemic CVA  Patient presents with weakness and not being able to go to the bathroom on time due to his weakness. He had a CVA on 3/19/2022 secondary to significant intracranial stenosis and cocaine use and has residual left sided weakness UE>LE  - Suspect recrudesce of his previous left sided weakness from uncontrolled DM  - Seen by PT, recommending ARU vs TCU  - Seen by OT next day, concern that pt has unsafe behavior and may not possibly withstand the rigor of ARU, they are recommending TCU.   - SW to follow for placement   - Continue home ASA, atorvastatin, Plavix, gabapentin for CVA     Urinary incontinence, unspecified type  - UA negative on 6/19/2022.   - Admits to increased urinary frequency, polyuria likely due to poor controlled BG  - PT states this has improved/resolved since being in the hospital abd BG better controlled.        Type 2 diabetes mellitus with hyperglycemia, without long-term current use of insulin (H)  Last A1C 7.8 on 6/18/2022. Home medication include metformin.But admits to being non compliant.  - Lantus started this admission, titrate to 15u at bedtime for now  - Cont med resistance sliding scale insulin  - May need to consider alternitve to insulin if concern for being non compliant ie victoza or januvia  "for ease.   - Cont metformin fornow      Hypertension, unspecified type  ? Continue home hydrochlorothiazide     Anxiety  ? Continue home Xanax prn                   Diet: Moderate Consistent Carb (60 g CHO per Meal) Diet    DVT Prophylaxis: on Plavix, encourage ambulationj  Central Lines: None  Cardiac Monitoring: None  Cortze Catheter: Not present  Code Status: Full Code           Disposition Plan     Expected Discharge: unclear pending TCU bed   Anticipated discharge location: TCU  Delays: bed availability           Interval History (Subjective):      Got angry earlier because he wanted to go down to [employer] by himself to get a new sandwich.   Had to call security   Feeling better now that cafeteria was able to bring new sandwich                  Physical Exam:      Last Vital Signs:  /87 (BP Location: Right arm)   Pulse 89   Temp 98.1  F (36.7  C) (Oral)   Resp 16   Ht 1.676 m (5' 6\")   Wt 66.6 kg (146 lb 12.8 oz)   SpO2 100%   BMI 23.69 kg/m      GENERAL: Patient is in fair condition, in no apparent distress.  HEENT: Patient is normocephalic, atraumatic.  EYES: Anicteric without injection.  RESPIRATORY: Clear to auscultation bilaterally.  CARDIOVASCULAR: Regular rate and rhythm.  Normal S1, S2 - no m/g/r.  GASTROINTESTINAL: The abdomen was normal in contour. Bowel sounds were present. Soft, non-tender without distension.  EXTREMITIES: Left arm and left leg with 3/5 strength with diminished sensation to LUE and LLE  NEUROLOGIC: The patient was alert and conversation was appropriate. Grossly non-focal.  PSYCHIATRIC: Mood and affect congruent.  SKIN: Exposed skin is within normal limits.          Medications:      All current medications were reviewed with changes reflected in problem list.         Data:      All new lab and imaging data was reviewed.   Labs:       Lab Results   Component Value Date     06/18/2022     03/22/2022     03/20/2022     04/18/2021     " 09/17/2014     01/31/2012    Lab Results   Component Value Date    CHLORIDE 105 06/18/2022    CHLORIDE 107 03/22/2022    CHLORIDE 104 03/20/2022    CHLORIDE 97 04/18/2021    CHLORIDE 105 09/17/2014    CHLORIDE 105 01/31/2012    Lab Results   Component Value Date    BUN 13 06/18/2022    BUN 15 03/22/2022    BUN 15 03/20/2022    BUN 19 04/18/2021    BUN 19 09/17/2014    BUN 22 01/31/2012      Lab Results   Component Value Date    POTASSIUM 3.8 06/18/2022    POTASSIUM 3.5 03/23/2022    POTASSIUM 3.8 03/22/2022    POTASSIUM 3.7 04/18/2021    POTASSIUM 3.8 09/17/2014    POTASSIUM 4.0 01/31/2012    Lab Results   Component Value Date    CO2 26 06/18/2022    CO2 27 03/22/2022    CO2 28 03/20/2022    CO2 30 04/18/2021    CO2 29 09/17/2014    CO2 27 01/31/2012    Lab Results   Component Value Date    CR 0.66 06/18/2022    CR 0.81 03/22/2022    CR 0.71 03/20/2022    CR 0.80 04/18/2021    CR 0.81 09/17/2014    CR 1.20 01/31/2012        Recent Labs   Lab 06/18/22  2150   WBC 10.5   HGB 12.4*   HCT 38.3*   MCV 96         Imaging:   Results for orders placed or performed during the hospital encounter of 03/19/22   Head CT w/o contrast    Narrative    EXAM: CT HEAD W/O CONTRAST  LOCATION: [facility]  DATE/TIME: 3/19/2022 8:31 PM    INDICATION: Head injury, on Plavix, history of multiple strokes with worsening left leg weakness  COMPARISON: MRI head 02/05/2022  TECHNIQUE: Routine CT Head without IV contrast. Multiplanar reformats. Dose reduction techniques were used.    FINDINGS:  INTRACRANIAL CONTENTS: No intracranial hemorrhage, extraaxial collection, or mass effect.  A few subtle hypodensities right cerebral hemisphere, corresponding to acute/subacute infarctions on prior MRI. A mild, more conspicuous hypodensity parasagittal   right frontal lobe near vertex appears tube in the area which appeared normal on prior MRI and could be due to new mild acute/subacute infarction. No definite associated  hemorrhage or significant mass effect. Normal ventricles and sulci. Mild chronic   infarction parasagittal left frontal lobe and superior aspect of left basal ganglia; similar to prior MRI.    VISUALIZED ORBITS/SINUSES/MASTOIDS: No intraorbital abnormality. No paranasal sinus mucosal disease. No middle ear or mastoid effusion.    BONES/SOFT TISSUES: No acute abnormality.      Impression    IMPRESSION:  1.  A few subtle hypodensities right cerebral hemisphere, corresponding to acute/subacute infarctions on prior MRI.  2.  A mild, more conspicuous hypodensity parasagittal right frontal lobe near vertex appears tube in the area which appeared normal on prior MRI and could be due to new mild acute/subacute infarction.  3.  No definite evidence of hemorrhage or significant mass effect.   MR Brain w/o & w Contrast    Narrative    EXAM: MRA BRAIN ([place] OF [place]) WO CONTRAST, MR BRAIN W/O AND W CONTRAST, MRA NECK (CAROTIDS) WO AND W CONTRAST  LOCATION: [facility]  DATE/TIME: 3/19/2022 10:19 PM    INDICATION: New stroke with left leg weakness.  COMPARISON: 2/5/2022.  CONTRAST: 10 mL Gadavist.  TECHNIQUE:   1) Routine multiplanar multisequence head MRI without and with intravenous contrast.  2) 3D time-of-flight head MRA without intravenous contrast.  3) Neck MRA without and with IV contrast. Stenosis measurements made according to NASCET criteria unless otherwise specified.    FINDINGS:  HEAD MRI:  INTRACRANIAL CONTENTS: There are new watershed territory patchy areas of restricted diffusion in the parasagittal right frontal/parietal centrum semiovale and a new 11 x 8 mm (AP x TR) ovoid focus of restricted diffusion at the right frontal/parietal   vertex. Resolving previously seen watershed territory infarcts. No mass, acute hemorrhage, or extra-axial fluid collections. Patchy nonspecific T2/FLAIR hyperintensities within the cerebral white matter most consistent with mild to moderate chronic    microvascular ischemic change. Chronic infarct right middle frontal gyrus. Chronic infarct left corona radiata. Tiny chronic lacunar infarct left cerebellar hemisphere. Mild generalized cerebral atrophy. No hydrocephalus. Normal position of the   cerebellar tonsils. There is some enhancement of the late subacute watershed territory infarcts first identified on 2/5/2022.    SELLA: No abnormality accounting for technique.    OSSEOUS STRUCTURES/SOFT TISSUES: Normal marrow signal. Abnormal right ICA flow void compatible with proximal occlusion.     ORBITS: No abnormality accounting for technique.     SINUSES/MASTOIDS: Mild mucosal thickening scattered about the paranasal sinuses. No middle ear or mastoid effusion.     HEAD MRA:   ANTERIOR CIRCULATION: No flow within the right ICA as it enters the skull base. It is reconstituted at the ophthalmic segment via posterior communicating artery collateral. There is severe stenosis bilaterally of the bilateral A1-A2 origins. There is new   occlusion of the right A1 segment. Again seen is severe stenosis at the right A1-A2 junction with the possibility of a nonocclusive intraluminal thrombus, similar to prior. Standard [place] anatomy.    POSTERIOR CIRCULATION: No stenosis/occlusion, aneurysm, or high flow vascular malformation. Dominant left and smaller right vertebral artery contribute to a normal basilar artery.     NECK MRA:   RIGHT CAROTID: The right ICA is occluded approximately 1 cm from its origin, stable from prior.    LEFT CAROTID: No measurable stenosis or dissection.    VERTEBRAL ARTERIES: No focal stenosis or dissection. Dominant left and smaller right vertebral arteries.    AORTIC ARCH: Classic aortic arch anatomy with no significant stenosis at the origin of the great vessels.      Impression    IMPRESSION:  HEAD MRI:   1.  New watershed territory infarcts in the deep right frontal/parietal centrum semiovale and right frontal/parietal vertex.  2.  Late  subacute watershed territory infarcts are resolving with some enhancement of these late subacute infarcts in the posterior right parietal lobe.  3.  Chronic infarcts left corona radiata, right middle frontal gyrus and left cerebellar hemisphere.    HEAD MRA:   1.  No flow in the right ICA as it enters the skull base. It is reconstituted at the ophthalmic segment via right posterior communicating artery collateralization.  2.  Progressive stenosis involving the right A1 segment with severe/critical stenosis of the bilateral A1-A2 junctions.  3.  High-grade stenosis/near occlusion involving the right M1/M2 junction, unchanged from prior either due to atheromatous disease or persistent intraluminal thrombus. There is arborization of the right MCA branches.    NECK MRA:  1.  Stable occlusion of the right ICA 1 cm from its origin.  2.  Otherwise unremarkable.   MRA Neck (Carotids) wo & w Contrast    Narrative    EXAM: MRA BRAIN ([place] OF FOY) WO CONTRAST, MR BRAIN W/O AND W CONTRAST, MRA NECK (CAROTIDS) WO AND W CONTRAST  LOCATION: [facility]  DATE/TIME: 3/19/2022 10:19 PM    INDICATION: New stroke with left leg weakness.  COMPARISON: 2/5/2022.  CONTRAST: 10 mL Gadavist.  TECHNIQUE:   1) Routine multiplanar multisequence head MRI without and with intravenous contrast.  2) 3D time-of-flight head MRA without intravenous contrast.  3) Neck MRA without and with IV contrast. Stenosis measurements made according to NASCET criteria unless otherwise specified.    FINDINGS:  HEAD MRI:  INTRACRANIAL CONTENTS: There are new watershed territory patchy areas of restricted diffusion in the parasagittal right frontal/parietal centrum semiovale and a new 11 x 8 mm (AP x TR) ovoid focus of restricted diffusion at the right frontal/parietal   vertex. Resolving previously seen watershed territory infarcts. No mass, acute hemorrhage, or extra-axial fluid collections. Patchy nonspecific T2/FLAIR hyperintensities  within the cerebral white matter most consistent with mild to moderate chronic   microvascular ischemic change. Chronic infarct right middle frontal gyrus. Chronic infarct left corona radiata. Tiny chronic lacunar infarct left cerebellar hemisphere. Mild generalized cerebral atrophy. No hydrocephalus. Normal position of the   cerebellar tonsils. There is some enhancement of the late subacute watershed territory infarcts first identified on 2/5/2022.    SELLA: No abnormality accounting for technique.    OSSEOUS STRUCTURES/SOFT TISSUES: Normal marrow signal. Abnormal right ICA flow void compatible with proximal occlusion.     ORBITS: No abnormality accounting for technique.     SINUSES/MASTOIDS: Mild mucosal thickening scattered about the paranasal sinuses. No middle ear or mastoid effusion.     HEAD MRA:   ANTERIOR CIRCULATION: No flow within the right ICA as it enters the skull base. It is reconstituted at the ophthalmic segment via posterior communicating artery collateral. There is severe stenosis bilaterally of the bilateral A1-A2 origins. There is new   occlusion of the right A1 segment. Again seen is severe stenosis at the right A1-A2 junction with the possibility of a nonocclusive intraluminal thrombus, similar to prior. Standard [place] anatomy.    POSTERIOR CIRCULATION: No stenosis/occlusion, aneurysm, or high flow vascular malformation. Dominant left and smaller right vertebral artery contribute to a normal basilar artery.     NECK MRA:   RIGHT CAROTID: The right ICA is occluded approximately 1 cm from its origin, stable from prior.    LEFT CAROTID: No measurable stenosis or dissection.    VERTEBRAL ARTERIES: No focal stenosis or dissection. Dominant left and smaller right vertebral arteries.    AORTIC ARCH: Classic aortic arch anatomy with no significant stenosis at the origin of the great vessels.      Impression    IMPRESSION:  HEAD MRI:   1.  New watershed territory infarcts in the deep right  frontal/parietal centrum semiovale and right frontal/parietal vertex.  2.  Late subacute watershed territory infarcts are resolving with some enhancement of these late subacute infarcts in the posterior right parietal lobe.  3.  Chronic infarcts left corona radiata, right middle frontal gyrus and left cerebellar hemisphere.    HEAD MRA:   1.  No flow in the right ICA as it enters the skull base. It is reconstituted at the ophthalmic segment via right posterior communicating artery collateralization.  2.  Progressive stenosis involving the right A1 segment with severe/critical stenosis of the bilateral A1-A2 junctions.  3.  High-grade stenosis/near occlusion involving the right M1/M2 junction, unchanged from prior either due to atheromatous disease or persistent intraluminal thrombus. There is arborization of the right MCA branches.    NECK MRA:  1.  Stable occlusion of the right ICA 1 cm from its origin.  2.  Otherwise unremarkable.   MR Head w/o Contrast Angiogram    Narrative    EXAM: MRA BRAIN ([place] OF FOY) WO CONTRAST, MR BRAIN W/O AND W CONTRAST, MRA NECK (CAROTIDS) WO AND W CONTRAST  LOCATION: [facility]  DATE/TIME: 3/19/2022 10:19 PM    INDICATION: New stroke with left leg weakness.  COMPARISON: 2/5/2022.  CONTRAST: 10 mL Gadavist.  TECHNIQUE:   1) Routine multiplanar multisequence head MRI without and with intravenous contrast.  2) 3D time-of-flight head MRA without intravenous contrast.  3) Neck MRA without and with IV contrast. Stenosis measurements made according to NASCET criteria unless otherwise specified.    FINDINGS:  HEAD MRI:  INTRACRANIAL CONTENTS: There are new watershed territory patchy areas of restricted diffusion in the parasagittal right frontal/parietal centrum semiovale and a new 11 x 8 mm (AP x TR) ovoid focus of restricted diffusion at the right frontal/parietal   vertex. Resolving previously seen watershed territory infarcts. No mass, acute hemorrhage, or  extra-axial fluid collections. Patchy nonspecific T2/FLAIR hyperintensities within the cerebral white matter most consistent with mild to moderate chronic   microvascular ischemic change. Chronic infarct right middle frontal gyrus. Chronic infarct left corona radiata. Tiny chronic lacunar infarct left cerebellar hemisphere. Mild generalized cerebral atrophy. No hydrocephalus. Normal position of the   cerebellar tonsils. There is some enhancement of the late subacute watershed territory infarcts first identified on 2/5/2022.    SELLA: No abnormality accounting for technique.    OSSEOUS STRUCTURES/SOFT TISSUES: Normal marrow signal. Abnormal right ICA flow void compatible with proximal occlusion.     ORBITS: No abnormality accounting for technique.     SINUSES/MASTOIDS: Mild mucosal thickening scattered about the paranasal sinuses. No middle ear or mastoid effusion.     HEAD MRA:   ANTERIOR CIRCULATION: No flow within the right ICA as it enters the skull base. It is reconstituted at the ophthalmic segment via posterior communicating artery collateral. There is severe stenosis bilaterally of the bilateral A1-A2 origins. There is new   occlusion of the right A1 segment. Again seen is severe stenosis at the right A1-A2 junction with the possibility of a nonocclusive intraluminal thrombus, similar to prior. Standard [place] anatomy.    POSTERIOR CIRCULATION: No stenosis/occlusion, aneurysm, or high flow vascular malformation. Dominant left and smaller right vertebral artery contribute to a normal basilar artery.     NECK MRA:   RIGHT CAROTID: The right ICA is occluded approximately 1 cm from its origin, stable from prior.    LEFT CAROTID: No measurable stenosis or dissection.    VERTEBRAL ARTERIES: No focal stenosis or dissection. Dominant left and smaller right vertebral arteries.    AORTIC ARCH: Classic aortic arch anatomy with no significant stenosis at the origin of the great vessels.      Impression     IMPRESSION:  HEAD MRI:   1.  New watershed territory infarcts in the deep right frontal/parietal centrum semiovale and right frontal/parietal vertex.  2.  Late subacute watershed territory infarcts are resolving with some enhancement of these late subacute infarcts in the posterior right parietal lobe.  3.  Chronic infarcts left corona radiata, right middle frontal gyrus and left cerebellar hemisphere.    HEAD MRA:   1.  No flow in the right ICA as it enters the skull base. It is reconstituted at the ophthalmic segment via right posterior communicating artery collateralization.  2.  Progressive stenosis involving the right A1 segment with severe/critical stenosis of the bilateral A1-A2 junctions.  3.  High-grade stenosis/near occlusion involving the right M1/M2 junction, unchanged from prior either due to atheromatous disease or persistent intraluminal thrombus. There is arborization of the right MCA branches.    NECK MRA:  1.  Stable occlusion of the right ICA 1 cm from its origin.  2.  Otherwise unremarkable.   CTA Head Neck with Contrast    Narrative    EXAM: CTA  HEAD NECK WITH CONTRAST  LOCATION: [facility]  DATE/TIME: 03/20/2022, 12:16 PM    INDICATION: Stroke/TIA, assess extracranial arteries.  COMPARISON: Head and neck MRA 03/19/2021, 02/05/2022.  CONTRAST: 70 mL Isovue 370.  TECHNIQUE: Head and neck CT angiogram with IV contrast. Axial helical CT images of the head and neck vessels obtained during the arterial phase of intravenous contrast administration. Axial 2D reconstructed images and multiplanar 3D MIP reconstructed   images of the head and neck vessels were performed by the technologist. Dose reduction techniques were used. All stenosis measurements made according to NASCET criteria unless otherwise specified.    FINDINGS:   HEAD CTA:  ANTERIOR CIRCULATION: Chronic right internal carotid artery occlusion with reconstitution at the level of the supraclinoid segment via collateral  filling. Severe focal stenosis of the distal right M1 segment/proximal M2 segment junction. Severe right and   moderate to severe left segmental stenoses of the anterior cerebral artery A1/proximal A2 segments. The left middle cerebral artery branches are widely patent. No aneurysm or evidence of vascular malformation.    POSTERIOR CIRCULATION: No stenosis/occlusion, aneurysm, or high-flow vascular malformation. Dominant left and smaller right vertebral artery contribute to a normal basilar artery.     DURAL VENOUS SINUSES: Expected enhancement of the major dural venous sinuses.    NECK CTA:  RIGHT CAROTID: Chronic occlusion of the right internal carotid artery approximately 1 cm from its origin at the level of the carotid bifurcation.    LEFT CAROTID: No measurable stenosis or dissection.    VERTEBRAL ARTERIES: No focal stenosis or dissection. Dominant left and smaller right vertebral arteries.    AORTIC ARCH: Classic aortic arch anatomy with no significant stenosis at the origin of the great vessels.    NONVASCULAR STRUCTURES: Unremarkable.      Impression    IMPRESSION:   1.  No significant change dating back to 2022. Chronic occlusion of the right internal carotid artery with reconstitution at the level of its supraclinoid segment.  2.  Severe focal stenosis of the right middle cerebral artery M1/M2 segment junction.  3.  Severe right and moderate to severe left anterior cerebral artery A1/proximal A2 segments.  4.  Widely patent vertebral arteries and intracranial posterior circulation.     Echocardiogram Limited     Value    LVEF  55%    Narrative    [id number]  [id number]  [id number]  [id number]^MAL^MARIANN^LEVAR     [facility]  Echocardiography Laboratory  [address] [place] [address]     Name: JERONIMO CEJA  MRN: [id number]  : 1970  Study Date: 2022 03:18 PM  Age: 51 yrs  Gender: Male  Patient Location: [facility]  Reason For Study: Other, Please Specify in  Comments  Ordering Physician: MARIANN RESENDEZ  Referring Physician: Von Aguirre  Performed By: Verona Jacobson     BSA: 1.8 m2  Height: 65 in  Weight: 157 lb  HR: 99  BP: 152/84 mmHg  ______________________________________________________________________________  Procedure  Limited Portable Echo Adult.  ______________________________________________________________________________  Interpretation Summary     The visual ejection fraction is estimated at 55%.  The right ventricle is normal in structure, function and size.  There is trace mitral regurgitation.  ______________________________________________________________________________  Left Ventricle  The left ventricle is normal in structure, function and size. There is normal  left ventricular wall thickness. Left ventricular systolic function is normal.  The visual ejection fraction is estimated at 55%. Left ventricular diastolic  function is normal. No regional wall motion abnormalities noted.     Right Ventricle  The right ventricle is normal in structure, function and size.     Atria  Normal left atrial size. Right atrial size is normal. There is no color  Doppler evidence of an atrial shunt.     Mitral Valve  The mitral valve is normal in structure and function. There is trace mitral  regurgitation.     Tricuspid Valve  The tricuspid valve is normal in structure and function. There is trace  tricuspid regurgitation.     Aortic Valve  There is mild trileaflet aortic sclerosis. No aortic regurgitation is present.     Pulmonic Valve  The pulmonic valve is not well seen, but is grossly normal.     Vessels  Normal size aorta.     Pericardium  There is no pericardial effusion.     Rhythm  Sinus rhythm was noted.  ______________________________________________________________________________  MMode/2D Measurements & Calculations  IVSd: 0.91 cm     LVIDd: 3.7 cm  LVIDs: 3.0 cm  LVPWd: 0.92 cm  FS: 20.3 %  LV mass(C)d: 100.2 grams  LV mass(C)dI: 56.1  grams/  RWT: 0.49     ______________________________________________________________________________  Report approved by: Devon Wills 03/21/2022 03:51 PM

## 2022-06-21 NOTE — PLAN OF CARE
"PRIMARY DIAGNOSIS: GENERALIZED WEAKNESS     OUTPATIENT/OBSERVATION GOALS TO BE MET BEFORE DISCHARGE  1. Orthostatic performed: No     2. Tolerating PO medications: Yes     3. Return to near baseline physical activity: Yes - feels a bit weaker      4. Cleared for discharge by consultants (if involved): Yes     Discharge Planner Nurse   Safe discharge environment identified: Yes - TCU or therapies   Barriers to discharge: Yes       Entered by: Flavio Presley 06/21/2022 1777      Please review provider order for any additional goals.   Nurse to notify provider when observation goals have been met and patient is ready for discharge.    Blood pressure (!) 147/92, pulse 100, temperature 98.5  F (36.9  C), temperature source Oral, resp. rate 18, height 1.676 m (5' 6\"), weight 66.6 kg (146 lb 12.8 oz), SpO2 97 %.      SBA w/ cane. A/O, Mod Carb diet, looking for placement TCU      "

## 2022-06-21 NOTE — PLAN OF CARE
PRIMARY DIAGNOSIS: GENERALIZED WEAKNESS    OUTPATIENT/OBSERVATION GOALS TO BE MET BEFORE DISCHARGE  1. Orthostatic performed: No    2. Tolerating PO medications: Yes    3. Return to near baseline physical activity: No    4. Cleared for discharge by consultants (if involved): Yes    Discharge Planner Nurse   Safe discharge environment identified: Yes  Barriers to discharge: Yes       Entered by: Annabel Goode RN 06/21/2022 2:43 AM   Pt A/O x4 and makes needs known. Here for generalized weakness, particularly in the LUE and LLE. Pt reports numbness and tingling in both extremities, states he would like his gabapentin to be increased in the morning. Moderate consistent carb diet and eats well. SBA with cane. Per PT acute rehab recommended. No IV access.  POC: Acute care rehab placement for therapy. Continue to provide supportive cares.  Please review provider order for any additional goals.   Nurse to notify provider when observation goals have been met and patient is ready for discharge.

## 2022-06-21 NOTE — PLAN OF CARE
PRIMARY DIAGNOSIS: GENERALIZED WEAKNESS    OUTPATIENT/OBSERVATION GOALS TO BE MET BEFORE DISCHARGE  1. Orthostatic performed: No    2. Tolerating PO medications: Yes    3. Return to near baseline physical activity: Yes - feels a bit weaker     4. Cleared for discharge by consultants (if involved): Yes    Discharge Planner Nurse   Safe discharge environment identified: Yes - TCU or therapies   Barriers to discharge: Yes       Entered by: Flavio Presley 06/21/2022 9:50 AM      Please review provider order for any additional goals.   Nurse to notify provider when observation goals have been met and patient is ready for discharge.

## 2022-06-21 NOTE — PLAN OF CARE
PRIMARY DIAGNOSIS: GENERALIZED WEAKNESS    OUTPATIENT/OBSERVATION GOALS TO BE MET BEFORE DISCHARGE  1. Orthostatic performed: No    2. Tolerating PO medications: Yes    3. Return to near baseline physical activity: No    4. Cleared for discharge by consultants (if involved): Yes    Discharge Planner Nurse   Safe discharge environment identified: Yes  Barriers to discharge: Yes       Entered by: Annabel Goode RN 06/21/2022 4:33 AM   Pt A/O x4 and makes needs known. Here for generalized weakness, particularly in the LUE and LLE. Pt reports numbness and tingling in both extremities, states he would like his gabapentin to be increased in the morning. Moderate consistent carb diet and eats well. SBA with cane. Per PT acute rehab recommended. No IV access.  POC: Acute care rehab placement for therapy. Continue to provide supportive cares.  Please review provider order for any additional goals.   Nurse to notify provider when observation goals have been met and patient is ready for discharge.

## 2022-06-21 NOTE — PROGRESS NOTES
[facility] [place]  Hospitalist Progress Note  CARRIE Garcia 06/21/2022    Reason for Stay (Diagnosis): weakness    Date of admission 6/19/22         Assessment and Plan:      Mr. Parisi is a 52-year-old male with a history of polysubstance abuse, cocaine use, HTN, and recent right KWADWO CVA 3/2022 (large territory defect) with residual left-sided weakness.  At that time, ARU recommended but patient refused and chose to leave AMA.  Since then, patient has been living alone with only a walker to mobilize.  He's been unable to make it to the bathroom in time and has been soiling himself.  He presented to the ER with concerns for generalized weakness, increased urinary frequency, and inability to get to the bathroom on time. He's been frustrated that he has not been able to make it to the bathroom in time.       Acute on chronic generalized weakness in the setting of previous left sided weakness due to Ischemic CVA  Patient weak making it difficult for him to get to the bathroom on time.  He had a CVA on 3/19/2022 secondary to significant intracranial stenosis and cocaine use and has residual left sided weakness UE>LE.    -  Suspect recrudesce of his previous left sided weakness from uncontrolled DM  -  Seen by PT, who recommended ARU vs TCU.  Seen by OT who voiced concern that pt has unsafe behavior and may not withstand the rigor of ARU, they are recommending TCU.  This time, patient is agreeable.  -  SW to follow for placement   -  Continue PTA ASA, atorvastatin, Plavix, gabapentin for CVA     Urinary incontinence, unspecified type  UA negative on 6/19/2022.  Suspect increased urinary frequency, polyuria likely due to poor controlled BG.     -  Improved/resolved since hospitalization, likely with improvement in BG better controlled.     Type 2 diabetes mellitus with hyperglycemia, without long-term current use of insulin (H)  Last A1C 7.8 on 6/18/2022. Home medication includes metformin. But admits to  "being non compliant.  -  Continue Metformin  -  Lantus started this admission.  Currently Lantus 15u at bedtime.  Morning BG a bit low.  If BG remain low today, will decrease lantus to 10  -  Medium ISS   -  May need to consider alternitve to insulin if concern for being non compliant ie victoza or januvia for ease.      Hypertension, unspecified type  ? PTA hydrochlorothiazide     Anxiety  ? Continue home Xanax prn                   Diet: Moderate Consistent Carb (60 g CHO per Meal) Diet    DVT Prophylaxis: on Plavix, encourage ambulationj  Central Lines: None  Cardiac Monitoring: None  Cortez Catheter: Not present  Code Status: Full Code           Disposition Plan     Expected Discharge: unclear pending TCU bed   Anticipated discharge location: TCU  Delays: bed availability           Interval History (Subjective):      Sleeping today.  Has been dismissive and rude to nursing team.  Upon my entry to the room, he was sleeping but roused to voice.  Reported no concerns or needs.  States he just wants to sleep.                   Physical Exam:      Last Vital Signs:  /85 (BP Location: Right arm)   Pulse 94   Temp 97.9  F (36.6  C) (Oral)   Resp 16   Ht 1.676 m (5' 6\")   Wt 66.6 kg (146 lb 12.8 oz)   SpO2 96%   BMI 23.69 kg/m      GENERAL: Patient is in fair condition, in no apparent distress.  HEENT: NCAT. MMM  RESPIRATORY:  Unlabored, prolonged inspiratory effort.   CARDIOVASCULAR: Reg  GASTROINTESTINAL: Abd soft ntnd  EXTREMITIES:  Full movement with x4 but stable left sided weakness  NEUROLOGIC:  AO x3. Stable left-sided weakness.    PSYCHIATRIC:  Pleasant but evasive  SKIN:  No rash. WWP          Medications:      All current medications were reviewed with changes reflected in problem list.         Data:      All new lab and imaging data was reviewed.   Labs:  Most Recent 3 CBC's:Recent Labs   Lab Test 06/18/22  2150 03/22/22  0722 03/20/22  0558   WBC 10.5 10.3 10.6   HGB 12.4* 11.6* 11.0*   MCV 96 96 " 96    356 324      Most Recent 3 BMP's:  Recent Labs   Lab Test 06/21/22  1251 06/21/22  1212 06/21/22  0816 06/19/22  0320 06/18/22  2150 03/23/22  0807 03/23/22  0610 03/22/22  0753 03/22/22  0722 03/20/22  0758 03/20/22  0558   NA  --   --   --   --  138  --   --   --  138  --  138   POTASSIUM  --   --   --   --  3.8  --  3.5  --  3.8   < > 3.4   CHLORIDE  --   --   --   --  105  --   --   --  107  --  104   CO2  --   --   --   --  26  --   --   --  27  --  28   BUN  --   --   --   --  13  --   --   --  15  --  15   CR  --   --   --   --  0.66  --   --   --  0.81  --  0.71   ANIONGAP  --   --   --   --  7  --   --   --  4  --  6   DAT  --   --   --   --  9.7  --   --   --  8.8  --  8.9   * 133* 255*   < > 345*   < >  --    < > 231*   < > 271*    < > = values in this interval not displayed.     Most Recent 2 LFT's:  Recent Labs   Lab Test 03/20/22  0558 02/05/22  1920   AST 9 7   ALT 19 18   ALKPHOS 58 78   BILITOTAL 0.4 0.3     Most Recent INR's and Anticoagulation Dosing History:  Anticoagulation Dose History     Recent Dosing and Labs Latest Ref Rng & Units 9/17/2014 4/18/2021 2/5/2022    INR 0.85 - 1.15 1.06 1.05 0.97        Most Recent 3 Troponin's:  Recent Labs   Lab Test 04/18/21  1553   TROPI <0.015     Most Recent Cholesterol Panel:  Recent Labs   Lab Test 03/20/22  0558   CHOL 93   LDL 46   HDL 34*   TRIG 66     Most Recent 6 Bacteria Isolates From Any Culture (See EPIC Reports for Culture Details):No lab results found.  Most Recent TSH, T4 and A1c Labs:  Recent Labs   Lab Test 06/18/22 2150   TSH 1.26   A1C 7.8*     Imaging:   Results for orders placed or performed during the hospital encounter of 03/19/22   Head CT w/o contrast    Narrative    EXAM: CT HEAD W/O CONTRAST  LOCATION: [facility]  DATE/TIME: 3/19/2022 8:31 PM    INDICATION: Head injury, on Plavix, history of multiple strokes with worsening left leg weakness  COMPARISON: MRI head 02/05/2022  TECHNIQUE:  Routine CT Head without IV contrast. Multiplanar reformats. Dose reduction techniques were used.    FINDINGS:  INTRACRANIAL CONTENTS: No intracranial hemorrhage, extraaxial collection, or mass effect.  A few subtle hypodensities right cerebral hemisphere, corresponding to acute/subacute infarctions on prior MRI. A mild, more conspicuous hypodensity parasagittal   right frontal lobe near vertex appears tube in the area which appeared normal on prior MRI and could be due to new mild acute/subacute infarction. No definite associated hemorrhage or significant mass effect. Normal ventricles and sulci. Mild chronic   infarction parasagittal left frontal lobe and superior aspect of left basal ganglia; similar to prior MRI.    VISUALIZED ORBITS/SINUSES/MASTOIDS: No intraorbital abnormality. No paranasal sinus mucosal disease. No middle ear or mastoid effusion.    BONES/SOFT TISSUES: No acute abnormality.      Impression    IMPRESSION:  1.  A few subtle hypodensities right cerebral hemisphere, corresponding to acute/subacute infarctions on prior MRI.  2.  A mild, more conspicuous hypodensity parasagittal right frontal lobe near vertex appears tube in the area which appeared normal on prior MRI and could be due to new mild acute/subacute infarction.  3.  No definite evidence of hemorrhage or significant mass effect.   MR Brain w/o & w Contrast    Narrative    EXAM: MRA BRAIN ([place] OF FOY) WO CONTRAST, MR BRAIN W/O AND W CONTRAST, MRA NECK (CAROTIDS) WO AND W CONTRAST  LOCATION: [facility]  DATE/TIME: 3/19/2022 10:19 PM    INDICATION: New stroke with left leg weakness.  COMPARISON: 2/5/2022.  CONTRAST: 10 mL Gadavist.  TECHNIQUE:   1) Routine multiplanar multisequence head MRI without and with intravenous contrast.  2) 3D time-of-flight head MRA without intravenous contrast.  3) Neck MRA without and with IV contrast. Stenosis measurements made according to NASCET criteria unless otherwise  specified.    FINDINGS:  HEAD MRI:  INTRACRANIAL CONTENTS: There are new watershed territory patchy areas of restricted diffusion in the parasagittal right frontal/parietal centrum semiovale and a new 11 x 8 mm (AP x TR) ovoid focus of restricted diffusion at the right frontal/parietal   vertex. Resolving previously seen watershed territory infarcts. No mass, acute hemorrhage, or extra-axial fluid collections. Patchy nonspecific T2/FLAIR hyperintensities within the cerebral white matter most consistent with mild to moderate chronic   microvascular ischemic change. Chronic infarct right middle frontal gyrus. Chronic infarct left corona radiata. Tiny chronic lacunar infarct left cerebellar hemisphere. Mild generalized cerebral atrophy. No hydrocephalus. Normal position of the   cerebellar tonsils. There is some enhancement of the late subacute watershed territory infarcts first identified on 2/5/2022.    SELLA: No abnormality accounting for technique.    OSSEOUS STRUCTURES/SOFT TISSUES: Normal marrow signal. Abnormal right ICA flow void compatible with proximal occlusion.     ORBITS: No abnormality accounting for technique.     SINUSES/MASTOIDS: Mild mucosal thickening scattered about the paranasal sinuses. No middle ear or mastoid effusion.     HEAD MRA:   ANTERIOR CIRCULATION: No flow within the right ICA as it enters the skull base. It is reconstituted at the ophthalmic segment via posterior communicating artery collateral. There is severe stenosis bilaterally of the bilateral A1-A2 origins. There is new   occlusion of the right A1 segment. Again seen is severe stenosis at the right A1-A2 junction with the possibility of a nonocclusive intraluminal thrombus, similar to prior. Standard [place] anatomy.    POSTERIOR CIRCULATION: No stenosis/occlusion, aneurysm, or high flow vascular malformation. Dominant left and smaller right vertebral artery contribute to a normal basilar artery.     NECK MRA:   RIGHT  CAROTID: The right ICA is occluded approximately 1 cm from its origin, stable from prior.    LEFT CAROTID: No measurable stenosis or dissection.    VERTEBRAL ARTERIES: No focal stenosis or dissection. Dominant left and smaller right vertebral arteries.    AORTIC ARCH: Classic aortic arch anatomy with no significant stenosis at the origin of the great vessels.      Impression    IMPRESSION:  HEAD MRI:   1.  New watershed territory infarcts in the deep right frontal/parietal centrum semiovale and right frontal/parietal vertex.  2.  Late subacute watershed territory infarcts are resolving with some enhancement of these late subacute infarcts in the posterior right parietal lobe.  3.  Chronic infarcts left corona radiata, right middle frontal gyrus and left cerebellar hemisphere.    HEAD MRA:   1.  No flow in the right ICA as it enters the skull base. It is reconstituted at the ophthalmic segment via right posterior communicating artery collateralization.  2.  Progressive stenosis involving the right A1 segment with severe/critical stenosis of the bilateral A1-A2 junctions.  3.  High-grade stenosis/near occlusion involving the right M1/M2 junction, unchanged from prior either due to atheromatous disease or persistent intraluminal thrombus. There is arborization of the right MCA branches.    NECK MRA:  1.  Stable occlusion of the right ICA 1 cm from its origin.  2.  Otherwise unremarkable.   MRA Neck (Carotids) wo & w Contrast    Narrative    EXAM: MRA BRAIN ([place] OF FOY) WO CONTRAST, MR BRAIN W/O AND W CONTRAST, MRA NECK (CAROTIDS) WO AND W CONTRAST  LOCATION: [facility]  DATE/TIME: 3/19/2022 10:19 PM    INDICATION: New stroke with left leg weakness.  COMPARISON: 2/5/2022.  CONTRAST: 10 mL Gadavist.  TECHNIQUE:   1) Routine multiplanar multisequence head MRI without and with intravenous contrast.  2) 3D time-of-flight head MRA without intravenous contrast.  3) Neck MRA without and with IV  contrast. Stenosis measurements made according to NASCET criteria unless otherwise specified.    FINDINGS:  HEAD MRI:  INTRACRANIAL CONTENTS: There are new watershed territory patchy areas of restricted diffusion in the parasagittal right frontal/parietal centrum semiovale and a new 11 x 8 mm (AP x TR) ovoid focus of restricted diffusion at the right frontal/parietal   vertex. Resolving previously seen watershed territory infarcts. No mass, acute hemorrhage, or extra-axial fluid collections. Patchy nonspecific T2/FLAIR hyperintensities within the cerebral white matter most consistent with mild to moderate chronic   microvascular ischemic change. Chronic infarct right middle frontal gyrus. Chronic infarct left corona radiata. Tiny chronic lacunar infarct left cerebellar hemisphere. Mild generalized cerebral atrophy. No hydrocephalus. Normal position of the   cerebellar tonsils. There is some enhancement of the late subacute watershed territory infarcts first identified on 2/5/2022.    SELLA: No abnormality accounting for technique.    OSSEOUS STRUCTURES/SOFT TISSUES: Normal marrow signal. Abnormal right ICA flow void compatible with proximal occlusion.     ORBITS: No abnormality accounting for technique.     SINUSES/MASTOIDS: Mild mucosal thickening scattered about the paranasal sinuses. No middle ear or mastoid effusion.     HEAD MRA:   ANTERIOR CIRCULATION: No flow within the right ICA as it enters the skull base. It is reconstituted at the ophthalmic segment via posterior communicating artery collateral. There is severe stenosis bilaterally of the bilateral A1-A2 origins. There is new   occlusion of the right A1 segment. Again seen is severe stenosis at the right A1-A2 junction with the possibility of a nonocclusive intraluminal thrombus, similar to prior. Standard [place] anatomy.    POSTERIOR CIRCULATION: No stenosis/occlusion, aneurysm, or high flow vascular malformation. Dominant left and smaller  right vertebral artery contribute to a normal basilar artery.     NECK MRA:   RIGHT CAROTID: The right ICA is occluded approximately 1 cm from its origin, stable from prior.    LEFT CAROTID: No measurable stenosis or dissection.    VERTEBRAL ARTERIES: No focal stenosis or dissection. Dominant left and smaller right vertebral arteries.    AORTIC ARCH: Classic aortic arch anatomy with no significant stenosis at the origin of the great vessels.      Impression    IMPRESSION:  HEAD MRI:   1.  New watershed territory infarcts in the deep right frontal/parietal centrum semiovale and right frontal/parietal vertex.  2.  Late subacute watershed territory infarcts are resolving with some enhancement of these late subacute infarcts in the posterior right parietal lobe.  3.  Chronic infarcts left corona radiata, right middle frontal gyrus and left cerebellar hemisphere.    HEAD MRA:   1.  No flow in the right ICA as it enters the skull base. It is reconstituted at the ophthalmic segment via right posterior communicating artery collateralization.  2.  Progressive stenosis involving the right A1 segment with severe/critical stenosis of the bilateral A1-A2 junctions.  3.  High-grade stenosis/near occlusion involving the right M1/M2 junction, unchanged from prior either due to atheromatous disease or persistent intraluminal thrombus. There is arborization of the right MCA branches.    NECK MRA:  1.  Stable occlusion of the right ICA 1 cm from its origin.  2.  Otherwise unremarkable.   MR Head w/o Contrast Angiogram    Narrative    EXAM: MRA BRAIN ([place] OF FOY) WO CONTRAST, MR BRAIN W/O AND W CONTRAST, MRA NECK (CAROTIDS) WO AND W CONTRAST  LOCATION: [facility]  DATE/TIME: 3/19/2022 10:19 PM    INDICATION: New stroke with left leg weakness.  COMPARISON: 2/5/2022.  CONTRAST: 10 mL Gadavist.  TECHNIQUE:   1) Routine multiplanar multisequence head MRI without and with intravenous contrast.  2) 3D  time-of-flight head MRA without intravenous contrast.  3) Neck MRA without and with IV contrast. Stenosis measurements made according to NASCET criteria unless otherwise specified.    FINDINGS:  HEAD MRI:  INTRACRANIAL CONTENTS: There are new watershed territory patchy areas of restricted diffusion in the parasagittal right frontal/parietal centrum semiovale and a new 11 x 8 mm (AP x TR) ovoid focus of restricted diffusion at the right frontal/parietal   vertex. Resolving previously seen watershed territory infarcts. No mass, acute hemorrhage, or extra-axial fluid collections. Patchy nonspecific T2/FLAIR hyperintensities within the cerebral white matter most consistent with mild to moderate chronic   microvascular ischemic change. Chronic infarct right middle frontal gyrus. Chronic infarct left corona radiata. Tiny chronic lacunar infarct left cerebellar hemisphere. Mild generalized cerebral atrophy. No hydrocephalus. Normal position of the   cerebellar tonsils. There is some enhancement of the late subacute watershed territory infarcts first identified on 2/5/2022.    SELLA: No abnormality accounting for technique.    OSSEOUS STRUCTURES/SOFT TISSUES: Normal marrow signal. Abnormal right ICA flow void compatible with proximal occlusion.     ORBITS: No abnormality accounting for technique.     SINUSES/MASTOIDS: Mild mucosal thickening scattered about the paranasal sinuses. No middle ear or mastoid effusion.     HEAD MRA:   ANTERIOR CIRCULATION: No flow within the right ICA as it enters the skull base. It is reconstituted at the ophthalmic segment via posterior communicating artery collateral. There is severe stenosis bilaterally of the bilateral A1-A2 origins. There is new   occlusion of the right A1 segment. Again seen is severe stenosis at the right A1-A2 junction with the possibility of a nonocclusive intraluminal thrombus, similar to prior. Standard [place] anatomy.    POSTERIOR CIRCULATION: No  stenosis/occlusion, aneurysm, or high flow vascular malformation. Dominant left and smaller right vertebral artery contribute to a normal basilar artery.     NECK MRA:   RIGHT CAROTID: The right ICA is occluded approximately 1 cm from its origin, stable from prior.    LEFT CAROTID: No measurable stenosis or dissection.    VERTEBRAL ARTERIES: No focal stenosis or dissection. Dominant left and smaller right vertebral arteries.    AORTIC ARCH: Classic aortic arch anatomy with no significant stenosis at the origin of the great vessels.      Impression    IMPRESSION:  HEAD MRI:   1.  New watershed territory infarcts in the deep right frontal/parietal centrum semiovale and right frontal/parietal vertex.  2.  Late subacute watershed territory infarcts are resolving with some enhancement of these late subacute infarcts in the posterior right parietal lobe.  3.  Chronic infarcts left corona radiata, right middle frontal gyrus and left cerebellar hemisphere.    HEAD MRA:   1.  No flow in the right ICA as it enters the skull base. It is reconstituted at the ophthalmic segment via right posterior communicating artery collateralization.  2.  Progressive stenosis involving the right A1 segment with severe/critical stenosis of the bilateral A1-A2 junctions.  3.  High-grade stenosis/near occlusion involving the right M1/M2 junction, unchanged from prior either due to atheromatous disease or persistent intraluminal thrombus. There is arborization of the right MCA branches.    NECK MRA:  1.  Stable occlusion of the right ICA 1 cm from its origin.  2.  Otherwise unremarkable.   CTA Head Neck with Contrast    Narrative    EXAM: CTA  HEAD NECK WITH CONTRAST  LOCATION: [facility]  DATE/TIME: 03/20/2022, 12:16 PM    INDICATION: Stroke/TIA, assess extracranial arteries.  COMPARISON: Head and neck MRA 03/19/2021, 02/05/2022.  CONTRAST: 70 mL Isovue 370.  TECHNIQUE: Head and neck CT angiogram with IV contrast. Axial  helical CT images of the head and neck vessels obtained during the arterial phase of intravenous contrast administration. Axial 2D reconstructed images and multiplanar 3D MIP reconstructed   images of the head and neck vessels were performed by the technologist. Dose reduction techniques were used. All stenosis measurements made according to NASCET criteria unless otherwise specified.    FINDINGS:   HEAD CTA:  ANTERIOR CIRCULATION: Chronic right internal carotid artery occlusion with reconstitution at the level of the supraclinoid segment via collateral filling. Severe focal stenosis of the distal right M1 segment/proximal M2 segment junction. Severe right and   moderate to severe left segmental stenoses of the anterior cerebral artery A1/proximal A2 segments. The left middle cerebral artery branches are widely patent. No aneurysm or evidence of vascular malformation.    POSTERIOR CIRCULATION: No stenosis/occlusion, aneurysm, or high-flow vascular malformation. Dominant left and smaller right vertebral artery contribute to a normal basilar artery.     DURAL VENOUS SINUSES: Expected enhancement of the major dural venous sinuses.    NECK CTA:  RIGHT CAROTID: Chronic occlusion of the right internal carotid artery approximately 1 cm from its origin at the level of the carotid bifurcation.    LEFT CAROTID: No measurable stenosis or dissection.    VERTEBRAL ARTERIES: No focal stenosis or dissection. Dominant left and smaller right vertebral arteries.    AORTIC ARCH: Classic aortic arch anatomy with no significant stenosis at the origin of the great vessels.    NONVASCULAR STRUCTURES: Unremarkable.      Impression    IMPRESSION:   1.  No significant change dating back to 02/05/2022. Chronic occlusion of the right internal carotid artery with reconstitution at the level of its supraclinoid segment.  2.  Severe focal stenosis of the right middle cerebral artery M1/M2 segment junction.  3.  Severe right and moderate to severe  left anterior cerebral artery A1/proximal A2 segments.  4.  Widely patent vertebral arteries and intracranial posterior circulation.     Echocardiogram Limited     Value    LVEF  55%    [facility]    [id number]  [id number]  [id number]^MAL^MARIANN^LEVAR     [facility]  Echocardiography Laboratory  [address] [place] [address]     Name: JERONIMO CEJA  MRN: [id number]  : 1970  Study Date: 2022 03:18 PM  Age: 51 yrs  Gender: Male  Patient Location: \[facility]\""  Reason For Study: Other, Please Specify in Comments  Ordering Physician: MARIANN RESENDEZ  Referring Physician: Von Aguirre  Performed By: Verona Jacobson     BSA: 1.8 m2  Height: 65 in  Weight: 157 lb  HR: 99  BP: 152/84 mmHg  ______________________________________________________________________________  Procedure  Limited Portable Echo Adult.  ______________________________________________________________________________  Interpretation Summary     The visual ejection fraction is estimated at 55%.  The right ventricle is normal in structure, function and size.  There is trace mitral regurgitation.  ______________________________________________________________________________  Left Ventricle  The left ventricle is normal in structure, function and size. There is normal  left ventricular wall thickness. Left ventricular systolic function is normal.  The visual ejection fraction is estimated at 55%. Left ventricular diastolic  function is normal. No regional wall motion abnormalities noted.     Right Ventricle  The right ventricle is normal in structure, function and size.     Atria  Normal left atrial size. Right atrial size is normal. There is no color  Doppler evidence of an atrial shunt.     Mitral Valve  The mitral valve is normal in structure and function. There is trace mitral  regurgitation.     Tricuspid Valve  The tricuspid valve is normal in structure and function. There is trace  tricuspid  regurgitation.     Aortic Valve  There is mild trileaflet aortic sclerosis. No aortic regurgitation is present.     Pulmonic Valve  The pulmonic valve is not well seen, but is grossly normal.     Vessels  Normal size aorta.     Pericardium  There is no pericardial effusion.     Rhythm  Sinus rhythm was noted.  ______________________________________________________________________________  MMode/2D Measurements & Calculations  IVSd: 0.91 cm     LVIDd: 3.7 cm  LVIDs: 3.0 cm  LVPWd: 0.92 cm  FS: 20.3 %  LV mass(C)d: 100.2 grams  LV mass(C)dI: 56.1 grams/m2  RWT: 0.49     ______________________________________________________________________________  Report approved by: Devon Wills 03/21/2022 03:51 PM

## 2022-06-21 NOTE — PLAN OF CARE
PRIMARY DIAGNOSIS: GENERALIZED WEAKNESS    OUTPATIENT/OBSERVATION GOALS TO BE MET BEFORE DISCHARGE  1. Orthostatic performed: No    2. Tolerating PO medications: Yes    3. Return to near baseline physical activity: No    4. Cleared for discharge by consultants (if involved): Yes    Discharge Planner Nurse   Safe discharge environment identified: Yes  Barriers to discharge: Yes       Entered by: Annabel Goode RN 06/21/2022 2:47 AM   Pt A/O x4 and makes needs known. Here for generalized weakness, particularly in the LUE and LLE. Pt reports numbness and tingling in both extremities, states he would like his gabapentin to be increased in the morning. Moderate consistent carb diet and eats well. SBA with cane. Per PT acute rehab recommended. No IV access.  POC: Acute care rehab placement for therapy. Continue to provide supportive cares.  Please review provider order for any additional goals.   Nurse to notify provider when observation goals have been met and patient is ready for discharge.

## 2022-06-22 LAB
GLUCOSE BLDC GLUCOMTR-MCNC: 178 MG/DL (ref 70–99)
GLUCOSE BLDC GLUCOMTR-MCNC: 183 MG/DL (ref 70–99)
GLUCOSE BLDC GLUCOMTR-MCNC: 196 MG/DL (ref 70–99)
GLUCOSE BLDC GLUCOMTR-MCNC: 202 MG/DL (ref 70–99)
GLUCOSE BLDC GLUCOMTR-MCNC: 227 MG/DL (ref 70–99)
GLUCOSE BLDC GLUCOMTR-MCNC: 89 MG/DL (ref 70–99)

## 2022-06-22 PROCEDURE — 250N000013 HC RX MED GY IP 250 OP 250 PS 637: Performed by: NURSE PRACTITIONER

## 2022-06-22 PROCEDURE — G0378 HOSPITAL OBSERVATION PER HR: HCPCS

## 2022-06-22 PROCEDURE — 82962 GLUCOSE BLOOD TEST: CPT

## 2022-06-22 PROCEDURE — 96372 THER/PROPH/DIAG INJ SC/IM: CPT | Mod: XU

## 2022-06-22 PROCEDURE — 250N000013 HC RX MED GY IP 250 OP 250 PS 637: Performed by: INTERNAL MEDICINE

## 2022-06-22 PROCEDURE — 250N000013 HC RX MED GY IP 250 OP 250 PS 637: Performed by: PHYSICIAN ASSISTANT

## 2022-06-22 PROCEDURE — 99225 PR SUBSEQUENT OBSERVATION CARE,LEVEL II: CPT | Performed by: PHYSICIAN ASSISTANT

## 2022-06-22 RX ADMIN — METFORMIN HYDROCHLORIDE 1000 MG: 500 TABLET, FILM COATED ORAL at 17:26

## 2022-06-22 RX ADMIN — CLOPIDOGREL BISULFATE 75 MG: 75 TABLET ORAL at 08:55

## 2022-06-22 RX ADMIN — ACETAMINOPHEN 650 MG: 325 TABLET, FILM COATED ORAL at 13:09

## 2022-06-22 RX ADMIN — INSULIN ASPART 4 UNITS: 100 INJECTION, SOLUTION INTRAVENOUS; SUBCUTANEOUS at 17:26

## 2022-06-22 RX ADMIN — GABAPENTIN 300 MG: 300 CAPSULE ORAL at 19:31

## 2022-06-22 RX ADMIN — GABAPENTIN 300 MG: 300 CAPSULE ORAL at 13:09

## 2022-06-22 RX ADMIN — METFORMIN HYDROCHLORIDE 1000 MG: 500 TABLET, FILM COATED ORAL at 08:48

## 2022-06-22 RX ADMIN — ATORVASTATIN CALCIUM 80 MG: 40 TABLET, FILM COATED ORAL at 08:48

## 2022-06-22 RX ADMIN — HYDROCHLOROTHIAZIDE 25 MG: 25 TABLET ORAL at 08:47

## 2022-06-22 RX ADMIN — INSULIN ASPART 2 UNITS: 100 INJECTION, SOLUTION INTRAVENOUS; SUBCUTANEOUS at 08:49

## 2022-06-22 RX ADMIN — INSULIN ASPART 3 UNITS: 100 INJECTION, SOLUTION INTRAVENOUS; SUBCUTANEOUS at 12:51

## 2022-06-22 RX ADMIN — ASPIRIN 325 MG ORAL TABLET 325 MG: 325 PILL ORAL at 08:47

## 2022-06-22 RX ADMIN — GABAPENTIN 300 MG: 300 CAPSULE ORAL at 08:47

## 2022-06-22 RX ADMIN — NICOTINE 1 PATCH: 14 PATCH, EXTENDED RELEASE TRANSDERMAL at 08:46

## 2022-06-22 RX ADMIN — ACETAMINOPHEN 650 MG: 325 TABLET, FILM COATED ORAL at 19:31

## 2022-06-22 NOTE — PROGRESS NOTES
PRIMARY DIAGNOSIS: GENERALIZED WEAKNESS    OUTPATIENT/OBSERVATION GOALS TO BE MET BEFORE DISCHARGE  1. Orthostatic performed: No    2. Tolerating PO medications: Yes    3. Return to near baseline physical activity: Yes    4. Cleared for discharge by consultants (if involved): Yes    Discharge Planner Nurse   Safe discharge environment identified: Yes  Barriers to discharge: Yes       Entered by: Francie Thmopson RN 06/21/2022 11:54 PM     Please review provider order for any additional goals.   Nurse to notify provider when observation goals have been met and patient is ready for discharge.    Pt is alert and oriented x4, VS at baseline, on Mod CHO diet, SBA with cane. Pt was complaining during the evening shift that no one wants to help him, he stated that he wanted someone to keep his remaining sandwich in the fridge but no one showed up, they only answered him from the desk. A waiting replacement to TCU, will keep monitoring.

## 2022-06-22 NOTE — PROGRESS NOTES
Care Management Follow Up    Length of Stay (days): 0    Expected Discharge Date: 06/23/2022     Concerns to be Addressed: care coordination/care conferences, discharge planning     Patient plan of care discussed at interdisciplinary rounds: Yes    Anticipated Discharge Disposition: Acute Rehab vs TCU     Anticipated Discharge Services: None  Anticipated Discharge DME: None    Patient/family educated on Medicare website which has current facility and service quality ratings:  (NA)  Education Provided on the Discharge Plan:  yes  Patient/Family in Agreement with the Plan: yes    Referrals Placed by CM/SW: Post Acute Facilities  Private pay costs discussed: Not applicable    Additional Information:  CC following for discharge planning. [facility] unable to accept, stating  patient does not have an acute medical management need and would be better fit for a TCU level of care.     Patient updated on discharge process. He stated he would be willing to go to TCU. Information given on Medicare.gov. Patient has no preferences and was agreeable to referrals sent to [facility], [facility], [facility], [facility] and [facility]. Referrals sent, pending responses.      Veronica Plata RN Case Manager  Inpatient Care Coordination   [facility]   [phone]    Veronica Plata RN

## 2022-06-22 NOTE — PROGRESS NOTES
PRIMARY DIAGNOSIS: GENERALIZED WEAKNESS    Patient alert and oriented but forgetful, sometimes has word finding difficulty. SBA with cane - left sided weakness. Tolerating mod carb 60g CHO diet and PO medications. ACHS BG checks, lantus at bedtime and sliding scale novolog, metformin BID. Urinary frequency improving. WOC consult placed for small wound on right underarm with increased moisture/sweat, PT, OT and SW following. VSS, continued monitoring and POC.  OUTPATIENT/OBSERVATION GOALS TO BE MET BEFORE DISCHARGE  1. Orthostatic performed: N/A    2. Tolerating PO medications: Yes    3. Return to near baseline physical activity: Yes, post CVA walking with cane    4. Cleared for discharge by consultants (if involved): Yes, needs placement/TCU    Discharge Planner Nurse   Safe discharge environment identified: No, needs TCU placement  Barriers to discharge: Yes, placement - referrals out       Entered by: Flavio Charles RN 06/22/2022    Please review provider order for any additional goals.     Nurse to notify provider when observation goals have been met and patient is ready for discharge.

## 2022-06-22 NOTE — PROGRESS NOTES
[facility]  Hospitalist Progress Note  CARRIE Garcia 06/22/2022    Reason for Stay (Diagnosis): weakness    Date of admission 6/19/22         Assessment and Plan:      Mr. Parisi is a 52-year-old male with a history of polysubstance abuse, cocaine use, HTN, and recent right KWADWO CVA 3/2022 (large territory defect) with residual left-sided weakness.  At that time, ARU recommended but patient refused and chose to leave AMA.  Since then, patient has been living alone with only a walker to mobilize.  He's been unable to make it to the bathroom in time and has been soiling himself.  He presented to the ER with concerns for generalized weakness, increased urinary frequency, and inability to get to the bathroom on time. He's been frustrated that he has not been able to make it to the bathroom in time.       Acute on chronic generalized weakness in the setting of previous left sided weakness due to Ischemic CVA  Patient weak making it difficult for him to get to the bathroom on time.  He had a CVA on 3/19/2022 secondary to significant intracranial stenosis and cocaine use and has residual left sided weakness UE>LE.    -  Suspect recrudesce of his previous left sided weakness from uncontrolled DM  -  Seen by PT, who recommended ARU vs TCU.  Seen by OT who voiced concern that pt has unsafe behavior and may not withstand the rigor of ARU, they are recommending TCU.  Patient is agreeable.    -  SW to follow for placement   -  Pt very motivated to get better but also increasingly frustrated that it is taking so long to find placement.  -  Continue PTA ASA, atorvastatin, Plavix, gabapentin for CVA     Urinary incontinence, unspecified type  UA negative on 6/19/2022.  Suspect increased urinary frequency, polyuria likely due to poor controlled BG.     -  Improved/resolved since hospitalization, likely with improvement in BG better controlled.     Type 2 diabetes mellitus with hyperglycemia, without long-term  "current use of insulin (H)  Last A1C 7.8 on 6/18/2022. Home medication includes metformin. But admits to being non compliant.  -  Continue Metformin  -  Lantus started this admission.  Currently Lantus 15u at bedtime.   -  Medium ISS   -  May need to consider alternitve to insulin if concern for being non compliant ie victoza or januvia for ease.      Hypertension, unspecified type  ? PTA hydrochlorothiazide     Anxiety  ? Continue home Xanax prn     Hidradenitis / Right axillary wound  Bilateral axillary scarring and tracts.  Right axilla has open, draining wound without associated fluctuance or erythema.  Has been using only a band-aid for this area  -  WOC consult  -  No s/sx systemic infection at this time.  Monitor closely.                 Diet: Moderate Consistent Carb (60 g CHO per Meal) Diet    DVT Prophylaxis: on Plavix, encourage ambulation  Code Status: Full Code           Disposition Plan     Expected Discharge: unclear pending TCU bed   Anticipated discharge location: TCU  Delays: bed availability           Interval History (Subjective):      Sleeping quite a bit.  Is getting frustrated that it is taking so long to find a bed for rehab.                   Physical Exam:      Last Vital Signs:  BP (!) 147/82   Pulse 92   Temp 97.6  F (36.4  C) (Axillary)   Resp 18   Ht 1.676 m (5' 6\")   Wt 66.6 kg (146 lb 12.8 oz)   SpO2 98%   BMI 23.69 kg/m      GENERAL:  At my visit, is pleasant, cooperative, interactive.  NAD.    HEENT: NCAT. MMM.  Sclera clear.    RESPIRATORY:  Clear, unlabored  CARDIOVASCULAR: RRR  GASTROINTESTINAL: Abd soft ntnd  EXTREMITIES:  Full movement with x4 but stable left sided weakness.  Extensive right axillary scarring with draining tract without associated fluctuance, erythema, or induration.   NEUROLOGIC:  AO x3. Stable left-sided deficits    PSYCHIATRIC:  Pleasant but evasive  SKIN:  No rash. WWP          Medications:      All current medications were reviewed with changes " reflected in problem list.         Data:      All new lab and imaging data was reviewed.   Labs:  Most Recent 3 CBC's:  Recent Labs   Lab Test 06/18/22 2150 03/22/22 0722 03/20/22  0558   WBC 10.5 10.3 10.6   HGB 12.4* 11.6* 11.0*   MCV 96 96 96    356 324      Most Recent 3 BMP's:  Recent Labs   Lab Test 06/22/22  0559 06/22/22  0145 06/21/22 2141 06/19/22  0320 06/18/22 2150 03/23/22  0807 03/23/22  0610 03/22/22  0753 03/22/22  0722 03/20/22  0758 03/20/22  0558   NA  --   --   --   --  138  --   --   --  138  --  138   POTASSIUM  --   --   --   --  3.8  --  3.5  --  3.8   < > 3.4   CHLORIDE  --   --   --   --  105  --   --   --  107  --  104   CO2  --   --   --   --  26  --   --   --  27  --  28   BUN  --   --   --   --  13  --   --   --  15  --  15   CR  --   --   --   --  0.66  --   --   --  0.81  --  0.71   ANIONGAP  --   --   --   --  7  --   --   --  4  --  6   DAT  --   --   --   --  9.7  --   --   --  8.8  --  8.9   * 89 214*   < > 345*   < >  --    < > 231*   < > 271*    < > = values in this interval not displayed.     Most Recent 2 LFT's:  Recent Labs   Lab Test 03/20/22  0558 02/05/22  1920   AST 9 7   ALT 19 18   ALKPHOS 58 78   BILITOTAL 0.4 0.3     Most Recent INR's and Anticoagulation Dosing History:  Anticoagulation Dose History     Recent Dosing and Labs Latest Ref Rng & Units 9/17/2014 4/18/2021 2/5/2022    INR 0.85 - 1.15 1.06 1.05 0.97        Most Recent 3 Troponin's:  Recent Labs   Lab Test 04/18/21  1553   TROPI <0.015     Most Recent Cholesterol Panel:  Recent Labs   Lab Test 03/20/22  0558   CHOL 93   LDL 46   HDL 34*   TRIG 66     Most Recent 6 Bacteria Isolates From Any Culture (See EPIC Reports for Culture Details):No lab results found.  Most Recent TSH, T4 and A1c Labs:  Recent Labs   Lab Test 06/18/22  2150   TSH 1.26   A1C 7.8*     Imaging:   No results found for this or any previous visit (from the past 24 hour(s)).

## 2022-06-22 NOTE — PROGRESS NOTES
PRIMARY DIAGNOSIS: GENERALIZED WEAKNESS    Patient alert and oriented but forgetful, sometimes has word finding difficulty. SBA with cane - left sided weakness. Tolerating mod carb 60g CHO diet and PO medications. ACHS BG checks, lantus at bedtime and sliding scale novolog, metformin BID. Urinary frequency improving. VSS, continued monitoring and POC.  OUTPATIENT/OBSERVATION GOALS TO BE MET BEFORE DISCHARGE  1. Orthostatic performed: N/A    2. Tolerating PO medications: Yes    3. Return to near baseline physical activity: Yes, post CVA walking with cane    4. Cleared for discharge by consultants (if involved): Yes, needs placement/TCU    Discharge Planner Nurse   Safe discharge environment identified: No, needs TCU placement  Barriers to discharge: Yes, placement - referrals out       Entered by: Flavio Charles RN 06/22/2022    Please review provider order for any additional goals.     Nurse to notify provider when observation goals have been met and patient is ready for discharge.

## 2022-06-22 NOTE — PROGRESS NOTES
PRIMARY DIAGNOSIS: GENERALIZED WEAKNESS    Patient alert and oriented but forgetful, sometimes has word finding difficulty. SBA with cane - left sided weakness. Tolerating mod carb 60g CHO diet and PO medications *pt order to allow going to cafeteria in wheelchair with supervision only and only if time/staff allows - see note, pt will primarily buy desserts, sugar soda, and carbs so monitor BG for hyperglycemia. ACHS BG checks, lantus at bedtime and sliding scale novolog, metformin BID. Urinary frequency improving. WOC consult placed for small wound on right underarm with increased moisture/sweat, PT, OT and SW following. VSS, continued monitoring and POC.  OUTPATIENT/OBSERVATION GOALS TO BE MET BEFORE DISCHARGE  1. Orthostatic performed: N/A    2. Tolerating PO medications: Yes    3. Return to near baseline physical activity: Yes, post CVA walking with cane    4. Cleared for discharge by consultants (if involved): Yes, needs placement/TCU    Discharge Planner Nurse   Safe discharge environment identified: No, needs TCU placement  Barriers to discharge: Yes, placement - referrals out       Entered by: Flavio Charles RN 06/22/2022    Please review provider order for any additional goals.     Nurse to notify provider when observation goals have been met and patient is ready for discharge.

## 2022-06-22 NOTE — PROGRESS NOTES
PRIMARY DIAGNOSIS: GENERALIZED WEAKNESS    OUTPATIENT/OBSERVATION GOALS TO BE MET BEFORE DISCHARGE  1. Orthostatic performed: No    2. Tolerating PO medications: Yes    3. Return to near baseline physical activity: Yes    4. Cleared for discharge by consultants (if involved): Yes    Discharge Planner Nurse   Safe discharge environment identified: Yes  Barriers to discharge: Yes       Entered by: Yady Mercado RN 06/22/2022 7:04 AM     Please review provider order for any additional goals.   Nurse to notify provider when observation goals have been met and patient is ready for discharge.    Pt is alert and oriented x4, Mod CHO diet, SBA with cane. BS reading 89, Orange juice given. Denies s/s hypoglycemia.  A waiting replacement to TCU, will keep monitoring. Pt slept all night, denies pain.

## 2022-06-22 NOTE — PROGRESS NOTES
PRIMARY DIAGNOSIS: GENERALIZED WEAKNESS    OUTPATIENT/OBSERVATION GOALS TO BE MET BEFORE DISCHARGE  1. Orthostatic performed: No    2. Tolerating PO medications: Yes    3. Return to near baseline physical activity: Yes    4. Cleared for discharge by consultants (if involved): Yes    Discharge Planner Nurse   Safe discharge environment identified: Yes  Barriers to discharge: Yes       Entered by: Yady Mercado RN 06/22/2022 1:55 AM     Please review provider order for any additional goals.   Nurse to notify provider when observation goals have been met and patient is ready for discharge.    Pt is alert and oriented x4, Mod CHO diet, SBA with cane. BS reading 89, Orange juice given. Denies s/s hypoglycemia.  A waiting replacement to TCU, will keep monitoring.

## 2022-06-23 LAB
GLUCOSE BLDC GLUCOMTR-MCNC: 100 MG/DL (ref 70–99)
GLUCOSE BLDC GLUCOMTR-MCNC: 133 MG/DL (ref 70–99)
GLUCOSE BLDC GLUCOMTR-MCNC: 156 MG/DL (ref 70–99)
GLUCOSE BLDC GLUCOMTR-MCNC: 157 MG/DL (ref 70–99)
GLUCOSE BLDC GLUCOMTR-MCNC: 172 MG/DL (ref 70–99)

## 2022-06-23 PROCEDURE — 250N000013 HC RX MED GY IP 250 OP 250 PS 637: Performed by: INTERNAL MEDICINE

## 2022-06-23 PROCEDURE — 250N000013 HC RX MED GY IP 250 OP 250 PS 637: Performed by: NURSE PRACTITIONER

## 2022-06-23 PROCEDURE — G0463 HOSPITAL OUTPT CLINIC VISIT: HCPCS

## 2022-06-23 PROCEDURE — G0378 HOSPITAL OBSERVATION PER HR: HCPCS

## 2022-06-23 PROCEDURE — 96372 THER/PROPH/DIAG INJ SC/IM: CPT | Mod: XU

## 2022-06-23 PROCEDURE — 250N000013 HC RX MED GY IP 250 OP 250 PS 637: Performed by: PHYSICIAN ASSISTANT

## 2022-06-23 PROCEDURE — 82962 GLUCOSE BLOOD TEST: CPT

## 2022-06-23 PROCEDURE — 99225 PR SUBSEQUENT OBSERVATION CARE,LEVEL II: CPT | Performed by: PHYSICIAN ASSISTANT

## 2022-06-23 RX ORDER — ALPRAZOLAM 0.5 MG
.5-1 TABLET ORAL
Status: DISCONTINUED | OUTPATIENT
Start: 2022-06-24 | End: 2022-06-24 | Stop reason: HOSPADM

## 2022-06-23 RX ADMIN — ASPIRIN 325 MG ORAL TABLET 325 MG: 325 PILL ORAL at 07:59

## 2022-06-23 RX ADMIN — HYDROCHLOROTHIAZIDE 25 MG: 25 TABLET ORAL at 08:01

## 2022-06-23 RX ADMIN — ATORVASTATIN CALCIUM 80 MG: 40 TABLET, FILM COATED ORAL at 08:00

## 2022-06-23 RX ADMIN — GABAPENTIN 300 MG: 300 CAPSULE ORAL at 13:44

## 2022-06-23 RX ADMIN — METFORMIN HYDROCHLORIDE 1000 MG: 500 TABLET, FILM COATED ORAL at 18:41

## 2022-06-23 RX ADMIN — INSULIN ASPART 1 UNITS: 100 INJECTION, SOLUTION INTRAVENOUS; SUBCUTANEOUS at 13:15

## 2022-06-23 RX ADMIN — ALPRAZOLAM 1 MG: 0.5 TABLET ORAL at 22:23

## 2022-06-23 RX ADMIN — METFORMIN HYDROCHLORIDE 1000 MG: 500 TABLET, FILM COATED ORAL at 08:01

## 2022-06-23 RX ADMIN — GABAPENTIN 300 MG: 300 CAPSULE ORAL at 21:07

## 2022-06-23 RX ADMIN — NICOTINE 1 PATCH: 14 PATCH, EXTENDED RELEASE TRANSDERMAL at 08:03

## 2022-06-23 RX ADMIN — GABAPENTIN 300 MG: 300 CAPSULE ORAL at 07:59

## 2022-06-23 RX ADMIN — INSULIN ASPART 1 UNITS: 100 INJECTION, SOLUTION INTRAVENOUS; SUBCUTANEOUS at 08:04

## 2022-06-23 RX ADMIN — CLOPIDOGREL BISULFATE 75 MG: 75 TABLET ORAL at 08:01

## 2022-06-23 NOTE — PROGRESS NOTES
PRIMARY DIAGNOSIS: GENERALIZED WEAKNESS    OUTPATIENT/OBSERVATION GOALS TO BE MET BEFORE DISCHARGE  1. Orthostatic performed: N/A    2. Tolerating PO medications: Yes    3. Return to near baseline physical activity: Yes    4. Cleared for discharge by consultants (if involved): Yes    Discharge Planner Nurse   Safe discharge environment identified: No  Barriers to discharge: Yes       Entered by: Alina Longo RN 06/23/2022    Patient A&Ox4, SBA, had Breakfast this morning. Denies pain. Bed alarm on. Pt wants shower but feels unconformable staff assisting him. Dressing is applied on the right armpit. CW following for discharge planning. Will continue to monitor.   Please review provider order for any additional goals.   Nurse to notify provider when observation goals have been met and patient is ready for discharge.

## 2022-06-23 NOTE — PROGRESS NOTES
"[facility]    Medicine Progress Note - Hospitalist Service    Date of Admission:  6/18/2022    Assessment & Plan          Josue Parisi is a 52 year old male with a PMHx significant for polysubstance abuse, cocaine use, HTN, and recent right KWADWO CVA 3/2022 (large territory defect) with residual left-sided weakness, who was admitted on 6/18/2022 with failure to thrive at home. At the time of his stroke, ARU was recommended but pt refused and discharged AMA. Able to mobilize at home with walker but is getting weaker. He does not have any meaningful function of his left hand. He notes increased urinary frequency and often times is not able to make it to the bathroom on time, for urine and/or stool.    Failure to thrive  Acute on chronic generalized weakness in the setting of previous left sided weakness due to Ischemic CVA  Patient weak making it difficult for him to get to the bathroom on time.  He had a CVA on 3/19/2022 secondary to significant intracranial stenosis and cocaine use and has residual left sided weakness UE>LE. He also had a stroke in Feb. Pt was not compliant with DAPT so full dose ASA recommended in March  -  continue ASA  -  Suspect recrudesce of his previous left sided weakness from uncontrolled DM  -  Seen by PT, who recommended ARU (which would be most appropriate) vs TCU.  Seen by OT who noted \"emotional lability and intermittent lack of cooperation\" and question whether he would tolerate intensity of ARU, recommend TCU. Patient is agreeable.    -  SW to follow for placement   -  Pt very motivated to get better but also increasingly frustrated that it is taking so long to find placement.  -  Pt notes erectile dysfunction since stroke, frustrated with not finding help for this, recommend follow up with Neurology  -  Continue PTA ASA, atorvastatin, Plavix, gabapentin for CVA     Urinary incontinence, unspecified type  UA negative on 6/19/2022.  Suspect increased urinary " frequency, polyuria likely due to poor controlled BG.     -  Improved/resolved since hospitalization, likely with improvement in BG better controlled.      Type 2 diabetes mellitus with hyperglycemia, without long-term current use of insulin (H)  Last A1C 7.8 on 6/18/2022. Home medication includes metformin. But admits to being non compliant, inconsistent with dosing. Pt agreeable to insulin, does not want to take more pills  -  Continue Metformin  -  Lantus started this admission.  Currently Lantus 15u at bedtime.   -  Continue Medium ISS   -  May need to consider alternitve to insulin if concern for being non compliant ie victoza or januvia for ease. Discussed with pt, offered additional PO medication instead, pt ok with insulin for now, does not want more pills     Hypertension, unspecified type  ? Continue PTA hydrochlorothiazide. May consider adding ACEi if BP remains elevated     Anxiety  ? Continue home Xanax prn     Hidradenitis / Right axillary wound  Bilateral axillary scarring and tracts.  Right axilla has open, draining wound without associated fluctuance or erythema.  Has been using only a band-aid for this area  -  WOC consult  -  No s/sx systemic infection at this time.  Monitor closely.   Right axilla wound: Daily  and PRN   1. Cleanse wound with NS or wound cleanser (omit this step if patient cleans wound in shower)  2. Dry and protect surrounding skin with no sting barrier film wipe #[id number]  3. Apply a small amount of iodesorb gel #[id number] to bandaid  4. If Band-Aid needs to be changed more than once a day. Apply iodesorb gel #[id number] to adaptic #[id number] and cover with Mepilex #[id number]    Covid-19 negative 6/19     Diet: Moderate Consistent Carb (60 g CHO per Meal) Diet    DVT Prophylaxis: Pneumatic Compression Devices and Ambulate every shift  Cortez Catheter: Not present  Central Lines: None  Cardiac Monitoring: None  Code Status: Full Code      Disposition Plan      Expected Discharge Date:  06/23/2022    Discharge Delays: Placement - TCU  Destination: inpatient rehabilitation facility          The patient's care was discussed with the Attending Physician, Dr. Ventura, Bedside Nurse, Care Coordinator/ and Patient.    Betsy Perkins PA-C  Hospitalist Service  [facility]  Securely message with the Vocera Web Console (learn more here)  Text page via [employer] Paging/Directory         Clinically Significant Risk Factors Present on Admission                # Platelet Defect: home medication list includes an antiplatelet medication     # Hypertension: home medication list includes antihypertensive(s)        ______________________________________________________________________    Interval History   Pt frustrated with placement taking time. Needs to go somewhere but is getting impatient. Pt notes erectile dysfunction following his stroke and is frustrated that no one is addressing that.    Data reviewed today: I reviewed all medications, new labs and imaging results over the last 24 hours. I personally reviewed no images or EKG's today.    Physical Exam   Vital Signs: Temp: 97.6  F (36.4  C) Temp src: Oral BP: 129/86 Pulse: 92   Resp: 16 SpO2: 98 % O2 Device: None (Room air)    Weight: 146 lbs 12.8 oz    GENERAL:  Comfortable.  PSYCH: pleasant, oriented, No acute distress.  HEENT:  Atraumatic, normocephalic. Normal conjunctiva, normal hearing, and oropharynx is normal.  NECK:  Supple, no neck vein distention  HEART: RRR  LUNGS:  Normal Respiratory effort.  EXTREMITIES:  Contracture to left hand, +2 pulses bilateral and equal.  SKIN:  Dry to touch, No rash, wound or ulcerations.  NEUROLOGIC:  CN 2-12 grossly intact, Left sided weakness UE>LE deficits.      Data   Recent Labs   Lab 06/23/22  1252 06/23/22  0744 06/23/22  0155 06/19/22  0320 06/18/22  2150   WBC  --   --   --   --  10.5   HGB  --   --   --   --  12.4*   MCV  --   --   --   --  96   PLT  --   --   --   --   395   NA  --   --   --   --  138   POTASSIUM  --   --   --   --  3.8   CHLORIDE  --   --   --   --  105   CO2  --   --   --   --  26   BUN  --   --   --   --  13   CR  --   --   --   --  0.66   ANIONGAP  --   --   --   --  7   DAT  --   --   --   --  9.7   * 157* 172*   < > 345*    < > = values in this interval not displayed.     No results found for this or any previous visit (from the past 24 hour(s)).  Medications       aspirin  325 mg Oral Daily     atorvastatin  80 mg Oral Daily     gabapentin  300 mg Oral TID     hydrochlorothiazide  25 mg Oral Daily     insulin aspart  1-10 Units Subcutaneous TID AC     insulin aspart  1-7 Units Subcutaneous At Bedtime     insulin glargine  15 Units Subcutaneous At Bedtime     metFORMIN  1,000 mg Oral BID w/meals     nicotine  1 patch Transdermal Daily     nicotine   Transdermal Q8H

## 2022-06-23 NOTE — DISCHARGE INSTRUCTIONS
Right axilla wound: Daily  and PRN   Cleanse wound with NS or wound cleanser (omit this step if patient cleans wound in shower)  Dry and protect surrounding skin with no sting barrier film wipe or spray  Apply a small amount of iodesorb gel to bandaid  If Band-Aid needs to be changed more than once a day. Apply iodesorb gel to adaptic and cover with small foam dressing      Your hospital follow up appointment has been scheduled for you with Von Aguirre PA-C at the [facility] for Thursday 6/30 at 2pm. Please bring your hospital discharge instructions, a photo ID, and insurance information with you to your appointment. Please call the clinic at [phone] if you need to reschedule.     [address]  [place] [address]      We have sent your therapy referrals to [facility] per your request, they should contact you within 24-48 hours. You will want to check with your insurance to see what is in network. If you have not heard from them or you prefer to schedule yourself, please call them at:     [facility]  [address]  [address]  [place] [address]  P: [phone]  F: [phone]

## 2022-06-23 NOTE — PROGRESS NOTES
Care Management Follow Up    Length of Stay (days): 0    Expected Discharge Date: 06/23/2022     Concerns to be Addressed: care coordination/care conferences, discharge planning     Patient plan of care discussed at interdisciplinary rounds: Yes    Anticipated Discharge Disposition: Transitional Care    Anticipated Discharge Services: None  Anticipated Discharge DME: None    Patient/family educated on Medicare website which has current facility and service quality ratings:  yes  Education Provided on the Discharge Plan:    Patient/Family in Agreement with the Plan: yes    Referrals Placed by CM/SW: Post Acute Facilities    Additional Information:  CM following for discharge planning, pt has been declined with all pending referrals. Additional referrals sent to [facility], [facility], [facility] and [facility]. Awaiting responses at this time.     Nasra Mosher RN BSN   Inpatient Care Coordination  [facility]   Phone [phone]

## 2022-06-23 NOTE — PROGRESS NOTES
PRIMARY DIAGNOSIS: GENERALIZED WEAKNESS    OUTPATIENT/OBSERVATION GOALS TO BE MET BEFORE DISCHARGE  1. Orthostatic performed: N/A    2. Tolerating PO medications: Yes    3. Return to near baseline physical activity: Yes    4. Cleared for discharge by consultants (if involved): Yes    Discharge Planner Nurse   Safe discharge environment identified: No  Barriers to discharge: Yes       Entered by: Alina Longo RN 06/23/2022    Patient A&Ox4, SBA, had Breakfast this morning. Denies pain. Bed alarm on. Will continue to monitor.     Please review provider order for any additional goals.   Nurse to notify provider when observation goals have been met and patient is ready for discharge.

## 2022-06-23 NOTE — PROGRESS NOTES
PRIMARY DIAGNOSIS: GENERALIZED WEAKNESS    OUTPATIENT/OBSERVATION GOALS TO BE MET BEFORE DISCHARGE  1. Orthostatic performed: No    2. Tolerating PO medications: Yes    3. Return to near baseline physical activity: Yes    4. Cleared for discharge by consultants (if involved): Yes    Discharge Planner Nurse   Safe discharge environment identified: Yes  Barriers to discharge: Yes       Entered by: Yady Mercado RN 06/23/2022 3:05 AM     Please review provider order for any additional goals.   Nurse to notify provider when observation goals have been met and patient is ready for discharge.    Pt is alert and oriented x4, Mod CHO diet, SBA with cane. VSS, Denies pain, and no SOB. Pt ambulate to the bathroom independently while using cane.     Awaiting replacement to TCU, will keep monitoring.

## 2022-06-23 NOTE — PROGRESS NOTES
PRIMARY DIAGNOSIS: GENERALIZED WEAKNESS    OUTPATIENT/OBSERVATION GOALS TO BE MET BEFORE DISCHARGE  1. Orthostatic performed: No    2. Tolerating PO medications: Yes    3. Return to near baseline physical activity: Yes    4. Cleared for discharge by consultants (if involved): Yes    Discharge Planner Nurse   Safe discharge environment identified: Yes  Barriers to discharge: Yes       Entered by: Yady Mercado RN 06/23/2022 6:42 AM    Pt is alert and oriented x4, Mod CHO diet, SBA with cane. VSS, Denies pain, and no SOB. Pt ambulate to the bathroom independently while using cane.     Awaiting replacement to TCU, will keep monitoring. Pt slept most of the night. BS stable.

## 2022-06-23 NOTE — PROGRESS NOTES
PRIMARY DIAGNOSIS: GENERALIZED WEAKNESS    OUTPATIENT/OBSERVATION GOALS TO BE MET BEFORE DISCHARGE  1. Orthostatic performed: N/A    2. Tolerating PO medications: Yes    3. Return to near baseline physical activity: Yes    4. Cleared for discharge by consultants (if involved): Yes    Discharge Planner Nurse   Safe discharge environment identified: No  Barriers to discharge: Yes       Entered by: Alina Longo RN 06/23/2022    Patient A&Ox4, SBA, had Breakfast this morning. Denies pain. Bed alarm on. Pt wants shower but feels unconformable staff assisting him. Dressing is applied on the right armpit. CW following for discharge planning. Pt wanted to go outside during shift change he was frustrated he couldn't go, code green was called. He is now calm in this room and napping. Will continue to monitor.   Please review provider order for any additional goals.   Nurse to notify provider when observation goals have been met and patient is ready for discharge.

## 2022-06-23 NOTE — PROGRESS NOTES
PRIMARY DIAGNOSIS: GENERALIZED WEAKNESS    OUTPATIENT/OBSERVATION GOALS TO BE MET BEFORE DISCHARGE  1. Orthostatic performed: No    2. Tolerating PO medications: Yes    3. Return to near baseline physical activity: Yes    4. Cleared for discharge by consultants (if involved): Yes    Discharge Planner Nurse   Safe discharge environment identified: Yes  Barriers to discharge: Yes       Entered by: Yady Mercado RN 06/23/2022 2:53 AM     Please review provider order for any additional goals.   Nurse to notify provider when observation goals have been met and patient is ready for discharge.    Pt is alert and oriented x4, Mod CHO diet, SBA with cane. VSS, Denies pain, and no SOB.   Awaiting replacement to TCU, will keep monitoring.
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
## 2022-06-24 ENCOUNTER — HOSPITAL ENCOUNTER (INPATIENT)
Facility: CLINIC | Age: 52
LOS: 6 days | Discharge: HOME | DRG: 065 | End: 2022-06-30
Attending: EMERGENCY MEDICINE | Admitting: INTERNAL MEDICINE
Payer: COMMERCIAL

## 2022-06-24 ENCOUNTER — APPOINTMENT (OUTPATIENT)
Dept: OCCUPATIONAL THERAPY | Facility: CLINIC | Age: 52
End: 2022-06-24
Payer: COMMERCIAL

## 2022-06-24 ENCOUNTER — APPOINTMENT (OUTPATIENT)
Dept: CT IMAGING | Facility: CLINIC | Age: 52
DRG: 065 | End: 2022-06-24
Attending: EMERGENCY MEDICINE
Payer: COMMERCIAL

## 2022-06-24 ENCOUNTER — APPOINTMENT (OUTPATIENT)
Dept: MRI IMAGING | Facility: CLINIC | Age: 52
DRG: 065 | End: 2022-06-24
Payer: COMMERCIAL

## 2022-06-24 ENCOUNTER — MEDICAL CORRESPONDENCE (OUTPATIENT)
Dept: HEALTH INFORMATION MANAGEMENT | Facility: CLINIC | Age: 52
End: 2022-06-24

## 2022-06-24 VITALS
WEIGHT: 146.8 LBS | BODY MASS INDEX: 23.59 KG/M2 | HEART RATE: 92 BPM | OXYGEN SATURATION: 95 % | SYSTOLIC BLOOD PRESSURE: 139 MMHG | DIASTOLIC BLOOD PRESSURE: 87 MMHG | TEMPERATURE: 98.5 F | HEIGHT: 66 IN | RESPIRATION RATE: 16 BRPM

## 2022-06-24 DIAGNOSIS — I63.9 ACUTE CVA (CEREBROVASCULAR ACCIDENT) (H): ICD-10-CM

## 2022-06-24 LAB
AMPHETAMINES UR QL SCN: ABNORMAL
ANION GAP SERPL CALCULATED.3IONS-SCNC: 9 MMOL/L (ref 3–14)
APTT PPP: 28 SECONDS (ref 22–38)
ATRIAL RATE - MUSE: 98 BPM
BARBITURATES UR QL: ABNORMAL
BASOPHILS # BLD AUTO: 0.1 10E3/UL (ref 0–0.2)
BASOPHILS NFR BLD AUTO: 0 %
BENZODIAZ UR QL: ABNORMAL
BUN SERPL-MCNC: 26 MG/DL (ref 7–30)
CALCIUM SERPL-MCNC: 8.8 MG/DL (ref 8.5–10.1)
CANNABINOIDS UR QL SCN: ABNORMAL
CHLORIDE BLD-SCNC: 99 MMOL/L (ref 94–109)
CHOLEST SERPL-MCNC: 102 MG/DL
CO2 SERPL-SCNC: 28 MMOL/L (ref 20–32)
COCAINE UR QL: ABNORMAL
CREAT SERPL-MCNC: 0.75 MG/DL (ref 0.66–1.25)
DIASTOLIC BLOOD PRESSURE - MUSE: NORMAL MMHG
EOSINOPHIL # BLD AUTO: 0.5 10E3/UL (ref 0–0.7)
EOSINOPHIL NFR BLD AUTO: 4 %
ERYTHROCYTE [DISTWIDTH] IN BLOOD BY AUTOMATED COUNT: 12.5 % (ref 10–15)
GFR SERPL CREATININE-BSD FRML MDRD: >90 ML/MIN/1.73M2
GLUCOSE BLD-MCNC: 138 MG/DL (ref 70–99)
GLUCOSE BLDC GLUCOMTR-MCNC: 143 MG/DL (ref 70–99)
GLUCOSE BLDC GLUCOMTR-MCNC: 148 MG/DL (ref 70–99)
GLUCOSE BLDC GLUCOMTR-MCNC: 154 MG/DL (ref 70–99)
GLUCOSE BLDC GLUCOMTR-MCNC: 179 MG/DL (ref 70–99)
HCT VFR BLD AUTO: 36.1 % (ref 40–53)
HDLC SERPL-MCNC: 30 MG/DL
HGB BLD-MCNC: 11.4 G/DL (ref 13.3–17.7)
IMM GRANULOCYTES # BLD: 0.1 10E3/UL
IMM GRANULOCYTES NFR BLD: 1 %
INR PPP: 1.08 (ref 0.85–1.15)
INTERPRETATION ECG - MUSE: NORMAL
LDLC SERPL CALC-MCNC: 53 MG/DL
LYMPHOCYTES # BLD AUTO: 2.7 10E3/UL (ref 0.8–5.3)
LYMPHOCYTES NFR BLD AUTO: 20 %
MCH RBC QN AUTO: 31.1 PG (ref 26.5–33)
MCHC RBC AUTO-ENTMCNC: 31.6 G/DL (ref 31.5–36.5)
MCV RBC AUTO: 98 FL (ref 78–100)
MONOCYTES # BLD AUTO: 0.9 10E3/UL (ref 0–1.3)
MONOCYTES NFR BLD AUTO: 7 %
NEUTROPHILS # BLD AUTO: 9.4 10E3/UL (ref 1.6–8.3)
NEUTROPHILS NFR BLD AUTO: 68 %
NONHDLC SERPL-MCNC: 72 MG/DL
NRBC # BLD AUTO: 0 10E3/UL
NRBC BLD AUTO-RTO: 0 /100
OPIATES UR QL SCN: ABNORMAL
P AXIS - MUSE: 57 DEGREES
PCP UR QL SCN: ABNORMAL
PLATELET # BLD AUTO: 390 10E3/UL (ref 150–450)
POTASSIUM BLD-SCNC: 3.8 MMOL/L (ref 3.4–5.3)
PR INTERVAL - MUSE: 162 MS
QRS DURATION - MUSE: 76 MS
QT - MUSE: 358 MS
QTC - MUSE: 457 MS
R AXIS - MUSE: 27 DEGREES
RBC # BLD AUTO: 3.67 10E6/UL (ref 4.4–5.9)
SODIUM SERPL-SCNC: 136 MMOL/L (ref 133–144)
SYSTOLIC BLOOD PRESSURE - MUSE: NORMAL MMHG
T AXIS - MUSE: 33 DEGREES
TRIGL SERPL-MCNC: 95 MG/DL
TROPONIN I SERPL HS-MCNC: 4 NG/L
VENTRICULAR RATE- MUSE: 98 BPM
WBC # BLD AUTO: 13.6 10E3/UL (ref 4–11)

## 2022-06-24 PROCEDURE — 99292 CRITICAL CARE ADDL 30 MIN: CPT

## 2022-06-24 PROCEDURE — 84484 ASSAY OF TROPONIN QUANT: CPT | Performed by: EMERGENCY MEDICINE

## 2022-06-24 PROCEDURE — 250N000013 HC RX MED GY IP 250 OP 250 PS 637: Performed by: INTERNAL MEDICINE

## 2022-06-24 PROCEDURE — 80048 BASIC METABOLIC PNL TOTAL CA: CPT | Performed by: EMERGENCY MEDICINE

## 2022-06-24 PROCEDURE — 99291 CRITICAL CARE FIRST HOUR: CPT | Mod: 25

## 2022-06-24 PROCEDURE — 70551 MRI BRAIN STEM W/O DYE: CPT

## 2022-06-24 PROCEDURE — 99207 PR APP CREDIT; MD BILLING SHARED VISIT: CPT | Performed by: PHYSICIAN ASSISTANT

## 2022-06-24 PROCEDURE — 85610 PROTHROMBIN TIME: CPT | Performed by: EMERGENCY MEDICINE

## 2022-06-24 PROCEDURE — 70450 CT HEAD/BRAIN W/O DYE: CPT

## 2022-06-24 PROCEDURE — 250N000013 HC RX MED GY IP 250 OP 250 PS 637: Performed by: EMERGENCY MEDICINE

## 2022-06-24 PROCEDURE — 250N000013 HC RX MED GY IP 250 OP 250 PS 637: Performed by: PHYSICIAN ASSISTANT

## 2022-06-24 PROCEDURE — 70496 CT ANGIOGRAPHY HEAD: CPT

## 2022-06-24 PROCEDURE — 85025 COMPLETE CBC W/AUTO DIFF WBC: CPT | Performed by: EMERGENCY MEDICINE

## 2022-06-24 PROCEDURE — 250N000011 HC RX IP 250 OP 636: Performed by: EMERGENCY MEDICINE

## 2022-06-24 PROCEDURE — 96360 HYDRATION IV INFUSION INIT: CPT | Mod: 59

## 2022-06-24 PROCEDURE — 85730 THROMBOPLASTIN TIME PARTIAL: CPT | Performed by: EMERGENCY MEDICINE

## 2022-06-24 PROCEDURE — 36415 COLL VENOUS BLD VENIPUNCTURE: CPT | Performed by: EMERGENCY MEDICINE

## 2022-06-24 PROCEDURE — G0378 HOSPITAL OBSERVATION PER HR: HCPCS

## 2022-06-24 PROCEDURE — 99223 1ST HOSP IP/OBS HIGH 75: CPT | Mod: AI | Performed by: INTERNAL MEDICINE

## 2022-06-24 PROCEDURE — 93005 ELECTROCARDIOGRAM TRACING: CPT

## 2022-06-24 PROCEDURE — 80307 DRUG TEST PRSMV CHEM ANLYZR: CPT | Performed by: INTERNAL MEDICINE

## 2022-06-24 PROCEDURE — 250N000009 HC RX 250: Performed by: EMERGENCY MEDICINE

## 2022-06-24 PROCEDURE — 96372 THER/PROPH/DIAG INJ SC/IM: CPT | Mod: XU

## 2022-06-24 PROCEDURE — 97535 SELF CARE MNGMENT TRAINING: CPT | Mod: GO

## 2022-06-24 PROCEDURE — 120N000001 HC R&B MED SURG/OB

## 2022-06-24 PROCEDURE — 80061 LIPID PANEL: CPT | Performed by: INTERNAL MEDICINE

## 2022-06-24 PROCEDURE — 258N000003 HC RX IP 258 OP 636: Performed by: EMERGENCY MEDICINE

## 2022-06-24 PROCEDURE — 70498 CT ANGIOGRAPHY NECK: CPT

## 2022-06-24 PROCEDURE — 0042T CT HEAD PERFUSION W CONTRAST: CPT

## 2022-06-24 PROCEDURE — 250N000013 HC RX MED GY IP 250 OP 250 PS 637: Performed by: NURSE PRACTITIONER

## 2022-06-24 PROCEDURE — 82962 GLUCOSE BLOOD TEST: CPT | Mod: 91

## 2022-06-24 RX ORDER — NICOTINE POLACRILEX 4 MG
15-30 LOZENGE BUCCAL
Status: DISCONTINUED | OUTPATIENT
Start: 2022-06-24 | End: 2022-07-01 | Stop reason: HOSPADM

## 2022-06-24 RX ORDER — ONDANSETRON 2 MG/ML
4 INJECTION INTRAMUSCULAR; INTRAVENOUS EVERY 6 HOURS PRN
Status: DISCONTINUED | OUTPATIENT
Start: 2022-06-24 | End: 2022-07-01 | Stop reason: HOSPADM

## 2022-06-24 RX ORDER — IOPAMIDOL 755 MG/ML
125 INJECTION, SOLUTION INTRAVASCULAR ONCE
Status: COMPLETED | OUTPATIENT
Start: 2022-06-24 | End: 2022-06-24

## 2022-06-24 RX ORDER — ATORVASTATIN CALCIUM 80 MG/1
80 TABLET, FILM COATED ORAL DAILY
Status: DISCONTINUED | OUTPATIENT
Start: 2022-06-25 | End: 2022-07-01 | Stop reason: HOSPADM

## 2022-06-24 RX ORDER — CETIRIZINE HYDROCHLORIDE 10 MG/1
10 TABLET ORAL DAILY
Status: DISCONTINUED | OUTPATIENT
Start: 2022-06-25 | End: 2022-07-01 | Stop reason: HOSPADM

## 2022-06-24 RX ORDER — LISINOPRIL 20 MG/1
20 TABLET ORAL DAILY
Status: DISCONTINUED | OUTPATIENT
Start: 2022-06-24 | End: 2022-06-24 | Stop reason: HOSPADM

## 2022-06-24 RX ORDER — LISINOPRIL AND HYDROCHLOROTHIAZIDE 20; 25 MG/1; MG/1
1 TABLET ORAL DAILY
Qty: 30 TABLET | Refills: 0 | Status: ON HOLD | OUTPATIENT
Start: 2022-06-24 | End: 2022-08-02

## 2022-06-24 RX ORDER — ONDANSETRON 4 MG/1
4 TABLET, ORALLY DISINTEGRATING ORAL EVERY 6 HOURS PRN
Status: DISCONTINUED | OUTPATIENT
Start: 2022-06-24 | End: 2022-07-01 | Stop reason: HOSPADM

## 2022-06-24 RX ORDER — ASPIRIN 325 MG
325 TABLET ORAL DAILY
Status: DISCONTINUED | OUTPATIENT
Start: 2022-06-25 | End: 2022-07-01 | Stop reason: HOSPADM

## 2022-06-24 RX ORDER — CLOPIDOGREL 300 MG/1
300 TABLET, FILM COATED ORAL ONCE
Status: COMPLETED | OUTPATIENT
Start: 2022-06-24 | End: 2022-06-24

## 2022-06-24 RX ORDER — CLOPIDOGREL BISULFATE 75 MG/1
75 TABLET ORAL DAILY
Status: DISCONTINUED | OUTPATIENT
Start: 2022-06-25 | End: 2022-07-01 | Stop reason: HOSPADM

## 2022-06-24 RX ORDER — ASPIRIN 325 MG
325 TABLET ORAL ONCE
Status: COMPLETED | OUTPATIENT
Start: 2022-06-24 | End: 2022-06-24

## 2022-06-24 RX ORDER — DEXTROSE MONOHYDRATE 25 G/50ML
25-50 INJECTION, SOLUTION INTRAVENOUS
Status: DISCONTINUED | OUTPATIENT
Start: 2022-06-24 | End: 2022-07-01 | Stop reason: HOSPADM

## 2022-06-24 RX ORDER — GABAPENTIN 300 MG/1
300 CAPSULE ORAL 3 TIMES DAILY
Status: DISCONTINUED | OUTPATIENT
Start: 2022-06-24 | End: 2022-06-26

## 2022-06-24 RX ORDER — LIDOCAINE 40 MG/G
CREAM TOPICAL
Status: DISCONTINUED | OUTPATIENT
Start: 2022-06-24 | End: 2022-07-01 | Stop reason: HOSPADM

## 2022-06-24 RX ADMIN — SODIUM CHLORIDE 1000 ML: 9 INJECTION, SOLUTION INTRAVENOUS at 19:30

## 2022-06-24 RX ADMIN — GABAPENTIN 300 MG: 300 CAPSULE ORAL at 23:15

## 2022-06-24 RX ADMIN — SODIUM CHLORIDE 1000 ML: 9 INJECTION, SOLUTION INTRAVENOUS at 18:06

## 2022-06-24 RX ADMIN — IOPAMIDOL 125 ML: 755 INJECTION, SOLUTION INTRAVENOUS at 17:27

## 2022-06-24 RX ADMIN — NICOTINE 1 PATCH: 14 PATCH, EXTENDED RELEASE TRANSDERMAL at 10:18

## 2022-06-24 RX ADMIN — GABAPENTIN 300 MG: 300 CAPSULE ORAL at 10:18

## 2022-06-24 RX ADMIN — ATORVASTATIN CALCIUM 80 MG: 40 TABLET, FILM COATED ORAL at 10:15

## 2022-06-24 RX ADMIN — SODIUM CHLORIDE 100 ML: 900 INJECTION INTRAVENOUS at 17:27

## 2022-06-24 RX ADMIN — CLOPIDOGREL BISULFATE 300 MG: 300 TABLET, FILM COATED ORAL at 19:43

## 2022-06-24 RX ADMIN — ASPIRIN 325 MG ORAL TABLET 325 MG: 325 PILL ORAL at 19:43

## 2022-06-24 RX ADMIN — INSULIN ASPART 1 UNITS: 100 INJECTION, SOLUTION INTRAVENOUS; SUBCUTANEOUS at 10:21

## 2022-06-24 RX ADMIN — METFORMIN HYDROCHLORIDE 1000 MG: 500 TABLET, FILM COATED ORAL at 10:17

## 2022-06-24 RX ADMIN — ASPIRIN 325 MG ORAL TABLET 325 MG: 325 PILL ORAL at 10:16

## 2022-06-24 RX ADMIN — LISINOPRIL 20 MG: 20 TABLET ORAL at 10:18

## 2022-06-24 RX ADMIN — HYDROCHLOROTHIAZIDE 25 MG: 25 TABLET ORAL at 10:17

## 2022-06-24 ASSESSMENT — ACTIVITIES OF DAILY LIVING (ADL)
ADLS_ACUITY_SCORE: 35
ADLS_ACUITY_SCORE: 35

## 2022-06-24 ASSESSMENT — ENCOUNTER SYMPTOMS: WEAKNESS: 1

## 2022-06-24 NOTE — PLAN OF CARE
Occupational Therapy Discharge Summary    Reason for therapy discharge:    Discharged to home with outpatient therapy.    Progress towards therapy goal(s). See goals on Care Plan in [facility] electronic health record for goal details.  Goals partially met.  Barriers to achieving goals:   discharge from facility.    Therapy recommendation(s):    Continued therapy is recommended.  Rationale/Recommendations:  TCU was recommended, however pt opted to discharge home w/ OP therapy services.

## 2022-06-24 NOTE — CONSULTS
"      [facility]    Stroke Consult Note    I was called by Roxanne Mims on 06/24/22 regarding patient Josue Parisi. The patient is a 52 year old male w hx of HTN, DM with med non-compliance, cocaine use, R KWADWO infarct 3/2022 from intracranial athero with residual L weakness, here with worsening L weakness.    At time of stroke asked to go to ARU but refused left AMA, 6/18-today was admitted to [facility] for failure to thrive, worsening L weakness thought to be recrudescence 2/2 poor glycemic control from non compliance with DM meds, also non compliant with DAPT, was being placed in TCU but left AMA again. Present to [facility] same day after mother said he has more weak L side. LKN unclear, per mother \"this morning or last night\".    Stroke Code Data (for stroke code without tele)  Stroke code activated 06/24/22   1711   Stroke provider first response  06/24/22   1714            Last known normal          Unknown   Time of discovery   (or onset of symptoms) 06/24/22       Head CT read by Stroke Neuro Dr/Provider 06/24/22   1728   Was stroke code de-escalated?    6/24/22 1842     Imaging Findings   CTH no acute bleed or clear new infarct, old bilat infarcts redemonstrated  CTA H+N Known R ICA occlusion, new R MCA occlusion from 3/2022    Intravenous Thrombolysis  Not given due to:   - unclear or unfavorable risk-benefit profile for extended window thrombolysis beyond the conventional 4.5 hour time window    Endovascular Treatment  Unable to perform RMCA thrombectomy 2/2 chronic PORTIA occlusion    Exam:    AOx3 able to anme repeat and follow commands  VFI EOMI L facial droop  RUE 5/5 LUE 3/5 RLE 5/5 LLE 4-/5  Sens decreased LUE LLE to LT  No dysmetria finger nose    NIHSS 4    Impression    51yo man with chronic R ICA occlusion, R KWADWO infarct comes in for worsening L weakness.    Recently left AMA from [facility] with same problem, vessel imaging shows new from 3/2022 image R MCA occlusion. Needs " "emergent imaging to determine chronicity.    Recommendations   -1L NS  -Hyperacute MRI brain wo    My recommendations are based on the information provided over the phone by Josue Parisi's in-person providers. They are not intended to replace the clinical judgment of his in-person providers. I was not requested to personally see or examine the patient at this time.    The Stroke Staff is Dr. Estrada.    Bowen Kolb MD  Vascular Neurology Fellow  To page me or covering stroke neurology team member, click here: AMCOM   Choose \"On Call\" tab at top, then search dropdown box for \"Neurology Adult\", select location, press Enter, then look for stroke/neuro ICU/telestroke.      "

## 2022-06-24 NOTE — PROGRESS NOTES
Patient's After Visit Summary was reviewed with patient    Patient verbalized understanding of After Visit Summary, recommended follow up and was given an opportunity to ask questions.   Discharge medications sent home with patient/family: No   Discharged with other:     Patient left AMA.    OBSERVATION patient END time: 1220

## 2022-06-24 NOTE — PROGRESS NOTES
"Care Management Discharge Note    Discharge Date: 06/24/2022     Discharge Disposition: Home    Discharge Services: None    Discharge DME: None    Discharge Transportation: family or friend will provide    Education Provided on the Discharge Plan:  yes  Persons Notified of Discharge Plans: pt  Patient/Family in Agreement with the Plan: yes    Additional Information:  CM following for discharge planning, was attempting to place pt at ARU or TCU with no accepting facilities at this time. Per provider, pt now declining ARU/TCU and is planning to discharge to home with OP PT/OT. Provider requesting referrals be sent to [facility] and appointment with PCP be scheduled. AVS updated. Provided pt with [facility] Resource Guide to explore additional services/support at home. Will also send hand-off to clinic CC requesting they continue to follow pt after discharge.     \"Your hospital follow up appointment has been scheduled for you with Von Aguirre PA-C at the [facility] for Thursday 6/30 at 2pm. Please bring your hospital discharge instructions, a photo ID, and insurance information with you to your appointment. Please call the clinic at [phone] if you need to reschedule.      [address]  [facility] [address]        We have sent your therapy referrals to [facility] per your request, they should contact you within 24-48 hours. You will want to check with your insurance to see what is in network. If you have not heard from them or you prefer to schedule yourself, please call them at:      [facility]  [address]  [address]  [place] [address]  P: [phone]  F: [phone]\"    Nasra Mosher RN BSN   Inpatient Care Coordination  [facility]   Phone [phone]    "

## 2022-06-24 NOTE — ED PROVIDER NOTES
History   Chief Complaint:  Stroke Symptoms (Discharged from [facility] at noon today for same symptoms.  Now presents with possibly increased weakness of left side and increased loss of fine motor)       The history is provided by the EMS personnel.      Josue Parisi is a 52 year old male on Asprin 81mg with history of stroke, Type II diabetes, and HTN who presents with stroke. He reports to the ED via EMS after his mother called while they were at the [employer] salon. He has left sided weakness and slurred speech. His blood sugar is reported at 170. He denies drug or alcohol use. His last known well time is either last night or this morning. He was discharged from [facility] today at 1200. It is reported that his slurred speech is new today.  No imaging is availab he has a complex medical history of intracranial stenosis as well as well as diabetes and stroke.     Review of Systems   Neurological: Positive for weakness.   All other systems reviewed and are negative.    Allergies:  No Known Allergies    Medications:  Aspirin 81mg  Atorvastatin  Insulin glargine  Lisinopril-hydrochlorothiazide  Alprazolam  Cetirizine  Gabapentin  Metformin    Past Medical History:     Cerebrovascular accident  Left leg weakness  Acute stroke due to ischemia  Type II diabetes  Urinary incontinence  HTN  Depression  Metabolic encephalopathy  Keloid scar  epidermal inclusion cyst  Cerebral infarction  Dysarthria  Expressive aphasia    Family History:    Father: depression    Social History:  He reports to the ED alone.     Physical Exam     Patient Vitals for the past 24 hrs:   BP Pulse Resp SpO2   06/24/22 2227 (!) 155/96 95 16 98 %   06/24/22 2200 (!) 151/102 76 17 97 %   06/24/22 2130 (!) 147/79 90 14 100 %   06/24/22 2100 (!) 174/94 90 19 100 %   06/24/22 2030 (!) 151/84 97 14 100 %   06/24/22 2000 (!) 159/102 90 10 --   06/24/22 1945 (!) 154/87 101 12 --   06/24/22 1930 (!) 154/87 96 17 --   06/24/22 1915 139/85 84 13 --   06/24/22  1900 134/81 85 12 --   06/24/22 1845 134/86 95 -- --   06/24/22 1843 125/79 93 -- 98 %   06/24/22 1754 125/77 94 -- 99 %   06/24/22 1726 123/82 87 16 100 %       Physical Exam  General: Well-nourished, has a cane on stretcher  Eyes: PERRL, conjunctivae pink no scleral icterus or conjunctival injection  ENT:  Moist mucus membranes, posterior oropharynx clear without erythema or exudates  Respiratory:  Lungs clear to auscultation bilaterally, no crackles/rubs/wheezes.  Good air movement  CV: Normal rate and rhythm, no murmurs/rubs/gallops  GI:  Abdomen soft and non-distended.  Normoactive BS.  No tenderness, guarding or rebound  Skin: Warm, dry.  No rashes or petechiae  Musculoskeletal: No peripheral edema or calf tenderness  Neuro: Alert and follows commands. PERRL, EOMI no nystagmus, ? Left facial droop. +minimal slurred speech. Tongue midline, symmetric palatal elevation, slight movement at the wrist on the left upper extremity.  Normal strength in the right upper extremity.  Some effort against gravity in the left lower extremity.  Normal strength in the right lower extremity.  Gait deferred.  Sensation intact to LT over face/BUE/BLE  Psychiatric: Normal affect      Emergency Department Course   ECG  ECG results from 06/24/22   EKG 12-lead, tracing only     Value    Ventricular Rate 98    Atrial Rate 98    OR Interval 162    QRS Duration 76        QTc 457    P Axis 57    R AXIS 27    T Axis 33    Interpretation ECG      Normal sinus rhythm  Possible Left atrial enlargement  Anteroseptal infarct , age undetermined  Abnormal ECG  No significant changes when compared with ECG dated 06/24/22.        Imaging:  MR Brain w/o Contrast   Final Result   IMPRESSION:   1.  New nonhemorrhagic infarction within the right prefrontal gyrus and deep MCA watershed infarct within the right centrum semiovale of the right frontal and parietal lobes.   2.  Chronic sequela of prior infarction as described.   3.  Left maxillary  sinus fluid level. Correlate for rhinosinusitis.      CT Head Perfusion w Contrast   Final Result   IMPRESSION: Perfusion abnormality in the right middle cerebral artery   territory indicating a large area of ischemic penumbra without   significant infarct core.      Radiation dose for this scan was reduced using automated exposure   control, adjustment of the mA and/or kV according to patient size, or   iterative reconstruction technique      KIM COLEY MD            SYSTEM ID:  [id number]      CTA Head Neck w Contrast   Final Result   CONCLUSION:   HEAD CTA:   1.  Abrupt termination of the mid M1 segment of the right middle cerebral artery is a change versus prior. This is favored to reflect acute thromboembolism. Stenosis progression since the previous exam is the other primary consideration.   2.  Relatively unchanged degree of filling of more distal right middle cerebral artery branches.   3.  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      NECK CTA:   1.  Chronic occlusion of the proximal right internal carotid artery, unchanged versus prior.   2.  Otherwise negative neck CTA.      Findings were discussed with Dr. Roxanne Mims via telephone at 1734 hours on 6/24/2022.      CT Head w/o Contrast   Final Result   IMPRESSION:   1.  There is a small area of low attenuation within the subcortical white matter of the mid right precentral gyrus that is new versus the prior MRI. Presumably this reflects ischemic change. It is suspected to be subacute or older in age, but is not    specific on CT. Consider further evaluation with MRI. No definite acute infarct by CT.   2.  Other areas of ischemic injury are not changed versus the 3/19/2020 comparison exams.   3.  No hemorrhage.      Findings were discussed with Dr. Roxanne Mims via telephone at 1734 hours on 6/24/2022.        Report per  radiology    Laboratory:  Labs Ordered and Resulted from Time of ED Arrival to Time of ED Departure   BASIC METABOLIC PANEL - Abnormal       Result Value    Sodium 136      Potassium 3.8      Chloride 99      Carbon Dioxide (CO2) 28      Anion Gap 9      Urea Nitrogen 26      Creatinine 0.75      Calcium 8.8      Glucose 138 (*)     GFR Estimate >90     CBC WITH PLATELETS AND DIFFERENTIAL - Abnormal    WBC Count 13.6 (*)     RBC Count 3.67 (*)     Hemoglobin 11.4 (*)     Hematocrit 36.1 (*)     MCV 98      MCH 31.1      MCHC 31.6      RDW 12.5      Platelet Count 390      % Neutrophils 68      % Lymphocytes 20      % Monocytes 7      % Eosinophils 4      % Basophils 0      % Immature Granulocytes 1      NRBCs per 100 WBC 0      Absolute Neutrophils 9.4 (*)     Absolute Lymphocytes 2.7      Absolute Monocytes 0.9      Absolute Eosinophils 0.5      Absolute Basophils 0.1      Absolute Immature Granulocytes 0.1      Absolute NRBCs 0.0     DRUG ABUSE SCREEN 77 URINE (FL, RH, SH) - Abnormal    Amphetamines Urine Screen Negative      Barbiturates Urine Screen Negative      Benzodiazepines Urine Screen Negative      Cannabinoids Urine Screen Positive (*)     Cocaine Urine Screen Negative      Opiates Urine Screen Negative      PCP Urine Screen Negative     INR - Normal    INR 1.08     PARTIAL THROMBOPLASTIN TIME - Normal    aPTT 28     TROPONIN I - Normal    Troponin I High Sensitivity 4     GLUCOSE MONITOR NURSING POCT      Emergency Department Course:    Reviewed:  I reviewed nursing notes, vitals, past medical history and Care Everywhere    Assessments:  1509 I obtained history and examined the patient as noted above.   1710 Tier 2 stroke code called.    Consults:  1714 I spoke with radiology regarding him.   1741 I spoke with stroke neurology regarding him.   1917 I spoke with Dr. Monge, hospitalist, regarding him.   1952 I spoke with Stroke Neurology regarding him.     Interventions:  1806 NS 1000mL IV  x2  1943 Aspirin 325mg PO  1943 Clopidogrel 300mg PO    Disposition:  The patient was admitted to the hospital under the care of Dr. Monge.     Impression & Plan     CMS Diagnoses: The patient has stroke symptoms:         ED Stroke specific documentation           NIHSS PDF     Patient last known well time: yesterday evening or this morning  ED Provider first to bedside at: 1509  CT Results received at: 1714    Thrombolytics:   Not given due to:   - unclear or unfavorable risk-benefit profile for extended window thrombolysis beyond the conventional 4.5 hour time window    If treating with thrombolytics: Ensure SBP<180 and DBP<105 prior to treatment with thrombolytics.  Administering thrombolytics after treatment with IV labetalol, hydralazine, or nicardipine is reasonable once BP control is established.    Endovascular Retrieval:  Not initiated due to absence of proximal vessel occlusion    [employer] Stroke Scale (Baseline)  Time Performed: 5:11p Per chart review, the patient was admitted to [facility] with failure to thrive and progression of his left-sided weakness.     Score    Level of consciousness: (0)   Alert, keenly responsive    LOC questions: (0)   Answers both questions correctly    LOC commands: (0)   Performs both tasks correctly    Best gaze: (0)   Normal    Visual: (0)   No visual loss    Facial palsy: (1)   Minor paralysis (flat nasolabial fold, smile asymmetry)    Motor arm (left): (2)   Some effort against gravity    Motor arm (right): (0)   No drift    Motor leg (left): (1)   Drift    Motor leg (right): (0)   No drift    Limb ataxia: (1)   Present in one limb    Sensory: (0)   Normal- no sensory loss    Best language: (1)   Mild to moderate aphasia    Dysarthria: (1)   Mild to moderate dysarthria    Extinction and inattention: (0)   No abnormality        Total Score:  7        Stroke Mimics were considered (including migraine headache, seizure disorder, hypoglycemia (or  hyperglycemia), head or spinal trauma, CNS infection, Toxin ingestion and shock state (e.g. sepsis) .    Medical Decision Making:  Josue Parisi is a 52 year old male who presents for evaluation of worsening of his baseline left-sided weakness.  Findings were concerning for a CVA and he certainly has many risk factors for this.  The last known well time is not clear.  A tier 2 code stroke was called.  The risk profile for tenecteplase was unfavorable per neurology, however an MRI demonstrated a likely acute stroke.  His blood pressure was on the lower side and he may be dependent on higher pressures for perfusion.  We treated him with IV fluids, held his antihypertensives.  He fortunately came up into the target range of blood pressure 140s systolic.  He did not require any peripheral pressors or additional medications.  Per neurology, he can be lay flat or even put in Trendelenburg should he have recurrence of symptoms but he did actually improve quite significantly and seem back to his baseline.  He will be admitted to the neurology service under the care of Dr. Monge with neurology on consultation.      Critical Care Time: was 45 minutes for this patient excluding procedures    Diagnosis:    ICD-10-CM    1. Acute CVA (cerebrovascular accident) (H)  I63.9        Scribe Disclosure:  RON, YULI HOLGUIN, am serving as a scribe at 5:11 PM on 6/24/2022 to document services personally performed by Roxanne Mims MD based on my observations and the provider's statements to me.            Roxanne Mims MD  06/25/22 9712

## 2022-06-24 NOTE — PROGRESS NOTES
PRIMARY DIAGNOSIS: GENERALIZED WEAKNESS    OUTPATIENT/OBSERVATION GOALS TO BE MET BEFORE DISCHARGE  1. Orthostatic performed: N/A    2. Tolerating PO medications: Yes    3. Return to near baseline physical activity: Yes    4. Cleared for discharge by consultants (if involved): Yes    Discharge Planner Nurse   Safe discharge environment identified: No  Barriers to discharge: Yes       Entered by: Alina Longo RN 06/24/2022    Patient A&Ox4, SBA, had Breakfast this morning. Denies pain. Bed alarm on. Will continue to monitor.     Please review provider order for any additional goals.   Nurse to notify provider when observation goals have been met and patient is ready for discharge.

## 2022-06-24 NOTE — PLAN OF CARE
PRIMARY DIAGNOSIS: GENERALIZED WEAKNESS    OUTPATIENT/OBSERVATION GOALS TO BE MET BEFORE DISCHARGE  1. Orthostatic performed: No    2. Tolerating PO medications: Yes    3. Return to near baseline physical activity: Yes    4. Cleared for discharge by consultants (if involved): No    Vitals are Temp: 98  F (36.7  C) Temp src: Oral BP: (!) 178/92 Pulse: 90   Resp: 16 SpO2: 98 %.  Patient is Alert and Oriented x4. Pt is SBA with no assistive devices .  Pt is a mod CHO diet.  Pt is denying pain.  Patient does not have an IV site.    Discharge Planner Nurse   Safe discharge environment identified: No  Barriers to discharge: Yes       Entered by: Amee Espinoza RN 06/24/2022     Please review provider order for any additional goals.   Nurse to notify provider when observation goals have been met and patient is ready for discharge.

## 2022-06-24 NOTE — PLAN OF CARE
PRIMARY DIAGNOSIS: GENERALIZED WEAKNESS    OUTPATIENT/OBSERVATION GOALS TO BE MET BEFORE DISCHARGE  1. Orthostatic performed: No    2. Tolerating PO medications: Yes    3. Return to near baseline physical activity: Yes    4. Cleared for discharge by consultants (if involved): No    Vitals are Temp: 98  F (36.7  C) Temp src: Oral BP: (!) 178/92 Pulse: 90   Resp: 16 SpO2: 98 %.  Patient is Alert and Oriented x4. Pt is SBA with no assistive devices .  Pt is a mod CHO diet.  Pt is denying pain.  Patient does not have an IV site.pt had uneventful night. Will cont to monitor and provide cares.       Discharge Planner Nurse   Safe discharge environment identified: No  Barriers to discharge: Yes       Entered by: Amee Espinoza RN 06/24/2022     Please review provider order for any additional goals.   Nurse to notify provider when observation goals have been met and patient is ready for discharge.

## 2022-06-24 NOTE — ED TRIAGE NOTES
Discharged from [facility] at noon today for same symptoms.  Now presents with possibly increased weakness of left side and increased loss of fine motor     Triage Assessment     Row Name 06/24/22 3745       Triage Assessment (Adult)    Airway WDL WDL       Respiratory WDL    Respiratory WDL WDL       Skin Circulation/Temperature WDL    Skin Circulation/Temperature WDL WDL       Cardiac WDL    Cardiac WDL WDL       Peripheral/Neurovascular WDL    Peripheral Neurovascular WDL WDL       Cognitive/Neuro/Behavioral WDL    Cognitive/Neuro/Behavioral WDL WDL

## 2022-06-24 NOTE — ED NOTES
Bed: ST  Expected date: 6/24/22  Expected time: 4:57 PM  Means of arrival: Ambulance  Comments:  821 52m stroke ETA 1708

## 2022-06-24 NOTE — PLAN OF CARE
Physical Therapy Discharge Summary     Reason for therapy discharge:    Discharged to home with outpatient therapy.     Progress towards therapy goal(s). See goals on Care Plan in [facility] electronic health record for goal details.  Goals partially met.  Barriers to achieving goals:   discharge from facility.     Therapy recommendation(s):    Continued therapy is recommended.  Rationale/Recommendations:  TCU/ARU was recommended, however pt opted to discharge home w/ OP therapy services.      **Pt not seen by discharging therapist on this date, note written based on previous treating therapist's notes and recommendations

## 2022-06-24 NOTE — DISCHARGE SUMMARY
Hospitalist Discharge Summary      Date of Admission:  6/18/2022  Date of Discharge:  6/24/2022 12:22 PM  Discharging Provider: Betsy Perkins PA-C  Discharge Service: Hospitalist Service    Discharge Diagnoses   Failure to thrive  Hx of CVA with left sided deficits  DM type II  HTN    Follow-ups Needed After Discharge   Follow-up Appointments     Follow-up and recommended labs and tests       Follow up with primary care provider, Von Aguirre, within 7 days for   hospital follow- up.  No follow up labs or test are needed.  Patient does desire a more supportive environment but unable to continue   hospitalization to get this figured out. He would benefit from intense   outpatient rehab to regain and maintain function after his stroke. A   referral has been sent to [facility] [place].   He should also follow up with Neurology  He was started on Lantus, in addition to Metformin. A glucometer was also   prescribed.   He was started on Lisinopril, in addition to hydrochlorothiazide   He would benefit from county support, possibly a  or case   manager, if not already in place             Unresulted Labs Ordered in the Past 30 Days of this Admission     No orders found from 5/19/2022 to 6/19/2022.      These results will be followed up by PCP    Discharge Disposition   Discharged to home  Condition at discharge: Stable      Hospital Course            Josue Parisi is a 52 year old male with a PMHx significant for polysubstance abuse, cocaine use, HTN, and recent right KWADWO CVA 3/2022 (large territory defect) with residual left-sided weakness, who was admitted on 6/18/2022 with failure to thrive at home. At the time of his stroke, ARU was recommended but pt refused and discharged AMA. Able to mobilize at home with walker but is getting weaker. He does not have any meaningful function of his left hand. He notes increased urinary frequency and often times  "is not able to make it to the bathroom on time, for urine and/or stool. Pt presented to ED for help and some sort of placement.   Pt was admitted to OBS for disposition. PT evaluated and recommended ARU (which would be most appropriate) vs TCU. pt is now ~3 months out from CVA so may not qualify for ARU services anymore. He was also seen by OT who noted \"emotional lability and intermittent lack of cooperation\" and question whether he would tolerate intensity of ARU, recommended TCU. Patient was agreeable but became impatient with placement, multiple barriers to placement. Now wishes to discharge home to \"take care of his business\". It is reasonable for discharge home. He would benefit from further community resources and more intense outpatient rehab. A referral was sent to [facility]. Follow up appt was made with PCP prior to discharge.     Failure to thrive  Acute on chronic generalized weakness in the setting of previous left sided weakness due to Ischemic CVA  Patient weak making it difficult for him to get to the bathroom on time.  He had a CVA on 3/19/2022 secondary to significant intracranial stenosis and cocaine use and has residual left sided weakness UE>LE. He also had a stroke in Feb. Pt was not compliant with DAPT so full dose ASA only recommended in March  -  continue ASA  -  Suspect recrudesce of his previous left sided weakness from uncontrolled DM  -  Seen by PT, who recommended ARU (which would be most appropriate) vs TCU.  Seen by OT who noted \"emotional lability and intermittent lack of cooperation\" and question whether he would tolerate intensity of ARU, recommend TCU. Patient was agreeable but became impatient with placement. Now wishes to discharge home to \"take care of his business\".    -  Pt would benefit from more intensive rehab as an outpatient, referral faxed to [facility]   -  SW provided pt with community support resources, may benefit from a social " work or  as an outpatient, if not already in place.     Urinary incontinence, unspecified type  UA negative on 6/19/2022.  Suspect increased urinary frequency, polyuria likely due to poor controlled BG.     -  Improved/resolved since hospitalization, likely with improvement in BG better controlled.      Type 2 diabetes mellitus with hyperglycemia, without long-term current use of insulin (H)  Last A1C 7.8 on 6/18/2022. Home medication includes metformin. But admits to being non compliant, inconsistent with dosing. Pt agreeable to insulin, does not want to take more pills  -  Continue Metformin  -  Lantus started this admission, continued on discharge at 15u at bedtime.   - order for glucometer and supplies sent  -  PCP may need to consider alternative to insulin if non compliance remains an issue. Discussed with pt, offered additional PO medication instead, pt ok with insulin for now, does not want more pills     Hypertension, unspecified type  ? BPs remain mildly elevated, pt would benefit from improved BP control. Lisinopril added to hydrochlorothiazide, 20/25 mg     Hidradenitis / Right axillary wound  Bilateral axillary scarring and tracts.  Right axilla has open, draining wound without associated fluctuance or erythema.  Has been using only a band-aid for this area  -  WOC consult  -  No s/sx systemic infection at this time.  Monitor closely.   Right axilla wound: Daily  and PRN   1. Cleanse wound with NS or wound cleanser (omit this step if patient cleans wound in shower)  2. Dry and protect surrounding skin with no sting barrier film wipe #[id number]  3. Apply a small amount of iodesorb gel #[id number] to bandaid  4. If Band-Aid needs to be changed more than once a day. Apply iodesorb gel #[id number] to adaptic #[id number] and cover with Mepilex #[id number]    Covid-19 negative 6/19    Consultations This Hospital Stay   CARE MANAGEMENT / SOCIAL WORK IP CONSULT  PHYSICAL THERAPY ADULT IP CONSULT  OCCUPATIONAL THERAPY  ADULT IP CONSULT  OCCUPATIONAL THERAPY ADULT IP CONSULT  WOUND OSTOMY CONTINENCE NURSE  IP CONSULT    Code Status   Full Code    Time Spent on this Encounter   I, Betsy Perkins PA-C, personally saw the patient today and spent greater than 30 minutes discharging this patient.       Betsy Perkins PA-C  [facility] OBSERVATION DEPT  [address] [place] [address]  Phone: [phone]  ______________________________________________________________________    Physical Exam   Vital Signs: Temp: 98.5  F (36.9  C) Temp src: Oral BP: 139/87 Pulse: 92   Resp: 16 SpO2: 95 % O2 Device: None (Room air)    Weight: 146 lbs 12.8 oz    GENERAL:  Comfortable.  PSYCH: pleasant, oriented, No acute distress.  HEENT:  Atraumatic, normocephalic. Normal conjunctiva, normal hearing, and oropharynx is normal.  NECK:  Supple, no neck vein distention  HEART: RRR  LUNGS:  Normal Respiratory effort.  EXTREMITIES:  Contracture to left hand, +2 pulses bilateral and equal.  SKIN:  Dry to touch, No rash, wound or ulcerations.  NEUROLOGIC:  CN 2-12 grossly intact, Left sided weakness UE>LE deficits.             Primary Care Physician   Von Aguirre    Discharge Orders      ALCOHOL WIPES PER BOX     Physical Therapy Referral      Occupational Therapy Referral      Reason for your hospital stay    Failure to thrive at home due to hx of stroke with left sided deficits. Acute rehab was recommended at discharge from initial hospitalization but no longer appropriate now that you are 3 months out from your stroke. PT evaluated here and recommended TCU. Placement was difficult for several factors and you have opted to discharge home. You were started on Lantus for additional glucose control and you were also started on Lisinopril, in addition to hydrochlorothiazide to improve blood pressure control.     Follow-up and recommended labs and tests     Follow up with primary care provider, Von Aguirre, within 7 days  for hospital follow- up.  No follow up labs or test are needed.  Patient does desire a more supportive environment but unable to continue hospitalization to get this figured out. He would benefit from intense outpatient rehab to regain and maintain function after his stroke. [facility].   He should also follow up with Neurology  He was started on Lantus, in addition to Metformin. A glucometer was also prescribed.   He was started on Lisinopril, in addition to hydrochlorothiazide   He would benefit from county support, possibly a  or , if not already in place     Activity    Your activity upon discharge: activity as tolerated     When to contact your care team    Call your primary doctor if you have any of the following: temperature greater than 101, increased weakness, or recurrent falls.     Discharge Instructions    Right axilla wound: Daily  and PRN   1. Cleanse wound with NS or wound cleanser (omit this step if patient cleans wound in shower)  2. Dry and protect surrounding skin with no sting barrier film wipe #[id number]  3. Apply a small amount of iodesorb gel #[id number] to bandaid  4. If Band-Aid needs to be changed more than once a day. Apply iodesorb gel #[id number] to adaptic #[id number] and cover with Mepilex #[id number]     Diet    Follow this diet upon discharge: Regular       Significant Results and Procedures   Most Recent 3 CBC's:Recent Labs   Lab Test 06/18/22  2150 03/22/22  0722 03/20/22  0558   WBC 10.5 10.3 10.6   HGB 12.4* 11.6* 11.0*   MCV 96 96 96    356 324     Most Recent 3 BMP's:Recent Labs   Lab Test 06/24/22  0852 06/24/22  0737 06/24/22  0128 06/19/22  0320 06/18/22  2150 03/23/22  0807 03/23/22  0610 03/22/22  0753 03/22/22  0722 03/20/22  0758 03/20/22  0558   NA  --   --   --   --  138  --   --   --  138  --  138   POTASSIUM  --   --   --   --  3.8  --  3.5  --  3.8   < > 3.4   CHLORIDE  --   --   --   --  105  --   --   --   107  --  104   CO2  --   --   --   --  26  --   --   --  27  --  28   BUN  --   --   --   --  13  --   --   --  15  --  15   CR  --   --   --   --  0.66  --   --   --  0.81  --  0.71   ANIONGAP  --   --   --   --  7  --   --   --  4  --  6   DAT  --   --   --   --  9.7  --   --   --  8.8  --  8.9   * 154* 179*   < > 345*   < >  --    < > 231*   < > 271*    < > = values in this interval not displayed.     Most Recent 2 LFT's:Recent Labs   Lab Test 03/20/22  0558 02/05/22 1920   AST 9 7   ALT 19 18   ALKPHOS 58 78   BILITOTAL 0.4 0.3     Most Recent 6 Bacteria Isolates From Any Culture (See EPIC Reports for Culture Details):No lab results found.  Most Recent TSH and T4:Recent Labs   Lab Test 06/18/22 2150   TSH 1.26     Most Recent Hemoglobin A1c:Recent Labs   Lab Test 06/18/22 2150   A1C 7.8*     Most Recent Urinalysis:Recent Labs   Lab Test 06/19/22  0011   COLOR Light Yellow   APPEARANCE Clear   URINEGLC >=1000*   URINEBILI Negative   URINEKETONE Negative   SG 1.021   UBLD Negative   URINEPH 6.5   PROTEIN Negative   NITRITE Negative   LEUKEST Negative   RBCU 0   WBCU <1   ,   Results for orders placed or performed during the hospital encounter of 03/19/22   Head CT w/o contrast    Narrative    EXAM: CT HEAD W/O CONTRAST  LOCATION: [facility]  DATE/TIME: 3/19/2022 8:31 PM    INDICATION: Head injury, on Plavix, history of multiple strokes with worsening left leg weakness  COMPARISON: MRI head 02/05/2022  TECHNIQUE: Routine CT Head without IV contrast. Multiplanar reformats. Dose reduction techniques were used.    FINDINGS:  INTRACRANIAL CONTENTS: No intracranial hemorrhage, extraaxial collection, or mass effect.  A few subtle hypodensities right cerebral hemisphere, corresponding to acute/subacute infarctions on prior MRI. A mild, more conspicuous hypodensity parasagittal   right frontal lobe near vertex appears tube in the area which appeared normal on prior MRI and could be due to  new mild acute/subacute infarction. No definite associated hemorrhage or significant mass effect. Normal ventricles and sulci. Mild chronic   infarction parasagittal left frontal lobe and superior aspect of left basal ganglia; similar to prior MRI.    VISUALIZED ORBITS/SINUSES/MASTOIDS: No intraorbital abnormality. No paranasal sinus mucosal disease. No middle ear or mastoid effusion.    BONES/SOFT TISSUES: No acute abnormality.      Impression    IMPRESSION:  1.  A few subtle hypodensities right cerebral hemisphere, corresponding to acute/subacute infarctions on prior MRI.  2.  A mild, more conspicuous hypodensity parasagittal right frontal lobe near vertex appears tube in the area which appeared normal on prior MRI and could be due to new mild acute/subacute infarction.  3.  No definite evidence of hemorrhage or significant mass effect.   MR Brain w/o & w Contrast    Narrative    EXAM: MRA BRAIN ([place] OF FOY) WO CONTRAST, MR BRAIN W/O AND W CONTRAST, MRA NECK (CAROTIDS) WO AND W CONTRAST  LOCATION: [facility]  DATE/TIME: 3/19/2022 10:19 PM    INDICATION: New stroke with left leg weakness.  COMPARISON: 2/5/2022.  CONTRAST: 10 mL Gadavist.  TECHNIQUE:   1) Routine multiplanar multisequence head MRI without and with intravenous contrast.  2) 3D time-of-flight head MRA without intravenous contrast.  3) Neck MRA without and with IV contrast. Stenosis measurements made according to NASCET criteria unless otherwise specified.    FINDINGS:  HEAD MRI:  INTRACRANIAL CONTENTS: There are new watershed territory patchy areas of restricted diffusion in the parasagittal right frontal/parietal centrum semiovale and a new 11 x 8 mm (AP x TR) ovoid focus of restricted diffusion at the right frontal/parietal   vertex. Resolving previously seen watershed territory infarcts. No mass, acute hemorrhage, or extra-axial fluid collections. Patchy nonspecific T2/FLAIR hyperintensities within the cerebral white  matter most consistent with mild to moderate chronic   microvascular ischemic change. Chronic infarct right middle frontal gyrus. Chronic infarct left corona radiata. Tiny chronic lacunar infarct left cerebellar hemisphere. Mild generalized cerebral atrophy. No hydrocephalus. Normal position of the   cerebellar tonsils. There is some enhancement of the late subacute watershed territory infarcts first identified on 2/5/2022.    SELLA: No abnormality accounting for technique.    OSSEOUS STRUCTURES/SOFT TISSUES: Normal marrow signal. Abnormal right ICA flow void compatible with proximal occlusion.     ORBITS: No abnormality accounting for technique.     SINUSES/MASTOIDS: Mild mucosal thickening scattered about the paranasal sinuses. No middle ear or mastoid effusion.     HEAD MRA:   ANTERIOR CIRCULATION: No flow within the right ICA as it enters the skull base. It is reconstituted at the ophthalmic segment via posterior communicating artery collateral. There is severe stenosis bilaterally of the bilateral A1-A2 origins. There is new   occlusion of the right A1 segment. Again seen is severe stenosis at the right A1-A2 junction with the possibility of a nonocclusive intraluminal thrombus, similar to prior. Standard [place] anatomy.    POSTERIOR CIRCULATION: No stenosis/occlusion, aneurysm, or high flow vascular malformation. Dominant left and smaller right vertebral artery contribute to a normal basilar artery.     NECK MRA:   RIGHT CAROTID: The right ICA is occluded approximately 1 cm from its origin, stable from prior.    LEFT CAROTID: No measurable stenosis or dissection.    VERTEBRAL ARTERIES: No focal stenosis or dissection. Dominant left and smaller right vertebral arteries.    AORTIC ARCH: Classic aortic arch anatomy with no significant stenosis at the origin of the great vessels.      Impression    IMPRESSION:  HEAD MRI:   1.  New watershed territory infarcts in the deep right frontal/parietal centrum  semiovale and right frontal/parietal vertex.  2.  Late subacute watershed territory infarcts are resolving with some enhancement of these late subacute infarcts in the posterior right parietal lobe.  3.  Chronic infarcts left corona radiata, right middle frontal gyrus and left cerebellar hemisphere.    HEAD MRA:   1.  No flow in the right ICA as it enters the skull base. It is reconstituted at the ophthalmic segment via right posterior communicating artery collateralization.  2.  Progressive stenosis involving the right A1 segment with severe/critical stenosis of the bilateral A1-A2 junctions.  3.  High-grade stenosis/near occlusion involving the right M1/M2 junction, unchanged from prior either due to atheromatous disease or persistent intraluminal thrombus. There is arborization of the right MCA branches.    NECK MRA:  1.  Stable occlusion of the right ICA 1 cm from its origin.  2.  Otherwise unremarkable.   MRA Neck (Carotids) wo & w Contrast    Narrative    EXAM: MRA BRAIN ([place] OF FOY) WO CONTRAST, MR BRAIN W/O AND W CONTRAST, MRA NECK (CAROTIDS) WO AND W CONTRAST  LOCATION: [facility]  DATE/TIME: 3/19/2022 10:19 PM    INDICATION: New stroke with left leg weakness.  COMPARISON: 2/5/2022.  CONTRAST: 10 mL Gadavist.  TECHNIQUE:   1) Routine multiplanar multisequence head MRI without and with intravenous contrast.  2) 3D time-of-flight head MRA without intravenous contrast.  3) Neck MRA without and with IV contrast. Stenosis measurements made according to NASCET criteria unless otherwise specified.    FINDINGS:  HEAD MRI:  INTRACRANIAL CONTENTS: There are new watershed territory patchy areas of restricted diffusion in the parasagittal right frontal/parietal centrum semiovale and a new 11 x 8 mm (AP x TR) ovoid focus of restricted diffusion at the right frontal/parietal   vertex. Resolving previously seen watershed territory infarcts. No mass, acute hemorrhage, or extra-axial fluid  collections. Patchy nonspecific T2/FLAIR hyperintensities within the cerebral white matter most consistent with mild to moderate chronic   microvascular ischemic change. Chronic infarct right middle frontal gyrus. Chronic infarct left corona radiata. Tiny chronic lacunar infarct left cerebellar hemisphere. Mild generalized cerebral atrophy. No hydrocephalus. Normal position of the   cerebellar tonsils. There is some enhancement of the late subacute watershed territory infarcts first identified on 2/5/2022.    SELLA: No abnormality accounting for technique.    OSSEOUS STRUCTURES/SOFT TISSUES: Normal marrow signal. Abnormal right ICA flow void compatible with proximal occlusion.     ORBITS: No abnormality accounting for technique.     SINUSES/MASTOIDS: Mild mucosal thickening scattered about the paranasal sinuses. No middle ear or mastoid effusion.     HEAD MRA:   ANTERIOR CIRCULATION: No flow within the right ICA as it enters the skull base. It is reconstituted at the ophthalmic segment via posterior communicating artery collateral. There is severe stenosis bilaterally of the bilateral A1-A2 origins. There is new   occlusion of the right A1 segment. Again seen is severe stenosis at the right A1-A2 junction with the possibility of a nonocclusive intraluminal thrombus, similar to prior. Standard [place] of [place] anatomy.    POSTERIOR CIRCULATION: No stenosis/occlusion, aneurysm, or high flow vascular malformation. Dominant left and smaller right vertebral artery contribute to a normal basilar artery.     NECK MRA:   RIGHT CAROTID: The right ICA is occluded approximately 1 cm from its origin, stable from prior.    LEFT CAROTID: No measurable stenosis or dissection.    VERTEBRAL ARTERIES: No focal stenosis or dissection. Dominant left and smaller right vertebral arteries.    AORTIC ARCH: Classic aortic arch anatomy with no significant stenosis at the origin of the great vessels.      Impression    IMPRESSION:  HEAD MRI:    1.  New watershed territory infarcts in the deep right frontal/parietal centrum semiovale and right frontal/parietal vertex.  2.  Late subacute watershed territory infarcts are resolving with some enhancement of these late subacute infarcts in the posterior right parietal lobe.  3.  Chronic infarcts left corona radiata, right middle frontal gyrus and left cerebellar hemisphere.    HEAD MRA:   1.  No flow in the right ICA as it enters the skull base. It is reconstituted at the ophthalmic segment via right posterior communicating artery collateralization.  2.  Progressive stenosis involving the right A1 segment with severe/critical stenosis of the bilateral A1-A2 junctions.  3.  High-grade stenosis/near occlusion involving the right M1/M2 junction, unchanged from prior either due to atheromatous disease or persistent intraluminal thrombus. There is arborization of the right MCA branches.    NECK MRA:  1.  Stable occlusion of the right ICA 1 cm from its origin.  2.  Otherwise unremarkable.   MR Head w/o Contrast Angiogram    Narrative    EXAM: MRA BRAIN ([place] OF FOY) WO CONTRAST, MR BRAIN W/O AND W CONTRAST, MRA NECK (CAROTIDS) WO AND W CONTRAST  LOCATION: [facility]  DATE/TIME: 3/19/2022 10:19 PM    INDICATION: New stroke with left leg weakness.  COMPARISON: 2/5/2022.  CONTRAST: 10 mL Gadavist.  TECHNIQUE:   1) Routine multiplanar multisequence head MRI without and with intravenous contrast.  2) 3D time-of-flight head MRA without intravenous contrast.  3) Neck MRA without and with IV contrast. Stenosis measurements made according to NASCET criteria unless otherwise specified.    FINDINGS:  HEAD MRI:  INTRACRANIAL CONTENTS: There are new watershed territory patchy areas of restricted diffusion in the parasagittal right frontal/parietal centrum semiovale and a new 11 x 8 mm (AP x TR) ovoid focus of restricted diffusion at the right frontal/parietal   vertex. Resolving previously seen  watershed territory infarcts. No mass, acute hemorrhage, or extra-axial fluid collections. Patchy nonspecific T2/FLAIR hyperintensities within the cerebral white matter most consistent with mild to moderate chronic   microvascular ischemic change. Chronic infarct right middle frontal gyrus. Chronic infarct left corona radiata. Tiny chronic lacunar infarct left cerebellar hemisphere. Mild generalized cerebral atrophy. No hydrocephalus. Normal position of the   cerebellar tonsils. There is some enhancement of the late subacute watershed territory infarcts first identified on 2/5/2022.    SELLA: No abnormality accounting for technique.    OSSEOUS STRUCTURES/SOFT TISSUES: Normal marrow signal. Abnormal right ICA flow void compatible with proximal occlusion.     ORBITS: No abnormality accounting for technique.     SINUSES/MASTOIDS: Mild mucosal thickening scattered about the paranasal sinuses. No middle ear or mastoid effusion.     HEAD MRA:   ANTERIOR CIRCULATION: No flow within the right ICA as it enters the skull base. It is reconstituted at the ophthalmic segment via posterior communicating artery collateral. There is severe stenosis bilaterally of the bilateral A1-A2 origins. There is new   occlusion of the right A1 segment. Again seen is severe stenosis at the right A1-A2 junction with the possibility of a nonocclusive intraluminal thrombus, similar to prior. Standard [place] of [place] anatomy.    POSTERIOR CIRCULATION: No stenosis/occlusion, aneurysm, or high flow vascular malformation. Dominant left and smaller right vertebral artery contribute to a normal basilar artery.     NECK MRA:   RIGHT CAROTID: The right ICA is occluded approximately 1 cm from its origin, stable from prior.    LEFT CAROTID: No measurable stenosis or dissection.    VERTEBRAL ARTERIES: No focal stenosis or dissection. Dominant left and smaller right vertebral arteries.    AORTIC ARCH: Classic aortic arch anatomy with no significant stenosis  at the origin of the great vessels.      Impression    IMPRESSION:  HEAD MRI:   1.  New watershed territory infarcts in the deep right frontal/parietal centrum semiovale and right frontal/parietal vertex.  2.  Late subacute watershed territory infarcts are resolving with some enhancement of these late subacute infarcts in the posterior right parietal lobe.  3.  Chronic infarcts left corona radiata, right middle frontal gyrus and left cerebellar hemisphere.    HEAD MRA:   1.  No flow in the right ICA as it enters the skull base. It is reconstituted at the ophthalmic segment via right posterior communicating artery collateralization.  2.  Progressive stenosis involving the right A1 segment with severe/critical stenosis of the bilateral A1-A2 junctions.  3.  High-grade stenosis/near occlusion involving the right M1/M2 junction, unchanged from prior either due to atheromatous disease or persistent intraluminal thrombus. There is arborization of the right MCA branches.    NECK MRA:  1.  Stable occlusion of the right ICA 1 cm from its origin.  2.  Otherwise unremarkable.   CTA Head Neck with Contrast    Narrative    EXAM: CTA  HEAD NECK WITH CONTRAST  LOCATION: [facility]  DATE/TIME: 03/20/2022, 12:16 PM    INDICATION: Stroke/TIA, assess extracranial arteries.  COMPARISON: Head and neck MRA 03/19/2021, 02/05/2022.  CONTRAST: 70 mL Isovue 370.  TECHNIQUE: Head and neck CT angiogram with IV contrast. Axial helical CT images of the head and neck vessels obtained during the arterial phase of intravenous contrast administration. Axial 2D reconstructed images and multiplanar 3D MIP reconstructed   images of the head and neck vessels were performed by the technologist. Dose reduction techniques were used. All stenosis measurements made according to NASCET criteria unless otherwise specified.    FINDINGS:   HEAD CTA:  ANTERIOR CIRCULATION: Chronic right internal carotid artery occlusion with reconstitution  at the level of the supraclinoid segment via collateral filling. Severe focal stenosis of the distal right M1 segment/proximal M2 segment junction. Severe right and   moderate to severe left segmental stenoses of the anterior cerebral artery A1/proximal A2 segments. The left middle cerebral artery branches are widely patent. No aneurysm or evidence of vascular malformation.    POSTERIOR CIRCULATION: No stenosis/occlusion, aneurysm, or high-flow vascular malformation. Dominant left and smaller right vertebral artery contribute to a normal basilar artery.     DURAL VENOUS SINUSES: Expected enhancement of the major dural venous sinuses.    NECK CTA:  RIGHT CAROTID: Chronic occlusion of the right internal carotid artery approximately 1 cm from its origin at the level of the carotid bifurcation.    LEFT CAROTID: No measurable stenosis or dissection.    VERTEBRAL ARTERIES: No focal stenosis or dissection. Dominant left and smaller right vertebral arteries.    AORTIC ARCH: Classic aortic arch anatomy with no significant stenosis at the origin of the great vessels.    NONVASCULAR STRUCTURES: Unremarkable.      Impression    IMPRESSION:   1.  No significant change dating back to 2022. Chronic occlusion of the right internal carotid artery with reconstitution at the level of its supraclinoid segment.  2.  Severe focal stenosis of the right middle cerebral artery M1/M2 segment junction.  3.  Severe right and moderate to severe left anterior cerebral artery A1/proximal A2 segments.  4.  Widely patent vertebral arteries and intracranial posterior circulation.     Echocardiogram Limited     Value    LVEF  55%    [facility]    [id number]  [id number]  [id number]^MAL^MARIANN^A     [facility]  Echocardiography Laboratory  [address] [place] [address]     Name: JERONIMO CEJA  MRN: [id number]  : 1970  Study Date: 2022 03:18 PM  Age: 51 yrs  Gender: Male  Patient Location:  [facility]  Reason For Study: Other, Please Specify in Comments  Ordering Physician: MARIANN RESENDEZ  Referring Physician: Von Aguirre  Performed By: Verona Jacobson     BSA: 1.8 m2  Height: 65 in  Weight: 157 lb  HR: 99  BP: 152/84 mmHg  ______________________________________________________________________________  Procedure  Limited Portable Echo Adult.  ______________________________________________________________________________  Interpretation Summary     The visual ejection fraction is estimated at 55%.  The right ventricle is normal in structure, function and size.  There is trace mitral regurgitation.  ______________________________________________________________________________  Left Ventricle  The left ventricle is normal in structure, function and size. There is normal  left ventricular wall thickness. Left ventricular systolic function is normal.  The visual ejection fraction is estimated at 55%. Left ventricular diastolic  function is normal. No regional wall motion abnormalities noted.     Right Ventricle  The right ventricle is normal in structure, function and size.     Atria  Normal left atrial size. Right atrial size is normal. There is no color  Doppler evidence of an atrial shunt.     Mitral Valve  The mitral valve is normal in structure and function. There is trace mitral  regurgitation.     Tricuspid Valve  The tricuspid valve is normal in structure and function. There is trace  tricuspid regurgitation.     Aortic Valve  There is mild trileaflet aortic sclerosis. No aortic regurgitation is present.     Pulmonic Valve  The pulmonic valve is not well seen, but is grossly normal.     Vessels  Normal size aorta.     Pericardium  There is no pericardial effusion.     Rhythm  Sinus rhythm was noted.  ______________________________________________________________________________  MMode/2D Measurements & Calculations  IVSd: 0.91 cm     LVIDd: 3.7 cm  LVIDs: 3.0 cm  LVPWd: 0.92 cm  FS: 20.3  %  LV mass(C)d: 100.2 grams  LV mass(C)dI: 56.1 grams/m2  RWT: 0.49     ______________________________________________________________________________  Report approved by: Devon Wills 03/21/2022 03:51 PM               Discharge Medications   Current Discharge Medication List      START taking these medications    Details   blood glucose (NO BRAND SPECIFIED) test strip Use to test blood sugar 1 times daily or as directed.  Qty: 30 strip, Refills: 0    Associated Diagnoses: Type 2 diabetes mellitus with hyperglycemia, without long-term current use of insulin (H)      blood glucose monitoring (NO BRAND SPECIFIED) meter device kit Use to test blood sugar 1 times daily, first thing in the morning.  Qty: 1 kit, Refills: 0    Associated Diagnoses: Type 2 diabetes mellitus with hyperglycemia, without long-term current use of insulin (H)      insulin glargine (LANTUS PEN) 100 UNIT/ML pen Inject 15 Units Subcutaneous At Bedtime  Qty: 15 mL, Refills: 0    Comments: If Lantus is not covered by insurance, may substitute Basaglar or Semglee or other insulin glargine product per insurance preference at same dose and frequency.    Associated Diagnoses: Type 2 diabetes mellitus with hyperglycemia, without long-term current use of insulin (H)      Lancets 30G MISC 120 30 gauge lancets (substitute as needed)  Qty: 120 each, Refills: 0    Associated Diagnoses: Type 2 diabetes mellitus with hyperglycemia, without long-term current use of insulin (H)      lisinopril-hydrochlorothiazide (ZESTORETIC) 20-25 MG tablet Take 1 tablet by mouth daily  Qty: 30 tablet, Refills: 0    Associated Diagnoses: Hypertension, unspecified type         CONTINUE these medications which have NOT CHANGED    Details   aspirin (ASA) 325 MG tablet Take 1 tablet (325 mg) by mouth daily    Associated Diagnoses: Acute cerebrovascular accident (CVA) due to ischemia (H)      atorvastatin (LIPITOR) 80 MG tablet Take 1 tablet (80 mg) by mouth daily  Qty: 30  tablet, Refills: 1    Associated Diagnoses: Acute cerebrovascular accident (CVA) due to ischemia (H)      cetirizine (ZYRTEC) 10 MG tablet Take 10 mg by mouth daily      gabapentin (NEURONTIN) 300 MG capsule Take 300 mg by mouth 3 times daily      metFORMIN (GLUCOPHAGE) 1000 MG tablet Take 1,000 mg by mouth 2 times daily (with meals)      sodium chloride (OCEAN) 0.65 % nasal spray Spray 2 sprays in nostril every 2 hours as needed for congestion      ALPRAZolam (XANAX) 1 MG tablet Take 1 mg by mouth nightly as needed for anxiety      chlorhexidine (HIBICLENS) 4 % liquid Apply topically daily as needed for wound care         STOP taking these medications       clopidogrel (PLAVIX) 75 MG tablet Comments:   Reason for Stopping:         hydrochlorothiazide (HYDRODIURIL) 25 MG tablet Comments:   Reason for Stopping:             Allergies   Not on File

## 2022-06-24 NOTE — PROGRESS NOTES
"Brief Stroke Progress Note    MRI brain shows scattered new RMCA infarct partially in watershed distribution, admit for stabilization.    Recommendations   - Use orderset: \"Ischemic Stroke Routine Admission\" or \"Ischemic Stroke No Thrombolytics/No Thrombectomy ICU Admission\"  - Neurochecks and Vital Signs every q2h   - Permissive HTN; goal -180 stop home antihypertensives, fluids, pressers if needed  - Daily aspirin 325 mg for secondary stroke prevention  - Plavix (clopidogrel) 300 mg PO loading dose x 1  - Plavix (clopidogrel) 75 mg PO Daily  - Statin: Atorvastatin 40mg qhs  - P2Y1Y2 plast assay (ordered)  - TTE (with Bubble Study if age 60 yrs or less)  - Telemetry, EKG  - Bedside Glucose Monitoring  - A1c, Lipid Panel, Troponin x 3  - PT/OT/SLP  - Stroke Education  - Euthermia, Euglycemia    The Stroke Staff is Dr. Sean Kolb MD  Vascular Neurology Fellow  To page me or covering stroke neurology team member, click here: [facility]COM   Choose \"On Call\" tab at top, then search dropdown box for \"Neurology Adult\", select location, press Enter, then look for stroke/neuro ICU/telestroke.      "

## 2022-06-24 NOTE — PROGRESS NOTES
PRIMARY DIAGNOSIS: GENERALIZED WEAKNESS    OUTPATIENT/OBSERVATION GOALS TO BE MET BEFORE DISCHARGE  1. Orthostatic performed: N/A    2. Tolerating PO medications: Yes    3. Return to near baseline physical activity: Yes    4. Cleared for discharge by consultants (if involved): Yes    Discharge Planner Nurse   Safe discharge environment identified: No  Barriers to discharge: Yes       Entered by: Alina Longo RN 06/24/2022    Patient A&Ox4, SBA, had Breakfast this morning. Denies pain. Bed alarm on. Not cooperative with cares,  Refused medication, but took second time.  He asked to talk to the provider, because he is tired of staying here. He wants to leave AMA. Provider was notified.     Please review provider order for any additional goals.   Nurse to notify provider when observation goals have been met and patient is ready for discharge.

## 2022-06-25 ENCOUNTER — APPOINTMENT (OUTPATIENT)
Dept: SPEECH THERAPY | Facility: CLINIC | Age: 52
DRG: 065 | End: 2022-06-25
Attending: INTERNAL MEDICINE
Payer: COMMERCIAL

## 2022-06-25 ENCOUNTER — APPOINTMENT (OUTPATIENT)
Dept: MRI IMAGING | Facility: CLINIC | Age: 52
DRG: 065 | End: 2022-06-25
Payer: COMMERCIAL

## 2022-06-25 ENCOUNTER — APPOINTMENT (OUTPATIENT)
Dept: CARDIOLOGY | Facility: CLINIC | Age: 52
DRG: 065 | End: 2022-06-25
Attending: HOSPITALIST
Payer: COMMERCIAL

## 2022-06-25 LAB
ANION GAP SERPL CALCULATED.3IONS-SCNC: 5 MMOL/L (ref 3–14)
BUN SERPL-MCNC: 18 MG/DL (ref 7–30)
CALCIUM SERPL-MCNC: 9.6 MG/DL (ref 8.5–10.1)
CHLORIDE BLD-SCNC: 104 MMOL/L (ref 94–109)
CO2 SERPL-SCNC: 28 MMOL/L (ref 20–32)
CREAT SERPL-MCNC: 0.61 MG/DL (ref 0.66–1.25)
ERYTHROCYTE [DISTWIDTH] IN BLOOD BY AUTOMATED COUNT: 12.5 % (ref 10–15)
FACTOR V INTERPRETATION: NORMAL
GFR SERPL CREATININE-BSD FRML MDRD: >90 ML/MIN/1.73M2
GLUCOSE BLD-MCNC: 185 MG/DL (ref 70–99)
GLUCOSE BLDC GLUCOMTR-MCNC: 182 MG/DL (ref 70–99)
GLUCOSE BLDC GLUCOMTR-MCNC: 217 MG/DL (ref 70–99)
GLUCOSE BLDC GLUCOMTR-MCNC: 255 MG/DL (ref 70–99)
GLUCOSE BLDC GLUCOMTR-MCNC: 305 MG/DL (ref 70–99)
HCT VFR BLD AUTO: 36.5 % (ref 40–53)
HGB BLD-MCNC: 11.6 G/DL (ref 13.3–17.7)
HOLD SPECIMEN: NORMAL
LAB DIRECTOR COMMENTS: NORMAL
LAB DIRECTOR DISCLAIMER: NORMAL
LAB DIRECTOR INTERPRETATION: NORMAL
LAB DIRECTOR METHODOLOGY: NORMAL
LAB DIRECTOR RESULTS: NORMAL
LVEF ECHO: NORMAL
MCH RBC QN AUTO: 31.3 PG (ref 26.5–33)
MCHC RBC AUTO-ENTMCNC: 31.8 G/DL (ref 31.5–36.5)
MCV RBC AUTO: 98 FL (ref 78–100)
PA ADP BLD-ACNC: 1 PRU
PLATELET # BLD AUTO: 428 10E3/UL (ref 150–450)
POTASSIUM BLD-SCNC: 3.9 MMOL/L (ref 3.4–5.3)
RBC # BLD AUTO: 3.71 10E6/UL (ref 4.4–5.9)
SODIUM SERPL-SCNC: 137 MMOL/L (ref 133–144)
SPECIMEN DESCRIPTION: NORMAL
WBC # BLD AUTO: 12.6 10E3/UL (ref 4–11)

## 2022-06-25 PROCEDURE — 99223 1ST HOSP IP/OBS HIGH 75: CPT | Mod: GC | Performed by: STUDENT IN AN ORGANIZED HEALTH CARE EDUCATION/TRAINING PROGRAM

## 2022-06-25 PROCEDURE — 255N000002 HC RX 255 OP 636: Performed by: INTERNAL MEDICINE

## 2022-06-25 PROCEDURE — 85576 BLOOD PLATELET AGGREGATION: CPT

## 2022-06-25 PROCEDURE — 36415 COLL VENOUS BLD VENIPUNCTURE: CPT | Performed by: HOSPITALIST

## 2022-06-25 PROCEDURE — 80048 BASIC METABOLIC PNL TOTAL CA: CPT | Performed by: INTERNAL MEDICINE

## 2022-06-25 PROCEDURE — 36415 COLL VENOUS BLD VENIPUNCTURE: CPT | Performed by: INTERNAL MEDICINE

## 2022-06-25 PROCEDURE — 92610 EVALUATE SWALLOWING FUNCTION: CPT | Mod: GN

## 2022-06-25 PROCEDURE — 250N000013 HC RX MED GY IP 250 OP 250 PS 637: Performed by: INTERNAL MEDICINE

## 2022-06-25 PROCEDURE — 93308 TTE F-UP OR LMTD: CPT | Mod: 26 | Performed by: INTERNAL MEDICINE

## 2022-06-25 PROCEDURE — 70552 MRI BRAIN STEM W/DYE: CPT

## 2022-06-25 PROCEDURE — A9585 GADOBUTROL INJECTION: HCPCS | Performed by: INTERNAL MEDICINE

## 2022-06-25 PROCEDURE — 81241 F5 GENE: CPT | Performed by: HOSPITALIST

## 2022-06-25 PROCEDURE — 999N000208 ECHOCARDIOGRAM LIMITED

## 2022-06-25 PROCEDURE — 250N000012 HC RX MED GY IP 250 OP 636 PS 637: Performed by: INTERNAL MEDICINE

## 2022-06-25 PROCEDURE — 85027 COMPLETE CBC AUTOMATED: CPT | Performed by: INTERNAL MEDICINE

## 2022-06-25 PROCEDURE — 120N000001 HC R&B MED SURG/OB

## 2022-06-25 PROCEDURE — G0452 MOLECULAR PATHOLOGY INTERPR: HCPCS | Mod: 26 | Performed by: PATHOLOGY

## 2022-06-25 PROCEDURE — C8924 2D TTE W OR W/O FOL W/CON,FU: HCPCS

## 2022-06-25 PROCEDURE — 93325 DOPPLER ECHO COLOR FLOW MAPG: CPT | Mod: 26 | Performed by: INTERNAL MEDICINE

## 2022-06-25 PROCEDURE — 85300 ANTITHROMBIN III ACTIVITY: CPT | Performed by: HOSPITALIST

## 2022-06-25 PROCEDURE — 250N000013 HC RX MED GY IP 250 OP 250 PS 637: Performed by: HOSPITALIST

## 2022-06-25 PROCEDURE — 93321 DOPPLER ECHO F-UP/LMTD STD: CPT | Mod: 26 | Performed by: INTERNAL MEDICINE

## 2022-06-25 PROCEDURE — 99233 SBSQ HOSP IP/OBS HIGH 50: CPT | Performed by: HOSPITALIST

## 2022-06-25 RX ORDER — GADOBUTROL 604.72 MG/ML
20 INJECTION INTRAVENOUS ONCE
Status: COMPLETED | OUTPATIENT
Start: 2022-06-25 | End: 2022-06-25

## 2022-06-25 RX ORDER — ACETAMINOPHEN 325 MG/1
325 TABLET ORAL EVERY 4 HOURS PRN
Status: DISCONTINUED | OUTPATIENT
Start: 2022-06-25 | End: 2022-06-27

## 2022-06-25 RX ADMIN — GABAPENTIN 300 MG: 300 CAPSULE ORAL at 16:47

## 2022-06-25 RX ADMIN — ATORVASTATIN CALCIUM 80 MG: 80 TABLET, FILM COATED ORAL at 10:07

## 2022-06-25 RX ADMIN — GABAPENTIN 300 MG: 300 CAPSULE ORAL at 10:07

## 2022-06-25 RX ADMIN — GABAPENTIN 300 MG: 300 CAPSULE ORAL at 21:15

## 2022-06-25 RX ADMIN — HUMAN ALBUMIN MICROSPHERES AND PERFLUTREN 9 ML: 10; .22 INJECTION, SOLUTION INTRAVENOUS at 12:15

## 2022-06-25 RX ADMIN — CLOPIDOGREL BISULFATE 75 MG: 75 TABLET ORAL at 10:07

## 2022-06-25 RX ADMIN — ASPIRIN 325 MG ORAL TABLET 325 MG: 325 PILL ORAL at 10:07

## 2022-06-25 RX ADMIN — GADOBUTROL 20 ML: 604.72 INJECTION INTRAVENOUS at 13:17

## 2022-06-25 RX ADMIN — INSULIN GLARGINE 10 UNITS: 100 INJECTION, SOLUTION SUBCUTANEOUS at 23:46

## 2022-06-25 RX ADMIN — ACETAMINOPHEN 325 MG: 325 TABLET ORAL at 17:59

## 2022-06-25 RX ADMIN — INSULIN ASPART 1 UNITS: 100 INJECTION, SOLUTION INTRAVENOUS; SUBCUTANEOUS at 14:15

## 2022-06-25 RX ADMIN — CETIRIZINE HYDROCHLORIDE 10 MG: 10 TABLET, FILM COATED ORAL at 10:07

## 2022-06-25 RX ADMIN — ACETAMINOPHEN 325 MG: 325 TABLET ORAL at 23:48

## 2022-06-25 RX ADMIN — INSULIN ASPART 3 UNITS: 100 INJECTION, SOLUTION INTRAVENOUS; SUBCUTANEOUS at 16:58

## 2022-06-25 ASSESSMENT — ACTIVITIES OF DAILY LIVING (ADL)
ADLS_ACUITY_SCORE: 39
ADLS_ACUITY_SCORE: 37
ADLS_ACUITY_SCORE: 35
ADLS_ACUITY_SCORE: 39
ADLS_ACUITY_SCORE: 37

## 2022-06-25 NOTE — PROGRESS NOTES
06/25/22 1416   General Information   Onset of Illness/Injury or Date of Surgery 06/24/22   Referring Physician Quinn Monge MD   Patient/Family Therapy Goal Statement (SLP) To get his left hand moving.   Pertinent History of Current Problem The pt is a 52 year old male with a PMHx significant for polysubstance abuse, cocaine use, HTN, and recent right KWADWO CVA 3/2022 (large territory defect) with residual left-sided weakness, who was admitted on 6/18/2022 at [facility] with failure to thrive at home. At the time of his initial stroke, ARU was recommended but pt refused and discharged home ama. Able to mobilize at home with walker but was getting weaker.  He was initially admitted on 6/18 under observation for weakness with a plan for placement for progressive weakness.  However he was discharged on 6/24/2022 when no place was found and he insisted on going home.  Outpatient rehab orders were placed.  He then was brought back in to [facility] later in the evening on 6/24/2022 for new onset significant worsening of left-sided weakness with paresthesias and numbness that is worse than before.  He does not have any meaningful function of his left hand. He notes increased urinary frequency and often times is not able to make it to the bathroom on time, for urine and/or stool. Head CT revealed acute right MCA CVA. Clinical swallow evaluation completed per MD order.   General Observations The pt is pleasant and seen with lunch. He ordered Popeyes via phone himself. He reports that his speech is back to baseline and denies changes to word-finding.   Past History of Dysphagia The pt passed nursing swallow screen once dysarthria resolved. This AM however he noted dysarthria, but now feels like it has cleared.   Pain Assessment   Patient Currently in Pain No   Type of Evaluation   Type of Evaluation Swallow Evaluation   Oral Motor   Oral Musculature generally intact   Structural Abnormalities none  present   Mucosal Quality good   Dentition (Oral Motor)   Dentition (Oral Motor) natural dentition   Facial Symmetry (Oral Motor)   Facial Symmetry (Oral Motor) WNL   Lip Function (Oral Motor)   Lip Range of Motion (Oral Motor) WNL   Tongue Function (Oral Motor)   Tongue ROM (Oral Motor) WNL   Jaw Function (Oral Motor)   Jaw Function (Oral Motor) WNL   Cough/Swallow/Gag Reflex (Oral Motor)   Volitional Throat Clear/Cough (Oral Motor) WNL   Volitional Swallow (Oral Motor) WNL   Vocal Quality/Secretion Management (Oral Motor)   Vocal Quality (Oral Motor) WNL   Secretion Management (Oral Motor) WNL   General Swallowing Observations   Respiratory Support (General Swallowing Observations) none   Current Diet/Method of Nutritional Intake (General Swallowing Observations, NIS) thin liquids (level 0);regular diet   Swallowing Evaluation Clinical swallow evaluation   Clinical Swallow Evaluation   Feeding Assistance set up only required   Clinical Swallow Evaluation Textures Trialed thin liquids;minced & moist;solid foods   Clinical Swallow Eval: Thin Liquid Texture Trial   Mode of Presentation, Thin Liquids straw;self-fed   Volume of Liquid or Food Presented 3 oz   Oral Phase of Swallow WFL   Pharyngeal Phase of Swallow intact   Diagnostic Statement Pt tolerated single and consecutive sips with no s/s of aspiration.   Clinical Swallow Eval: Minced & Moist   Mode of Presentation spoon;self-fed   Volume Presented 2 oz rice   Oral Phase WFL   Pharyngeal Phase intact   Diagnostic Statement No s/s of aspiration or oral residuals.   Clinical Swallow Evaluation: Solid Food Texture Trial   Mode of Presentation self-fed   Volume Presented chicken nuggets and fries   Oral Phase WFL   Pharyngeal Phase intact   Diagnostic Statement No s/s of aspiration or oral residuals   Esophageal Phase of Swallow   Patient reports or presents with symptoms of esophageal dysphagia No   Swallowing Recommendations   Diet Consistency Recommendations thin  liquids (level 0);regular diet   Supervision Level for Intake other (see comments)  (setup only required)   Recommended Feeding/Eating Techniques (Swallow Eval) maintain upright sitting position for eating   Medication Administration Recommendations, Swallowing (SLP) Per pt preference   Instrumental Assessment Recommendations instrumental evaluation not recommended at this time   Clinical Impression   Criteria for Skilled Therapeutic Interventions Met (SLP Eval) Evaluation only   SLP Diagnosis Functional oropharyngeal swallow mechanism. Dysarthria has resolved.   Clinical Impression Comments Clinical swallow evaluation completed per MD order. The pt presents with functional oropharyngeal swallow mechanism at bedside. Oral mech WFL. His slurred speech has resolved and the pt denies difficulty with swallowing or speech. He consumed meal of Popeyes chicken nuggets, fries, rice, and soda with no s/s of aspiration. He had timely mastication and propulsion with no oral residuals. He had single swallows per bolus and used liquid wash every few bites as needed. He denied globus sensation or difficulty with swallowing. Recommend continue regular solids and thin liquids with setup assist. Upright with PO. No appreciable oropharyngeal dysphagia, dysathria, or word-finding difficulties at bedside. Pt may benefit from cognitive-linguistic evaluation at next level of care pending agreeableness given location of CVA.   SLP Discharge Planning   SLP Discharge Recommendation   (Defer to PT/OT/MD)   SLP Rationale for DC Rec Dysphagia is at baseline; may require supervision pending cognitive testing   SLP Brief overview of current status  Recommend continue regular solids and thin liquids with setup assist. Upright with PO. No appreciable oropharyngeal dysphagia, dysathria, or word-finding difficulties at bedside. Pt may benefit from cognitive-linguistic evaluation at next level of care pending agreeableness given location of CVA.     Total Evaluation Time   Total Evaluation Time (Minutes) 18

## 2022-06-25 NOTE — PLAN OF CARE
4 hour shift summary: Patient here with CVA.    Neuro: Left sided weakness with rigidity  3/5 weak left hand grasp.  Alert, declines wearing telemetry.   Cardiac: HR WNL.  GI: Denies nausea  : Urinal voiding spontaneously.  IV: Left PIV SL.  Mobility: A2 GB Declines gait belt .  CMS: Left side sensory changes UE and LE.   PAIN: Mild HA, Tylenol given.  Diet Regular.  SKIN: Left axillary wound, foam CDI      Goal Outcome Evaluation:    Plan of Care Reviewed With: patient     Overall Patient Progress: improving    Outcome Evaluation: Goal -180 no deterioration in neuro exam

## 2022-06-25 NOTE — H&P
[facility]    History and Physical - Hospitalist Service       Date of Admission:  6/24/2022    Assessment & Plan      Josue Parisi is a 52 year old male with history of diabetes mellitus type 2, hypertension, CVA, polysubstance abuse was admitted from 06/18-06/24 at [facility] and left AMA this afternoon.  Now he is being admitted on 6/24/2022 with worsening left weakness and slurred speech and found to have new ischemic infarct.    #  New nonhemorrhagic infarction within the right prefrontal gyrus and deep MCA watershed infarct within the right centrum semiovale of the right frontal and parietal lobes.   #previous left sided weakness due to Ischemic CVA  -Appreciate stroke team review  -Admit for neuro checks and vital signs every 2 hours, telemetry, blood glucose monitoring and permissive hypertension (goal -180 stop home antihypertensives, fluids, pressers if needed)   -Continue aspirin 325 mg, Plavix 75 mg p.o. daily, atorvastatin 40 mg ( A/w- P2Y1Y2 plast assay )  -PT/OT/SLP/stroke education  - bedside swallow cleared by RN and eating chipotle in Stabilization room    #Type 2 diabetes mellitus.  Last HbA1c of 7.8 on 06/18   -Hold metformin and decrease Lantus to 10 units (recently started at [facility])  -Need close PCP monitoring as non compliance is an issue    #History of hypertension   -Hold PTA meds to allow permissive hypertension    #Hidradenitis / Rt axillary wound  - WOCN consult previous recs:   Right axilla wound: Daily  and PRN   1. Cleanse wound with NS or wound cleanser (omit this step if patient cleans wound in shower)  2. Dry and protect surrounding skin with no sting barrier film wipe #[id number]  3. Apply a small amount of iodesorb gel #[id number] to bandaid  4. If Band-Aid needs to be changed more than once a day. Apply iodesorb gel #[id number] to adaptic #056117 and cover with Mepilex #[id number]    # Polysubstance abuse:   - will get U.Tox       Diet: NPO  for Medical/Clinical Reasons Except for: No Exceptions    DVT Prophylaxis: Pneumatic Compression Devices  Cortez Catheter: Not present  Central Lines: None  Cardiac Monitoring: None  Code Status:   Full     Clinically Significant Risk Factors Present on Admission                 # Hypertension: home medication list includes antihypertensive(s)    # Hypertension: home medication list includes antihypertensive(s)        Disposition Plan    2-3 days     The patient's care was discussed with the Bedside Nurse, Patient and ED Team.    Quinn Monge MD, MD  Hospitalist Service  [facility]  Securely message with the Vocera Web Console (learn more here)  Text page via [employer] Paging/Directory     ______________________________________________________________________    Chief Complaint   Left sided weakness, slurred speech     History is obtained from the patient  Review of EMR and D/W ED MD     History of Present Illness   Josue Parisi is a 52 year old male with history of diabetes mellitus type 2, hypertension, CVA, polysubstance abuse was admitted from 06/18-06/24 at [facility] and left AMA this afternoon.  Now he is being admitted on 6/24/2022 with worsening left weakness and slurred speech and found to have new ischemic infarct.     As per him when he left the hospital and went home.  He could not get out of the car but somehow moved into his car and drove to his mom will apparently he noticed increasing weakness on the left side with some slurred speech so presented to the emergency department.  In the ED he had work-up for stroke and found to have new stroke    Seen in the stabilization room: Eating Chipotle  Denies Chest pain/ SOB/ cough, Denies any N&V/ bowel problems  Denies urinary problems , Denies fever/chills/rigors     Says usually smokes, drinks and uses cocaine but last use was 1 week ago.      Review of Systems    The 10 point Review of Systems is negative other  than noted in the HPI or here.     Past Medical History    I have reviewed this patient's medical history and updated it with pertinent information if needed.   Past Medical History:   Diagnosis Date     CVA (cerebral infarction)      Hypertension        Past Surgical History   I have reviewed this patient's surgical history and updated it with pertinent information if needed.  No past surgical history on file.    Social History   I have reviewed this patient's social history and updated it with pertinent information if needed.  Social History     Tobacco Use     Smoking status: Current Every Day Smoker     Packs/day: 0.50   Substance Use Topics     Alcohol use: No     Drug use: No       Family History     No significant family history, pertinent to this admission    Prior to Admission Medications   Prior to Admission Medications   Prescriptions Last Dose Informant Patient Reported? Taking?   ALPRAZolam (XANAX) 1 MG tablet   Yes No   Sig: Take 1 mg by mouth nightly as needed for anxiety   Lancets 30G MISC   No No   Si 30 gauge lancets (substitute as needed)   aspirin (ASA) 325 MG tablet   No No   Sig: Take 1 tablet (325 mg) by mouth daily   atorvastatin (LIPITOR) 80 MG tablet   No No   Sig: Take 1 tablet (80 mg) by mouth daily   blood glucose (NO BRAND SPECIFIED) test strip   No No   Sig: Use to test blood sugar 1 times daily or as directed.   blood glucose monitoring (NO BRAND SPECIFIED) meter device kit   No No   Sig: Use to test blood sugar 1 times daily, first thing in the morning.   cetirizine (ZYRTEC) 10 MG tablet   Yes No   Sig: Take 10 mg by mouth daily   chlorhexidine (HIBICLENS) 4 % liquid   Yes No   Sig: Apply topically daily as needed for wound care   gabapentin (NEURONTIN) 300 MG capsule   Yes No   Sig: Take 300 mg by mouth 3 times daily   insulin glargine (LANTUS PEN) 100 UNIT/ML pen   No No   Sig: Inject 15 Units Subcutaneous At Bedtime   lisinopril-hydrochlorothiazide (ZESTORETIC) 20-25 MG  tablet   No No   Sig: Take 1 tablet by mouth daily   metFORMIN (GLUCOPHAGE) 1000 MG tablet   Yes No   Sig: Take 1,000 mg by mouth 2 times daily (with meals)   sodium chloride (OCEAN) 0.65 % nasal spray   Yes No   Sig: Spray 2 sprays in nostril every 2 hours as needed for congestion      Facility-Administered Medications: None     Allergies   No Known Allergies    Physical Exam   BP (!) 159/102   Pulse 90   Resp 10   SpO2 98%   Gen- pleasant  lying in bed  HEENT- NAD, CALIXTO  Neck- supple, no JVD elevation, no thyromegaly  CVS- I+II+ no m/r/g  RS- CTAB  Abdo- soft, no tenderness . No g/r/r  Ext- no edema   CNS- Left sided weakness (details in stroke neuro note)  Oriented to person/ place and time.     Data   Data reviewed today: I reviewed all medications, new labs and imaging results over the last 24 hours. I personally reviewed the brain MRI image(s) showing as below.    Recent Results (from the past 24 hour(s))   CT Head w/o Contrast    Narrative    EXAM: CT HEAD W/O CONTRAST  LOCATION: [facility]  DATE/TIME: 6/24/2022 5:15 PM    INDICATION: Neuro deficit, left-sided weakness.  COMPARISON: Head CT 3/19/2022, brain MRI 3/19/2022.  TECHNIQUE: Routine CT Head without IV contrast. Multiplanar reformats. Dose reduction techniques were used.    FINDINGS:  INTRACRANIAL CONTENTS: No hemorrhage. Areas of chronic encephalomalacia in the frontal lobes unchanged versus the prior CT. There is an area of subcortical low attenuation within the posterior right frontal lobe (axial image 27) that is new from the   prior CT and MRI and could represent a recent infarct. Other areas of low attenuation within the parasagittal subcortical white matter are new from the prior CT and correspond to the infarcts that were acute at the time of the prior MRI. Mild generalized   volume loss. No hydrocephalus.     VISUALIZED ORBITS/SINUSES/MASTOIDS: No intraorbital abnormality. Shallow fluid level left maxillary  sinus. No middle ear or mastoid effusion.    BONES/SOFT TISSUES: No acute abnormality.      Impression    IMPRESSION:  1.  There is a small area of low attenuation within the subcortical white matter of the mid right precentral gyrus that is new versus the prior MRI. Presumably this reflects ischemic change. It is suspected to be subacute or older in age, but is not   specific on CT. Consider further evaluation with MRI. No definite acute infarct by CT.  2.  Other areas of ischemic injury are not changed versus the 3/19/2020 comparison exams.  3.  No hemorrhage.    Findings were discussed with Dr. Roxanne Mims via telephone at 1734 hours on 6/24/2022.   CTA Head Neck w Contrast    Narrative    HEAD AND NECK CT ANGIOGRAM WITH IV CONTRAST  6/24/2022 5:15 PM    INDICATION: Neuro deficit, left-sided weakness.  TECHNIQUE: Head and neck CT angiogram with IV contrast. CT images of the head and neck vessels obtained during the arterial phase of intravenous contrast administration. Axial helical 2D reconstructed images and multiplanar 3D MIP reconstructed images of   the head and neck vessels were performed by the technologist. Dose reduction techniques were used.  CONTRAST: 75mL Isovue 370  COMPARISON: Head and neck CTA 3/20/2022.     FINDINGS:  HEAD CTA: There is relatively abrupt termination of the M1 segment of the right middle cerebral artery that represents a change versus prior, when this segment was seen to be severely stenotic. Beyond this, contrast does opacify M2 branches to a similar   degree as on prior. As on the prior, the proximal intracranial segment of the right internal carotid artery is occluded and reconstitutes at the level of the optic strut via a prominent right posterior communicating artery and P1 segment. Mild left   carotid siphon atherosclerosis. Small left and hypoplastic right A1 segments with severe attenuation of the A2 segments of the anterior cerebral arteries unchanged versus prior.    NECK  CTA: Three vessel arch. The right internal carotid artery is chronically occluded shortly after its origin, unchanged versus prior. No hemodynamically significant narrowing in the left carotid artery. Patent vertebral arteries. No dissection.      Impression    CONCLUSION:  HEAD CTA:  1.  Abrupt termination of the mid M1 segment of the right middle cerebral artery is a change versus prior. This is favored to reflect acute thromboembolism. Stenosis progression since the previous exam is the other primary consideration.  2.  Relatively unchanged degree of filling of more distal right middle cerebral artery branches.  3.  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    NECK CTA:  1.  Chronic occlusion of the proximal right internal carotid artery, unchanged versus prior.  2.  Otherwise negative neck CTA.    Findings were discussed with Dr. Roxanne Mims via telephone at 1734 hours on 6/24/2022.   CT Head Perfusion w Contrast    Narrative    CT BRAIN PERFUSION 6/24/2022 5:36 PM    COMPARISON: Head and neck CTA 6/24/2022    HISTORY: Neurological deficit. Left-sided weakness with right M1  segment middle cerebral artery occlusion.    TECHNIQUE: Time sequential axial CT images of the head were acquired  during the administration of intravenous contrast (50mL Isovue-370).  CTA images of the [place] as well as color perfusion maps of  the brain were created from this time sequential axial source data.    FINDINGS: There are areas of elevated Tmax and mean transit time  throughout the right middle cerebral artery territory. Generally  preserved relative cerebral blood volume. There is some diminished  relative cerebral blood flow within the deep white matter on the  quantitative images that is not readily evident on the qualitative  images.    RAPID analysis indicates a volume of approximately 147 milliliters  of  Tmax greater than 6 seconds. Most of this is throughout the right  middle cerebral artery territory with a smaller volume in the anterior  inferior paramedian left frontal lobe zone of encephalomalacia. There  are no areas of cerebral blood flow less than 30% indicating a  markedly elevated mismatch ratio.      Impression    IMPRESSION: Perfusion abnormality in the right middle cerebral artery  territory indicating a large area of ischemic penumbra without  significant infarct core.    Radiation dose for this scan was reduced using automated exposure  control, adjustment of the mA and/or kV according to patient size, or  iterative reconstruction technique    KIM COLEY MD         SYSTEM ID:  [id number]   MR Brain w/o Contrast    Narrative    EXAM: MR BRAIN W/O CONTRAST  LOCATION: [facility]  DATE/TIME: 6/24/2022 6:24 PM    INDICATION: Hyperacute for new RMCA infarct  COMPARISON: 6/24/2022 head CT, CT angiogram, 3/19/2022 brain MRI  TECHNIQUE: Routine multiplanar multisequence head MRI without intravenous contrast.    FINDINGS:  INTRACRANIAL CONTENTS: Multiple patchy areas of acute infarction within the right MCA territory characterized by hyperintense signal on the diffusion-weighted sequence with corresponding hypointense signal on the ADC map, located within the right   precentral gyrus and the deep watershed territory of the right centrum semiovale and corona radiata. No associated hemorrhage. Patchy areas of cortical and subcortical FLAIR hyperintensity within the right cerebral hemisphere consistent with sequela of   prior infarction. Chronic lacunar infarction within the left basal ganglia and the superolateral aspect of the left thalamus. Paramedian left frontal and cingulate encephalomalacia. Small chronic infarction within the left cerebellum. Mild brain atrophy.   No hydrocephalus.    SELLA: No abnormality accounting for technique.    OSSEOUS STRUCTURES/SOFT TISSUES:  Normal marrow signal. Absence of the right ICA flow void consistent with chronic occlusion.     ORBITS: No abnormality accounting for technique.     SINUSES/MASTOIDS: Left maxillary sinus opacification with fluid level. No middle ear or mastoid effusion.       Impression    IMPRESSION:  1.  New nonhemorrhagic infarction within the right prefrontal gyrus and deep MCA watershed infarct within the right centrum semiovale of the right frontal and parietal lobes.  2.  Chronic sequela of prior infarction as described.  3.  Left maxillary sinus fluid level. Correlate for rhinosinusitis.     BMPRecent Labs   Lab 06/24/22  1805 06/24/22  0852 06/24/22  0737 06/24/22  0128 06/19/22  0320 06/18/22  2150     --   --   --   --  138   POTASSIUM 3.8  --   --   --   --  3.8   CHLORIDE 99  --   --   --   --  105   DAT 8.8  --   --   --   --  9.7   CO2 28  --   --   --   --  26   BUN 26  --   --   --   --  13   CR 0.75  --   --   --   --  0.66   * 143* 154* 179*   < > 345*    < > = values in this interval not displayed.     CBC  Recent Labs   Lab 06/24/22 1805 06/18/22 2150   WBC 13.6* 10.5   RBC 3.67* 3.99*   HGB 11.4* 12.4*   HCT 36.1* 38.3*   MCV 98 96   MCH 31.1 31.1   MCHC 31.6 32.4   RDW 12.5 12.0    395     INR  Recent Labs   Lab 06/24/22  1805   INR 1.08     LFTsNo lab results found in last 7 days.   PANCNo lab results found in last 7 days.

## 2022-06-25 NOTE — PHARMACY-ADMISSION MEDICATION HISTORY
Pharmacy Medication History  Admission medication history interview status for the 6/24/2022  admission is complete. See EPIC admission navigator for prior to admission medications     Location of Interview: Patient room  Medication history sources: Patient and MAR ([facility])    Additional medication history information:    Patient discharged from [facility] at 1200 today. All AM medications given there. Pt did not take anything between discharge and admission here.    Plavix: last dose of 75 mg Plavix was at [facility] 6/23. Was decided due to non-compliance with DAPT pt will be moved to high dose aspirin only.     Medication reconciliation completed by provider prior to medication history? No    Time spent in this activity: 15 minutes     Prior to Admission medications    Medication Sig Last Dose Taking? Auth Provider Long Term End Date   ALPRAZolam (XANAX) 1 MG tablet Take 1 mg by mouth nightly as needed for anxiety  Yes Unknown, Entered By History     aspirin (ASA) 325 MG tablet Take 1 tablet (325 mg) by mouth daily 6/24/2022 at am Yes Israel Neal MD     atorvastatin (LIPITOR) 80 MG tablet Take 1 tablet (80 mg) by mouth daily 6/24/2022 at am Yes Israel Neal MD Yes    cetirizine (ZYRTEC) 10 MG tablet Take 10 mg by mouth daily  Yes Unknown, Entered By History     chlorhexidine (HIBICLENS) 4 % liquid Apply topically daily as needed for wound care  Yes Unknown, Entered By History     gabapentin (NEURONTIN) 300 MG capsule Take 300 mg by mouth 3 times daily 6/24/2022 at x1 Yes Unknown, Entered By History Yes    insulin glargine (LANTUS PEN) 100 UNIT/ML pen Inject 15 Units Subcutaneous At Bedtime 6/23/2022 at hs Yes Betsy Perkins PA-C Yes    lisinopril-hydrochlorothiazide (ZESTORETIC) 20-25 MG tablet Take 1 tablet by mouth daily 6/24/2022 at am Yes Betsy Perkins PA-C Yes    metFORMIN (GLUCOPHAGE) 1000 MG tablet Take 1,000 mg by mouth 2 times daily (with meals) 6/24/2022 at am Yes  Unknown, Entered By History     sodium chloride (OCEAN) 0.65 % nasal spray Spray 2 sprays in nostril every 2 hours as needed for congestion  Yes Unknown, Entered By History     blood glucose (NO BRAND SPECIFIED) test strip Use to test blood sugar 1 times daily or as directed.   Betsy Perkins PA-C     blood glucose monitoring (NO BRAND SPECIFIED) meter device kit Use to test blood sugar 1 times daily, first thing in the morning.   Betsy Perkins PA-C     Lancets 30G MISC 120 30 gauge lancets (substitute as needed)   Betsy Perkins PA-C     clopidogrel (PLAVIX) 75 MG tablet Take 1 tablet (75 mg) by mouth daily Take for 90 days, then stop   Israel Neal MD Yes 6/24/22   hydrochlorothiazide (HYDRODIURIL) 25 MG tablet Take 25 mg by mouth daily   Unknown, Entered By History Yes 6/24/22       The information provided in this note is only as accurate as the sources available at the time of update(s)     Mary Shea, PharmD, BCCCP

## 2022-06-25 NOTE — PROVIDER NOTIFICATION
Notified MD at 1718  PM  regarding pain sensory/nerve pain Left side of body.  Tylenol requested, and Nicotine patch request. .      Spoke with: Text page Dr. Giordano    Orders were obtained.    Comments: None

## 2022-06-25 NOTE — PROVIDER NOTIFICATION
MD Notification    Notified Person: MD    Notified Person Name: Lili    Notification Date/Time: 6/25 9982    Notification Interaction:Paged    Purpose of Notification:Pt's SBP is 118, Per Neuros note, Permissive HTN (goal 140-180). Do you want to start fluids or continue to monitor?    Orders Received: 1L bolus ordered    Comments:

## 2022-06-25 NOTE — PHARMACY-CONSULT NOTE
Pharmacy Consult to evaluate for medication related stroke core measures    Josue Parisi, 52 year old male admitted for new RMCA infarct partially in watershed distribution on 6/24/2022.    Thrombolytic was not given because of Time from onset contraindications    VTE Prophylaxis SCDs /PCDs placed on 6/24/2022, as appropriate prior to end of hospital day 2.    Antithrombotic: aspirin and clopidogrel started on 6/24/2022, as appropriate by end of hospital day 2. Continue antithrombotic therapy on discharge to meet quality measures, unless contraindicated.    Anticoagulation if history of A-fib/flutter: Patient does not have history of A-fib/flutter - anticoagulation not required for medication related stroke core measures.     LDL Cholesterol Calculated   Date Value Ref Range Status   06/24/2022 53 <=100 mg/dL Final       Patient's home statin, Lipitor (atorvastatin) restarted; continue statin on discharge to meet quality measures, unless contraindicated.     Recommendations: None at this time    Thank you for the consult.    Shruthi Ramos RPH, PharmD, BCPS, 6/25/2022 1:09 PM

## 2022-06-25 NOTE — PROVIDER NOTIFICATION
Discussed with neurologist about this patient's neuro checks, they agreed he can be q4 given that he was stable overnight.

## 2022-06-25 NOTE — PROGRESS NOTES
[facility]    Hospitalist Progress Note    Interval History     Patient is awake and alert.  Complaining of significant discomfort on the left side of his body.  Very frustrated that he keeps having recurrent strokes despite being on appropriate medications.  Denies any headache, nausea, blurred vision or any other complaints.  -Data reviewed today: I reviewed all new labs and imaging results over the last 24 hours. I personally reviewed no images or EKG's today.    Physical Exam   Temp: 99.2  F (37.3  C) Temp src: Oral BP: (!) 151/76 Pulse: 96   Resp: 19 SpO2: 97 % O2 Device: None (Room air)    There were no vitals filed for this visit.  Vital Signs with Ranges  Temp:  [98  F (36.7  C)-99.2  F (37.3  C)] 99.2  F (37.3  C)  Pulse:  [] 96  Resp:  [10-19] 19  BP: (118-174)/() 151/76  SpO2:  [97 %-100 %] 97 %  I/O last 3 completed shifts:  In: 1000 [IV Piggyback:1000]  Out: -     Physical Exam  Constitutional:       Appearance: Normal appearance.   HENT:      Head: Normocephalic.   Eyes:      Pupils: Pupils are equal, round, and reactive to light.   Cardiovascular:      Rate and Rhythm: Normal rate and regular rhythm.      Pulses: Normal pulses.      Heart sounds: Normal heart sounds.   Pulmonary:      Effort: Pulmonary effort is normal. No respiratory distress.      Breath sounds: Normal breath sounds.   Abdominal:      General: Abdomen is flat. Bowel sounds are normal. There is no distension.      Tenderness: There is no abdominal tenderness. There is no guarding.   Musculoskeletal:         General: Normal range of motion.      Cervical back: Normal range of motion.   Skin:     General: Skin is warm and dry.   Neurological:      General: No focal deficit present.      Comments: Left upper and lower extremity numbness and paresthesias with 3 x 5 strength in left upper extremity and 2 x 5 strength in lower extremity.   Psychiatric:         Mood and Affect: Mood normal.            Medications     - MEDICATION INSTRUCTIONS -       - MEDICATION INSTRUCTIONS -         sodium chloride 0.9%  1,000 mL Intravenous Once     aspirin  325 mg Oral Daily     atorvastatin  80 mg Oral Daily     cetirizine  10 mg Oral Daily     clopidogrel  75 mg Oral or NG Tube Daily     gabapentin  300 mg Oral TID     insulin aspart  1-7 Units Subcutaneous TID AC     insulin aspart  1-5 Units Subcutaneous At Bedtime     insulin glargine  10 Units Subcutaneous At Bedtime     sodium chloride (PF)  3 mL Intracatheter Q8H       Data   Recent Labs   Lab 06/25/22  1129 06/25/22  0718 06/25/22  0322 06/24/22  2232 06/24/22  1805 06/19/22  0320 06/18/22  2150   WBC 12.6*  --   --   --  13.6*  --  10.5   HGB 11.6*  --   --   --  11.4*  --  12.4*   MCV 98  --   --   --  98  --  96     --   --   --  390  --  395   INR  --   --   --   --  1.08  --   --      --   --   --  136  --  138   POTASSIUM 3.9  --   --   --  3.8  --  3.8   CHLORIDE 104  --   --   --  99  --  105   CO2 28  --   --   --  28  --  26   BUN 18  --   --   --  26  --  13   CR 0.61*  --   --   --  0.75  --  0.66   ANIONGAP 5  --   --   --  9  --  7   DAT 9.6  --   --   --  8.8  --  9.7   * 182* 217*   < > 138*   < > 345*    < > = values in this interval not displayed.       Recent Results (from the past 24 hour(s))   CT Head w/o Contrast    Narrative    EXAM: CT HEAD W/O CONTRAST  LOCATION: [facility]  DATE/TIME: 6/24/2022 5:15 PM    INDICATION: Neuro deficit, left-sided weakness.  COMPARISON: Head CT 3/19/2022, brain MRI 3/19/2022.  TECHNIQUE: Routine CT Head without IV contrast. Multiplanar reformats. Dose reduction techniques were used.    FINDINGS:  INTRACRANIAL CONTENTS: No hemorrhage. Areas of chronic encephalomalacia in the frontal lobes unchanged versus the prior CT. There is an area of subcortical low attenuation within the posterior right frontal lobe (axial image 27) that is new from the   prior CT and  MRI and could represent a recent infarct. Other areas of low attenuation within the parasagittal subcortical white matter are new from the prior CT and correspond to the infarcts that were acute at the time of the prior MRI. Mild generalized   volume loss. No hydrocephalus.     VISUALIZED ORBITS/SINUSES/MASTOIDS: No intraorbital abnormality. Shallow fluid level left maxillary sinus. No middle ear or mastoid effusion.    BONES/SOFT TISSUES: No acute abnormality.      Impression    IMPRESSION:  1.  There is a small area of low attenuation within the subcortical white matter of the mid right precentral gyrus that is new versus the prior MRI. Presumably this reflects ischemic change. It is suspected to be subacute or older in age, but is not   specific on CT. Consider further evaluation with MRI. No definite acute infarct by CT.  2.  Other areas of ischemic injury are not changed versus the 3/19/2020 comparison exams.  3.  No hemorrhage.    Findings were discussed with Dr. Roxanne Mims via telephone at 1734 hours on 6/24/2022.   CTA Head Neck w Contrast    Narrative    HEAD AND NECK CT ANGIOGRAM WITH IV CONTRAST  6/24/2022 5:15 PM    INDICATION: Neuro deficit, left-sided weakness.  TECHNIQUE: Head and neck CT angiogram with IV contrast. CT images of the head and neck vessels obtained during the arterial phase of intravenous contrast administration. Axial helical 2D reconstructed images and multiplanar 3D MIP reconstructed images of   the head and neck vessels were performed by the technologist. Dose reduction techniques were used.  CONTRAST: 75mL Isovue 370  COMPARISON: Head and neck CTA 3/20/2022.     FINDINGS:  HEAD CTA: There is relatively abrupt termination of the M1 segment of the right middle cerebral artery that represents a change versus prior, when this segment was seen to be severely stenotic. Beyond this, contrast does opacify M2 branches to a similar   degree as on prior. As on the prior, the proximal  intracranial segment of the right internal carotid artery is occluded and reconstitutes at the level of the optic strut via a prominent right posterior communicating artery and P1 segment. Mild left   carotid siphon atherosclerosis. Small left and hypoplastic right A1 segments with severe attenuation of the A2 segments of the anterior cerebral arteries unchanged versus prior.    NECK CTA: Three vessel arch. The right internal carotid artery is chronically occluded shortly after its origin, unchanged versus prior. No hemodynamically significant narrowing in the left carotid artery. Patent vertebral arteries. No dissection.      Impression    CONCLUSION:  HEAD CTA:  1.  Abrupt termination of the mid M1 segment of the right middle cerebral artery is a change versus prior. This is favored to reflect acute thromboembolism. Stenosis progression since the previous exam is the other primary consideration.  2.  Relatively unchanged degree of filling of more distal right middle cerebral artery branches.  3.  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    NECK CTA:  1.  Chronic occlusion of the proximal right internal carotid artery, unchanged versus prior.  2.  Otherwise negative neck CTA.    Findings were discussed with Dr. Roxanne Mims via telephone at 1734 hours on 6/24/2022.   CT Head Perfusion w Contrast    Narrative    CT BRAIN PERFUSION 6/24/2022 5:36 PM    COMPARISON: Head and neck CTA 6/24/2022    HISTORY: Neurological deficit. Left-sided weakness with right M1  segment middle cerebral artery occlusion.    TECHNIQUE: Time sequential axial CT images of the head were acquired  during the administration of intravenous contrast (50mL Isovue-370).  CTA images of the [place] as well as color perfusion maps of  the brain were created from this time sequential axial source data.    FINDINGS: There are areas  of elevated Tmax and mean transit time  throughout the right middle cerebral artery territory. Generally  preserved relative cerebral blood volume. There is some diminished  relative cerebral blood flow within the deep white matter on the  quantitative images that is not readily evident on the qualitative  images.    RAPID analysis indicates a volume of approximately 147 milliliters of  Tmax greater than 6 seconds. Most of this is throughout the right  middle cerebral artery territory with a smaller volume in the anterior  inferior paramedian left frontal lobe zone of encephalomalacia. There  are no areas of cerebral blood flow less than 30% indicating a  markedly elevated mismatch ratio.      Impression    IMPRESSION: Perfusion abnormality in the right middle cerebral artery  territory indicating a large area of ischemic penumbra without  significant infarct core.    Radiation dose for this scan was reduced using automated exposure  control, adjustment of the mA and/or kV according to patient size, or  iterative reconstruction technique    KIM COLEY MD         SYSTEM ID:  [id number]   MR Brain w/o Contrast    Narrative    EXAM: MR BRAIN W/O CONTRAST  LOCATION: [facility]  DATE/TIME: 6/24/2022 6:24 PM    INDICATION: Hyperacute for new RMCA infarct  COMPARISON: 6/24/2022 head CT, CT angiogram, 3/19/2022 brain MRI  TECHNIQUE: Routine multiplanar multisequence head MRI without intravenous contrast.    FINDINGS:  INTRACRANIAL CONTENTS: Multiple patchy areas of acute infarction within the right MCA territory characterized by hyperintense signal on the diffusion-weighted sequence with corresponding hypointense signal on the ADC map, located within the right   precentral gyrus and the deep watershed territory of the right centrum semiovale and corona radiata. No associated hemorrhage. Patchy areas of cortical and subcortical FLAIR hyperintensity within the right cerebral hemisphere  consistent with sequela of   prior infarction. Chronic lacunar infarction within the left basal ganglia and the superolateral aspect of the left thalamus. Paramedian left frontal and cingulate encephalomalacia. Small chronic infarction within the left cerebellum. Mild brain atrophy.   No hydrocephalus.    SELLA: No abnormality accounting for technique.    OSSEOUS STRUCTURES/SOFT TISSUES: Normal marrow signal. Absence of the right ICA flow void consistent with chronic occlusion.     ORBITS: No abnormality accounting for technique.     SINUSES/MASTOIDS: Left maxillary sinus opacification with fluid level. No middle ear or mastoid effusion.       Impression    IMPRESSION:  1.  New nonhemorrhagic infarction within the right prefrontal gyrus and deep MCA watershed infarct within the right centrum semiovale of the right frontal and parietal lobes.  2.  Chronic sequela of prior infarction as described.  3.  Left maxillary sinus fluid level. Correlate for rhinosinusitis.         Assessment & Plan      Josue Parisi is a 52 year old male with a PMHx significant for polysubstance abuse, cocaine use, HTN, and recent right KWADWO CVA 3/2022 (large territory defect) with residual left-sided weakness, who was admitted on 6/18/2022 at [facility] with failure to thrive at home. At the time of his initial stroke, ARU was recommended but pt refused and discharged home ama. Able to mobilize at home with walker but was getting weaker.  He was initially admitted on 6/18 under observation for weakness with a plan for placement for progressive weakness.  However he was discharged on 6/24/2022 when no place was found and he insisted on going home.  Outpatient rehab orders were placed.  He then was brought back in to [facility] later in the evening on 6/24/2022 for new onset significant worsening of left-sided weakness with paresthesias and numbness that is worse than before.  He does not have any meaningful  function of his left hand. He notes increased urinary frequency and often times is not able to make it to the bathroom on time, for urine and/or stool.     #  New nonhemorrhagic infarction within the right prefrontal gyrus and deep MCA watershed infarct within the right centrum semiovale of the right frontal and parietal lobes.   #previous left sided weakness due to Ischemic CVA  -Appreciate stroke team review  -Admit for neuro checks and vital signs every 2 hours, telemetry, blood glucose monitoring and permissive hypertension (goal -180 stop home antihypertensives, fluids, pressers if needed)   -Continue aspirin 325 mg, Plavix 75 mg p.o. daily, atorvastatin 40 mg ( A/w- P2Y1Y2 plast assay )  -PT/OT/SLP/stroke education  - bedside swallow cleared by RN and eating chipotle in Stabilization room  -CT angio head and neck shows new right MCA occlusion with infarct partially in the watershed distribution with large penumbra concerning for hypoperfusion and potential for larger right-sided ICA territory infarct.  Allow for permissive hypertension with systolic blood pressure 140.  Plan on repeating a CT perfusion study to recheck perfusion after maintaining blood pressure was 140 overnight.     #Type 2 diabetes mellitus.  Last HbA1c of 7.8 on 06/18   -Hold metformin.  On Lantus 10 units with sliding scale insulin aspart.  -Need close PCP monitoring as non compliance is an issue     #History of hypertension   -Hold PTA meds to allow permissive hypertension     #Hidradenitis / Rt axillary wound  - WOCN consult previous recs:   Right axilla wound: Daily  and PRN   1. Cleanse wound with NS or wound cleanser (omit this step if patient cleans wound in shower)  2. Dry and protect surrounding skin with no sting barrier film wipe #[id number]  3. Apply a small amount of iodesorb gel #[id number] to bandaid  4. If Band-Aid needs to be changed more than once a day. Apply iodesorb gel #[id number] to adaptic #[id number] and cover with Mepilex  #[id number]     # Polysubstance abuse:   -Urine tox screen positive for THC.     DVT Prophylaxis: Pneumatic Compression Devices  Code Status: Full Code  Disposition: Will need placement once medically cleared.      Julieta Giordano MD, MD  [phone](p)  [phone]( c)

## 2022-06-25 NOTE — ED NOTES
"[facility]  ED Nurse Handoff Report    ED Chief complaint: Stroke Symptoms (Discharged from [facility] at noon today for same symptoms.  Now presents with possibly increased weakness of left side and increased loss of fine motor)      ED Diagnosis:   Final diagnoses:   Acute CVA (cerebrovascular accident) (H)       Code Status: Full Code    Allergies: No Known Allergies    Patient Story: Left AMA from [facility] this morning. After getting home, L sided deficits got progressively worst.  Focused Assessment:  Alert and oriented x4. In room air. 18G in L Lower arm. Uses a cane at baseline. Sinus rhythm. Plan to keep SBP>140. Symptoms have improved. Currently having 3/5 strength in L upper and lower extremities, with decreased sensation. Of note, patient has made comments such as \"I don't want to be around anymore.\" Patient denies feeling suicidal and explains that he is frustrated at his new baseline with his L sided deficits. Hx of Cocaine and alcohol use.    Labs Ordered and Resulted from Time of ED Arrival to Time of ED Departure   BASIC METABOLIC PANEL - Abnormal       Result Value    Sodium 136      Potassium 3.8      Chloride 99      Carbon Dioxide (CO2) 28      Anion Gap 9      Urea Nitrogen 26      Creatinine 0.75      Calcium 8.8      Glucose 138 (*)     GFR Estimate >90     CBC WITH PLATELETS AND DIFFERENTIAL - Abnormal    WBC Count 13.6 (*)     RBC Count 3.67 (*)     Hemoglobin 11.4 (*)     Hematocrit 36.1 (*)     MCV 98      MCH 31.1      MCHC 31.6      RDW 12.5      Platelet Count 390      % Neutrophils 68      % Lymphocytes 20      % Monocytes 7      % Eosinophils 4      % Basophils 0      % Immature Granulocytes 1      NRBCs per 100 WBC 0      Absolute Neutrophils 9.4 (*)     Absolute Lymphocytes 2.7      Absolute Monocytes 0.9      Absolute Eosinophils 0.5      Absolute Basophils 0.1      Absolute Immature Granulocytes 0.1      Absolute NRBCs 0.0     INR - Normal    INR 1.08     PARTIAL " THROMBOPLASTIN TIME - Normal    aPTT 28     TROPONIN I - Normal    Troponin I High Sensitivity 4     GLUCOSE MONITOR NURSING POCT   DRUG ABUSE SCREEN 77 URINE (FL, RH, SH)     MR Brain w/o Contrast   Final Result   IMPRESSION:   1.  New nonhemorrhagic infarction within the right prefrontal gyrus and deep MCA watershed infarct within the right centrum semiovale of the right frontal and parietal lobes.   2.  Chronic sequela of prior infarction as described.   3.  Left maxillary sinus fluid level. Correlate for rhinosinusitis.      CT Head Perfusion w Contrast   Final Result   IMPRESSION: Perfusion abnormality in the right middle cerebral artery   territory indicating a large area of ischemic penumbra without   significant infarct core.      Radiation dose for this scan was reduced using automated exposure   control, adjustment of the mA and/or kV according to patient size, or   iterative reconstruction technique      KIM COLEY MD            SYSTEM ID:  [id number]      CTA Head Neck w Contrast   Final Result   CONCLUSION:   HEAD CTA:   1.  Abrupt termination of the mid M1 segment of the right middle cerebral artery is a change versus prior. This is favored to reflect acute thromboembolism. Stenosis progression since the previous exam is the other primary consideration.   2.  Relatively unchanged degree of filling of more distal right middle cerebral artery branches.   3.  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      NECK CTA:   1.  Chronic occlusion of the proximal right internal carotid artery, unchanged versus prior.   2.  Otherwise negative neck CTA.      Findings were discussed with Dr. Roxanne Mims via telephone at 1734 hours on 6/24/2022.      CT Head w/o Contrast   Final Result   IMPRESSION:   1.  There is a small area of low attenuation within the subcortical white matter of the mid right  precentral gyrus that is new versus the prior MRI. Presumably this reflects ischemic change. It is suspected to be subacute or older in age, but is not    specific on CT. Consider further evaluation with MRI. No definite acute infarct by CT.   2.  Other areas of ischemic injury are not changed versus the 3/19/2020 comparison exams.   3.  No hemorrhage.      Findings were discussed with Dr. Roxanne Mims via telephone at 1734 hours on 6/24/2022.            Treatments and/or interventions provided: NS, monitoirng  Patient's response to treatments and/or interventions: Tolerated well    To be done/followed up on inpatient unit:  Continue with plan of care per admitting MD.      Does this patient have any cognitive concerns?: N/A    Activity level - Baseline/Home:  Cane  Activity Level - Current:   Total Care    Patient's Preferred language: English   Needed?: No    Isolation: None  Infection: Not Applicable  Patient tested for COVID 19 prior to admission: YES  Bariatric?: No    Vital Signs:   Vitals:    06/24/22 1915 06/24/22 1930 06/24/22 1945 06/24/22 2000   BP: 139/85 (!) 154/87 (!) 154/87 (!) 159/102   Pulse: 84 96 101 90   Resp: 13 17 12 10   SpO2:           Cardiac Rhythm:     Was the PSS-3 completed:   Yes  What interventions are required if any?               Family Comments: No family at bedside  OBS brochure/video discussed/provided to patient/family: N/A                 For the majority of the shift this patient's behavior was Yellow.   Behavioral interventions performed were reassurance, setting boundaries, and negotiation.    ED NURSE PHONE NUMBER: [id number]

## 2022-06-25 NOTE — CONSULTS
"      [facility]    Stroke Consult Note    Reason for Consult:  New RMCA stroke    Chief Complaint: L weakness     HPI    I was called by Roxanne Mims on 06/24/22 regarding patient Josue Parisi. The patient is a 52 year old male w hx of HTN, DM with med non-compliance, cocaine use, R KWADWO infarct 3/2022 from intracranial athero with residual L weakness, here with worsening L weakness.     At time of stroke asked to go to ARU but refused left AMA, 6/18-today was admitted to [facility] for failure to thrive, worsening L weakness thought to be recrudescence 2/2 poor glycemic control from non compliance with DM meds, also non compliant with DAPT, was being placed in TCU but left AMA again. Present to [facility] same day after mother said he has more weak L side. LKN unclear, per mother \"this morning or last night\".    Stroke Evaluation Summarized    MRI/Head CT MRI Brain    1.  New nonhemorrhagic infarction within the right prefrontal gyrus and deep MCA watershed infarct within the right centrum semiovale of the right frontal and parietal lobes.  2.  Chronic sequela of prior infarction as described.  3.  Left maxillary sinus fluid level. Correlate for rhinosinusitis.    CTH  1.  There is a small area of low attenuation within the subcortical white matter of the mid right precentral gyrus that is new versus the prior MRI. Presumably this reflects ischemic change. It is suspected to be subacute or older in age, but is not   specific on CT. Consider further evaluation with MRI. No definite acute infarct by CT.  2.  Other areas of ischemic injury are not changed versus the 3/19/2020 comparison exams.  3.  No hemorrhage.    CTP 6/24    IMPRESSION: Perfusion abnormality in the right middle cerebral artery territory indicating a large area of ischemic penumbra without significant infarct core.      Intracranial Vasculature CTA H    1.  Abrupt termination of the mid M1 segment of the right middle " cerebral artery is a change versus prior. This is favored to reflect acute thromboembolism. Stenosis progression since the previous exam is the other primary consideration.  2.  Relatively unchanged degree of filling of more distal right middle cerebral artery branches.  3.  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   Cervical Vasculature CTA N    1.  Chronic occlusion of the proximal right internal carotid artery, unchanged versus prior.  2.  Otherwise negative neck CTA.     Echocardiogram Pending   EKG/Telemetry SR   Other Testing P2Y12 is 1 - questionable result     LDL  6/24/2022: 53 mg/dL   A1C  6/18/2022: 7.8 %   Troponin No lab value available in past 48 hrs     Impression    51yo man with chronic R ICA occlusion, R KWADWO infarct comes in for worsening L weakness.     Vessel imaging shows new from 3/2022 image R MCA occlusion, MRI brain shows scattered new RMCA infarct partially in watershed distribution, large penumbra concerning for hypoperfusion and potential for larger R ICA territory infarct. Patient stable after night of maintaining bp over 140, will repeat CTP to check perfusion.     Recommendations    - Repeat perfusion with BP >140    - Neurochecks and Vital Signs every q4h   - Permissive HTN; goal -180 stop home antihypertensives, fluids, pressers if needed  - Daily aspirin 325 mg for secondary stroke prevention  - Plavix (clopidogrel) 75 mg PO Daily  - Statin: Atorvastatin 40mg qhs  - TTE (with Bubble Study if age 60 yrs or less)  - Telemetry, EKG  - Bedside Glucose Monitoring  - PT/OT/SLP  - Stroke Education  - Euthermia, Euglycemia    - Possible TCU placement    Patient Follow-up    - final recommendation pending work-up    Thank you for this consult. We will continue to follow.     The Stroke Staff is Dr. Estrada.    Bowen Kolb MD  Vascular Neurology Fellow  To page me or covering  "stroke neurology team member, click here: AMCOM   Choose \"On Call\" tab at top, then search dropdown box for \"Neurology Adult\", select location, press Enter, then look for stroke/neuro ICU/telestroke.    _____________________________________________________    Clinically Significant Risk Factors Present on Admission                        Past Medical History   Past Medical History:   Diagnosis Date     CVA (cerebral infarction)      Hypertension      Past Surgical History   No past surgical history on file.  Medications   Home Meds  Prior to Admission medications    Medication Sig Start Date End Date Taking? Authorizing Provider   ALPRAZolam (XANAX) 1 MG tablet Take 1 mg by mouth nightly as needed for anxiety   Yes Unknown, Entered By History   aspirin (ASA) 325 MG tablet Take 1 tablet (325 mg) by mouth daily 2/7/22  Yes Israel Neal MD   atorvastatin (LIPITOR) 80 MG tablet Take 1 tablet (80 mg) by mouth daily 2/7/22  Yes Israel Neal MD   cetirizine (ZYRTEC) 10 MG tablet Take 10 mg by mouth daily   Yes Unknown, Entered By History   chlorhexidine (HIBICLENS) 4 % liquid Apply topically daily as needed for wound care   Yes Unknown, Entered By History   gabapentin (NEURONTIN) 300 MG capsule Take 300 mg by mouth 3 times daily 5/27/21  Yes Unknown, Entered By History   insulin glargine (LANTUS PEN) 100 UNIT/ML pen Inject 15 Units Subcutaneous At Bedtime 6/24/22  Yes Betsy Perkins PA-C   lisinopril-hydrochlorothiazide (ZESTORETIC) 20-25 MG tablet Take 1 tablet by mouth daily 6/24/22  Yes Betsy Perkins PA-C   metFORMIN (GLUCOPHAGE) 1000 MG tablet Take 1,000 mg by mouth 2 times daily (with meals)   Yes Unknown, Entered By History   sodium chloride (OCEAN) 0.65 % nasal spray Spray 2 sprays in nostril every 2 hours as needed for congestion   Yes Unknown, Entered By History   blood glucose (NO BRAND SPECIFIED) test strip Use to test blood sugar 1 times daily or as directed. 6/24/22   Maddie" Betsy Cobos PA-C   blood glucose monitoring (NO BRAND SPECIFIED) meter device kit Use to test blood sugar 1 times daily, first thing in the morning. 6/24/22   Betsy Perkins PA-C   Lancets 30G MISC 120 30 gauge lancets (substitute as needed) 6/24/22   Besty Perkins PA-C   clopidogrel (PLAVIX) 75 MG tablet Take 1 tablet (75 mg) by mouth daily Take for 90 days, then stop 2/7/22 6/24/22  Israel Neal MD   hydrochlorothiazide (HYDRODIURIL) 25 MG tablet Take 25 mg by mouth daily  6/24/22  Unknown, Entered By History       Scheduled Meds    sodium chloride 0.9%  1,000 mL Intravenous Once     aspirin  325 mg Oral Daily     atorvastatin  80 mg Oral Daily     cetirizine  10 mg Oral Daily     clopidogrel  75 mg Oral or NG Tube Daily     gabapentin  300 mg Oral TID     insulin aspart  1-7 Units Subcutaneous TID AC     insulin aspart  1-5 Units Subcutaneous At Bedtime     insulin glargine  10 Units Subcutaneous At Bedtime     sodium chloride (PF)  3 mL Intracatheter Q8H       Infusion Meds    - MEDICATION INSTRUCTIONS -       - MEDICATION INSTRUCTIONS -         PRN Meds  glucose **OR** dextrose **OR** glucagon, lidocaine 4%, lidocaine (buffered or not buffered), - MEDICATION INSTRUCTIONS -, - MEDICATION INSTRUCTIONS -, ondansetron **OR** ondansetron, sodium chloride, sodium chloride (PF)    Allergies   No Known Allergies  Family History   No family history on file.  Social History   Social History     Tobacco Use     Smoking status: Current Every Day Smoker     Packs/day: 0.50   Substance Use Topics     Alcohol use: No     Drug use: No       Review of Systems   The 10 point Review of Systems is negative other than noted in the HPI or here.        PHYSICAL EXAMINATION   Temp:  [98  F (36.7  C)-99.2  F (37.3  C)] 99.2  F (37.3  C)  Pulse:  [] 96  Resp:  [10-19] 19  BP: (118-174)/() 144/75  SpO2:  [97 %-100 %] 97 %    AOx3 able to anme repeat and follow commands  VFI EOMI L facial droop  RUE 5/5  LUE 4-/5 RLE 5/5 LLE 4-/5  Sens decreased LUE LLE to LT  No dysmetria finger nose    Dysphagia Screen  Per Nursing    Stroke Scales    NIHSS  1a. Level of Consciousness 0-->Alert, keenly responsive   1b. LOC Questions 0-->Answers both questions correctly   1c. LOC Commands 0-->Performs both tasks correctly   2.   Best Gaze 0-->Normal   3.   Visual 0-->No visual loss   4.   Facial Palsy 0-->Normal symmetrical movements   5a. Motor Arm, Left 1-->Drift, limb holds 90 (or 45) degrees, but drifts down before full 10 seconds, does not hit bed or other support   5b. Motor Arm, Right 0-->No drift, limb holds 90 (or 45) degrees for full 10 secs   6a. Motor Leg, Left 1-->Drift, leg falls by the end of the 5-sec period but does not hit bed   6b. Motor Leg, right 0-->No drift, leg holds 30 degree position for full 5 secs   7.   Limb Ataxia 1-->Present in one limb   8.   Sensory 0-->Normal, no sensory loss   9.   Best Language 0-->No aphasia, normal   10. Dysarthria 0-->Normal   11. Extinction and Inattention  0-->No abnormality   Total 3 (06/24/22 2227)     Modified Roberto Score (Pre-morbid)  3-Moderate disability; requiring some help, but able to walk without assistance    Imaging  I personally reviewed all imaging; relevant findings per HPI.    Labs Data   CBC  Recent Labs   Lab 06/25/22  1129 06/24/22 1805 06/18/22  2150   WBC 12.6* 13.6* 10.5   RBC 3.71* 3.67* 3.99*   HGB 11.6* 11.4* 12.4*   HCT 36.5* 36.1* 38.3*    390 395     Basic Metabolic Panel   Recent Labs   Lab 06/25/22  1129 06/25/22  0718 06/25/22 0322 06/24/22 2232 06/24/22 1805 06/19/22 0320 06/18/22  2150     --   --   --  136  --  138   POTASSIUM 3.9  --   --   --  3.8  --  3.8   CHLORIDE 104  --   --   --  99  --  105   CO2 28  --   --   --  28  --  26   BUN 18  --   --   --  26  --  13   CR 0.61*  --   --   --  0.75  --  0.66   * 182* 217*   < > 138*   < > 345*   DAT 9.6  --   --   --  8.8  --  9.7    < > = values in this interval  not displayed.     Liver Panel  No results for input(s): PROTTOTAL, ALBUMIN, BILITOTAL, ALKPHOS, AST, ALT, BILIDIRECT in the last 168 hours.  INR    Recent Labs   Lab Test 06/24/22  1805 02/05/22  1919 04/18/21  1553   INR 1.08 0.97 1.05      Lipid Profile    Recent Labs   Lab Test 06/24/22  1805 03/20/22  0558 02/06/22  0617   CHOL 102 93 193   HDL 30* 34* 38*   LDL 53 46 126*   TRIG 95 66 144     A1C    Recent Labs   Lab Test 06/18/22  2150 03/20/22  0558 02/06/22  0617   A1C 7.8* 8.8* 10.5*     Troponin I    Recent Labs   Lab 06/24/22  1805   TROPONINIS 4          Stroke Consult Data Data   This was a non-emergent, non-telestroke consult.

## 2022-06-25 NOTE — PROGRESS NOTES
RECEIVING UNIT ED HANDOFF REVIEW    ED Nurse Handoff Report was reviewed by: Lakshmi Tineo on June 24, 2022 at 9:08 PM

## 2022-06-25 NOTE — PLAN OF CARE
Reason for Admission: Scattered R MCA infarct     Cognitive/Mentation: A/Ox 4, cooperative at times, irritable, frustrated with condition  Neuros/CMS: Intact ex LUE and LLE 3/5, reports numbness and tingling in both extremities, L hand weak , L foot weak planterflexion, absent dorsifex.    VS: On RA, SBP goal 140-180.  SBP below goal, MD notified.  1L bolus ordered   Tele: SR, Tachy with activity  GI: Continent. BM x2 throughout shift  : Continent.  Pulmonary: LS clear, on RA, denies cough  Pain: Denies but says owe and groans movements     Drains/Lines: PIV SL  Skin: Boil under R armpit: Dressing changed, draining serous fluid  Activity: Assist x 1-2 A with GB/W  Diet: Modcarb with thin liquids. Takes pills whole     Therapies recs: TBD, PT/SLP/OT to see patient  Discharge: TBD     Aggression Stoplight Tool: yellow     End of shift summary: Plavix and ASA 325mg given in ED. TTE needed.  Continue stroke work up.

## 2022-06-25 NOTE — PLAN OF CARE
"BP (!) 168/90 (BP Location: Right arm)   Pulse 96   Temp 99.2  F (37.3  C) (Oral)   Resp 19   Ht 1.676 m (5' 6\")   SpO2 97%   BMI 23.69 kg/m      Patient name: Josue Parisi    Nursing shift note  Summary: Patient in with CVA  Alertness/orientation: Alert, not cooperative with orientation questions but knows hes in the hospital- doesn't know the city  Neuro: Left side is 3/5  Cardiac: Normal Sinus  Resp: WDL  GI: WDL  : Uses urinal  IV: Left forarm PIV. Patient wanted to remove the IV that was in his antecubital   Mobility: A2 Lift, left leg very stiff  CMS: Intact  Pain: Reports pain in his left side, when asked about it he says its not painful it just feels \"F--ed up\"  Diet: Regular  Takes meds: Whole  Skin: Has wound on right axillary area. Dressing clean, dry and intact  Plan: Complete workup  Other Important Info:    Carlos A Healy, RN  [facility]  [phone]    "

## 2022-06-26 ENCOUNTER — APPOINTMENT (OUTPATIENT)
Dept: OCCUPATIONAL THERAPY | Facility: CLINIC | Age: 52
DRG: 065 | End: 2022-06-26
Attending: INTERNAL MEDICINE
Payer: COMMERCIAL

## 2022-06-26 LAB
AT III ACT/NOR PPP CHRO: 125 % (ref 85–135)
GLUCOSE BLDC GLUCOMTR-MCNC: 191 MG/DL (ref 70–99)
GLUCOSE BLDC GLUCOMTR-MCNC: 246 MG/DL (ref 70–99)
GLUCOSE BLDC GLUCOMTR-MCNC: 291 MG/DL (ref 70–99)
GLUCOSE BLDC GLUCOMTR-MCNC: 325 MG/DL (ref 70–99)
GLUCOSE BLDC GLUCOMTR-MCNC: 342 MG/DL (ref 70–99)

## 2022-06-26 PROCEDURE — 99233 SBSQ HOSP IP/OBS HIGH 50: CPT | Mod: GC | Performed by: STUDENT IN AN ORGANIZED HEALTH CARE EDUCATION/TRAINING PROGRAM

## 2022-06-26 PROCEDURE — 97530 THERAPEUTIC ACTIVITIES: CPT | Mod: GO

## 2022-06-26 PROCEDURE — 99233 SBSQ HOSP IP/OBS HIGH 50: CPT | Performed by: HOSPITALIST

## 2022-06-26 PROCEDURE — 97166 OT EVAL MOD COMPLEX 45 MIN: CPT | Mod: GO

## 2022-06-26 PROCEDURE — 97535 SELF CARE MNGMENT TRAINING: CPT | Mod: GO

## 2022-06-26 PROCEDURE — 250N000013 HC RX MED GY IP 250 OP 250 PS 637: Performed by: HOSPITALIST

## 2022-06-26 PROCEDURE — 250N000013 HC RX MED GY IP 250 OP 250 PS 637: Performed by: INTERNAL MEDICINE

## 2022-06-26 PROCEDURE — 120N000001 HC R&B MED SURG/OB

## 2022-06-26 RX ORDER — GABAPENTIN 400 MG/1
400 CAPSULE ORAL 3 TIMES DAILY
Status: DISCONTINUED | OUTPATIENT
Start: 2022-06-26 | End: 2022-06-27

## 2022-06-26 RX ADMIN — ACETAMINOPHEN 325 MG: 325 TABLET ORAL at 22:38

## 2022-06-26 RX ADMIN — INSULIN ASPART 4 UNITS: 100 INJECTION, SOLUTION INTRAVENOUS; SUBCUTANEOUS at 16:13

## 2022-06-26 RX ADMIN — GABAPENTIN 400 MG: 400 CAPSULE ORAL at 22:38

## 2022-06-26 RX ADMIN — INSULIN ASPART 3 UNITS: 100 INJECTION, SOLUTION INTRAVENOUS; SUBCUTANEOUS at 09:08

## 2022-06-26 RX ADMIN — CETIRIZINE HYDROCHLORIDE 10 MG: 10 TABLET, FILM COATED ORAL at 09:08

## 2022-06-26 RX ADMIN — GABAPENTIN 400 MG: 400 CAPSULE ORAL at 09:08

## 2022-06-26 RX ADMIN — CLOPIDOGREL BISULFATE 75 MG: 75 TABLET ORAL at 09:08

## 2022-06-26 RX ADMIN — ASPIRIN 325 MG ORAL TABLET 325 MG: 325 PILL ORAL at 09:08

## 2022-06-26 RX ADMIN — INSULIN ASPART 2 UNITS: 100 INJECTION, SOLUTION INTRAVENOUS; SUBCUTANEOUS at 11:16

## 2022-06-26 RX ADMIN — GABAPENTIN 400 MG: 400 CAPSULE ORAL at 15:06

## 2022-06-26 RX ADMIN — ATORVASTATIN CALCIUM 80 MG: 80 TABLET, FILM COATED ORAL at 09:08

## 2022-06-26 ASSESSMENT — ACTIVITIES OF DAILY LIVING (ADL)
ADLS_ACUITY_SCORE: 41
ADLS_ACUITY_SCORE: 41
TRANSFERRING: 0-->INDEPENDENT
ADLS_ACUITY_SCORE: 41
DRESSING/BATHING_DIFFICULTY: YES
WALKING_OR_CLIMBING_STAIRS: AMBULATION DIFFICULTY, REQUIRES EQUIPMENT
NUMBER_OF_TIMES_PATIENT_HAS_FALLEN_WITHIN_LAST_SIX_MONTHS: 5
DRESSING/BATHING: BATHING DIFFICULTY, REQUIRES EQUIPMENT
DIFFICULTY_EATING/SWALLOWING: NO
WEAR_GLASSES_OR_BLIND: YES
ADLS_ACUITY_SCORE: 43
DOING_ERRANDS_INDEPENDENTLY_DIFFICULTY: YES
ADLS_ACUITY_SCORE: 43
CHANGE_IN_FUNCTIONAL_STATUS_SINCE_ONSET_OF_CURRENT_ILLNESS/INJURY: NO
ADLS_ACUITY_SCORE: 43
TOILETING_ASSISTANCE: TOILETING DIFFICULTY, REQUIRES EQUIPMENT
ADLS_ACUITY_SCORE: 39
ADLS_ACUITY_SCORE: 41
CONCENTRATING,_REMEMBERING_OR_MAKING_DECISIONS_DIFFICULTY: NO
FALL_HISTORY_WITHIN_LAST_SIX_MONTHS: YES
ADLS_ACUITY_SCORE: 41
TRANSFERRING: 1-->ASSISTANCE (EQUIPMENT/PERSON) NEEDED
ADLS_ACUITY_SCORE: 41
ADLS_ACUITY_SCORE: 41
TOILETING_ISSUES: YES
WALKING_OR_CLIMBING_STAIRS_DIFFICULTY: YES
EQUIPMENT_CURRENTLY_USED_AT_HOME: CANE, STRAIGHT
ADLS_ACUITY_SCORE: 43

## 2022-06-26 NOTE — PLAN OF CARE
Reason for Admission: R MCA and R ICA occlusion    Cognitive/Mentation: A/Ox 4  Neuros/CMS: L hemiparesis. LUE and LLE 3/5 strength. LUE weakly grasps, ataxic and drifts. LLE weakly dorsi/plantar flexes, SEAN heel to shin.   VS: stable on RA.   Tele: pt refused.  GI: BS audible, passing flatus, last BM today. Continent.  : WDL. Continent with urinal.  Pulmonary: LS clear.  Pain: L sided nerve pain, refusing tylenol and lidocaine interventions.     Drains/Lines: none  Skin: WDL ex for boil on L armpit  Activity: Assist x 2 with GB and W.  Diet: Regular with thin liquids. Takes pills whole. Blood sugar checks.     Therapies recs: ARU  Discharge: pending    Aggression Stoplight Tool: yellow

## 2022-06-26 NOTE — PROGRESS NOTES
"   06/26/22 0840   Quick Adds   Type of Visit Initial Occupational Therapy Evaluation   Living Environment   People in Home alone   Current Living Arrangements apartment   Living Environment Comments Stairs, no elevator. Patient has a tub/shower with a seat, walker. Previously IND with ADL/IADL. Drives.   General Information   Onset of Illness/Injury or Date of Surgery 06/24/22   Referring Physician Mariposa   Patient/Family Therapy Goal Statement (OT) Go home.   Additional Occupational Profile Info/Pertinent History of Current Problem Just left [facility] AMA after admit for increased weakness. Was at [facility] [place] when he had increased L sided weakness and slurred speech. +R MCA CVI.   Existing Precautions/Restrictions fall   Cognitive Status Examination   Orientation Status orientation to person, place and time   Follows Commands WNL   Cognitive Status Comments Angry and frustrated upon therapists arrival. \"I am fucking messed up\" \"I can't do anything\". Becoming more agreeable and with + sense of humor as session went on. Cognition appears intact, will further assess as needed.   Visual Perception   Impact of Vision Impairment on Function (Vision) Appears functional, will screen next session.   Sensory   Sensory Comments Impaired LUE/LLE   Pain Assessment   Patient Currently in Pain No   Range of Motion Comprehensive   Comment, General Range of Motion RUE WNL 5/5 strength. L shoulder, elbow and wrist extension WNL, 3+/5 shoulder flexion, 4/5 elbow flex/ext, 3+/5 wrist extension. Little to no AROM in fingers on L hand.   Coordination   Coordination Comments Severely impaired LUE   Bed Mobility   Comment (Bed Mobility) MOD A   Transfers   Transfer Comments MIN A   Balance   Balance Comments MIN A in stance. Able to WB LLE. Needing A to hold walker wtih L hand.   Activities of Daily Living   BADL Assessment/Intervention lower body dressing;feeding;toileting   Lower Body Dressing Assessment/Training   [place] Level (Lower Body " Dressing) maximum assist (25% patient effort)   Eating/Self Feeding   [place] Level (Feeding) set up;moderate assist (50% patient effort)   Toileting   [place] Level (Toileting) moderate assist (50% patient effort)   Clinical Impression   Criteria for Skilled Therapeutic Interventions Met (OT) Yes, treatment indicated   OT Diagnosis Decreased ADL   OT Problem List-Impairments impacting ADL balance;range of motion (ROM);sensation;strength   Assessment of Occupational Performance 3-5 Performance Deficits   Identified Performance Deficits ADL, transfers, IADL   Planned Therapy Interventions (OT) ADL retraining;neuromuscular re-education;fine motor coordination training;ROM;strengthening;transfer training   Clinical Decision Making Complexity (OT) moderate complexity   Risk & Benefits of therapy have been explained evaluation/treatment results reviewed;care plan/treatment goals reviewed;patient   OT Discharge Planning   OT Discharge Recommendation (DC Rec) Acute [facility]-Motivated patient will benefit from intensive, interdisciplinary therapy.  Anticipate will be able to tolerate 3 hours of therapy per day   OT Rationale for DC Rec Patient is motivated to return to [facility] despite his frustration at times. Has good potential for improvement.   Total Evaluation Time (Minutes)   Total Evaluation Time (Minutes) 10   OT Goals   Therapy Frequency (OT) 2 times/day   OT Predicted Duration/Target Date for Goal Attainment 06/30/22   OT Goals OT Goal 1;Hygiene/Grooming   OT: Hygiene/Grooming minimal assist;while standing   OT: Upper Body Dressing Supervision/stand-by assist;including set-up/clothing retrieval   OT: Lower Body Dressing Minimal assist;including set-up/clothing retrieval   OT: Toilet Transfer/Toileting toilet transfer;cleaning and garment management;Minimal assist   OT: Goal 1 Patient will IND position LUE during ADL tasks and use L hand as a gross assist with <min cues.

## 2022-06-26 NOTE — PROGRESS NOTES
"      [facility]    Stroke Progress Note    Reason for Consult:  New RMCA stroke    Chief Complaint: L weakness     HPI    I was called by Roxanne Mims on 06/24/22 regarding patient Josue Parisi. The patient is a 52 year old male w hx of HTN, DM with med non-compliance, cocaine use, R KWADWO infarct 3/2022 from intracranial athero with residual L weakness, here with worsening L weakness.     At time of stroke asked to go to ARU but refused left AMA, 6/18-today was admitted to [facility] for failure to thrive, worsening L weakness thought to be recrudescence 2/2 poor glycemic control from non compliance with DM meds, also non compliant with DAPT, was being placed in TCU but left AMA again. Present to [facility] same day after mother said he has more weak L side. LKN unclear, per mother \"this morning or last night\".    Stroke Evaluation Summarized    MRI/Head CT MRI Brain    1.  New nonhemorrhagic infarction within the right prefrontal gyrus and deep MCA watershed infarct within the right centrum semiovale of the right frontal and parietal lobes.  2.  Chronic sequela of prior infarction as described.  3.  Left maxillary sinus fluid level. Correlate for rhinosinusitis.    CTH  1.  There is a small area of low attenuation within the subcortical white matter of the mid right precentral gyrus that is new versus the prior MRI. Presumably this reflects ischemic change. It is suspected to be subacute or older in age, but is not   specific on CT. Consider further evaluation with MRI. No definite acute infarct by CT.  2.  Other areas of ischemic injury are not changed versus the 3/19/2020 comparison exams.  3.  No hemorrhage.    CTP 6/24    Perfusion abnormality in the right middle cerebral artery territory indicating a large area of ischemic penumbra without significant infarct core.    MRI perfusion 6/25    Perfusion images are not significantly changed after maintenance of a higher blood pressure.    "   Intracranial Vasculature CTA H    1.  Abrupt termination of the mid M1 segment of the right middle cerebral artery is a change versus prior. This is favored to reflect acute thromboembolism. Stenosis progression since the previous exam is the other primary consideration.  2.  Relatively unchanged degree of filling of more distal right middle cerebral artery branches.  3.  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   Cervical Vasculature CTA N    1.  Chronic occlusion of the proximal right internal carotid artery, unchanged versus prior.  2.  Otherwise negative neck CTA.     Echocardiogram EF 60-65%  No thrombus  LA normal no shunt   EKG/Telemetry SR   Other Testing P2Y12 is 1      LDL  6/24/2022: 53 mg/dL   A1C  6/18/2022: 7.8 %   Troponin No lab value available in past 48 hrs     Impression    53yo man with chronic R ICA occlusion, R KWADWO infarct comes in for worsening L weakness.     Vessel imaging shows new from 3/2022 image R MCA occlusion, MRI brain shows scattered new RMCA infarct partially in watershed distribution, large penumbra concerning for hypoperfusion and potential for larger R ICA territory infarct. Etiology is most likely large vessel disease 2/2 polysubstance use, HTN and DM with hx non compliance, suspect latest infarct is 2/2 hypoperfusion vs vessel to vessel embolism.    Patient stable while maintained above 140 systolic, but no resolution of symptoms or improvement on repeat perfusion scan. Needs to be maintained 140-160 going forward both inpatient and op settings.    Patient generally dismissive off and non-compliant with care. Twice decline ARU and later TCU placement and left AMA. We again urge TCU placement or atleast close follow-up w op rehab services. Patient still poorly compliant with diabetic diet, warned that this will likely worsen his  "condition.     Recommendations    - Neurochecks and Vital Signs every q4h   - Permissive HTN; goal -160 this is a long term op goal  - Daily aspirin 325 mg for secondary stroke prevention  - Plavix (clopidogrel) 75 mg PO Daily for 90 days  - Statin: Atorvastatin 80mg qhs  - Telemetry, EKG  - Bedside Glucose Monitoring  - PT/OT/SLP  - Stroke Education  - Euthermia, Euglycemia    - Possible TCU placement if patient willing    Patient Follow-up      No further recs stroke will sign off but call with any questions    The Stroke Staff is Dr. Wilfredo Kolb MD  Vascular Neurology Fellow  To page me or covering stroke neurology team member, click here: [facility]COM   Choose \"On Call\" tab at top, then search dropdown box for \"Neurology Adult\", select location, press Enter, then look for stroke/neuro ICU/telestroke.    _____________________________________________________    Clinically Significant Risk Factors Present on Admission                    Past Medical History   Past Medical History:   Diagnosis Date     CVA (cerebral infarction)      Hypertension      Past Surgical History   No past surgical history on file.  Medications   Home Meds  Prior to Admission medications    Medication Sig Start Date End Date Taking? Authorizing Provider   ALPRAZolam (XANAX) 1 MG tablet Take 1 mg by mouth nightly as needed for anxiety   Yes Unknown, Entered By History   aspirin (ASA) 325 MG tablet Take 1 tablet (325 mg) by mouth daily 2/7/22  Yes Israel Neal MD   atorvastatin (LIPITOR) 80 MG tablet Take 1 tablet (80 mg) by mouth daily 2/7/22  Yes Israel Neal MD   cetirizine (ZYRTEC) 10 MG tablet Take 10 mg by mouth daily   Yes Unknown, Entered By History   chlorhexidine (HIBICLENS) 4 % liquid Apply topically daily as needed for wound care   Yes Unknown, Entered By History   gabapentin (NEURONTIN) 300 MG capsule Take 300 mg by mouth 3 times daily 5/27/21  Yes Unknown, Entered By History   insulin glargine (LANTUS " PEN) 100 UNIT/ML pen Inject 15 Units Subcutaneous At Bedtime 6/24/22  Yes Betsy Perkins PA-C   lisinopril-hydrochlorothiazide (ZESTORETIC) 20-25 MG tablet Take 1 tablet by mouth daily 6/24/22  Yes Betsy Perkins PA-C   metFORMIN (GLUCOPHAGE) 1000 MG tablet Take 1,000 mg by mouth 2 times daily (with meals)   Yes Unknown, Entered By History   sodium chloride (OCEAN) 0.65 % nasal spray Spray 2 sprays in nostril every 2 hours as needed for congestion   Yes Unknown, Entered By History   blood glucose (NO BRAND SPECIFIED) test strip Use to test blood sugar 1 times daily or as directed. 6/24/22   Betsy Perkins PA-C   blood glucose monitoring (NO BRAND SPECIFIED) meter device kit Use to test blood sugar 1 times daily, first thing in the morning. 6/24/22   Betsy Perkins PA-C   Lancets 30G MISC 120 30 gauge lancets (substitute as needed) 6/24/22   Betsy Perkins PA-C   clopidogrel (PLAVIX) 75 MG tablet Take 1 tablet (75 mg) by mouth daily Take for 90 days, then stop 2/7/22 6/24/22  Israel Neal MD   hydrochlorothiazide (HYDRODIURIL) 25 MG tablet Take 25 mg by mouth daily  6/24/22  Unknown, Entered By History       Scheduled Meds    sodium chloride 0.9%  1,000 mL Intravenous Once     aspirin  325 mg Oral Daily     atorvastatin  80 mg Oral Daily     cetirizine  10 mg Oral Daily     clopidogrel  75 mg Oral or NG Tube Daily     gabapentin  400 mg Oral TID     insulin aspart  1-7 Units Subcutaneous TID AC     insulin aspart  1-5 Units Subcutaneous At Bedtime     insulin glargine  10 Units Subcutaneous BID     sodium chloride (PF)  3 mL Intracatheter Q8H       Infusion Meds    - MEDICATION INSTRUCTIONS -       - MEDICATION INSTRUCTIONS -         PRN Meds  acetaminophen, glucose **OR** dextrose **OR** glucagon, lidocaine 4%, lidocaine (buffered or not buffered), - MEDICATION INSTRUCTIONS -, - MEDICATION INSTRUCTIONS -, ondansetron **OR** ondansetron, sodium chloride, sodium chloride  (PF)    Allergies   No Known Allergies  Family History   No family history on file.  Social History   Social History     Tobacco Use     Smoking status: Current Every Day Smoker     Packs/day: 0.50   Substance Use Topics     Alcohol use: No     Drug use: No       Review of Systems   The 10 point Review of Systems is negative other than noted in the HPI or here.        PHYSICAL EXAMINATION   Temp:  [98.4  F (36.9  C)-98.6  F (37  C)] 98.6  F (37  C)  Pulse:  [90-98] 96  Resp:  [16-18] 18  BP: (140-168)/() 141/101  SpO2:  [97 %-100 %] 100 %    AOx3 able to anme repeat and follow commands  VFI EOMI L facial droop  RUE 5/5 LUE 4-/5 RLE 5/5 LLE 4-/5  Sens decreased LUE LLE to LT  No dysmetria finger nose    Dysphagia Screen  Per Nursing    Stroke Scales    NIHSS  1a. Level of Consciousness 0-->Alert, keenly responsive   1b. LOC Questions 0-->Answers both questions correctly   1c. LOC Commands 0-->Performs both tasks correctly   2.   Best Gaze 0-->Normal   3.   Visual 0-->No visual loss   4.   Facial Palsy 0-->Normal symmetrical movements   5a. Motor Arm, Left 1-->Drift, limb holds 90 (or 45) degrees, but drifts down before full 10 seconds, does not hit bed or other support   5b. Motor Arm, Right 0-->No drift, limb holds 90 (or 45) degrees for full 10 secs   6a. Motor Leg, Left 1-->Drift, leg falls by the end of the 5-sec period but does not hit bed   6b. Motor Leg, right 0-->No drift, leg holds 30 degree position for full 5 secs   7.   Limb Ataxia 1-->Present in one limb   8.   Sensory 0-->Normal, no sensory loss   9.   Best Language 0-->No aphasia, normal   10. Dysarthria 0-->Normal   11. Extinction and Inattention  0-->No abnormality   Total 3 (06/26/22 1312)     Modified Wickett Score (Pre-morbid)  3-Moderate disability; requiring some help, but able to walk without assistance    Imaging  I personally reviewed all imaging; relevant findings per HPI.    Labs Data   CBC  Recent Labs   Lab 06/25/22  2989  06/24/22  1805   WBC 12.6* 13.6*   RBC 3.71* 3.67*   HGB 11.6* 11.4*   HCT 36.5* 36.1*    390     Basic Metabolic Panel   Recent Labs   Lab 06/26/22  1103 06/26/22  0724 06/26/22  0207 06/25/22  1650 06/25/22  1129 06/24/22  2232 06/24/22  1805   NA  --   --   --   --  137  --  136   POTASSIUM  --   --   --   --  3.9  --  3.8   CHLORIDE  --   --   --   --  104  --  99   CO2  --   --   --   --  28  --  28   BUN  --   --   --   --  18  --  26   CR  --   --   --   --  0.61*  --  0.75   * 246* 342*   < > 185*   < > 138*   DAT  --   --   --   --  9.6  --  8.8    < > = values in this interval not displayed.     Liver Panel  No results for input(s): PROTTOTAL, ALBUMIN, BILITOTAL, ALKPHOS, AST, ALT, BILIDIRECT in the last 168 hours.  INR    Recent Labs   Lab Test 06/24/22  1805 02/05/22  1919 04/18/21  1553   INR 1.08 0.97 1.05      Lipid Profile    Recent Labs   Lab Test 06/24/22  1805 03/20/22  0558 02/06/22  0617   CHOL 102 93 193   HDL 30* 34* 38*   LDL 53 46 126*   TRIG 95 66 144     A1C    Recent Labs   Lab Test 06/18/22  2150 03/20/22  0558 02/06/22  0617   A1C 7.8* 8.8* 10.5*     Troponin I    Recent Labs   Lab 06/24/22  1805   TROPONINIS 4          Stroke Consult Data Data   This was a non-emergent, non-telestroke consult.

## 2022-06-26 NOTE — PLAN OF CARE
Pt here with R MCA infarct. A&Ox4. Neuros LUE and LLE 3/5 with numbness and tingling. VSS on RA. Pt refusing tele. Pt refused 0400 vital signs. Pt receiving sliding scale insulin, started on Lantus. Requesting sugary drinks despite blood sugars of 305 at hs and 342 overnight, educated on importance of diet and blood sugar control. Pt was very frustrated with this. Reg diet, thin liquids. Takes pills whole with water. Up to commode with Ax1-2, pt refusing to wear GB at times. PRN tylenol give for L sided pain. Pt scoring yellow on the Aggression Stop Light Tool. Discharge pending.

## 2022-06-26 NOTE — PROGRESS NOTES
[facility]    Hospitalist Progress Note    Interval History     Patient is awake and alert.  Complaining of significant discomfort on the left side of his body.  Very frustrated that he keeps having recurrent strokes despite being on appropriate medications.  Denies any headache, nausea, blurred vision or any other complaints.  -Data reviewed today: I reviewed all new labs and imaging results over the last 24 hours. I personally reviewed no images or EKG's today.    Physical Exam   Temp: 98.6  F (37  C) Temp src: Oral BP: (!) 147/95 Pulse: 100   Resp: 18 SpO2: 100 % O2 Device: None (Room air)    There were no vitals filed for this visit.  Vital Signs with Ranges  Temp:  [98.4  F (36.9  C)-98.6  F (37  C)] 98.6  F (37  C)  Pulse:  [] 100  Resp:  [16-18] 18  BP: (140-168)/() 147/95  SpO2:  [97 %-100 %] 100 %  I/O last 3 completed shifts:  In: 900 [P.O.:900]  Out: 500 [Urine:500]    Physical Exam  Constitutional:       Appearance: Normal appearance.   HENT:      Head: Normocephalic.   Eyes:      Pupils: Pupils are equal, round, and reactive to light.   Cardiovascular:      Rate and Rhythm: Normal rate and regular rhythm.      Pulses: Normal pulses.      Heart sounds: Normal heart sounds.   Pulmonary:      Effort: Pulmonary effort is normal. No respiratory distress.      Breath sounds: Normal breath sounds.   Abdominal:      General: Abdomen is flat. Bowel sounds are normal. There is no distension.      Tenderness: There is no abdominal tenderness. There is no guarding.   Musculoskeletal:         General: Normal range of motion.      Cervical back: Normal range of motion.   Skin:     General: Skin is warm and dry.   Neurological:      General: No focal deficit present.      Comments: Left upper and lower extremity numbness and paresthesias with 3 x 5 strength in left upper extremity and 2 x 5 strength in lower extremity.   Psychiatric:         Mood and Affect: Mood normal.            Medications     - MEDICATION INSTRUCTIONS -       - MEDICATION INSTRUCTIONS -         sodium chloride 0.9%  1,000 mL Intravenous Once     aspirin  325 mg Oral Daily     atorvastatin  80 mg Oral Daily     cetirizine  10 mg Oral Daily     clopidogrel  75 mg Oral or NG Tube Daily     gabapentin  400 mg Oral TID     insulin aspart  1-7 Units Subcutaneous TID AC     insulin aspart  1-5 Units Subcutaneous At Bedtime     insulin glargine  10 Units Subcutaneous BID     sodium chloride (PF)  3 mL Intracatheter Q8H       Data   Recent Labs   Lab 06/26/22  1103 06/26/22  0724 06/26/22  0207 06/25/22  1650 06/25/22  1129 06/24/22  2232 06/24/22  1805   WBC  --   --   --   --  12.6*  --  13.6*   HGB  --   --   --   --  11.6*  --  11.4*   MCV  --   --   --   --  98  --  98   PLT  --   --   --   --  428  --  390   INR  --   --   --   --   --   --  1.08   NA  --   --   --   --  137  --  136   POTASSIUM  --   --   --   --  3.9  --  3.8   CHLORIDE  --   --   --   --  104  --  99   CO2  --   --   --   --  28  --  28   BUN  --   --   --   --  18  --  26   CR  --   --   --   --  0.61*  --  0.75   ANIONGAP  --   --   --   --  5  --  9   DAT  --   --   --   --  9.6  --  8.8   * 246* 342*   < > 185*   < > 138*    < > = values in this interval not displayed.       No results found for this or any previous visit (from the past 24 hour(s)).      Assessment & Plan      Josue Parisi is a 52 year old male with a PMHx significant for polysubstance abuse, cocaine use, HTN, and recent right KWADWO CVA 3/2022 (large territory defect) with residual left-sided weakness, who was admitted on 6/18/2022 at [facility] with failure to thrive at home. At the time of his initial stroke, ARU was recommended but pt refused and discharged home ama. Able to mobilize at home with walker but was getting weaker.  He was initially admitted on 6/18 under observation for weakness with a plan for placement for progressive weakness.  However he  was discharged on 6/24/2022 when no place was found and he insisted on going home.  Outpatient rehab orders were placed.  He then was brought back in to [facility] later in the evening on 6/24/2022 for new onset significant worsening of left-sided weakness with paresthesias and numbness that is worse than before.  He does not have any meaningful function of his left hand. He notes increased urinary frequency and often times is not able to make it to the bathroom on time, for urine and/or stool.     #  New nonhemorrhagic infarction within the right prefrontal gyrus and deep MCA watershed infarct within the right centrum semiovale of the right frontal and parietal lobes.   #previous left sided weakness due to Ischemic CVA  -Appreciate stroke team review  -Admit for neuro checks and vital signs every 2 hours, telemetry, blood glucose monitoring and permissive hypertension (goal -180 stop home antihypertensives, fluids, pressers if needed)   -Continue aspirin 325 mg, Plavix 75 mg p.o. daily, atorvastatin 40 mg ( A/w- P2Y1Y2 plast assay )  -PT/OT/SLP/stroke education  - bedside swallow cleared by RN and eating chipotle in Stabilization room  -CT angio head and neck shows new right MCA occlusion with infarct partially in the watershed distribution with large penumbra concerning for hypoperfusion and potential for larger right-sided ICA territory infarct.  Permissive hypertension with systolic blood pressure greater than 140.  This is a long-term outpatient goal.  -Had a repeat perfusion MRI imaging yesterday that did not show any significantly changed appearance after maintaining high blood pressure.  It was felt that his new worsening weaknesses from stroke and this would not change much with maintaining higher blood pressure.  Patient is very noncompliant with any recommendations regarding blood sugar control or his medications.  He has been ordering food from outside with high sugar  content.  -Underwent PT OT.  Recommended acute rehab unit.  Social work consulted.    #Type 2 diabetes mellitus.  Last HbA1c of 7.8 on 06/18   -Hold metformin.  On Lantus 10 units with sliding scale insulin aspart.  Increase this to twice daily.  -Need close PCP monitoring as non compliance is an issue     #History of hypertension   -Hold PTA meds to allow permissive hypertension     #Hidradenitis / Rt axillary wound  - WOCN consult previous recs:   Right axilla wound: Daily  and PRN   1. Cleanse wound with NS or wound cleanser (omit this step if patient cleans wound in shower)  2. Dry and protect surrounding skin with no sting barrier film wipe #[id number]  3. Apply a small amount of iodesorb gel #[id number] to bandaid  4. If Band-Aid needs to be changed more than once a day. Apply iodesorb gel #[id number] to adaptic #[id number] and cover with Mepilex #[id number]     # Polysubstance abuse:   -Urine tox screen positive for THC.     DVT Prophylaxis: Pneumatic Compression Devices  Code Status: Full Code  Disposition: Will need placement to acute rehab unit      Julieta Giordano MD, MD  [phone](p)  [phone]( c)

## 2022-06-26 NOTE — PLAN OF CARE
"BP (!) 141/101 (BP Location: Right arm)   Pulse 96   Temp 98.6  F (37  C) (Oral)   Resp 18   Ht 1.676 m (5' 6\")   SpO2 100%   BMI 23.69 kg/m      Patient is currently here with a right-sided cerebrovascular accident primarily affecting the middle cerebral artery. Patient is alert and oriented, neurologically intact aside from left-sided hemiparesis, ataxia in the left-upper extremity, generalized weakness and forgetfulness. VSS on RA with intermittent hypertension. Patient actively refusing telemetry. Patient is currently on a regular diet with thin liquids. Takes pills whole or crushed in applesauce. Patient transfers and ambulates with an assist of two plus the utilization of a gait belt and walker. Patient is continent of both bowel and bladder, currently utilizing the urinal and commode at the bedside with adequate urinary output. Patient's skin is intact and in good condition aside from scattered bruising as well as a boil on the patient's right axillary area. Denies pain. Patient is currently scoring yellow on the Aggression Stop Light Tool related to noncompliance and impulsiveness. Plan to continue to assess for any potential neurological changes. Discharge plans pending eventual placement in an acute rehabilitation unit.    "

## 2022-06-27 ENCOUNTER — APPOINTMENT (OUTPATIENT)
Dept: OCCUPATIONAL THERAPY | Facility: CLINIC | Age: 52
DRG: 065 | End: 2022-06-27
Payer: COMMERCIAL

## 2022-06-27 ENCOUNTER — APPOINTMENT (OUTPATIENT)
Dept: PHYSICAL THERAPY | Facility: CLINIC | Age: 52
DRG: 065 | End: 2022-06-27
Attending: INTERNAL MEDICINE
Payer: COMMERCIAL

## 2022-06-27 LAB
GLUCOSE BLDC GLUCOMTR-MCNC: 226 MG/DL (ref 70–99)
GLUCOSE BLDC GLUCOMTR-MCNC: 246 MG/DL (ref 70–99)
GLUCOSE BLDC GLUCOMTR-MCNC: 264 MG/DL (ref 70–99)
GLUCOSE BLDC GLUCOMTR-MCNC: 307 MG/DL (ref 70–99)
GLUCOSE BLDC GLUCOMTR-MCNC: 317 MG/DL (ref 70–99)
GLUCOSE BLDC GLUCOMTR-MCNC: 325 MG/DL (ref 70–99)
POTASSIUM BLD-SCNC: 3.5 MMOL/L (ref 3.4–5.3)

## 2022-06-27 PROCEDURE — 99233 SBSQ HOSP IP/OBS HIGH 50: CPT | Performed by: INTERNAL MEDICINE

## 2022-06-27 PROCEDURE — 97116 GAIT TRAINING THERAPY: CPT | Mod: GP | Performed by: PHYSICAL THERAPIST

## 2022-06-27 PROCEDURE — 250N000013 HC RX MED GY IP 250 OP 250 PS 637: Performed by: PHYSICIAN ASSISTANT

## 2022-06-27 PROCEDURE — 120N000001 HC R&B MED SURG/OB

## 2022-06-27 PROCEDURE — 84132 ASSAY OF SERUM POTASSIUM: CPT | Performed by: HOSPITALIST

## 2022-06-27 PROCEDURE — 97161 PT EVAL LOW COMPLEX 20 MIN: CPT | Mod: GP | Performed by: PHYSICAL THERAPIST

## 2022-06-27 PROCEDURE — 250N000013 HC RX MED GY IP 250 OP 250 PS 637: Performed by: INTERNAL MEDICINE

## 2022-06-27 PROCEDURE — 36415 COLL VENOUS BLD VENIPUNCTURE: CPT | Performed by: HOSPITALIST

## 2022-06-27 PROCEDURE — 97530 THERAPEUTIC ACTIVITIES: CPT | Mod: GP | Performed by: PHYSICAL THERAPIST

## 2022-06-27 PROCEDURE — G0463 HOSPITAL OUTPT CLINIC VISIT: HCPCS

## 2022-06-27 PROCEDURE — 250N000013 HC RX MED GY IP 250 OP 250 PS 637: Performed by: HOSPITALIST

## 2022-06-27 PROCEDURE — 97535 SELF CARE MNGMENT TRAINING: CPT | Mod: GO | Performed by: OCCUPATIONAL THERAPIST

## 2022-06-27 PROCEDURE — 250N000012 HC RX MED GY IP 250 OP 636 PS 637: Performed by: INTERNAL MEDICINE

## 2022-06-27 RX ORDER — GABAPENTIN 300 MG/1
600 CAPSULE ORAL 3 TIMES DAILY
Status: DISCONTINUED | OUTPATIENT
Start: 2022-06-27 | End: 2022-06-27

## 2022-06-27 RX ORDER — GABAPENTIN 300 MG/1
600 CAPSULE ORAL 3 TIMES DAILY
Status: DISCONTINUED | OUTPATIENT
Start: 2022-06-27 | End: 2022-07-01 | Stop reason: HOSPADM

## 2022-06-27 RX ORDER — ACETAMINOPHEN 325 MG/1
650 TABLET ORAL EVERY 4 HOURS PRN
Status: DISCONTINUED | OUTPATIENT
Start: 2022-06-27 | End: 2022-07-01 | Stop reason: HOSPADM

## 2022-06-27 RX ORDER — HYDROXYZINE HYDROCHLORIDE 10 MG/1
5 TABLET, FILM COATED ORAL
Status: DISCONTINUED | OUTPATIENT
Start: 2022-06-27 | End: 2022-07-01 | Stop reason: HOSPADM

## 2022-06-27 RX ADMIN — Medication 3 MG: at 23:16

## 2022-06-27 RX ADMIN — ASPIRIN 325 MG ORAL TABLET 325 MG: 325 PILL ORAL at 09:07

## 2022-06-27 RX ADMIN — ACETAMINOPHEN 325 MG: 325 TABLET ORAL at 09:07

## 2022-06-27 RX ADMIN — INSULIN ASPART 3 UNITS: 100 INJECTION, SOLUTION INTRAVENOUS; SUBCUTANEOUS at 08:05

## 2022-06-27 RX ADMIN — GABAPENTIN 600 MG: 300 CAPSULE ORAL at 22:18

## 2022-06-27 RX ADMIN — INSULIN ASPART 3 UNITS: 100 INJECTION, SOLUTION INTRAVENOUS; SUBCUTANEOUS at 15:53

## 2022-06-27 RX ADMIN — ATORVASTATIN CALCIUM 80 MG: 80 TABLET, FILM COATED ORAL at 09:07

## 2022-06-27 RX ADMIN — GABAPENTIN 400 MG: 400 CAPSULE ORAL at 09:07

## 2022-06-27 RX ADMIN — INSULIN GLARGINE 15 UNITS: 100 INJECTION, SOLUTION SUBCUTANEOUS at 12:07

## 2022-06-27 RX ADMIN — CETIRIZINE HYDROCHLORIDE 10 MG: 10 TABLET, FILM COATED ORAL at 09:07

## 2022-06-27 RX ADMIN — CLOPIDOGREL BISULFATE 75 MG: 75 TABLET ORAL at 09:07

## 2022-06-27 RX ADMIN — INSULIN GLARGINE 15 UNITS: 100 INJECTION, SOLUTION SUBCUTANEOUS at 22:17

## 2022-06-27 RX ADMIN — GABAPENTIN 600 MG: 300 CAPSULE ORAL at 15:53

## 2022-06-27 RX ADMIN — INSULIN ASPART 2 UNITS: 100 INJECTION, SOLUTION INTRAVENOUS; SUBCUTANEOUS at 12:07

## 2022-06-27 RX ADMIN — GABAPENTIN 600 MG: 300 CAPSULE ORAL at 14:12

## 2022-06-27 ASSESSMENT — ACTIVITIES OF DAILY LIVING (ADL)
ADLS_ACUITY_SCORE: 41
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
## 2022-06-27 NOTE — PROGRESS NOTES
"   06/27/22 1035   Quick Adds   Type of Visit Initial PT Evaluation   Living Environment   People in Home alone   Current Living Arrangements apartment   Home Accessibility stairs to enter home   Number of Stairs, Main Entrance greater than 10 stairs   Stair Railings, Main Entrance railings on both sides of stairs   Transportation Anticipated car, drives self;family or friend will provide   Living Environment Comments PTA, pt. was living alone in 2nd floor apartment with 20 IVORY (B HRs) with tubshower with the following DME: SPC, FWW, tub chair.   Self-Care   Usual Activity Tolerance fair   Regular Exercise No   Equipment Currently Used at Home cane, straight   Fall history within last six months yes   Number of times patient has fallen within last six months 5   Activity/Exercise/Self-Care Comment Reports he had an aide a while back, but \"They didn't do anything,\" \"Just took my BP and did a little Physical Therapy, that's it.\"   General Information   Onset of Illness/Injury or Date of Surgery 06/24/22   Referring Physician Quinn Monge MD   Patient/Family Therapy Goals Statement (PT) \"I just don't know why this keeps happening.\"   Pertinent History of Current Problem (include personal factors and/or comorbidities that impact the POC) 51 yo male with chronic R ICA occlusion, R KWADWO infarct comes in for worsening L weakness. MRI: scattered new RMCA infarct partially in watershed distribution, large penumbra concerning for hypoperfusion and potential for larger R ICA territory infarct. Etiology is most likely large vessel disease 2/2 polysubstance use, HTN and DM with hx non compliance, suspect latest infarct is 2/2 hypoperfusion vs vessel to vessel embolism.   Existing Precautions/Restrictions   (Keep -160 systolic pressure after discussion with charge. Also discussed with RN, orders to be \"cleaned up,\" to reflect BP goals accurately.)   Cognition   Affect/Mental Status (Cognition) anxious;WFL   Orientation " "Status (Cognition) oriented x 4   Follows Commands (Cognition) WFL  (Needs cues to stay on task at times.)   Behavioral Issues other (see comments)  (Pt says \"Fuck\" often, but not directed toward anyone, just the current situation secondary to frustration.)   Safety Deficit (Cognition) awareness of need for assistance;impulsivity;insight into deficits/self-awareness;judgment;safety precautions awareness;safety precautions follow-through/compliance;minimal deficit   Pain Assessment   Patient Currently in Pain Yes, see Vital Sign flowsheet  (L LE and UE, painful to touch, highly sensitive)   Integumentary/Edema   Integumentary/Edema Comments L fingers slightly puffy in appearance, likley disuse   Posture    Posture Forward head position   Range of Motion (ROM)   ROM Comment R UE and R LE WFL,  L UE and LE limited in AROM secondary to weakness, limited in PROM secondary to pain. L UE more limited vs the L LE. Very weak  on the L. L ankle DF shy of neutral, foot flat with gait noted.   Strength (Manual Muscle Testing)   Strength Comments L paresis, weakness of the L UE>LE . Weak L hand , holds the L UE abducted and slightly flexed, gentle redirection with touch pt able to  walker very loosely. L knee abdirashid at times, weakness in the hip abd/adductors, hip flexors, knee extension and knee flexion, DF weakness evident with gait as pt with foot flat. No formal MMTs on the L as pt is very sensitive to touch, painful.   Transfers   Comment, (Transfers) Sit<>stand CGA   Gait/Stairs (Locomotion)   Comment, (Gait/Stairs) Bed side gait with CGA at gait belt, Min A to keep the L hand on the walker. Short step length, L knee abdirashid.   Balance   Balance Comments Sitting balance, uanble to reach to the floor without LOB. Static stance, with UE support available/close by no increased sway, but keeps all weight on the R.  Dynamic balance impaired as pt is not fullyl aware ofthe ataxic and weak L UE-UE slips from walker " and pt with minor LOB.   Sensory Examination   Sensory Perception Comments Very sensitive to touch at the LLE and LUE.   Coordination   Coordination Comments L UE weakness and some ataxia, difficulty placing the L hand and note the L UE drifts into a shoulder flexion and abduction position at rest.   Muscle Tone   Muscle Tone Comments Increaed L UE tone with intent.   Clinical Impression   Criteria for Skilled Therapeutic Intervention Yes, treatment indicated   PT Diagnosis (PT) Impaired gait   Influenced by the following impairments Impaired balance, strength, activity tolerance, pain/impaird sensation of the L side.   Functional limitations due to impairments Decreased functional independence with mobility   Clinical Presentation (PT Evaluation Complexity) Stable/Uncomplicated   Clinical Presentation Rationale see MR   Clinical Decision Making (Complexity) low complexity   Planned Therapy Interventions (PT) balance training;bed mobility training;gait training;home exercise program;neuromuscular re-education;patient/family education;ROM (range of motion);stair training;strengthening;stretching;transfer training;risk factor education;progressive activity/exercise;home program guidelines   Risk & Benefits of therapy have been explained evaluation/treatment results reviewed;care plan/treatment goals reviewed;risks/benefits reviewed;current/potential barriers reviewed;participants voiced agreement with care plan;participants included;patient   PT Discharge Planning   PT Discharge Recommendation (DC Rec) Transitional Care Facility   PT Rationale for DC Rec Pt is motivated and eager to improve independence with mobility. He does live alone and has a history of non-complaince. If pt continues to progress and work with therapy may be an Acute Rehab candidate. If pt decindes he will not go to a rehab facility, recommend 24/7 assist with mobility including gait and stairs as well as home PT service.   PT Brief overview of  current status CGA sit<>stand, CGA/Min A with walker use for amb 20' x 2. Difficulty with L UE , and LLE knee buckling.

## 2022-06-27 NOTE — PLAN OF CARE
Patient name: Josue Parisi    Nursing shift note  Summary: Patient in with CVA  Alertness/orientation: Alert, oriented x4  Neuro: Left sided weakness 3/5, ataxia, numbness, tingling and pain  Cardiac: Patient refuses tele  Resp: WDL  GI: Having BM's  : Voiding in urinal  IV: Left PIV complains of pain, IV removed   Mobility: heavy A1 gaitbelt walker  CMS: Numbness tinlging  Pain: In left side  Diet: Regular  Takes meds: Whole  Skin: WDL  Plan: Discharge soon  Other Important Info:       Carlos A Healy, RN  [facility]  [phone]

## 2022-06-27 NOTE — PROGRESS NOTES
"Clinical Nutrition Brief Note    Received Positive Admission Nutrition Risk Screen~  - Have you recently lost weight without trying in the last six months?: unsure   - Have you been eating poorly because of a decreased appetite?: no    Met with patient briefly this morning. He denies unintentional wt loss PTA - stating that he does not recall answering these questions above   States that once he starting having strokes, he began to eat better (limiting fast food, salt and carbohydrates and now cooking from home) which in turn kept his weight stable (or with a few pounds lost intentionally) instead of increasing   Reports being ~150 lbs for \"a while\"   There is no admission weight to evaluate - RD ordered one for this morning     Patient does not meet MST criteria for a nutrition assessment at this time   Will check back at hospital LOS, unless otherwise re consulted. Thanks    Aby Chowdary RD, LD  Clinical Dietitian   Units Gen Surg, Neuroscience, 88, Postpartum, L&D  Pager: [phone]        "

## 2022-06-27 NOTE — PROGRESS NOTES
Cross Cover Note    Patient ate at 4 pm and again dinner now. Lantus 4 units ordered to cover for additional meal/carbs given .    ADDENDUM: Called to request Xanax. Admitted for new stroke. Noted to have anxiety. Will trial atarax this evening. Can have pharmacy re-confirm home med list re: Xanax.  Will order 7 mg patch as notes indicate 0.5 pack/day smoker.    Joselin Isidro PA-C

## 2022-06-27 NOTE — PLAN OF CARE
A&Ox4. L side 3/5 strength with numbness/tingling. VSS, hypertensive w/in parameters. Not on tele. No IV access. Regular diet, thin liquids. Pt ordered a pizza at ~1600 from [employer], then ate a meal tray from the kitchen, Dr. Monge paged for additional insulin. Up with 2, gait belt, walker. C/o neuropathy pain to L side, decreased with gabapentin scheduled. R axillary dressing CDI. Pt scoring green on the Aggression Stop Light Tool. TCU recommended at discharge.     Addendum @ 1825, Joselin BUTLER on call hospitalist paged for one time insulin dose. Pt ordered a pizza at ~1600 stating this was his dinner. Received a meal tray from the kitchen this evening. Pre meal blood glucose 307. One time insulin dose ordered by PA

## 2022-06-27 NOTE — PROGRESS NOTES
"[facility]    Medicine Progress Note - Hospitalist Service    Date of Admission:  6/24/2022    Assessment & Plan          Josue Parisi is a 52 year old male with a PMHx significant for polysubstance abuse, cocaine use, HTN, and recent right KWADWO CVA 3/2022 (large territory defect) with residual left-sided weakness, who was admitted on 6/18/2022 at [facility] with failure to thrive at home. At the time of his initial stroke, ARU was recommended but pt refused and discharged home ama. Able to mobilize at home with walker but was getting weaker. He was initially admitted on 6/18 under observation for weakness with a plan for placement for progressive weakness.  However he was discharged on 6/24/2022 when no place was found and he insisted on going home.  Outpatient rehab orders were placed.  He then was brought back in to [facility] later in the evening on 6/24/2022 for new onset significant worsening of left-sided weakness with paresthesias and numbness that is worse than before.  He does not have any meaningful function of his left hand. He notes increased urinary frequency and often times is not able to make it to the bathroom on time, for urine and/or stool.     #  New nonhemorrhagic infarction within the right prefrontal gyrus and deep MCA watershed infarct within the right centrum semiovale of the right frontal and parietal lobes.   #previous left sided weakness due to Ischemic CVA  -Appreciate stroke team review  -Admit for neuro checks, telemetry, blood glucose monitoring and permissive hypertension (goal -180 stop home antihypertensives, fluids, pressers if needed)   - Appreciate Neuro note 6/26 \"SBP increase of 20-30 points resulted in no symptomatic improvement and no change in perfusion map, suggesting symptoms are a result of new infarcts, not hypoperfused tissue, and lowering BP may lead to new tissue infarct as well. For this reason, recommend " "continued DAPT x90 days, and allow SBP to persist 140-160 upon discharge, then gradually try to decrease less than 140 in about 2 weeks and see if tolerated clinically. Hgb A1c significantly improved since Feb/March, but still recommend <7.0 for further reduced stroke risk. \"    -Continue aspirin 325 mg, Plavix 75 mg p.o. daily (for 90 days), atorvastatin 40 mg ( A/w- P2Y1Y2 plast assay )  -PT/OT/SLP/stroke education  -Underwent PT OT.  Recommended acute rehab unit.  Social work consulted.     #Type 2 diabetes mellitus.  Last HbA1c of 7.8 on 06/18   -Hold metformin.  On Lantus 10 units with sliding scale insulin aspart.   Recent Labs   Lab 06/27/22  1123 06/27/22  0739 06/27/22  0209 06/26/22  2108 06/26/22  1609 06/26/22  1103   * 264* 325* 325* 291* 191*     Blood glucose has been persistently high due to patient ordering food from outside.   His Lantus was increased to twice daily yesterday.  06/27: I will increase his Lantus to 15 units twice daily and add carb coverage for now.  Ideally we should be able to change Lantus to daily once sugars are stable.   -Need close PCP monitoring as non compliance is an issue  -Long discussion with patient, changed his diet in hospital to regular diet to avoid ordering food from outside     #History of hypertension   -Hold PTA meds to allow permissive hypertension     #Hidradenitis / Rt axillary wound  - WOCN consult previous recs:   Right axilla wound: Daily  and PRN   1. Cleanse wound with NS or wound cleanser (omit this step if patient cleans wound in shower)  2. Dry and protect surrounding skin with no sting barrier film wipe #[id number]  3. Apply a small amount of iodesorb gel #[id number] to bandaid  4. If Band-Aid needs to be changed more than once a day. Apply iodesorb gel #[id number] to adaptic #504170 and cover with Mepilex #[id number]     # Polysubstance abuse:   -Urine tox screen positive for THC.    # Generalized pain: PTA on gabapentin 300 mg 3 times daily.    -Increase " "to 600 mg 3 times daily (monitor for any sedation)  -Continue as needed Tylenol as needed. Avoid opioids         Diet: Regular Diet Adult    DVT Prophylaxis: Pneumatic Compression Devices  Cortez Catheter: Not present  Central Lines: None  Cardiac Monitoring: ACTIVE order. Indication: Stroke, acute (48 hours)  Code Status: Full Code      Disposition Plan      Expected Discharge Date: 06/28/2022                The patient's care was discussed with the Bedside Nurse and Patient.    Quinn Monge MD, MD  Hospitalist Service  [facility]  Securely message with the Vocera Web Console (learn more here)  Text page via [employer] Paging/Directory     \"This dictation was performed with voice recognition software and may contain errors,  omissions and inadvertent word substitution.\"       Clinically Significant Risk Factors Present on Admission               # Hypertension: home medication list includes antihypertensive(s)        ______________________________________________________________________    Interval History   Complains of persistent pain and whole body predominantly left side.  Frustrated with restricted diet in the hospital and has been ordering from [facility]    4 point ROS done and neg unless mentioned    Data reviewed today: I reviewed all medications, new labs and imaging results over the last 24 hours. I personally reviewed no images or EKG's today.    Physical Exam   BP (!) 149/92 (BP Location: Right arm)   Pulse 89   Temp 98.3  F (36.8  C) (Oral)   Resp 16   Ht 1.676 m (5' 6\")   SpO2 100%   BMI 23.69 kg/m    Gen- pleasant   HEENT- NAD, CALIXTO  Neck- supple, no JVD elevation, no thyromegaly  CVS- I+II+ no m/r/g  RS- CTAB  Abdo- soft, no tenderness . No g/r/r   Ext- no edema   CNS-  Left sided weakness (details in stroke neuro note)  Oriented to person/ place and time.     Data    BMPRecent Labs   Lab 06/27/22  1123 06/27/22  0739 06/27/22  0628 06/27/22  0209 06/26/22  2108 " 06/25/22  1650 06/25/22  1129 06/24/22  2232 06/24/22  1805   NA  --   --   --   --   --   --  137  --  136   POTASSIUM  --   --  3.5  --   --   --  3.9  --  3.8   CHLORIDE  --   --   --   --   --   --  104  --  99   DAT  --   --   --   --   --   --  9.6  --  8.8   CO2  --   --   --   --   --   --  28  --  28   BUN  --   --   --   --   --   --  18  --  26   CR  --   --   --   --   --   --  0.61*  --  0.75   * 264*  --  325* 325*   < > 185*   < > 138*    < > = values in this interval not displayed.     CBC  Recent Labs   Lab 06/25/22  1129 06/24/22  1805   WBC 12.6* 13.6*   RBC 3.71* 3.67*   HGB 11.6* 11.4*   HCT 36.5* 36.1*   MCV 98 98   MCH 31.3 31.1   MCHC 31.8 31.6   RDW 12.5 12.5    390     INR  Recent Labs   Lab 06/24/22  1805   INR 1.08     LFTsNo lab results found in last 7 days.   PANCNo lab results found in last 7 days.    No results found for this or any previous visit (from the past 24 hour(s)).

## 2022-06-27 NOTE — PLAN OF CARE
Pt here with R MCA infarct. A&Ox4. Neuros LUE and LLE 3/5 with numbness and tingling. VSS on RA. Pt refusing tele. Pt receiving sliding scale insulin, on Lantus. Reg diet, thin liquids. Takes pills whole with water. Elevated blood sugars. Up to commode with Ax1-2, pt refusing staff assistance when up to bathroom and to wear GB at times. PRN tylenol give for L sided extremity pain. Pt scoring yellow on the Aggression Stop Light Tool. Discharge pending.

## 2022-06-28 ENCOUNTER — APPOINTMENT (OUTPATIENT)
Dept: OCCUPATIONAL THERAPY | Facility: CLINIC | Age: 52
DRG: 065 | End: 2022-06-28
Payer: COMMERCIAL

## 2022-06-28 ENCOUNTER — APPOINTMENT (OUTPATIENT)
Dept: PHYSICAL THERAPY | Facility: CLINIC | Age: 52
DRG: 065 | End: 2022-06-28
Payer: COMMERCIAL

## 2022-06-28 LAB
GLUCOSE BLDC GLUCOMTR-MCNC: 264 MG/DL (ref 70–99)
GLUCOSE BLDC GLUCOMTR-MCNC: 288 MG/DL (ref 70–99)
GLUCOSE BLDC GLUCOMTR-MCNC: 314 MG/DL (ref 70–99)
GLUCOSE BLDC GLUCOMTR-MCNC: 326 MG/DL (ref 70–99)
GLUCOSE BLDC GLUCOMTR-MCNC: 345 MG/DL (ref 70–99)
POTASSIUM BLD-SCNC: 3.8 MMOL/L (ref 3.4–5.3)

## 2022-06-28 PROCEDURE — L1820 KO ELAS W/ CONDYLE PADS & JO: HCPCS

## 2022-06-28 PROCEDURE — 99233 SBSQ HOSP IP/OBS HIGH 50: CPT | Performed by: INTERNAL MEDICINE

## 2022-06-28 PROCEDURE — 250N000013 HC RX MED GY IP 250 OP 250 PS 637: Performed by: PHYSICIAN ASSISTANT

## 2022-06-28 PROCEDURE — 97530 THERAPEUTIC ACTIVITIES: CPT | Mod: GO | Performed by: OCCUPATIONAL THERAPIST

## 2022-06-28 PROCEDURE — 36415 COLL VENOUS BLD VENIPUNCTURE: CPT | Performed by: INTERNAL MEDICINE

## 2022-06-28 PROCEDURE — 97116 GAIT TRAINING THERAPY: CPT | Mod: GP

## 2022-06-28 PROCEDURE — 84132 ASSAY OF SERUM POTASSIUM: CPT | Performed by: INTERNAL MEDICINE

## 2022-06-28 PROCEDURE — 97535 SELF CARE MNGMENT TRAINING: CPT | Mod: GO | Performed by: OCCUPATIONAL THERAPIST

## 2022-06-28 PROCEDURE — 250N000013 HC RX MED GY IP 250 OP 250 PS 637: Performed by: INTERNAL MEDICINE

## 2022-06-28 PROCEDURE — 120N000001 HC R&B MED SURG/OB

## 2022-06-28 RX ORDER — IBUPROFEN 600 MG/1
600 TABLET, FILM COATED ORAL ONCE
Status: COMPLETED | OUTPATIENT
Start: 2022-06-28 | End: 2022-06-28

## 2022-06-28 RX ORDER — BENZOCAINE/MENTHOL 6 MG-10 MG
LOZENGE MUCOUS MEMBRANE 2 TIMES DAILY
Status: DISCONTINUED | OUTPATIENT
Start: 2022-06-28 | End: 2022-07-01 | Stop reason: HOSPADM

## 2022-06-28 RX ADMIN — GABAPENTIN 600 MG: 300 CAPSULE ORAL at 08:12

## 2022-06-28 RX ADMIN — CLOPIDOGREL BISULFATE 75 MG: 75 TABLET ORAL at 08:12

## 2022-06-28 RX ADMIN — GABAPENTIN 600 MG: 300 CAPSULE ORAL at 16:31

## 2022-06-28 RX ADMIN — ATORVASTATIN CALCIUM 80 MG: 80 TABLET, FILM COATED ORAL at 08:12

## 2022-06-28 RX ADMIN — Medication 3 MG: at 20:35

## 2022-06-28 RX ADMIN — HYDROCORTISONE: 1 CREAM TOPICAL at 16:31

## 2022-06-28 RX ADMIN — ACETAMINOPHEN 650 MG: 325 TABLET ORAL at 18:29

## 2022-06-28 RX ADMIN — GABAPENTIN 600 MG: 300 CAPSULE ORAL at 22:20

## 2022-06-28 RX ADMIN — IBUPROFEN 600 MG: 600 TABLET ORAL at 20:35

## 2022-06-28 RX ADMIN — ASPIRIN 325 MG ORAL TABLET 325 MG: 325 PILL ORAL at 08:12

## 2022-06-28 RX ADMIN — CETIRIZINE HYDROCHLORIDE 10 MG: 10 TABLET, FILM COATED ORAL at 08:12

## 2022-06-28 RX ADMIN — NICOTINE 1 PATCH: 7 PATCH, EXTENDED RELEASE TRANSDERMAL at 08:12

## 2022-06-28 RX ADMIN — ACETAMINOPHEN 650 MG: 325 TABLET ORAL at 12:17

## 2022-06-28 ASSESSMENT — ACTIVITIES OF DAILY LIVING (ADL)
ADLS_ACUITY_SCORE: 41
ADLS_ACUITY_SCORE: 39
ADLS_ACUITY_SCORE: 41
ADLS_ACUITY_SCORE: 39
ADLS_ACUITY_SCORE: 41
ADLS_ACUITY_SCORE: 41

## 2022-06-28 NOTE — PROGRESS NOTES
Pt here with a new R. prefontal gyrus and R. MCA watershed infarcts. A&Ox4. Neuros with L. Sided hemiparesis scoring a 3/5, L sided N/T, and a LUE drift present. VSS on RA. Regular diet, thin liquids. Takes pills whole with water. Up with A1 GB/walker. Denies pain. Pt scoring green on the Aggression Stop Light Tool. Frequent education regarding risk factors for stroke provided, reinforcement needed. Discharge to TCU pending.

## 2022-06-28 NOTE — CONSULTS
Care Management Initial Consult    General Information  Assessment completed with: Patient,    Type of CM/SW Visit: Initial Assessment    Primary Care Provider verified and updated as needed:     Readmission within the last 30 days:        Reason for Consult: discharge planning  Advance Care Planning:        none in chart    Communication Assessment  Patient's communication style: spoken language (English or Bilingual)    Hearing Difficulty or Deaf: no   Wear Glasses or Blind: yes    Cognitive  Cognitive/Neuro/Behavioral: WDL  Level of Consciousness: alert  Arousal Level: opens eyes spontaneously  Orientation: oriented x 4  Mood/Behavior: calm, cooperative  Best Language: 0 - No aphasia  Speech: clear, spontaneous    Living Environment:   People in home: alone     Current living Arrangements: apartment      Able to return to prior arrangements:         Family/Social Support:  Care provided by: self  Provides care for:    Marital Status: Single             Description of Support System:  (lack of support)  Pt lives alone and has no family or friend to stay with him post rehab.  TCU is recommended. Pt states he wants to go for rehab and seems to feel that he is not recovering as quickly as he has in the past.     Current Resources:   Patient receiving home care services:       Community Resources:    Equipment currently used at home: cane, straight  Supplies currently used at home:      Employment/Financial:  Employment Status:        Financial Concerns: [employer]          Lifestyle & Psychosocial Needs:  Social Determinants of Health     Tobacco Use: Not on file   Alcohol Use: Not on file   Financial Resource Strain: Not on file   Food Insecurity: Not on file   Transportation Needs: Not on file   Physical Activity: Not on file   Stress: Not on file   Social Connections: Not on file   Intimate Partner Violence: Not on file   Depression: Not on file   Housing Stability: Not on file       Functional  Status:  Prior to admission patient needed assistance: Pt is independent at baseline. He drives. He resides in an apartment without elevator and has two flights of stairs to manage to be able to return there.    Mental Health Status:          Chemical Dependency Status:              Values/Beliefs:  Spiritual, Cultural Beliefs, Scientologist Practices, Values that affect care:                 Additional Information:  Pt motivated to begin therapy and return home  [facility] met with pt and he is agreeable to TCU placement and is ready to discharge.  Referrals out.    Pt decline by [facility] as no openings. ELMA has called and left 2 messages with [place] regarding referral and awaiting return call.  ELMA received return call from [place] that pt has been declined and that they do not have available bed.  Additional referrals sent out for review  .  MAYANK Armijo  [facility]  Care Transitions  [phone]

## 2022-06-28 NOTE — PROGRESS NOTES
"[facility]    HOSPITALIST PROGRESS NOTE :   --------------------------------------------------    Date of Admission:  6/24/2022    Cumulative Summary: Josue Parisi is a 52 year old male with a PMHx significant for polysubstance abuse, cocaine use, HTN, and recent right KWADWO CVA 3/2022 (large territory defect) with residual left-sided weakness, who was admitted on 6/18/2022 at [facility] with failure to thrive at home. At the time of his initial stroke, ARU was recommended but pt refused and discharged home ama. Able to mobilize at home with walker but was getting weaker. He was initially admitted on 6/18 under observation for weakness with a plan for placement for progressive weakness.  However he was discharged on 6/24/2022 when no place was found and he insisted on going home. Outpatient rehab orders were placed.  He then was brought back in to [facility] later in the evening on 6/24/2022 for new onset significant worsening of left-sided weakness with paresthesias and numbness that is worse than before. He does not have any meaningful function of his left hand. He notes increased urinary frequency and often times is not able to make it to the bathroom on time, for urine and/or stool.    Assessment & Plan     New nonhemorrhagic infarction within the right prefrontal gyrus and deep MCA watershed infarct within the right centrum semiovale of the right frontal and parietal lobes.   #previous left sided weakness due to Ischemic CVA    -- Patient care was assumed this morning, patient was seen and examined, pleasant gentleman sitting in chair.  --Patient has been evaluated by stroke neurology during the hospitalization.  -- Appreciate Neuro note 6/26: \"SBP increase of 20-30 points resulted in no symptomatic improvement and no change in perfusion map, suggesting symptoms are a result of new infarcts, not hypoperfused tissue, and lowering BP may lead to new tissue " "infarct as well. For this reason, recommend continued DAPT x90 days, and allow SBP to persist 140-160 upon discharge, then gradually try to decrease less than 140 in about 2 weeks and see if tolerated clinically. Hgb A1c significantly improved since Feb/March, but still recommend <7.0 for further reduced stroke risk. \"  --Continue patient on current antihypertensive medications currently blood pressure seems to be in the goal between 1 40-1 60 systolic blood pressure.  -- Continue aspirin 325 mg along with Plavix 75 mg p.o. daily for 90 days  -- Continue atorvastatin 40 mg  -- Antithrombin III levels are in normal range.   --Factor V Leiden mutation analysis is still in process  -- Platelet function P2Y12 in range of appropriate response to antiplatelet therapy for cardiology purposes.  -- Patient has been evaluated by physical, occupational and speech therapy, recommended to be discharged to rehab, social workers and care coordinators are working.  --Patient is requesting knee brace for his left knee, will consult orthosis     #Type 2 diabetes mellitus.  Last HbA1c of 7.8 on 06/18   --Hold metformin.  On Lantus 10 units with sliding scale insulin aspart.   --Hyperglycemia has been noticed as patient is altering food sometimes outside the hospital.  -- will increase Lantus to 18 units BID from 15 units BID  -- will change medium dose sliding scale to high dose sliding scale insulin\  -- will change meal coverage from 1:15 to 1:10 ratio    History of hypertension   -- Hold PTA meds to allow permissive hypertension     Hidradenitis / Rt axillary wound  -- WOCN consult previous recs:   Right axilla wound: Daily  and PRN   1. Cleanse wound with NS or wound cleanser (omit this step if patient cleans wound in shower)  2. Dry and protect surrounding skin with no sting barrier film wipe #[id number]  3. Apply a small amount of iodesorb gel #[id number] to bandaid  4. If Band-Aid needs to be changed more than once a day. Apply " iodesorb gel #[id number] to adaptic #[id number] and cover with Mepilex #[id number]     Polysubstance abuse:   -- Urine tox screen positive for THC.     Generalized pain: PTA on gabapentin 300 mg 3 times daily.    -- Increase to 600 mg 3 times daily (monitor for any sedation)  -- Continue as needed Tylenol as needed. Avoid opioids   -- Patient was questioning regarding as needed Xanax at the nighttime which he uses intermittently at home, explained to him that at this time it is better to avoid benzodiazepine at the nighttime as he is recovering from the stroke to avoid any confusion from medication side effect.  Patient showed understanding.    Clinically Significant Risk Factors Present on Admission               # Hypertension: home medication list includes antihypertensive(s)        Diet: Regular Diet Adult    Cortez Catheter: Not present  DVT Prophylaxis: Pneumatic Compression Devices  Code Status: Full Code    The patient's care was discussed with the Patient.    Disposition Plan      Expected Discharge Date: 06/29/2022             Entered: Lulu Desai MD 06/28/2022, 7:33 AM       Lulu Desai MD, MD, FACP  Text Page (7am - 6pm)    ----------------------------------------------------------------------------------------------------------------------    Interval History   Patient care was assumed this morning, patient was seen and examined, sitting in chair.  Discussed with patient regarding adjusting his insulin, discussed with patient that it is important to inform nursing when he is consuming any outside food.  Patient is denying any chest pain, palpitations or shortness of breath.  Patient told me that his left side is generally weaker and many times his knee would give away and is requesting some knee brace.  We discussed about consulting orthosis.  Patient was also requesting if his as needed Xanax can be ordered which he uses.  Intermittently at home.  Discussed with him regarding side effects of benzodiazepine and  how it would be wise to hold it while patient is recovering from the stroke so any side effects of medication do not impact his neurological examination.  Patient showed understanding.      -Data reviewed today: I reviewed all new labs and imaging results over the last 24 hours.    I personally reviewed no images or EKG's today.    Physical Exam   Temp: 98.3  F (36.8  C) Temp src: Oral BP: (!) 176/91 Pulse: 77   Resp: 16 SpO2: 97 % O2 Device: None (Room air)    There were no vitals filed for this visit.  Vital Signs with Ranges  Temp:  [97.6  F (36.4  C)-98.3  F (36.8  C)] 98.3  F (36.8  C)  Pulse:  [77-95] 77  Resp:  [16] 16  BP: (149-176)/(91-98) 176/91  SpO2:  [96 %-100 %] 97 %  I/O last 3 completed shifts:  In: 360 [P.O.:360]  Out: 1750 [Urine:1750]    GENERAL: Alert , awake and oriented. NAD. Conversational, appropriate.   HEENT: Normocephalic. EOMI. No icterus or injection. Nares normal.   LUNGS: Clear to auscultation. No dyspnea at rest.   HEART: Regular rate. Extremities perfused.   ABDOMEN: Soft, nontender, and nondistended. Positive bowel sounds.   EXTREMITIES: No LE edema noted.   NEUROLOGIC: Moves extremities x4 on command.  Left-sided weakness.    Medications     - MEDICATION INSTRUCTIONS -       - MEDICATION INSTRUCTIONS -         aspirin  325 mg Oral Daily     atorvastatin  80 mg Oral Daily     cetirizine  10 mg Oral Daily     clopidogrel  75 mg Oral or NG Tube Daily     gabapentin  600 mg Oral TID     insulin aspart  1-10 Units Subcutaneous TID AC     insulin aspart  1-7 Units Subcutaneous At Bedtime     insulin aspart   Subcutaneous Daily with breakfast     insulin glargine  18 Units Subcutaneous BID     nicotine  1 patch Transdermal Daily     nicotine   Transdermal Q8H     sodium chloride (PF)  3 mL Intracatheter Q8H       Data   Recent Labs   Lab 06/28/22  0209 06/27/22  2118 06/27/22  1757 06/27/22  0739 06/27/22  0628 06/25/22  1650 06/25/22  1129 06/24/22  2232 06/24/22  1805   WBC  --   --    --   --   --   --  12.6*  --  13.6*   HGB  --   --   --   --   --   --  11.6*  --  11.4*   MCV  --   --   --   --   --   --  98  --  98   PLT  --   --   --   --   --   --  428  --  390   INR  --   --   --   --   --   --   --   --  1.08   NA  --   --   --   --   --   --  137  --  136   POTASSIUM  --   --   --   --  3.5  --  3.9  --  3.8   CHLORIDE  --   --   --   --   --   --  104  --  99   CO2  --   --   --   --   --   --  28  --  28   BUN  --   --   --   --   --   --  18  --  26   CR  --   --   --   --   --   --  0.61*  --  0.75   ANIONGAP  --   --   --   --   --   --  5  --  9   DAT  --   --   --   --   --   --  9.6  --  8.8   * 317* 307*   < >  --    < > 185*   < > 138*    < > = values in this interval not displayed.       Imaging:   No results found for this or any previous visit (from the past 24 hour(s)).

## 2022-06-28 NOTE — PROGRESS NOTES
S:  We received an order in EPIC for a L knee brace from Lulu Desai MD.  O:  I met with the patient in room # 2446-78 and he is alert.  The patient says his L knee hyperextends when walking and he is looking for a KO that will stop this.  I donned a size medium Lazara Lerma hinged KO on the L knee and the fit is adequate.  The patient walked with the KO on and it is preventing the L knee from hyperextending.    A:  The KO fits adequate and in preventing the L knee from hyperextending.  The patient is pleased with the knee brace.    P:  The patient will wear the knee brace as needed to prevent the L knee from hyperextending.  The patient will contact us prn.  G:  Prevent L knee from hyperextending.  Raghav MEZA

## 2022-06-28 NOTE — PLAN OF CARE
Pt here with R prefrontal gyrus and R MCA watershed infarcts. A&Ox4. Neuros L sided hemiparesis, 3/5, LUE drift, LUE and LLE numbness and tingling. VSS. Regular diet, thin liquids. Takes pills whole. Up with A1-2/GBW. Continent of B&B, urinal at bedside. Denies pain. Pt scoring green on the Aggression Stop Light Tool. Discharge pending.

## 2022-06-29 ENCOUNTER — APPOINTMENT (OUTPATIENT)
Dept: OCCUPATIONAL THERAPY | Facility: CLINIC | Age: 52
DRG: 065 | End: 2022-06-29
Payer: COMMERCIAL

## 2022-06-29 ENCOUNTER — APPOINTMENT (OUTPATIENT)
Dept: PHYSICAL THERAPY | Facility: CLINIC | Age: 52
DRG: 065 | End: 2022-06-29
Payer: COMMERCIAL

## 2022-06-29 LAB
GLUCOSE BLDC GLUCOMTR-MCNC: 195 MG/DL (ref 70–99)
GLUCOSE BLDC GLUCOMTR-MCNC: 248 MG/DL (ref 70–99)
GLUCOSE BLDC GLUCOMTR-MCNC: 309 MG/DL (ref 70–99)
GLUCOSE BLDC GLUCOMTR-MCNC: 316 MG/DL (ref 70–99)
POTASSIUM BLD-SCNC: 3.7 MMOL/L (ref 3.4–5.3)

## 2022-06-29 PROCEDURE — 97530 THERAPEUTIC ACTIVITIES: CPT | Mod: GP

## 2022-06-29 PROCEDURE — 120N000001 HC R&B MED SURG/OB

## 2022-06-29 PROCEDURE — 97535 SELF CARE MNGMENT TRAINING: CPT | Mod: GO

## 2022-06-29 PROCEDURE — 250N000013 HC RX MED GY IP 250 OP 250 PS 637: Performed by: INTERNAL MEDICINE

## 2022-06-29 PROCEDURE — 36415 COLL VENOUS BLD VENIPUNCTURE: CPT | Performed by: INTERNAL MEDICINE

## 2022-06-29 PROCEDURE — 97116 GAIT TRAINING THERAPY: CPT | Mod: GP

## 2022-06-29 PROCEDURE — 84132 ASSAY OF SERUM POTASSIUM: CPT | Performed by: INTERNAL MEDICINE

## 2022-06-29 PROCEDURE — 97530 THERAPEUTIC ACTIVITIES: CPT | Mod: GO

## 2022-06-29 PROCEDURE — 250N000013 HC RX MED GY IP 250 OP 250 PS 637: Performed by: PHYSICIAN ASSISTANT

## 2022-06-29 PROCEDURE — 99232 SBSQ HOSP IP/OBS MODERATE 35: CPT | Performed by: INTERNAL MEDICINE

## 2022-06-29 RX ADMIN — GABAPENTIN 600 MG: 300 CAPSULE ORAL at 21:55

## 2022-06-29 RX ADMIN — GABAPENTIN 600 MG: 300 CAPSULE ORAL at 17:26

## 2022-06-29 RX ADMIN — ATORVASTATIN CALCIUM 80 MG: 80 TABLET, FILM COATED ORAL at 08:27

## 2022-06-29 RX ADMIN — ACETAMINOPHEN 650 MG: 325 TABLET ORAL at 04:42

## 2022-06-29 RX ADMIN — ASPIRIN 325 MG ORAL TABLET 325 MG: 325 PILL ORAL at 08:27

## 2022-06-29 RX ADMIN — Medication 3 MG: at 22:00

## 2022-06-29 RX ADMIN — NICOTINE 1 PATCH: 7 PATCH, EXTENDED RELEASE TRANSDERMAL at 08:24

## 2022-06-29 RX ADMIN — CETIRIZINE HYDROCHLORIDE 10 MG: 10 TABLET, FILM COATED ORAL at 08:27

## 2022-06-29 RX ADMIN — GABAPENTIN 600 MG: 300 CAPSULE ORAL at 08:27

## 2022-06-29 RX ADMIN — CLOPIDOGREL BISULFATE 75 MG: 75 TABLET ORAL at 08:27

## 2022-06-29 RX ADMIN — HYDROCORTISONE: 1 CREAM TOPICAL at 17:27

## 2022-06-29 ASSESSMENT — ACTIVITIES OF DAILY LIVING (ADL)
ADLS_ACUITY_SCORE: 39

## 2022-06-29 NOTE — PROGRESS NOTES
"[facility]    HOSPITALIST PROGRESS NOTE :   --------------------------------------------------    Date of Admission:  6/24/2022    Summary: Josue Parisi is a 52 year old male with a PMHx significant for polysubstance abuse, cocaine use, HTN, and recent right KWADWO CVA 3/2022 (large territory defect) with residual left-sided weakness, who was admitted on 6/18/2022 at [facility] with failure to thrive at home. At the time of his initial stroke, ARU was recommended but pt refused and discharged home AMA. Able to mobilize at home with walker but was getting weaker. He was initially admitted on 6/18 under observation for weakness with a plan for placement for progressive weakness.  However he insisted on going home and he was discharged on 6/24/2022 when no place was found and . Outpatient rehab orders were placed.  He then was brought back in to [facility] later in the evening on 6/24/2022 for new onset significant worsening of left-sided weakness with paresthesias and numbness that is worse than before. He does not have any meaningful function of his left hand. He notes increased urinary frequency and often times is not able to make it to the bathroom on time, for urine and/or stool.    Assessment & Plan     New nonhemorrhagic infarction within the right prefrontal gyrus and deep MCA watershed infarct within the right centrum semiovale of the right frontal and parietal lobes.   previous left sided weakness due to Ischemic CVA         Patient has been evaluated by stroke neurology during this hospitalization.    Appreciate Neuro note 6/26: \"SBP increase of 20-30 points resulted in no symptomatic improvement and no change in perfusion map, suggesting symptoms are a result of new infarcts, not hypoperfused tissue, and lowering BP may lead to new tissue infarct as well. For this reason, recommend continued DAPT x90 days, and allow SBP to persist 140-160 upon discharge, " "then gradually try to decrease less than 140 in about 2 weeks and see if tolerated clinically. Hgb A1c significantly improved since Feb/March, but still recommend <7.0 for further reduced stroke risk. \"    Continue patient on current antihypertensive medications currently blood pressure seems to be in the goal between 1 40-1 60 systolic blood pressure.    Continue aspirin 325 mg along with Plavix 75 mg p.o. daily for 90 days    Continue atorvastatin 40 mg    Antithrombin III levels are in normal range.     Factor V Leiden mutation analysis is still in process    Platelet function P2Y12 appropriate for antiplatelet therapy for cardiology purposes.    Patient has been evaluated by physical, occupational and speech therapy, recommended to be discharged to rehab, social workers and care coordinators are working.    Patient is requesting knee brace for his left knee, will consult orthosis       Type 2 diabetes mellitus.  Last HbA1c of 7.8 on 06/18     Hold metformin.  On Lantus 10 units with sliding scale insulin aspart.     Hyperglycemia has been noticed as patient is altering food sometimes outside the hospital.    increased Lantus to 18 units BID from 15 units BID    changed medium dose sliding scale to high dose sliding scale insulin    changed meal coverage from 1:15 to 1:10 ratio    History of hypertension     Hold PTA meds to allow permissive hypertension    See above, goal -160 mmHg     Hidradenitis / Rt axillary wound    WOCN consult previous recs:   Right axilla wound: Daily  and PRN   1. Cleanse wound with NS or wound cleanser (omit this step if patient cleans wound in shower)  2. Dry and protect surrounding skin with no sting barrier film wipe #[id number]  3. Apply a small amount of iodesorb gel #[id number] to bandaid  4. If Band-Aid needs to be changed more than once a day. Apply iodesorb gel #[id number] to adaptic #736831 and cover with Mepilex #[id number]     Polysubstance abuse:     Urine tox screen positive for " "THC.     Generalized pain: PTA on gabapentin 300 mg 3 times daily.      Increase to 600 mg 3 times daily (monitor for any sedation)    Continue as needed Tylenol as needed. Avoid opioids     Patient was questioning regarding as needed Xanax at the nighttime which he uses intermittently at home, explained to him that at this time it is better to avoid benzodiazepine at the nighttime as he is recovering from the stroke to avoid any confusion from medication side effect.  Patient showed understanding.    Clinically Significant Risk Factors Present on Admission                     Diet: Regular Diet Adult    Cortez Catheter: Not present  DVT Prophylaxis: Pneumatic Compression Devices  Code Status: Full Code    The patient's care was discussed with the Patient and bedside RN    Disposition Plan      Expected Discharge Date: 06/30/2022    Discharge Delays: Placement - TCU    Discharge Comments: Pending TCU placement     Entered: Elizabeth Barbour MD 06/29/2022, 7:45 AM       Elizabeth Barbour MD  Text Page (7am - 6pm)      Interval History   \"I do not have anyone to help me.  I am going to lose my apartment.\"  Patient is upset, he says he needs to leave the hospital for a few hours to go get a money order and pay his rent and then he will return.  We discussed that patients cannot leave the hospital on pass, he is understandably distressed by his need to pay bills and do errands.  He is also upset that his young children cannot visit him in the hospital.  He has no new respiratory or GI complaints.    -Data reviewed today: I reviewed all new labs and imaging results over the last 24 hours.    I personally reviewed no images or EKG's today.    Physical Exam   Temp: 97.4  F (36.3  C) Temp src: Oral BP: (!) 158/100 Pulse: 83   Resp: 18 SpO2: 100 % O2 Device: None (Room air)    There were no vitals filed for this visit.  Vital Signs with Ranges  Temp:  [97.4  F (36.3  C)-98.8  F (37.1  C)] 97.4  F (36.3  C)  Pulse:  [83-95] " 83  Resp:  [16-20] 18  BP: (138-175)/() 158/100  SpO2:  [99 %-100 %] 100 %  I/O last 3 completed shifts:  In: 300 [P.O.:300]  Out: 2900 [Urine:2900]    GENERAL: Alert , awake and oriented. NAD. Conversational, appropriate.   LUNGS: Clear to auscultation.  HEART: Regular rate. Extremities perfused.   ABDOMEN: Soft, nontender, and nondistended. Positive bowel sounds.   EXTREMITIES: No LE edema noted.   NEUROLOGIC: Moves extremities x4 on command.  Left-sided weakness.    Medications     - MEDICATION INSTRUCTIONS -       - MEDICATION INSTRUCTIONS -         aspirin  325 mg Oral Daily     atorvastatin  80 mg Oral Daily     cetirizine  10 mg Oral Daily     clopidogrel  75 mg Oral or NG Tube Daily     gabapentin  600 mg Oral TID     hydrocortisone   Topical BID     insulin aspart  1-10 Units Subcutaneous TID AC     insulin aspart  1-7 Units Subcutaneous At Bedtime     insulin aspart   Subcutaneous Daily with breakfast     insulin glargine  18 Units Subcutaneous BID     nicotine  1 patch Transdermal Daily     nicotine   Transdermal Q8H     sodium chloride (PF)  3 mL Intracatheter Q8H       Data   Recent Labs   Lab 06/29/22  0721 06/28/22  2200 06/28/22  1835 06/28/22  0806 06/28/22  0730 06/27/22  0739 06/27/22  0628 06/25/22  1650 06/25/22  1129 06/24/22  2232 06/24/22  1805   WBC  --   --   --   --   --   --   --   --  12.6*  --  13.6*   HGB  --   --   --   --   --   --   --   --  11.6*  --  11.4*   MCV  --   --   --   --   --   --   --   --  98  --  98   PLT  --   --   --   --   --   --   --   --  428  --  390   INR  --   --   --   --   --   --   --   --   --   --  1.08   NA  --   --   --   --   --   --   --   --  137  --  136   POTASSIUM  --   --   --   --  3.8  --  3.5  --  3.9  --  3.8   CHLORIDE  --   --   --   --   --   --   --   --  104  --  99   CO2  --   --   --   --   --   --   --   --  28  --  28   BUN  --   --   --   --   --   --   --   --  18  --  26   CR  --   --   --   --   --   --   --   --  0.61*   --  0.75   ANIONGAP  --   --   --   --   --   --   --   --  5  --  9   DAT  --   --   --   --   --   --   --   --  9.6  --  8.8   * 345* 326*   < >  --    < >  --    < > 185*   < > 138*    < > = values in this interval not displayed.       Imaging:   No results found for this or any previous visit (from the past 24 hour(s)).

## 2022-06-29 NOTE — PROVIDER NOTIFICATION
"MD Notification    Notified Person: MD    Notified Person Name: Dr. alas    Notification Date/Time: 6/29/22 at 1551    Notification Interaction: [employer] messaging     Purpose of Notification: \"Are you okay to change neuros to q8h? NeuroCVA signed off 6/26 and just waiting TCU placement.\"    Orders Received: change neuros and vitals to q8h.    Comments:    "

## 2022-06-29 NOTE — PLAN OF CARE
Reason for Admission: R prefrontal gyrus and R MCA watershed CVAs    Cognitive/Mentation: A/Ox 4  Neuros/CMS: Intact ex L sided hemiparesis 3/5 both upper and lower, L drift, L sided numbness and tingling  VS: Stable.  GI: BS +, + flatus, last BM today. Continent.  : Voids without issue. Continent.  Pulmonary: LS clear.  Pain: 7-8/10 pain on left side of body, tylenol and ibuprofen given.     Drains: None  Skin: R axillary boil, wound care performed  Activity: Assist x 1-2 with GBW.  Diet: Regular with thin liquids. Takes pills whole.     Aggression Stoplight Score: Green  Therapies recs: TCU  Discharge: Pending    End of shift summary: Patient requested more pain medication, one time dose of ibuprofen given. Patient setting off bed alarm to stand to use urinal, educated on fall safety and calling before standing. Sliding scale insulin given.

## 2022-06-29 NOTE — PLAN OF CARE
Reason for Admission: CVA    Cognitive/Mentation: A/Ox 4  Neuros/CMS: left side functioning at 3/5 rating  VS: stable.   Tele: pt refuses.  GI: BS present, passing flatus pt had bowel movement on shift Continent. Ambulates to bathroom from bed and chair.  : Continent.  Pulmonary: LS clear bilaterally.  Pain: denies pain.     Drains/Lines: pt refuses IV  Skin: CDI   Activity: Assist x one with GBW.  Diet: reg with thin liquids. Takes pills whole.     Therapies recs: TCU  Discharge: pending    Aggression Stoplight Tool: yellow, pt was irritable on shift had confrontation with mother, then mother left. Writer consoled pt, whom then became calm again.    End of shift summary: pt requested to speak with MD about discharge. Writer educated pt on discharge being unsafe. Discussed high BP's with MD that were high from the previous day which were out of range from the instructed from neuro MD discussed its better to keep them higher then try to lower than and then it become to low.

## 2022-06-29 NOTE — PLAN OF CARE
[id number]: Patient here with right-sided hemispheric infarcts, right ICA occlusion and MCA stenosis. A&Ox4. Neuros and vital signs changed to q8h. LUE presents with weakness 3/5. Difficult to grasp and reports numbness to left hand and left foot. LUE (+) drift. Slight contracture in left hand. Speech clear. Tolerating regular diet without any swallowing issues and using call light appropriately. VSS with SBP between 140-160. Elevated x1, but corrected self without intervention upon recheck. Refusing tele. Denies pain. Remained in chair throughout shift. Offered to use bathroom but declined. Discharge to TCU pending placement, SW following.

## 2022-06-29 NOTE — PROGRESS NOTES
Care Management Follow Up    Length of Stay (days): 5    Expected Discharge Date: 06/30/2022     Concerns to be Addressed:       Patient plan of care discussed at interdisciplinary rounds: Yes    Anticipated Discharge Disposition: Home     Anticipated Discharge Services:    Anticipated Discharge DME:      Patient/family educated on Medicare website which has current facility and service quality ratings:    Education Provided on the Discharge Plan:    Patient/Family in Agreement with the Plan:      Referrals Placed by CM/SW:    Private pay costs discussed: Not applicable    Additional Information:  Writer followed up with TCU referrals that are pending.    [facility] - full until next week. Will still assess to see if medically appropriate  [facility] - full this week and next week with no anticipated discharges  [facility] - declined    Writer sent additional referral to [facility] for bed availability.    Will continue to follow.    ZAKIA Real, LGSW    [facility]

## 2022-06-29 NOTE — PLAN OF CARE
Reason for Admission: R prefrontal gyrus and R MCA watershed CVAs  Cognitive/Mentation: A/Ox 4  Neuros/CMS: Stable with L sided hemiparesis 3/5 both upper and lower, L drift, L sided numbness and tingling.   VS: VSS on RA  Tele: Pt refused  GI: BS audible, + flatus, last BM 6/28/22. Continent.  : Voiding adequately using bedside urinal. Continent.  Pulmonary: LS clear  Pain: Denies   Drains/Lines: Pt refused IV  Skin: R axillary boil  Activity: Assist x 1-2 with GB/W.  Diet: Regular diet with thin liquids. Takes pills whole.   Discharge: Pending to TCU  Aggression Stoplight Tool: Green  End of shift summary: Patient setting off bed alarm to stand to use urinal, educated on fall safety and calling before standing.

## 2022-06-29 NOTE — PROVIDER NOTIFICATION
Date paged: 6/28    Time paged: [phone]    Person paged: Cross-coverage hospitalist    Paging system used: OmniVec    Message: [phone] RW: Patient complaining of 8/10 pain in left sided extremities. Tylenol given but patient requesting something stronger. Any orders? ThanksGeraldine RN *[phone]    Response: Ibuprofen ordered.

## 2022-06-30 ENCOUNTER — APPOINTMENT (OUTPATIENT)
Dept: PHYSICAL THERAPY | Facility: CLINIC | Age: 52
DRG: 065 | End: 2022-06-30
Payer: COMMERCIAL

## 2022-06-30 ENCOUNTER — HOSPITAL ENCOUNTER (INPATIENT)
Facility: CLINIC | Age: 52
End: 2022-06-30
Payer: COMMERCIAL

## 2022-06-30 ENCOUNTER — APPOINTMENT (OUTPATIENT)
Dept: OCCUPATIONAL THERAPY | Facility: CLINIC | Age: 52
DRG: 065 | End: 2022-06-30
Payer: COMMERCIAL

## 2022-06-30 VITALS
SYSTOLIC BLOOD PRESSURE: 175 MMHG | TEMPERATURE: 98.3 F | WEIGHT: 157.1 LBS | OXYGEN SATURATION: 98 % | HEIGHT: 66 IN | BODY MASS INDEX: 25.25 KG/M2 | HEART RATE: 99 BPM | RESPIRATION RATE: 18 BRPM | DIASTOLIC BLOOD PRESSURE: 97 MMHG

## 2022-06-30 LAB
GLUCOSE BLDC GLUCOMTR-MCNC: 183 MG/DL (ref 70–99)
GLUCOSE BLDC GLUCOMTR-MCNC: 249 MG/DL (ref 70–99)
GLUCOSE BLDC GLUCOMTR-MCNC: 260 MG/DL (ref 70–99)
GLUCOSE BLDC GLUCOMTR-MCNC: 317 MG/DL (ref 70–99)

## 2022-06-30 PROCEDURE — 250N000013 HC RX MED GY IP 250 OP 250 PS 637: Performed by: PHYSICIAN ASSISTANT

## 2022-06-30 PROCEDURE — 97535 SELF CARE MNGMENT TRAINING: CPT | Mod: GO

## 2022-06-30 PROCEDURE — 250N000013 HC RX MED GY IP 250 OP 250 PS 637: Performed by: INTERNAL MEDICINE

## 2022-06-30 PROCEDURE — 99232 SBSQ HOSP IP/OBS MODERATE 35: CPT | Performed by: INTERNAL MEDICINE

## 2022-06-30 PROCEDURE — 97116 GAIT TRAINING THERAPY: CPT | Mod: GP

## 2022-06-30 PROCEDURE — 97112 NEUROMUSCULAR REEDUCATION: CPT | Mod: GO

## 2022-06-30 PROCEDURE — 97530 THERAPEUTIC ACTIVITIES: CPT | Mod: GP

## 2022-06-30 RX ADMIN — ATORVASTATIN CALCIUM 80 MG: 80 TABLET, FILM COATED ORAL at 08:08

## 2022-06-30 RX ADMIN — ASPIRIN 325 MG ORAL TABLET 325 MG: 325 PILL ORAL at 08:08

## 2022-06-30 RX ADMIN — NICOTINE 1 PATCH: 7 PATCH, EXTENDED RELEASE TRANSDERMAL at 08:11

## 2022-06-30 RX ADMIN — ACETAMINOPHEN 650 MG: 325 TABLET ORAL at 20:35

## 2022-06-30 RX ADMIN — ACETAMINOPHEN 650 MG: 325 TABLET ORAL at 03:30

## 2022-06-30 RX ADMIN — GABAPENTIN 600 MG: 300 CAPSULE ORAL at 08:08

## 2022-06-30 RX ADMIN — CLOPIDOGREL BISULFATE 75 MG: 75 TABLET ORAL at 08:08

## 2022-06-30 RX ADMIN — ACETAMINOPHEN 650 MG: 325 TABLET ORAL at 16:34

## 2022-06-30 RX ADMIN — CETIRIZINE HYDROCHLORIDE 10 MG: 10 TABLET, FILM COATED ORAL at 08:08

## 2022-06-30 RX ADMIN — GABAPENTIN 600 MG: 300 CAPSULE ORAL at 16:32

## 2022-06-30 RX ADMIN — HYDROCORTISONE: 1 CREAM TOPICAL at 16:38

## 2022-06-30 ASSESSMENT — ACTIVITIES OF DAILY LIVING (ADL)
ADLS_ACUITY_SCORE: 39

## 2022-06-30 NOTE — PROGRESS NOTES
"[facility]    HOSPITALIST PROGRESS NOTE :   --------------------------------------------------    Date of Admission:  6/24/2022    Summary: Josue Parisi is a 52 year old male with a PMHx significant for polysubstance abuse, cocaine use, HTN, and recent right KWADWO CVA 3/2022 (large territory defect) with residual left-sided weakness, who was admitted on 6/18/2022 at [facility] with failure to thrive at home. At the time of his initial stroke, ARU was recommended but pt refused and discharged home AMA. Able to mobilize at home with walker but was getting weaker. He was initially admitted on 6/18 under observation for weakness with a plan for placement for progressive weakness.  However he insisted on going home and he was discharged on 6/24/2022 when no place was found and . Outpatient rehab orders were placed.  He then was brought back in to [facility] later in the evening on 6/24/2022 for new onset significant worsening of left-sided weakness with paresthesias and numbness that is worse than before. He does not have any meaningful function of his left hand. He notes increased urinary frequency and often times is not able to make it to the bathroom on time, for urine and/or stool.    Assessment & Plan     New nonhemorrhagic infarction within the right prefrontal gyrus and deep MCA watershed infarct within the right centrum semiovale of the right frontal and parietal lobes.   previous left sided weakness due to Ischemic CVA    Patient has been evaluated by stroke neurology during this hospitalization.    Appreciate Neuro note 6/26: \"SBP increase of 20-30 points resulted in no symptomatic improvement and no change in perfusion map, suggesting symptoms are a result of new infarcts, not hypoperfused tissue, and lowering BP may lead to new tissue infarct as well. For this reason, recommend continued DAPT x90 days, and allow SBP to persist 140-160 upon discharge, then " "gradually try to decrease less than 140 in about 2 weeks and see if tolerated clinically. Hgb A1c significantly improved since Feb/March, but still recommend <7.0 for further reduced stroke risk. \"    Continue patient on current antihypertensive medications currently blood pressure seems to be in the goal between 1 40-1 60 systolic blood pressure.    Continue aspirin 325 mg along with Plavix 75 mg p.o. daily for 90 days    Continue atorvastatin 40 mg    Antithrombin III levels are in normal range.     Factor V Leiden mutation analysis is still in process    Platelet function P2Y12 appropriate for antiplatelet therapy for cardiology purposes.    Patient has been evaluated by physical, occupational and speech therapy, recommended to be discharged to rehab, social workers and care coordinators are working.    Patient is requesting knee brace for his left knee, will consult orthosis       Type 2 diabetes mellitus.  Last HbA1c of 7.8 on 06/18     Hold metformin.  On Lantus 10 units with sliding scale insulin aspart.     Hyperglycemia has been noticed as patient is altering food sometimes outside the hospital.    increased Lantus to 18 units BID from 15 units BID    changed medium dose sliding scale to high dose sliding scale insulin    changed meal coverage from 1:15 to 1:10 ratio with breakfast and lunch, 1:15 with supper    History of hypertension     Hold PTA meds to allow permissive hypertension    See above, goal -160 mmHg     Hidradenitis / Rt axillary wound    WOCN consult previous recs:   Right axilla wound: Daily  and PRN   1. Cleanse wound with NS or wound cleanser (omit this step if patient cleans wound in shower)  2. Dry and protect surrounding skin with no sting barrier film wipe #[id number]  3. Apply a small amount of iodesorb gel #[id number] to bandaid  4. If Band-Aid needs to be changed more than once a day. Apply iodesorb gel #[id number] to adaptic #[id number] and cover with Mepilex #[id number]     Polysubstance " "abuse:     Urine tox screen positive for THC.     Generalized pain: PTA on gabapentin 300 mg 3 times daily.      Increase to 600 mg 3 times daily (monitor for any sedation)    Continue as needed Tylenol as needed. Avoid opioids     Patient was questioning regarding as needed Xanax at the nighttime which he uses intermittently at home, explained to him that at this time it is better to avoid benzodiazepine at the nighttime as he is recovering from the stroke to avoid any confusion from medication side effect.  Patient showed understanding.    Clinically Significant Risk Factors Present on Admission                     Diet: Regular Diet Adult    Cortez Catheter: Not present  DVT Prophylaxis: Pneumatic Compression Devices  Code Status: Full Code    The patient's care was discussed with the Patient and bedside RN    Disposition Plan      Expected Discharge Date: 07/01/2022    Discharge Delays: Placement - TCU    Discharge Comments: Pending TCU placement     Entered: Elizabeth Barbour MD 06/30/2022, 8:40 AM       Elizabeth Barbour MD  Text Page (7am - 6pm)       Interval History   \" I had to have a break from the food here.\"  Patient ordered a pepperoni pizza from [employer].  He says therapist, \"has helped me a lot,\" he is motivated to try to use his left arm more.  He has no new respiratory or GI complaints.    -Data reviewed today: I reviewed all new labs and imaging results over the last 24 hours.    I personally reviewed no images or EKG's today.    Physical Exam   Temp: 97.6  F (36.4  C) Temp src: Oral BP: (!) 172/91 Pulse: 102   Resp: 18 SpO2: 100 % O2 Device: None (Room air)    Vitals:    06/30/22 0620   Weight: 71.3 kg (157 lb 1.6 oz)     Vital Signs with Ranges  Temp:  [96.8  F (36  C)-98.1  F (36.7  C)] 97.6  F (36.4  C)  Pulse:  [] 102  Resp:  [18-20] 18  BP: (154-172)/() 172/91  SpO2:  [99 %-100 %] 100 %  I/O last 3 completed shifts:  In: 720 [P.O.:720]  Out: 1625 [Urine:1625]    GENERAL: Alert , " awake and oriented. NAD. Conversational, appropriate.   LUNGS: Clear to auscultation.  HEART: Regular rate. Extremities perfused.   ABDOMEN: Soft, nontender, and nondistended. Positive bowel sounds.   EXTREMITIES: No LE edema noted.   NEUROLOGIC: Moves extremities x4 on command.  Left-sided weakness.    Medications     - MEDICATION INSTRUCTIONS -       - MEDICATION INSTRUCTIONS -         aspirin  325 mg Oral Daily     atorvastatin  80 mg Oral Daily     cetirizine  10 mg Oral Daily     clopidogrel  75 mg Oral or NG Tube Daily     gabapentin  600 mg Oral TID     hydrocortisone   Topical BID     insulin aspart  1-10 Units Subcutaneous TID AC     insulin aspart  1-7 Units Subcutaneous At Bedtime     insulin aspart   Subcutaneous Daily with breakfast     insulin glargine  18 Units Subcutaneous BID     nicotine  1 patch Transdermal Daily     nicotine   Transdermal Q8H     sodium chloride (PF)  3 mL Intracatheter Q8H       Data   Recent Labs   Lab 06/30/22  0725 06/30/22  0019 06/29/22  2129 06/29/22  1108 06/29/22  1106 06/28/22  0806 06/28/22  0730 06/27/22  0739 06/27/22  0628 06/25/22  1650 06/25/22  1129 06/24/22  2232 06/24/22  1805   WBC  --   --   --   --   --   --   --   --   --   --  12.6*  --  13.6*   HGB  --   --   --   --   --   --   --   --   --   --  11.6*  --  11.4*   MCV  --   --   --   --   --   --   --   --   --   --  98  --  98   PLT  --   --   --   --   --   --   --   --   --   --  428  --  390   INR  --   --   --   --   --   --   --   --   --   --   --   --  1.08   NA  --   --   --   --   --   --   --   --   --   --  137  --  136   POTASSIUM  --   --   --   --  3.7  --  3.8  --  3.5  --  3.9  --  3.8   CHLORIDE  --   --   --   --   --   --   --   --   --   --  104  --  99   CO2  --   --   --   --   --   --   --   --   --   --  28  --  28   BUN  --   --   --   --   --   --   --   --   --   --  18  --  26   CR  --   --   --   --   --   --   --   --   --   --  0.61*  --  0.75   ANIONGAP  --   --   --    --   --   --   --   --   --   --  5  --  9   DAT  --   --   --   --   --   --   --   --   --   --  9.6  --  8.8   * 260* 309*   < >  --    < >  --    < >  --    < > 185*   < > 138*    < > = values in this interval not displayed.       Imaging:   No results found for this or any previous visit (from the past 24 hour(s)).

## 2022-06-30 NOTE — PROGRESS NOTES
"Care Management Follow Up    Length of Stay (days): 6    Expected Discharge Date: 07/01/2022     Concerns to be Addressed:       Patient plan of care discussed at interdisciplinary rounds: Yes    Anticipated Discharge Disposition: Home     Anticipated Discharge Services:    Anticipated Discharge DME:      Patient/family educated on Medicare website which has current facility and service quality ratings:    Education Provided on the Discharge Plan:    Patient/Family in Agreement with the Plan:      Referrals Placed by CM/SW:    Private pay costs discussed: Not applicable    Additional Information:  Patient requested to meet with writer to discuss concerns with paying his rent/phone bill. Writer met with patient who states he normally will get a money order and drop off at his apartment and for phone bill but only has cash so he is not able to make payments over the phone. Writer inquired if patient has any family/friends who would be able and willing to assist with delivering rent as patient is concerned he will lose his apartment - patient states he \"doesn't have anyone who he can trust with that request and does not want to ask\" Writer encouraged patient to reach out if able as his options are limited. Writer educated patient to contact his landlord to inform that he is currently hospitalized therefore unable to leave to make payments. Patient understood but stated \"they wont care they just want their money.\" Writer again encouraged patient to call himself as he is able to and that writer would also follow up with to educate them of this/provide a supporting letter of hospitalization if needed. Patient provided writer with  contact - Fernanda Baldwin phone # [phone] email [email or web address]. Writer placed call several times to contact with no answer- left voicemail.    Patient states he is still wanting to rehab and writer educated the role of writer to help in discharge planning and seeking " TCU.      Will continue to follow.    ZAKIA Real, LGSW    [facility]

## 2022-06-30 NOTE — PLAN OF CARE
Goal Outcome Evaluation:    Pt A&Ox4. VSS on RA. Up A1 GB/W. Diet MOD CHO, thins. IV access removed. Tele refused by pt. Neuros - left side weakness, left wrist drop. R axillary boil. Denies Pain. Continent, urinal in reach. Consults: PT, OT. Discharge TCU pending placement.

## 2022-06-30 NOTE — INTERIM SUMMARY
[facility] Pre-Admission Screen    Referral Source:  [facility]OSCI[facility]  Admit date to referring facility: 6/24/2022    Physical Medicine and Rehab Consult Completed: No    Rehab Diagnosis:    Stroke Ischemic 01.1 (L) Body Involvement (R) Brain; New nonhemorrhagic infarction within the right prefrontal gyrus and deep MCA watershed infarct within the right centrum semiovale of the right frontal and parietal lobes. New nonhemorrhagic infarction within the right prefrontal gyrus and deep MCA watershed infarct within the right centrum semiovale of the right frontal and parietal lobes.     Justification for Acute Inpatient Rehabilitation  Josue Parisi is a 52 year old male with history of diabetes mellitus type 2, hypertension, CVA, polysubstance abuse was admitted from 06/18-06/24 at [facility] and left AMA later that afternoon.  Now he is being admitted on 6/24/2022 with worsening left weakness and slurred speech and found to have new ischemic infarct. As per him when he left the hospital and went home.  He could not get out of the car but somehow moved into his car and drove to his mom will apparently he noticed increasing weakness on the left side with some slurred speech so presented to the emergency department.  In the ED he had work-up for stroke and found to have new stroke.    The patient is medically ready for transfer to [facility] for rehabilitation. Patient requires an intensive inpatient rehab program to address the following acute impairments:{ARC Acute Impairments:214578}    Current Active Medical Management Needs/Risks for Clinical Complications  The patient requires the high level of rehabilitation physician supervision that accompanies the provision of intensive rehabilitation therapy.  The patient needs the services of the rehabilitation physician to assess the patient medically and functionally and to  "modify the course of treatment as needed to maximize the patient's capacity to benefit from the rehabilitation process. The patient requires physician involvement at least 3 days per week to manage:   Neurologic: in the setting of     The patient is at risk for ***  {ARC Med Mgmt Needs/Risks Clinical Complications:065861}    Past Medical/Surgical History   Surgery in the past 100 days: No   Additional relevant past medical history: diabetes mellitus type 2, hypertension, CVA, polysubstance abuse     Level of Functioning Prior to Admission:    LIVING ENVIRONMENT   People in Home: alone   Current Living Arrangements: apartment    Number of Stairs, Main Entrance: greater than 10 stairs    Stair Railings, Main Entrance: railings on both sides of stairs     Transportation Anticipated: car, drives self, family or friend will provide   Living Environment Comments: PTA, pt. was living alone in 2nd floor apartment with 20 IVORY (B HRs) with tubshower with the following DME: SPC, FWW, tub chair.    SELF-CARE   Usual Activity Tolerance: fair   Regular Exercise: No     Equipment Currently Used at Home: cane, straight   Activity/Exercise/Self-Care Comment: Reports he had an aide a while back, but \"They didn't do anything,\" \"Just took my BP and did a little Physical Therapy, that's it.\"  Additional Comments: ***    Level of Function: GG Scale (Section GG Functional Ability and Goals; CMS's KEY Version 3.0 Manual effective 10.1.2019):  PT Current Function Goals for Rehab   Bed Rolling {GG Scale:640704::\"6 Independent\"} {Goals for Rehab:433891}   Supine to Sit {GG Scale:234400::\"6 Independent\"} {Goals for Rehab:208629}   Sit to Stand {GG Scale:563721::\"6 Independent\"} {Goals for Rehab:230286}   Transfer {GG Scale:623852::\"6 Independent\"} {Goals for Rehab:472065}   Ambulation {GG Scale:791102::\"6 Independent\"} {Goals for Rehab:590324}   Stairs {GG Scale:976472::\"6 Independent\"} {Goals for Rehab:733508}     OT Current Function Goals " "for Rehab   Feeding {GG Scale:186285::\"6 Independent\"} {Goals for Rehab:686272}   Grooming {GG Scale:925958::\"6 Independent\"} {Goals for Rehab:390144}   Bathing {GG Scale:092098::\"6 Independent\"} {Goals for Rehab:234805}   Upper Body Dressing {GG Scale:794987::\"6 Independent\"} {Goals for Rehab:497261}   Lower Body Dressing {GG Scale:683734::\"6 Independent\"} {Goals for Rehab:395424}   Toileting {GG Scale:490731::\"6 Independent\"} {Goals for Rehab:757693}   Toilet Transfer {GG Scale:245461::\"6 Independent\"} {Goals for Rehab:249992}   Tub/Shower Transfer {GG Scale:179997::\"6 Independent\"} {Goals for Rehab:423399}   Cognition {ARC Impaired Not NA:018793} {ARC COGNITION GOAL:860818}     SLP Current Function Goals for Rehab   Swallow {ARC Impaired Not NA:174165} {ARC SWALLOW GOAL:169583::\"Tolerate least restrictive diet without signs & symptoms of aspiration and adhere to safe swallow strategies\"}   Communication {ARC Impaired Not NA:824333} {ARC COMM GOAL:472339::\"Communicate basic needs effectively\"}       Current Diet:  {ARC CURRENT DIET:568816}    Summary Statement:  ***    Expected Therapies and Services Required During Inpatient Rehab Admission  Intensity of Therapy: Patient requires intensive therapies not available in a lesser level of care. Patient is motivated, making gains, and can tolerate 3 hours of therapy a day.  Physical Therapy: *** minutes per day, 7 days a week for *** days  Occupational Therapy: *** minutes per day, 7 days a week for *** days  Speech and Language Therapy: *** minutes per day, 7 days a week for *** days  Rehabilitation Nursing Needs: Patient requires 24 hour Rehab Nursing to manage {ARC Nursing Needs:116343}.    Precautions/restrictions/special needs:   Precautions: {ARC Precautions:889695}   Restrictions: ***   Special Needs: {ARC Special Needs:449309}    Expected Level of Improvement: ***  Expected Length of time to achieve: ***    Anticipated Discharge Needs:  Anticipated Discharge " Destination: {ARC Discharge Destination:607562}  Anticipated Discharge Support: {ARC Discharge Support:757548}  24/7 support available : {Yes-No:251505}  Identified caregiver(s):  ***  Anticipated Discharge Needs: {ARC Discharge Needs:249135}    Identified challenges/barriers:  ***    Rehab Admissions Liaison Signature/Date/Time:    Physician statement of review and agreement:  I have reviewed and am in agreement of the need for IRF stay to address above functional and medical needs. In addition to above statements address, Patient requires intensive active and ongoing therapeutic intervention and multiple therapies; Patient requires medical supervision; Expected to actively participate in the intensive rehab program; Sufficiently stable to actively participate; Expectation for measurable improvement in functional capacity or adaption to impairments.    Physician Signature/Date/Time:

## 2022-06-30 NOTE — PLAN OF CARE
Pt here with R hemispheric CVA.  A/O x 4.  L weakness.  Minimal L hand .  Numbness L hand, L foot.  Vitals stable.  LS clear.  RA.  On mod CHO diet.  Pt has been ordering food from delivery services.  Advised against this to stay within carb parameters.  -310.  Pt c/o L sided pain.  Up with A1 GBW.  Has healing wound R axilla.  No IV access, refuses.  Refuses tele.  AM potassium not drawn, pt refused.   Vitals and neuro checks q 8 h.  Scores green on aggression stoplight.

## 2022-07-01 NOTE — PROGRESS NOTES
MD Notification    Notified Person: MD    Notified Person Name: Rebecca Gregg  Notification Date/Time: 6/30/22 2020    Notification Interaction: paged x2    Purpose of Notification:     FYI -   Pt telling staff he is going to leave AMA, states he has a ride and a PCA that will take care of him at home. Please advise.    Paged again at [phone] to request provider to talk to patient, pt states his ride is here and is ready to leave. Pt did not want to stay to talk to provider.    Orders Received: Allow patient to leave AMA    Comments:

## 2022-07-01 NOTE — PLAN OF CARE
Physical Therapy Discharge Summary    Reason for therapy discharge:    Patient left AMA.    Progress towards therapy goal(s). See goals on Care Plan in [facility] electronic health record for goal details.  Goals not met.  Barriers to achieving goals:   Patient left AMA.    Therapy recommendation(s):    Recommendation for ARC vs TCU to progress safety and independence with ADLs and mobility tasks.  Patient left AMA.

## 2022-07-01 NOTE — PLAN OF CARE
Occupational Therapy Discharge Summary    Reason for therapy discharge:    Pt leaves hospital AMA    Progress towards therapy goal(s). See goals on Care Plan in [facility] electronic health record for goal details.  Goals not met.  Barriers to achieving goals:   left AMA.    Therapy recommendation(s):    Continued therapy is recommended.  Rationale/Recommendations:  Per most recent therapy note: Pt below baseline with significant left sided weakness. Pt would benefit from continued skilled therapy at TCU to progress independence in ADLs and functional mobility. Pt with increasing motivation for therapy, although still fluctuating, and is wanting to do what he can to go home. Pt does live alone and have a history of non-compliance, but pt would benefit from intensive treatment at ARU if he can consistently engage in therapy..

## 2022-07-01 NOTE — DISCHARGE SUMMARY
"[facility]  Hospitalist Discharge Summary      Date of Admission:  6/24/2022  Date of Discharge: Patient was not discharged, he left AGAINST MEDICAL ADVICE  Discharging Provider: Elizabeth Barbour MD  Discharge Service: Hospitalist Service    Discharge Diagnoses   New nonhemorrhagic infarction within the right prefrontal gyrus and deep MCA watershed infarct within the right centrum semiovale of the right frontal and parietal lobes.   previous left sided weakness due to Ischemic CVA  Type 2 diabetes mellitus  History of hypertension  Hidradenitis/right axillary wound  Polysubstance abuse  Generalized pain    Follow-ups Needed After Discharge   No follow-ups were arranged because patient left AGAINST MEDICAL ADVICE    Unresulted Labs Ordered in the Past 30 Days of this Admission     No orders found from 5/25/2022 to 6/25/2022.          Discharge Disposition   Patient left AGAINST MEDICAL ADVICE    Hospital Course   New nonhemorrhagic infarction within the right prefrontal gyrus and deep MCA watershed infarct within the right centrum semiovale of the right frontal and parietal lobes.   previous left sided weakness due to Ischemic CVA    Patient has been evaluated by stroke neurology during this hospitalization.    Appreciate Neuro note 6/26: \"SBP increase of 20-30 points resulted in no symptomatic improvement and no change in perfusion map, suggesting symptoms are a result of new infarcts, not hypoperfused tissue, and lowering BP may lead to new tissue infarct as well. For this reason, recommend continued DAPT x90 days, and allow SBP to persist 140-160 upon discharge, then gradually try to decrease less than 140 in about 2 weeks and see if tolerated clinically. Hgb A1c significantly improved since Feb/March, but still recommend <7.0 for further reduced stroke risk. \"    Continue patient on current antihypertensive medications currently blood pressure seems to be in the goal between 1 40-1 60 " systolic blood pressure.    Continue aspirin 325 mg along with Plavix 75 mg p.o. daily for 90 days    Continue atorvastatin 40 mg    Antithrombin III levels are in normal range.     Factor V Leiden mutation analysis is still in process    Platelet function P2Y12 appropriate for antiplatelet therapy for cardiology purposes.    Patient has been evaluated by physical, occupational and speech therapy, recommended to be discharged to rehab, social workers and care coordinators are working.    Patient is requesting knee brace for his left knee, will consult orthosis       Type 2 diabetes mellitus.  Last HbA1c of 7.8 on 06/18     Hold metformin.  On Lantus 10 units with sliding scale insulin aspart.     Hyperglycemia has been noticed as patient is altering food sometimes outside the hospital.    increased Lantus to 18 units BID from 15 units BID    changed medium dose sliding scale to high dose sliding scale insulin    changed meal coverage from 1:15 to 1:10 ratio with breakfast and lunch, 1:15 with supper    History of hypertension     Hold PTA meds to allow permissive hypertension    See above, goal -160 mmHg     Hidradenitis / Rt axillary wound    WOCN consult previous recs:   Right axilla wound: Daily  and PRN   1. Cleanse wound with NS or wound cleanser (omit this step if patient cleans wound in shower)  2. Dry and protect surrounding skin with no sting barrier film wipe #[id number]  3. Apply a small amount of iodesorb gel #[id number] to bandaid  4. If Band-Aid needs to be changed more than once a day. Apply iodesorb gel #[id number] to adaptic #[id number] and cover with Mepilex #[id number]     Polysubstance abuse:     Urine tox screen positive for THC.     Generalized pain: PTA on gabapentin 300 mg 3 times daily.      Increase to 600 mg 3 times daily (monitor for any sedation)    Continue as needed Tylenol as needed. Avoid opioids     Patient was questioning regarding as needed Xanax at the nighttime which he uses intermittently  at home, explained to him that at this time it is better to avoid benzodiazepine at the nighttime as he is recovering from the stroke to avoid any confusion from medication side effect.  Patient showed understanding.      Consultations This Hospital Stay   PATIENT [facility] IP CONSULT  NEUROLOGY IP STROKE CONSULT  SPEECH LANGUAGE PATH ADULT IP CONSULT  PHARMACY IP CONSULT  PHARMACY IP CONSULT  PHARMACY IP CONSULT  PHYSICAL THERAPY ADULT IP CONSULT  OCCUPATIONAL THERAPY ADULT IP CONSULT  REHAB ADMISSIONS LIAISON IP CONSULT  CARE MANAGEMENT / SOCIAL WORK IP CONSULT  WOUND OSTOMY CONTINENCE NURSE  IP CONSULT  ORTHOSIS EXTREMITY LOWER REFERRAL IP CONSULT  SMOKING CESSATION PROGRAM IP CONSULT    Code Status   Prior    Time Spent on this Encounter   I, Elizabeth Barbour MD, did not discharge this patient today, he left against medical advice.         Elizabeth Barbour MD  [facility] NEUROSCIENCE UNIT  [address] [place] [address]  Phone: [phone]  ______________________________________________________________________    Physical Exam   Vital Signs: Temp: 98.3  F (36.8  C) Temp src: Oral BP: (!) 175/97 Pulse: 99   Resp: 18 SpO2: 98 % O2 Device: None (Room air)    Weight: 157 lbs 1.6 oz  I saw and examined the patient on the date of discharge.           Primary Care Physician   Von Aguirre    Discharge Orders   No discharge procedures on file.    Significant Results and Procedures   Most Recent 3 CBC's:Recent Labs   Lab Test 06/25/22  1129 06/24/22  1805 06/18/22  2150   WBC 12.6* 13.6* 10.5   HGB 11.6* 11.4* 12.4*   MCV 98 98 96    390 395     Most Recent 3 BMP's:Recent Labs   Lab Test 06/30/22  1622 06/30/22  1147 06/30/22  0725 06/29/22  1108 06/29/22  1106 06/28/22  0806 06/28/22  0730 06/27/22  0739 06/27/22  0628 06/25/22  1650 06/25/22  1129 06/24/22  2232 06/24/22  1805 06/19/22  0320 06/18/22  2150   NA  --   --   --   --   --   --   --   --   --   --  137  --  136  --   138   POTASSIUM  --   --   --   --  3.7  --  3.8  --  3.5  --  3.9  --  3.8  --  3.8   CHLORIDE  --   --   --   --   --   --   --   --   --   --  104  --  99  --  105   CO2  --   --   --   --   --   --   --   --   --   --  28  --  28  --  26   BUN  --   --   --   --   --   --   --   --   --   --  18  --  26  --  13   CR  --   --   --   --   --   --   --   --   --   --  0.61*  --  0.75  --  0.66   ANIONGAP  --   --   --   --   --   --   --   --   --   --  5  --  9  --  7   DAT  --   --   --   --   --   --   --   --   --   --  9.6  --  8.8  --  9.7   * 249* 183*   < >  --    < >  --    < >  --    < > 185*   < > 138*   < > 345*    < > = values in this interval not displayed.   ,   Results for orders placed or performed during the hospital encounter of 06/24/22   CT Head Perfusion w Contrast    Narrative    CT BRAIN PERFUSION 6/24/2022 5:36 PM    COMPARISON: Head and neck CTA 6/24/2022    HISTORY: Neurological deficit. Left-sided weakness with right M1  segment middle cerebral artery occlusion.    TECHNIQUE: Time sequential axial CT images of the head were acquired  during the administration of intravenous contrast (50mL Isovue-370).  CTA images of the [place] as well as color perfusion maps of  the brain were created from this time sequential axial source data.    FINDINGS: There are areas of elevated Tmax and mean transit time  throughout the right middle cerebral artery territory. Generally  preserved relative cerebral blood volume. There is some diminished  relative cerebral blood flow within the deep white matter on the  quantitative images that is not readily evident on the qualitative  images.    RAPID analysis indicates a volume of approximately 147 milliliters of  Tmax greater than 6 seconds. Most of this is throughout the right  middle cerebral artery territory with a smaller volume in the anterior  inferior paramedian left frontal lobe zone of encephalomalacia. There  are no areas of cerebral  blood flow less than 30% indicating a  markedly elevated mismatch ratio.      Impression    IMPRESSION: Perfusion abnormality in the right middle cerebral artery  territory indicating a large area of ischemic penumbra without  significant infarct core.    Radiation dose for this scan was reduced using automated exposure  control, adjustment of the mA and/or kV according to patient size, or  iterative reconstruction technique    KIM COLEY MD         SYSTEM ID:  [id number]   CT Head w/o Contrast    Narrative    EXAM: CT HEAD W/O CONTRAST  LOCATION: [facility]  DATE/TIME: 6/24/2022 5:15 PM    INDICATION: Neuro deficit, left-sided weakness.  COMPARISON: Head CT 3/19/2022, brain MRI 3/19/2022.  TECHNIQUE: Routine CT Head without IV contrast. Multiplanar reformats. Dose reduction techniques were used.    FINDINGS:  INTRACRANIAL CONTENTS: No hemorrhage. Areas of chronic encephalomalacia in the frontal lobes unchanged versus the prior CT. There is an area of subcortical low attenuation within the posterior right frontal lobe (axial image 27) that is new from the   prior CT and MRI and could represent a recent infarct. Other areas of low attenuation within the parasagittal subcortical white matter are new from the prior CT and correspond to the infarcts that were acute at the time of the prior MRI. Mild generalized   volume loss. No hydrocephalus.     VISUALIZED ORBITS/SINUSES/MASTOIDS: No intraorbital abnormality. Shallow fluid level left maxillary sinus. No middle ear or mastoid effusion.    BONES/SOFT TISSUES: No acute abnormality.      Impression    IMPRESSION:  1.  There is a small area of low attenuation within the subcortical white matter of the mid right precentral gyrus that is new versus the prior MRI. Presumably this reflects ischemic change. It is suspected to be subacute or older in age, but is not   specific on CT. Consider further evaluation with MRI. No definite acute  infarct by CT.  2.  Other areas of ischemic injury are not changed versus the 3/19/2020 comparison exams.  3.  No hemorrhage.    Findings were discussed with Dr. Roxanne Mims via telephone at 1734 hours on 6/24/2022.   CTA Head Neck w Contrast    Narrative    HEAD AND NECK CT ANGIOGRAM WITH IV CONTRAST  6/24/2022 5:15 PM    INDICATION: Neuro deficit, left-sided weakness.  TECHNIQUE: Head and neck CT angiogram with IV contrast. CT images of the head and neck vessels obtained during the arterial phase of intravenous contrast administration. Axial helical 2D reconstructed images and multiplanar 3D MIP reconstructed images of   the head and neck vessels were performed by the technologist. Dose reduction techniques were used.  CONTRAST: 75mL Isovue 370  COMPARISON: Head and neck CTA 3/20/2022.     FINDINGS:  HEAD CTA: There is relatively abrupt termination of the M1 segment of the right middle cerebral artery that represents a change versus prior, when this segment was seen to be severely stenotic. Beyond this, contrast does opacify M2 branches to a similar   degree as on prior. As on the prior, the proximal intracranial segment of the right internal carotid artery is occluded and reconstitutes at the level of the optic strut via a prominent right posterior communicating artery and P1 segment. Mild left   carotid siphon atherosclerosis. Small left and hypoplastic right A1 segments with severe attenuation of the A2 segments of the anterior cerebral arteries unchanged versus prior.    NECK CTA: Three vessel arch. The right internal carotid artery is chronically occluded shortly after its origin, unchanged versus prior. No hemodynamically significant narrowing in the left carotid artery. Patent vertebral arteries. No dissection.      Impression    CONCLUSION:  HEAD CTA:  1.  Abrupt termination of the mid M1 segment of the right middle cerebral artery is a change versus prior. This is favored to reflect acute thromboembolism.  Stenosis progression since the previous exam is the other primary consideration.  2.  Relatively unchanged degree of filling of more distal right middle cerebral artery branches.  3.  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    NECK CTA:  1.  Chronic occlusion of the proximal right internal carotid artery, unchanged versus prior.  2.  Otherwise negative neck CTA.    Findings were discussed with Dr. Roxanne Mims via telephone at 1734 hours on 6/24/2022.   MR Brain w/o Contrast    Narrative    EXAM: MR BRAIN W/O CONTRAST  LOCATION: [facility]  DATE/TIME: 6/24/2022 6:24 PM    INDICATION: Hyperacute for new RMCA infarct  COMPARISON: 6/24/2022 head CT, CT angiogram, 3/19/2022 brain MRI  TECHNIQUE: Routine multiplanar multisequence head MRI without intravenous contrast.    FINDINGS:  INTRACRANIAL CONTENTS: Multiple patchy areas of acute infarction within the right MCA territory characterized by hyperintense signal on the diffusion-weighted sequence with corresponding hypointense signal on the ADC map, located within the right   precentral gyrus and the deep watershed territory of the right centrum semiovale and corona radiata. No associated hemorrhage. Patchy areas of cortical and subcortical FLAIR hyperintensity within the right cerebral hemisphere consistent with sequela of   prior infarction. Chronic lacunar infarction within the left basal ganglia and the superolateral aspect of the left thalamus. Paramedian left frontal and cingulate encephalomalacia. Small chronic infarction within the left cerebellum. Mild brain atrophy.   No hydrocephalus.    SELLA: No abnormality accounting for technique.    OSSEOUS STRUCTURES/SOFT TISSUES: Normal marrow signal. Absence of the right ICA flow void consistent with chronic occlusion.     ORBITS: No abnormality accounting for technique.      SINUSES/MASTOIDS: Left maxillary sinus opacification with fluid level. No middle ear or mastoid effusion.       Impression    IMPRESSION:  1.  New nonhemorrhagic infarction within the right prefrontal gyrus and deep MCA watershed infarct within the right centrum semiovale of the right frontal and parietal lobes.  2.  Chronic sequela of prior infarction as described.  3.  Left maxillary sinus fluid level. Correlate for rhinosinusitis.   MR Brain w Contrast    Narrative    EXAM: MR BRAIN W CONTRAST  LOCATION: [facility]  DATE/TIME: 2022 1:08 PM    INDICATION: MR perfusion after maintaining BP over 140 please compare with CTP yesterday  COMPARISON: CT perfusion 2022 and head MRI 2022  TECHNIQUE: Dynamic susceptibility contrast perfusion imaging was performed.  CONTRAST: 20 Gadavist IV    FINDINGS:  Significantly delayed Tmax throughout the right MCA territory correlating with the high-grade stenoses noted on CTA. Significant compensation noted on relative cerebral blood flow and relative cerebral blood volume maps. The perfusion maps have not   significantly changed.      Impression    IMPRESSION:  Perfusion images are not significantly changed after maintenance of a higher blood pressure.   Echo Limited     Value    LVEF  60-65%    Narrative    [id number]  [id number]  [id number]  [id number]^SUDHAKAR^LENORA^CATHY     [facility]  Echocardiography Laboratory  [address] [place] [address]     Name: JERONIMO CEJA  MRN: [id number]  : 1970  Study Date: 2022 12:00 PM  Age: 52 yrs  Gender: Male  Patient Location: [facility]  Reason For Study: CVA  Ordering Physician: LENORA DE LA FUENTE  Referring Physician: Von Aguirre  Performed By: Israel Hunter RDCS     BSA: 1.7 m2  Height: 66 in  Weight: 146 lb  HR: 75  BP: 144/75 mmHg  ______________________________________________________________________________  Procedure  Limited Portable Echo Adult.  Optison (NDC #5905-4437) given intravenously.  ______________________________________________________________________________  Interpretation Summary     The left ventricle is normal in size.  Left ventricular systolic function is normal.  The visual ejection fraction is 60-65%.  Normal left ventricular wall motion  No hemodynamically significant valvular abnormalities on 2D or color flow  imaging. No obvious cardiac source of emboli was seen within the limits of a  transthoracic echocardiogram. Compared to prior study, there is no significant  change.  ______________________________________________________________________________  Left Ventricle  The left ventricle is normal in size. There is normal left ventricular wall  thickness. Left ventricular systolic function is normal. The visual ejection  fraction is 60-65%. Normal left ventricular wall motion.     Right Ventricle  The right ventricle is normal in structure, function and size.     Atria  Normal left atrial size. Right atrial size is normal. There is no atrial shunt  seen.     Mitral Valve  The mitral valve is normal in structure and function. There is trace mitral  regurgitation.     Tricuspid Valve  The tricuspid valve is normal in structure and function. Right ventricle  systolic pressure estimate normal. There is trace tricuspid regurgitation. The  right ventricular systolic pressure is approximated at 15.9 mmHg plus the  right atrial pressure. IVC diameter <2.1 cm collapsing >50% with sniff  suggests a normal RA pressure of 3 mmHg.     Aortic Valve  The aortic valve is normal in structure and function. No aortic regurgitation  is present. No aortic stenosis is present.     Pulmonic Valve  The pulmonic valve is not well seen, but is grossly normal. There is trace  pulmonic valvular regurgitation.     Vessels  Normal size aorta. The inferior vena cava was normal in size with preserved  respiratory variability.     Pericardium  There is no pericardial  effusion.     Rhythm  Sinus rhythm was noted.  ______________________________________________________________________________  Doppler Measurements & Calculations  TR max alla: 199.3 cm/sec  TR max PG: 15.9 mmHg     ______________________________________________________________________________  Report approved by: Devon Flower 06/25/2022 02:13 PM               Discharge Medications   Discharge Medication List as of 6/30/2022 10:21 PM      CONTINUE these medications which have NOT CHANGED    Details   ALPRAZolam (XANAX) 1 MG tablet Take 1 mg by mouth nightly as needed for anxiety, Historical      aspirin (ASA) 325 MG tablet Take 1 tablet (325 mg) by mouth daily, OTC      atorvastatin (LIPITOR) 80 MG tablet Take 1 tablet (80 mg) by mouth daily, Disp-30 tablet, R-1, E-Prescribe      cetirizine (ZYRTEC) 10 MG tablet Take 10 mg by mouth daily, Historical      chlorhexidine (HIBICLENS) 4 % liquid Apply topically daily as needed for wound careHistorical      gabapentin (NEURONTIN) 300 MG capsule Take 300 mg by mouth 3 times daily, Historical      insulin glargine (LANTUS PEN) 100 UNIT/ML pen Inject 15 Units Subcutaneous At Bedtime, Disp-15 mL, R-0, E-PrescribeIf Lantus is not covered by insurance, may substitute Basaglar or Semglee or other insulin glargine product per insurance preference at same dose and frequency.        lisinopril-hydrochlorothiazide (ZESTORETIC) 20-25 MG tablet Take 1 tablet by mouth daily, Disp-30 tablet, R-0, E-Prescribe      metFORMIN (GLUCOPHAGE) 1000 MG tablet Take 1,000 mg by mouth 2 times daily (with meals), Historical      sodium chloride (OCEAN) 0.65 % nasal spray Spray 2 sprays in nostril every 2 hours as needed for congestion, Historical      blood glucose (NO BRAND SPECIFIED) test strip Use to test blood sugar 1 times daily or as directed., Disp-30 strip, R-0, E-Prescribe      blood glucose monitoring (NO BRAND SPECIFIED) meter device kit Use to test blood sugar 1 times daily, first  thing in the morning.Disp-1 kit, [id number]      Lancets 30G MISC 120 30 gauge lancets (substitute as needed), Disp-120 each, R-0, E-Prescribe           Allergies   No Known Allergies

## 2022-07-03 ENCOUNTER — HOSPITAL ENCOUNTER (INPATIENT)
Facility: CLINIC | Age: 52
LOS: 29 days | Discharge: NURSING, REHAB OR LONG-TERM CARE | DRG: 065 | End: 2022-08-02
Attending: EMERGENCY MEDICINE | Admitting: INTERNAL MEDICINE
Payer: COMMERCIAL

## 2022-07-03 DIAGNOSIS — I16.0 HYPERTENSIVE URGENCY: ICD-10-CM

## 2022-07-03 DIAGNOSIS — M62.81 GENERALIZED MUSCLE WEAKNESS: ICD-10-CM

## 2022-07-03 DIAGNOSIS — I63.9 CEREBROVASCULAR ACCIDENT (CVA), UNSPECIFIED MECHANISM (H): ICD-10-CM

## 2022-07-03 DIAGNOSIS — I63.9 ACUTE CVA (CEREBROVASCULAR ACCIDENT) (H): Primary | ICD-10-CM

## 2022-07-03 DIAGNOSIS — E11.65 TYPE 2 DIABETES MELLITUS WITH HYPERGLYCEMIA, WITHOUT LONG-TERM CURRENT USE OF INSULIN (H): ICD-10-CM

## 2022-07-03 DIAGNOSIS — F32.A DEPRESSION, UNSPECIFIED DEPRESSION TYPE: ICD-10-CM

## 2022-07-03 LAB
ALBUMIN SERPL-MCNC: 4 G/DL (ref 3.4–5)
ALP SERPL-CCNC: 65 U/L (ref 40–150)
ALT SERPL W P-5'-P-CCNC: 51 U/L (ref 0–70)
ANION GAP SERPL CALCULATED.3IONS-SCNC: 11 MMOL/L (ref 3–14)
AST SERPL W P-5'-P-CCNC: 46 U/L (ref 0–45)
BASOPHILS # BLD AUTO: 0 10E3/UL (ref 0–0.2)
BASOPHILS NFR BLD AUTO: 0 %
BILIRUB SERPL-MCNC: 0.6 MG/DL (ref 0.2–1.3)
BUN SERPL-MCNC: 23 MG/DL (ref 7–30)
CALCIUM SERPL-MCNC: 9.8 MG/DL (ref 8.5–10.1)
CHLORIDE BLD-SCNC: 102 MMOL/L (ref 94–109)
CO2 SERPL-SCNC: 23 MMOL/L (ref 20–32)
CREAT SERPL-MCNC: 0.64 MG/DL (ref 0.66–1.25)
EOSINOPHIL # BLD AUTO: 0.3 10E3/UL (ref 0–0.7)
EOSINOPHIL NFR BLD AUTO: 2 %
ERYTHROCYTE [DISTWIDTH] IN BLOOD BY AUTOMATED COUNT: 12.5 % (ref 10–15)
GFR SERPL CREATININE-BSD FRML MDRD: >90 ML/MIN/1.73M2
GLUCOSE BLD-MCNC: 168 MG/DL (ref 70–99)
HCT VFR BLD AUTO: 41.6 % (ref 40–53)
HGB BLD-MCNC: 13.2 G/DL (ref 13.3–17.7)
HOLD SPECIMEN: NORMAL
IMM GRANULOCYTES # BLD: 0 10E3/UL
IMM GRANULOCYTES NFR BLD: 0 %
LYMPHOCYTES # BLD AUTO: 2.3 10E3/UL (ref 0.8–5.3)
LYMPHOCYTES NFR BLD AUTO: 20 %
MAGNESIUM SERPL-MCNC: 2.1 MG/DL (ref 1.6–2.3)
MCH RBC QN AUTO: 30.7 PG (ref 26.5–33)
MCHC RBC AUTO-ENTMCNC: 31.7 G/DL (ref 31.5–36.5)
MCV RBC AUTO: 97 FL (ref 78–100)
MONOCYTES # BLD AUTO: 1.2 10E3/UL (ref 0–1.3)
MONOCYTES NFR BLD AUTO: 10 %
NEUTROPHILS # BLD AUTO: 8.2 10E3/UL (ref 1.6–8.3)
NEUTROPHILS NFR BLD AUTO: 68 %
NRBC # BLD AUTO: 0 10E3/UL
NRBC BLD AUTO-RTO: 0 /100
PLATELET # BLD AUTO: 443 10E3/UL (ref 150–450)
POTASSIUM BLD-SCNC: 3.6 MMOL/L (ref 3.4–5.3)
PROT SERPL-MCNC: 8.2 G/DL (ref 6.8–8.8)
RBC # BLD AUTO: 4.3 10E6/UL (ref 4.4–5.9)
SARS-COV-2 RNA RESP QL NAA+PROBE: NEGATIVE
SODIUM SERPL-SCNC: 136 MMOL/L (ref 133–144)
TROPONIN I SERPL HS-MCNC: 10 NG/L
WBC # BLD AUTO: 12 10E3/UL (ref 4–11)

## 2022-07-03 PROCEDURE — 80053 COMPREHEN METABOLIC PANEL: CPT | Performed by: EMERGENCY MEDICINE

## 2022-07-03 PROCEDURE — 250N000013 HC RX MED GY IP 250 OP 250 PS 637: Performed by: INTERNAL MEDICINE

## 2022-07-03 PROCEDURE — G0378 HOSPITAL OBSERVATION PER HR: HCPCS

## 2022-07-03 PROCEDURE — 250N000012 HC RX MED GY IP 250 OP 636 PS 637: Performed by: INTERNAL MEDICINE

## 2022-07-03 PROCEDURE — 83735 ASSAY OF MAGNESIUM: CPT | Performed by: EMERGENCY MEDICINE

## 2022-07-03 PROCEDURE — 84145 PROCALCITONIN (PCT): CPT | Performed by: HOSPITALIST

## 2022-07-03 PROCEDURE — 85025 COMPLETE CBC W/AUTO DIFF WBC: CPT | Performed by: EMERGENCY MEDICINE

## 2022-07-03 PROCEDURE — 82040 ASSAY OF SERUM ALBUMIN: CPT | Performed by: EMERGENCY MEDICINE

## 2022-07-03 PROCEDURE — 36415 COLL VENOUS BLD VENIPUNCTURE: CPT | Performed by: EMERGENCY MEDICINE

## 2022-07-03 PROCEDURE — C9803 HOPD COVID-19 SPEC COLLECT: HCPCS

## 2022-07-03 PROCEDURE — 84484 ASSAY OF TROPONIN QUANT: CPT | Performed by: EMERGENCY MEDICINE

## 2022-07-03 PROCEDURE — 99285 EMERGENCY DEPT VISIT HI MDM: CPT | Mod: 25

## 2022-07-03 PROCEDURE — U0005 INFEC AGEN DETEC AMPLI PROBE: HCPCS | Performed by: EMERGENCY MEDICINE

## 2022-07-03 PROCEDURE — 99223 1ST HOSP IP/OBS HIGH 75: CPT | Mod: AI | Performed by: INTERNAL MEDICINE

## 2022-07-03 PROCEDURE — 250N000013 HC RX MED GY IP 250 OP 250 PS 637: Performed by: EMERGENCY MEDICINE

## 2022-07-03 PROCEDURE — 96372 THER/PROPH/DIAG INJ SC/IM: CPT | Performed by: INTERNAL MEDICINE

## 2022-07-03 RX ORDER — NICOTINE POLACRILEX 4 MG
15-30 LOZENGE BUCCAL
Status: DISCONTINUED | OUTPATIENT
Start: 2022-07-03 | End: 2022-08-02 | Stop reason: HOSPADM

## 2022-07-03 RX ORDER — CLOPIDOGREL BISULFATE 75 MG/1
75 TABLET ORAL ONCE
Status: COMPLETED | OUTPATIENT
Start: 2022-07-03 | End: 2022-07-03

## 2022-07-03 RX ORDER — ACETAMINOPHEN 325 MG/1
650 TABLET ORAL EVERY 6 HOURS PRN
Status: DISCONTINUED | OUTPATIENT
Start: 2022-07-03 | End: 2022-07-05

## 2022-07-03 RX ORDER — LISINOPRIL AND HYDROCHLOROTHIAZIDE 20; 25 MG/1; MG/1
1 TABLET ORAL DAILY
Status: DISCONTINUED | OUTPATIENT
Start: 2022-07-04 | End: 2022-07-12

## 2022-07-03 RX ORDER — ASPIRIN 325 MG
325 TABLET ORAL DAILY
Status: DISCONTINUED | OUTPATIENT
Start: 2022-07-04 | End: 2022-07-05 | Stop reason: ALTCHOICE

## 2022-07-03 RX ORDER — ONDANSETRON 4 MG/1
4 TABLET, ORALLY DISINTEGRATING ORAL EVERY 6 HOURS PRN
Status: DISCONTINUED | OUTPATIENT
Start: 2022-07-03 | End: 2022-08-02 | Stop reason: HOSPADM

## 2022-07-03 RX ORDER — CETIRIZINE HYDROCHLORIDE 10 MG/1
10 TABLET ORAL DAILY
Status: DISCONTINUED | OUTPATIENT
Start: 2022-07-04 | End: 2022-07-06

## 2022-07-03 RX ORDER — ATORVASTATIN CALCIUM 80 MG/1
80 TABLET, FILM COATED ORAL DAILY
Status: DISCONTINUED | OUTPATIENT
Start: 2022-07-04 | End: 2022-07-06

## 2022-07-03 RX ORDER — MIRTAZAPINE 15 MG/1
15 TABLET, ORALLY DISINTEGRATING ORAL AT BEDTIME
Status: DISCONTINUED | OUTPATIENT
Start: 2022-07-03 | End: 2022-07-10

## 2022-07-03 RX ORDER — DEXTROSE MONOHYDRATE 25 G/50ML
25-50 INJECTION, SOLUTION INTRAVENOUS
Status: DISCONTINUED | OUTPATIENT
Start: 2022-07-03 | End: 2022-08-02 | Stop reason: HOSPADM

## 2022-07-03 RX ORDER — ONDANSETRON 2 MG/ML
4 INJECTION INTRAMUSCULAR; INTRAVENOUS EVERY 6 HOURS PRN
Status: DISCONTINUED | OUTPATIENT
Start: 2022-07-03 | End: 2022-08-02 | Stop reason: HOSPADM

## 2022-07-03 RX ORDER — ASPIRIN 325 MG
325 TABLET ORAL ONCE
Status: COMPLETED | OUTPATIENT
Start: 2022-07-03 | End: 2022-07-03

## 2022-07-03 RX ORDER — ACETAMINOPHEN 650 MG/1
650 SUPPOSITORY RECTAL EVERY 6 HOURS PRN
Status: DISCONTINUED | OUTPATIENT
Start: 2022-07-03 | End: 2022-07-05

## 2022-07-03 RX ADMIN — MIRTAZAPINE 15 MG: 15 TABLET, ORALLY DISINTEGRATING ORAL at 23:13

## 2022-07-03 RX ADMIN — ASPIRIN 325 MG ORAL TABLET 325 MG: 325 PILL ORAL at 20:19

## 2022-07-03 RX ADMIN — INSULIN GLARGINE 15 UNITS: 100 INJECTION, SOLUTION SUBCUTANEOUS at 23:12

## 2022-07-03 RX ADMIN — CLOPIDOGREL BISULFATE 75 MG: 75 TABLET ORAL at 20:19

## 2022-07-03 NOTE — ED PROVIDER NOTES
History     Chief Complaint:  Generalized Weakness       HPI   Josue Parisi is a 52 year old male who presents via ambulance from home due to generalized weakness and not being able to care for himself.  The patient was admitted little over a week ago with a right CVA.  He was supposed to be discharged apparently to a rehabilitation facility, but the patient left the hospital AMA on June 30.  He indicates that he has not been able to care for himself as a result since then, as he lives alone.  He is not able to provide much more history, due to the fact that he does not want to talk.  He does not have use of his left arm or leg.    Allergies:  No Known Allergies     Medications:    ALPRAZolam (XANAX) 1 MG tablet  aspirin (ASA) 325 MG tablet  atorvastatin (LIPITOR) 80 MG tablet  blood glucose (NO BRAND SPECIFIED) test strip  blood glucose monitoring (NO BRAND SPECIFIED) meter device kit  cetirizine (ZYRTEC) 10 MG tablet  chlorhexidine (HIBICLENS) 4 % liquid  gabapentin (NEURONTIN) 300 MG capsule  insulin glargine (LANTUS PEN) 100 UNIT/ML pen  Lancets 30G MISC  lisinopril-hydrochlorothiazide (ZESTORETIC) 20-25 MG tablet  metFORMIN (GLUCOPHAGE) 1000 MG tablet  sodium chloride (OCEAN) 0.65 % nasal spray        Past Medical History:    Past Medical History:   Diagnosis Date     CVA (cerebral infarction)      Hypertension        Patient Active Problem List    Diagnosis Date Noted     Acute CVA (cerebrovascular accident) (H) 06/24/2022     Priority: Medium     Generalized weakness 06/19/2022     Priority: Medium     Type 2 diabetes mellitus with hyperglycemia, without long-term current use of insulin (H) 06/19/2022     Priority: Medium     Urinary incontinence, unspecified type 06/19/2022     Priority: Medium     Hypertension, unspecified type 06/19/2022     Priority: Medium     Left leg weakness 03/19/2022     Priority: Medium     Acute stroke due to ischemia (H) 03/19/2022     Priority: Medium      Cerebrovascular accident (H) 02/05/2022     Priority: Medium        Past Surgical History:    No past surgical history on file.     Family History:    family history is not on file.    Social History:   reports that he has been smoking. He has been smoking about 0.50 packs per day. He does not have any smokeless tobacco history on file. He reports that he does not drink alcohol and does not use drugs.    PCP: Von Aguirre     Review of Systems   All other systems reviewed and are negative.        Physical Exam     Patient Vitals for the past 24 hrs:   BP Temp Temp src Pulse Resp SpO2   07/03/22 1720 (!) 148/97 97.7  F (36.5  C) Temporal 118 20 100 %        Physical Exam  Nursing note and vitals reviewed.  Constitutional:  Awake and alert.  HENT:   Nose:    Nose normal.   Mouth/Throat:   Mucous membranes are normal.   Eyes:    Conjunctivae normal and EOM are normal.      Pupils are equal, round, and reactive to light.   Neck:    Trachea normal.   Cardiovascular:  Normal rate, regular rhythm, normal heart sounds and normal pulses. No murmur heard.  Pulmonary/Chest:  Effort normal and breath sounds normal.   Abdominal:   Soft. Normal appearance and bowel sounds are normal.      There is no tenderness.      There is no rebound and no CVA tenderness.   Musculoskeletal:  Extremities atraumatic x 4.   Lymphadenopathy:  No cervical adenopathy.   Neurological:   Alert and oriented to person, place, and time.  Paralysis to his left arm and left leg.  Normal strength in the right arm and right leg. No cranial nerve deficit or apparent sensory deficit. GCS eye subscore is 4. GCS verbal subscore is 5. GCS motor subscore is 6.   Skin:    Skin is intact. No rash noted.   Psychiatric:   Somewhat agitated but otherwise normal mood and affect.    Emergency Department Course   Laboratory:  Labs Ordered and Resulted from Time of ED Arrival to Time of ED Departure   COMPREHENSIVE METABOLIC PANEL - Abnormal       Result Value     Sodium 136      Potassium 3.6      Chloride 102      Carbon Dioxide (CO2) 23      Anion Gap 11      Urea Nitrogen 23      Creatinine 0.64 (*)     Calcium 9.8      Glucose 168 (*)     Alkaline Phosphatase 65      AST 46 (*)     ALT 51      Protein Total 8.2      Albumin 4.0      Bilirubin Total 0.6      GFR Estimate >90     CBC WITH PLATELETS AND DIFFERENTIAL - Abnormal    WBC Count 12.0 (*)     RBC Count 4.30 (*)     Hemoglobin 13.2 (*)     Hematocrit 41.6      MCV 97      MCH 30.7      MCHC 31.7      RDW 12.5      Platelet Count 443      % Neutrophils 68      % Lymphocytes 20      % Monocytes 10      % Eosinophils 2      % Basophils 0      % Immature Granulocytes 0      NRBCs per 100 WBC 0      Absolute Neutrophils 8.2      Absolute Lymphocytes 2.3      Absolute Monocytes 1.2      Absolute Eosinophils 0.3      Absolute Basophils 0.0      Absolute Immature Granulocytes 0.0      Absolute NRBCs 0.0     TROPONIN I - Normal    Troponin I High Sensitivity 10     MAGNESIUM - Normal    Magnesium 2.1     COVID-19 VIRUS (CORONAVIRUS) BY PCR - Normal    SARS CoV2 PCR Negative     ROUTINE UA WITH MICROSCOPIC REFLEX TO CULTURE        Interventions:  Medications   aspirin (ASA) tablet 325 mg (325 mg Oral Given 7/3/22 2019)   clopidogrel (PLAVIX) tablet 75 mg (75 mg Oral Given 7/3/22 2019)        Emergency Department Course:  Past medical records, nursing notes, and vitals reviewed.  I performed an exam of the patient and obtained history, as documented above.      Impression & Plan    Medical Decision Making:  This is a 52-year-old male who came in by ambulance essentially because he is unable to care for himself at home.  He was recently admitted to the hospital for a CVA, and he left AMA rather than going to a rehab facility apparently.  The patient was not the most forthcoming with a history, and in fact he was somewhat agitated with any questioning.  I felt it was reasonable to check the above blood work, and he was provided  oral aspirin and Plavix per the stroke neurology recommendations.  I then spoke with Dr. Marroquin, who will be taking care of him, as he is coming into the hospital and will likely require placement.    Diagnosis:    ICD-10-CM    1. Cerebrovascular accident (CVA), unspecified mechanism (H)  I63.9    2. Generalized muscle weakness  M62.81         7/3/2022   Bryn Schulte MD Lashkowitz, Seth H, MD  07/03/22 2109

## 2022-07-03 NOTE — ED TRIAGE NOTES
Left hospital AM; he suffered a stroke in June; pt hasn't been able to care for self at home     Triage Assessment     Row Name 07/03/22 6916       Triage Assessment (Adult)    Airway WDL WDL       Respiratory WDL    Respiratory WDL WDL       Cardiac WDL    Cardiac WDL X       Peripheral/Neurovascular WDL    Peripheral Neurovascular WDL WDL       Cognitive/Neuro/Behavioral WDL    Cognitive/Neuro/Behavioral WDL X

## 2022-07-04 ENCOUNTER — APPOINTMENT (OUTPATIENT)
Dept: MRI IMAGING | Facility: CLINIC | Age: 52
DRG: 065 | End: 2022-07-04
Attending: NURSE PRACTITIONER
Payer: COMMERCIAL

## 2022-07-04 ENCOUNTER — APPOINTMENT (OUTPATIENT)
Dept: GENERAL RADIOLOGY | Facility: CLINIC | Age: 52
DRG: 065 | End: 2022-07-04
Attending: PHYSICIAN ASSISTANT
Payer: COMMERCIAL

## 2022-07-04 ENCOUNTER — APPOINTMENT (OUTPATIENT)
Dept: CT IMAGING | Facility: CLINIC | Age: 52
DRG: 065 | End: 2022-07-04
Attending: PHYSICIAN ASSISTANT
Payer: COMMERCIAL

## 2022-07-04 PROBLEM — I63.9 CEREBROVASCULAR ACCIDENT (CVA), UNSPECIFIED MECHANISM (H): Status: ACTIVE | Noted: 2022-07-04

## 2022-07-04 LAB
ALBUMIN UR-MCNC: 50 MG/DL
AMPHETAMINES UR QL SCN: ABNORMAL
ANION GAP SERPL CALCULATED.3IONS-SCNC: 11 MMOL/L (ref 3–14)
APPEARANCE UR: CLEAR
BARBITURATES UR QL: ABNORMAL
BASOPHILS # BLD AUTO: 0 10E3/UL (ref 0–0.2)
BASOPHILS NFR BLD AUTO: 0 %
BENZODIAZ UR QL: ABNORMAL
BILIRUB UR QL STRIP: ABNORMAL
BUN SERPL-MCNC: 30 MG/DL (ref 7–30)
CALCIUM SERPL-MCNC: 9.8 MG/DL (ref 8.5–10.1)
CANNABINOIDS UR QL SCN: ABNORMAL
CHLORIDE BLD-SCNC: 107 MMOL/L (ref 94–109)
CO2 SERPL-SCNC: 22 MMOL/L (ref 20–32)
COCAINE UR QL: ABNORMAL
COLOR UR AUTO: YELLOW
CREAT SERPL-MCNC: 0.9 MG/DL (ref 0.66–1.25)
EOSINOPHIL # BLD AUTO: 0.3 10E3/UL (ref 0–0.7)
EOSINOPHIL NFR BLD AUTO: 2 %
ERYTHROCYTE [DISTWIDTH] IN BLOOD BY AUTOMATED COUNT: 12.7 % (ref 10–15)
GFR SERPL CREATININE-BSD FRML MDRD: >90 ML/MIN/1.73M2
GLUCOSE BLD-MCNC: 123 MG/DL (ref 70–99)
GLUCOSE BLDC GLUCOMTR-MCNC: 123 MG/DL (ref 70–99)
GLUCOSE BLDC GLUCOMTR-MCNC: 126 MG/DL (ref 70–99)
GLUCOSE BLDC GLUCOMTR-MCNC: 161 MG/DL (ref 70–99)
GLUCOSE BLDC GLUCOMTR-MCNC: 170 MG/DL (ref 70–99)
GLUCOSE UR STRIP-MCNC: 30 MG/DL
HCT VFR BLD AUTO: 40.5 % (ref 40–53)
HGB BLD-MCNC: 13.1 G/DL (ref 13.3–17.7)
HGB UR QL STRIP: NEGATIVE
HYALINE CASTS: 1 /LPF
IMM GRANULOCYTES # BLD: 0 10E3/UL
IMM GRANULOCYTES NFR BLD: 0 %
KETONES UR STRIP-MCNC: 40 MG/DL
LEUKOCYTE ESTERASE UR QL STRIP: NEGATIVE
LYMPHOCYTES # BLD AUTO: 1.9 10E3/UL (ref 0.8–5.3)
LYMPHOCYTES NFR BLD AUTO: 14 %
MCH RBC QN AUTO: 31 PG (ref 26.5–33)
MCHC RBC AUTO-ENTMCNC: 32.3 G/DL (ref 31.5–36.5)
MCV RBC AUTO: 96 FL (ref 78–100)
MONOCYTES # BLD AUTO: 1.1 10E3/UL (ref 0–1.3)
MONOCYTES NFR BLD AUTO: 9 %
MUCOUS THREADS #/AREA URNS LPF: PRESENT /LPF
NEUTROPHILS # BLD AUTO: 9.8 10E3/UL (ref 1.6–8.3)
NEUTROPHILS NFR BLD AUTO: 75 %
NITRATE UR QL: NEGATIVE
NRBC # BLD AUTO: 0 10E3/UL
NRBC BLD AUTO-RTO: 0 /100
OPIATES UR QL SCN: ABNORMAL
PCP UR QL SCN: ABNORMAL
PH UR STRIP: 5.5 [PH] (ref 5–7)
PLATELET # BLD AUTO: 426 10E3/UL (ref 150–450)
POTASSIUM BLD-SCNC: 3.8 MMOL/L (ref 3.4–5.3)
PROCALCITONIN SERPL-MCNC: <0.05 NG/ML
RBC # BLD AUTO: 4.22 10E6/UL (ref 4.4–5.9)
RBC URINE: 0 /HPF
SODIUM SERPL-SCNC: 140 MMOL/L (ref 133–144)
SP GR UR STRIP: 1.02 (ref 1–1.03)
UROBILINOGEN UR STRIP-MCNC: NORMAL MG/DL
WBC # BLD AUTO: 13.2 10E3/UL (ref 4–11)
WBC URINE: <1 /HPF

## 2022-07-04 PROCEDURE — 93005 ELECTROCARDIOGRAM TRACING: CPT

## 2022-07-04 PROCEDURE — 70551 MRI BRAIN STEM W/O DYE: CPT

## 2022-07-04 PROCEDURE — 71045 X-RAY EXAM CHEST 1 VIEW: CPT

## 2022-07-04 PROCEDURE — 85014 HEMATOCRIT: CPT | Performed by: PHYSICIAN ASSISTANT

## 2022-07-04 PROCEDURE — G0378 HOSPITAL OBSERVATION PER HR: HCPCS

## 2022-07-04 PROCEDURE — 99233 SBSQ HOSP IP/OBS HIGH 50: CPT | Performed by: PHYSICIAN ASSISTANT

## 2022-07-04 PROCEDURE — 80307 DRUG TEST PRSMV CHEM ANLYZR: CPT | Performed by: EMERGENCY MEDICINE

## 2022-07-04 PROCEDURE — 258N000003 HC RX IP 258 OP 636: Performed by: PHYSICIAN ASSISTANT

## 2022-07-04 PROCEDURE — 81001 URINALYSIS AUTO W/SCOPE: CPT | Performed by: EMERGENCY MEDICINE

## 2022-07-04 PROCEDURE — 93010 ELECTROCARDIOGRAM REPORT: CPT | Performed by: INTERNAL MEDICINE

## 2022-07-04 PROCEDURE — 250N000013 HC RX MED GY IP 250 OP 250 PS 637: Performed by: INTERNAL MEDICINE

## 2022-07-04 PROCEDURE — 82962 GLUCOSE BLOOD TEST: CPT

## 2022-07-04 PROCEDURE — 80048 BASIC METABOLIC PNL TOTAL CA: CPT | Performed by: PHYSICIAN ASSISTANT

## 2022-07-04 PROCEDURE — 70450 CT HEAD/BRAIN W/O DYE: CPT

## 2022-07-04 PROCEDURE — 120N000001 HC R&B MED SURG/OB

## 2022-07-04 PROCEDURE — 36415 COLL VENOUS BLD VENIPUNCTURE: CPT | Performed by: PHYSICIAN ASSISTANT

## 2022-07-04 RX ORDER — CLOPIDOGREL BISULFATE 75 MG/1
75 TABLET ORAL DAILY
Status: DISCONTINUED | OUTPATIENT
Start: 2022-07-04 | End: 2022-07-06

## 2022-07-04 RX ORDER — SODIUM CHLORIDE 9 MG/ML
INJECTION, SOLUTION INTRAVENOUS CONTINUOUS
Status: DISCONTINUED | OUTPATIENT
Start: 2022-07-04 | End: 2022-07-09

## 2022-07-04 RX ADMIN — INSULIN GLARGINE 15 UNITS: 100 INJECTION, SOLUTION SUBCUTANEOUS at 23:01

## 2022-07-04 RX ADMIN — ATORVASTATIN CALCIUM 80 MG: 80 TABLET, FILM COATED ORAL at 09:28

## 2022-07-04 RX ADMIN — SODIUM CHLORIDE: 9 INJECTION, SOLUTION INTRAVENOUS at 11:29

## 2022-07-04 RX ADMIN — LISINOPRIL AND HYDROCHLOROTHIAZIDE 1 TABLET: 25; 20 TABLET ORAL at 09:28

## 2022-07-04 RX ADMIN — CETIRIZINE HYDROCHLORIDE 10 MG: 10 TABLET, FILM COATED ORAL at 09:28

## 2022-07-04 RX ADMIN — ASPIRIN 325 MG ORAL TABLET 325 MG: 325 PILL ORAL at 09:28

## 2022-07-04 ASSESSMENT — ACTIVITIES OF DAILY LIVING (ADL)
ADLS_ACUITY_SCORE: 45

## 2022-07-04 NOTE — H&P
Admitted: 07/03/2022    PRIMARY CARE PHYSICIAN:  CARRIE Veloz    CHIEF COMPLAINT:  Multiple strokes in the last few months.  Patient unable to manage independent living.    HISTORY OF PRESENT ILLNESS:  Josue Parisi is a 52-year-old  gentleman with longstanding history of narcotic abuse over the last 4 months.  He has had multiple hospitalizations for stroke and for various social reasons.  The patient was admitted initially at [facility] back in March when he had his second stroke in 5 weeks, primarily atherosclerotic effect in the right MCA and bilateral KWADWO, in the context of a chronically occluded right cervical internal carotid artery.  The first stroke was thought to be hemodynamic and embolic, and the second stroke of the right KWADWO was likely atherosclerotic stenosis of the vessel.  The patient was going to be on dual-antiplatelet therapy with aspirin and Plavix, but the patient has not been compliant in part due to his significant narcotic issues.  Finally, they decided to put him on a single antiplatelet therapy hoping for improved compliance.  He has risk factors of cocaine, alcohol, and nicotine use.  More recently, the patient was admitted at [facility] again on 06/19/2022, and on the 24th discharged himself against medical advice.  It was thought that the patient was more weak, but the patient returned on the very same day on the 24th to [facility] with increased weakness.  At this time, he was found to have a new hemorrhagic infarction of the right deep MCA watershed infarction.  He had new evidence of infarction in the right MCA territory, and it is not clear whether the patient truly was taking his antiplatelet therapy.  The patient was supposed to go to acute rehab, but once again left against medical advice on 06/30/2022.    The patient returns again today, admitting that he is not able to care for himself due to his severe  left-sided weakness.  The patient also admits to using cocaine either Friday or Saturday.  The patient was quite somnolent and needed frequent prompts to keep himself engaged.    In the Emergency Department, the patient was afebrile.  Blood pressures were stable.  He is slightly tachycardic.  Blood work revealed normal electrolytes, normal kidney function.  LFTs are also overall normal with slight AST elevation of 46.  White count 12, hemoglobin 13.2, platelets of 443.  COVID test was negative.  The patient will be admitted under observation status for further TCU placement.    PAST MEDICAL HISTORY:  1.  History of multiple strokes in the last 4 months of the right MCA and bilateral KWADWO territory; combination of atherosclerotic, hypertensive and possibly embolic with ongoing medical compliance issues.  2.  Type 2 diabetes.  3.  Hypertension.  4.  Hidradenitis.  5.  Polysubstance abuse.    PAST SURGICAL HISTORY:  None.    SOCIAL HISTORY:  Half a pack a day smoker.  No alcohol, but positive for drugs including cocaine.  He lives in an apartment.    ALLERGIES:  NO KNOWN DRUG ALLERGIES.    CURRENT MEDICATION LIST:   1.  Aspirin 325 once a day.   2.  Lipitor 80 mg once a day.  3.  Lantus 50 units at bedtime.  4.  Lisinopril/hydrochlorothiazide 20/25, 1 tablet once a day.  5.  Xanax 1 mg at nighttime for anxiety.  6.  Zyrtec 10 mg once a day.  7.  Neurontin 300 mg 2 times a day.  8.  Metformin 1000 mg b.i.d.  9.  Sodium chloride Ocean spray, 2 sprays in nostrils every 2 hours as needed for congestion.    REVIEW OF SYSTEMS:  Ten points reviewed.  Denies any new weakness.  Positive for cocaine use.  Admits to not being compliant with aspirin, but at other times states that he is.  Denies any fevers, chills, sweats, headache, falls, black or bloody stools.  Otherwise, 10 points reviewed and otherwise are negative.    PHYSICAL EXAMINATION:    VITAL SIGNS:  Temperature 98.5, heart rate 112, respirations 20, blood pressure  138/88, sats are 98% on room air.  GENERAL:  The patient is somnolent but arouses.  HEENT:  No facial asymmetry.  Pupils are equal.  Mucous membranes are moist.  NECK:  JVP is not elevated.  PULMONARY:  Lungs are clear to auscultation.  CARDIOVASCULAR:  S1, S2.  Borderline tachycardic, hyperdynamic.  GASTROINTESTINAL:  Abdomen is soft, nontender, normoactive.  MUSCULOSKELETAL:  No edema.  NEUROLOGIC:  He is significantly weak of his left arm, and unable to make a complete fist of his left hand.  He is unable to move his left leg against gravity.  No right-sided abscess.  No cranial deficits.  SKIN:  Warm, dry, well perfused.  PSYCHIATRIC:  The patient is somnolent.    LABORATORY AND IMAGING STUDIES:  As dictated in history of present illness.    ASSESSMENT:  Josue Parisi is 52, who has had multiple strokes with some significant compliance issues, ongoing polysubstance abuse, being admitted under observation status for placement at a TCU, as he is unable to care for himself.    PLAN:    1.  History of multiple strokes with medical compliance issues and patient unable to care for himself.  The patient will be seen by Social Work for TCU placement.  The patient will be continued on his aspirin single agent.  This was done due to the patient not being compliant.  The patient will be on Lipitor as well.  2.  Polysubstance abuse with pending urine tox screen.  The patient has never been to inpatient treatment, but is willing, but currently he is too weak to undergo this, but can potentially go once he has some rehabilitation.  Social Work has been consulted.  3.  Depression.  The patient admits to depression with multiple strokes.  We will start the patient on Remeron.  4.  Hypertension.  We will continue the patient on lisinopril/hydrochlorothiazide.  5.  Diabetes.  We will continue the patient on Lantus and sliding scale insulin.  We will hold the patient's metformin.  6.  Seasonal allergies.  We will continue the  patient on Zyrtec.  7.  Deep venous thrombosis prophylaxis.  Will give the patient compression boots.  8.  Code status:  FULL.    DISPOSITION:  Observation status.    Jose Marroquin MD        D: 2022   T: 2022   MT: [id number]    Name:     JERONIMO CEJA  MRN:      [id number]        Account:     [id number]   :      1970           Admitted:    2022       Document: [id number]    cc:  Von Aguirre PA-C

## 2022-07-04 NOTE — PROGRESS NOTES
hospitalist brief note: called regarding patient having lethargy with possible worsened L-sided weakness. History reviewed, in brief pt with multiple prior CVAs and polysub abuse with multiple hospital admissions recommending TCU and pt leaving AMA, returned this admit with ongoing L-sided weakness and inability to care for self at home.     Chart reviewed, pt tachycardic with low-grade fever, mild leukocytosis on admission. On exam, lethargic but opens eyes to voice and answers questions with gestures of right hand, nods/shakes of head.  strength to LUE is 1/5, plantar flexion/dorsiflexion is 1/5. Notes from admission had difficulties ascertaining motor strength due to pt's substance abuse, lethargy, and apparent lack of desire to talk. Per charge RN, during last weeks admission, pt had voluntary and purposeful movement of LUE/LLE with weakness. Unclear if these are acute motor changes or result of prior CVA with lack of therapies     - Repeat noncon CTH, consider neuro consult if new changes  - Resumed DAPT with plavix/ASA, appears plavix was recommended but ultimately discontinued when patient left AMA to encourage compliance (unclear if he has been taking ASA)  - Infectious workup with repeat CBC, BMP this AM. Also added UA/UC, pCXR  - Add IVF given poor po intake since admit 2/2 lethargy    Full hospitalist progress note to follow.    Nikita Huang PA-C  7/4/2022, 11:53 AM  Pager: [phone]

## 2022-07-04 NOTE — PROGRESS NOTES
RECEIVING UNIT ED HANDOFF REVIEW    ED Nurse Handoff Report was reviewed by: TAWANA ANTONIO RN on July 3, 2022 at 8:36 PM

## 2022-07-04 NOTE — PHARMACY-ADMISSION MEDICATION HISTORY
Pharmacy Medication History  Admission medication history interview status for the 7/3/2022  admission is complete. See EPIC admission navigator for prior to admission medications     Location of Interview: Patient room  Medication history sources: Patient and recent discharge summary from [facility]     Significant changes made to the medication list:  None    In the past week, patient estimated taking medication this percent of the time: 50-90% due to other    Additional medication history information:   Patient refused to participate in medication history interview. Updated med list with discharge summary when patient left the hospital AMA 6/30/22    Medication reconciliation completed by provider prior to medication history? No    Time spent in this activity: 15 min    Prior to Admission medications    Medication Sig Last Dose Taking? Auth Provider Long Term End Date   ALPRAZolam (XANAX) 1 MG tablet Take 1 mg by mouth nightly as needed for anxiety Past Week at Unknown time Yes Unknown, Entered By History     aspirin (ASA) 325 MG tablet Take 1 tablet (325 mg) by mouth daily Past Week at Unknown time Yes Israel Neal MD     atorvastatin (LIPITOR) 80 MG tablet Take 1 tablet (80 mg) by mouth daily Past Week at Unknown time Yes Israel Neal MD Yes    cetirizine (ZYRTEC) 10 MG tablet Take 10 mg by mouth daily Past Week at Unknown time Yes Unknown, Entered By History     chlorhexidine (HIBICLENS) 4 % liquid Apply topically daily as needed for wound care  at prn Yes Unknown, Entered By History     gabapentin (NEURONTIN) 300 MG capsule Take 300 mg by mouth 3 times daily Past Week at Unknown time Yes Unknown, Entered By History Yes    insulin glargine (LANTUS PEN) 100 UNIT/ML pen Inject 15 Units Subcutaneous At Bedtime Past Week at Unknown time Yes Betsy Perkins PA-C Yes    lisinopril-hydrochlorothiazide (ZESTORETIC) 20-25 MG tablet Take 1 tablet by mouth daily Past Week at Unknown time Yes Maddie  Betsy Cobos PA-C Yes    metFORMIN (GLUCOPHAGE) 1000 MG tablet Take 1,000 mg by mouth 2 times daily (with meals) Past Week at Unknown time Yes Unknown, Entered By History     sodium chloride (OCEAN) 0.65 % nasal spray Spray 2 sprays in nostril every 2 hours as needed for congestion Past Week at Unknown time Yes Unknown, Entered By History       The information provided in this note is only as accurate as the sources available at the time of update(s)     Elaine Damon, GonsaloD, BCPS

## 2022-07-04 NOTE — CONSULTS
Care Management Initial Consult    General Information  Assessment completed with: Patient,    Type of CM/SW Visit: Initial Assessment    Primary Care Provider verified and updated as needed:     Readmission within the last 30 days:        Reason for Consult: discharge planning  Advance Care Planning:     Non in chart.    Communication Assessment  Patient's communication style: spoken language (English or Bilingual)       Cognitive  Cognitive/Neuro/Behavioral: .WDL except  Level of Consciousness: lethargic  Arousal Level: opens eyes spontaneously  Orientation: oriented x 4  Mood/Behavior: agitated, cooperative  Best Language: 0 - No aphasia  Speech: slow, clear    Living Environment:   People in home:     alone  Current living Arrangements:    apartment    Able to return to prior arrangements:         Family/Social Support:  Care provided by:    Provides care for:                  Description of Support System: Other (see comments)  Pt has a daughter listed on his emergency contact list       Current Resources:   Patient receiving home care services:  no     Community Resources:    Equipment currently used at home:    Supplies currently used at home:      Employment/Financial:  Employment Status:          Financial Concerns:   [employer] [place]           Lifestyle & Psychosocial Needs:  Social Determinants of Health     Tobacco Use: Not on file   Alcohol Use: Not on file   Financial Resource Strain: Not on file   Food Insecurity: Not on file   Transportation Needs: Not on file   Physical Activity: Not on file   Stress: Not on file   Social Connections: Not on file   Intimate Partner Violence: Not on file   Depression: Not on file   Housing Stability: Not on file       Functional Status:  Prior to admission patient needed assistance:  Pt at home following a hospital stay last month for CVA. Pt recommended for TCU placement and pt declined TCU placement. Pt left and returned home. Pt now at hospital under  observation for placement as he was not able to care for self at home.     Mental Health Status:          Chemical Dependency Status:   History of poly substance abuse.   Last use was cocaine on 7/2/22  Pt is a smoker.     Values/Beliefs:  Spiritual, Cultural Beliefs, Mormonism Practices, Values that affect care:     Pt has history of leaving hospital AMA   Pt has a history of non compliance regarding medical care.     Additional Information:   SW to send out TCU referrals in attempt to place pt in rehab where he can get stronger.    MAYANK Morris  [facility]  Care Transitions  [phone]

## 2022-07-04 NOTE — PROVIDER NOTIFICATION
"Text page to on call hospitalist: \"pt more lethargic than in AM, tox/drug screens positive--could this be a factor? Head CT complete, awaiting MRI. Any other intervention?\"    Call back received: await MRI, ensure he's not aspirating, ect.   "

## 2022-07-04 NOTE — PROVIDER NOTIFICATION
"Text page to PA: \"pt very lethargic, difficulty staying awake for assessments. Minimal participation. L side appears weaker than admission. LUE no contraction. Any intervention?\"    Text page: will come up to see patient to assess. Head CT ordered.    MD notified of head CT results and UA and drug screen results.     MRI ordered  "

## 2022-07-04 NOTE — PLAN OF CARE
Reason for Admission: CVAs    Cognitive/Mentation: A/Ox SEAN; pt not participating  Neuros/CMS: LUE hemiplegia, LLE 2-3/5. R side moves spontaneously. Not following most commands.   VS: stable ex for softer BPs.   Tele: ST--MD aware.  GI: BS audible, passing flatus. Not Continent.  : Due to void. Strait cath x 2.  Pulmonary: LS diminished, congested.  Pain: none.     Drains/Lines: NS at 100mL/hr  Skin: WDL  Activity: Lift, pt has not gotten up  Diet: Keeping NPO until SLP, pt not command following    Therapies recs: pending  Discharge: pending    Aggression Stoplight Tool: yellow    End of shift summary: Pt very lethargic. Needs MRI and therapies.

## 2022-07-04 NOTE — ED NOTES
[facility]  ED Nurse Handoff Report    ED Chief complaint: Generalized Weakness      ED Diagnosis:   Final diagnoses:   Cerebrovascular accident (CVA), unspecified mechanism (H)   Generalized muscle weakness       Code Status: Admitting provider to address    Allergies: No Known Allergies    Patient Story: Josue Parisi is a 52 year old male who presents via ambulance from home due to generalized weakness and not being able to care for himself.  The patient was admitted little over a week ago with a right CVA.  He was supposed to be discharged apparently to a rehabilitation facility, but the patient left the hospital AMA on June 30.  He indicates that he has not been able to care for himself as a result since then, as he lives alone.  He is not able to provide much more history, due to the fact that he does not want to talk.  He does not have use of his left arm or leg.    Focused Assessment:  Pt is awake, alert and appears resting and falls a sleep     Treatments and/or interventions provided:     Patient's response to treatments and/or interventions: Tolerated well     To be done/followed up on inpatient unit:      Does this patient have any cognitive concerns?: Alert and orientated     Activity level - Baseline/Home:  Unknown  Activity Level - Current:   Unknown    Patient's Preferred language: English   Needed?: No    Isolation: None  Infection: Not Applicable  Other   Patient tested for COVID 19 prior to admission: YES  Bariatric?: No    Vital Signs:   Vitals:    07/03/22 1720   BP: (!) 148/97   Pulse: 118   Resp: 20   Temp: 97.7  F (36.5  C)   TempSrc: Temporal   SpO2: 100%       Cardiac Rhythm:     Was the PSS-3 completed:   Yes  What interventions are required if any?               Family Comments:   OBS brochure/video discussed/provided to patient/family:               Name of person given brochure if not patient:               Relationship to patient:     For the majority  of the shift this patient's behavior was Green.   Behavioral interventions performed were .    ED NURSE PHONE NUMBER: [id number]

## 2022-07-04 NOTE — PROGRESS NOTES
[facility]    Medicine Progress Note - Hospitalist Service    Date of Admission:  7/3/2022    Assessment & Plan        Josue Parisi is 52, who has had multiple strokes with some significant compliance issues, ongoing polysubstance abuse, being admitted under observation status for placement at a TCU, as he is unable to care for himself.    Multiple ischemic CVAs involving MCA, KWADWO with left-sided weakness and concern for progression  Medication noncompliance  Polysubstance abuse  Initial CVA occurred in 03/2022 with right KWADWO, large territory defect and residual left-sided weakness recommended to discharge to ARU however declined and discharged AMA. Subsequently returned 6/18 with progressive weakness, again referred to TCU however declined and returned home. Hospitalized 6/24 with worsening left-sided weakness, paresthesias, numbness with findings of new nonhemorrhagic infarction within right prefrontal gyrus and deep MCA watershed infarct within the right centrum semiovale of the right frontal and parietal lobes. Patient was evaluated by stroke neurology each admission, most recently felt symptoms were suggestive of new infarct rather than hypoperfused tissue. Recommended liberal SBP goal of 140-160 and DAPT with plavix/ for 90 days. Patient most recently left AMA on 6/30 after being recommended for TCU. At time of discharge, it appears the plavix was discontinued in an effort to limit medications and encourage compliance.   * Returned to ED 7/3/22 with reports of failing at home. It was unclear on admission if patient's left-sided symptoms had persisted or worsened due to presence of polysubstances and limited ability to participate in physical exam. Admitted to cocaine use as recently as 7/2. UTox on admission positive for cocaine, amphetamines. Admitted under observation care for presumed placement.  * 7/4 exam noted with 1/5 LUE/LLE strength, limited ability to communicate  verbally but cognitively able to answer yes/no questions with gestures, shakes of head. RNs present at bedside had cared for patient week prior and reported change in function. Repeat CTH revealed rounded focus of hypoattenuation within right corona radiata/centrum semiovale, MRI brain 6/25 indicated restricted diffusion within this region however area of hypoattenuation is larger than region of diffusion restriction concerning for progression of infarct.  - Cardiac monitoring  - Hold PTA antihypertensive to encourage -160  - Stroke neurology consult  - DAPT with plavix/  - Continue statin  - PT/OT consults  - Reconsult speech therapy given limited verbal skills on exam today, ?dysphagia with possible progression of infarct  - SW following for TCU placement    SIRS syndrome  Pt with mild leukocytosis 12-13, procalcitonin on admission negative. Low-grade fever and sinus tachycardia persisting today. UA without evidence of infection. CXR neg. ?due to polysubstance abuse with amphetamines, cocaine in UTox.  - Monitor on tele  - Monitor fever curve  - PRN tylenol for fever  - Repeat CBC in AM  - Low threshold to obtain blood cultures, initiate broad-spectrum abx      Type 2 diabetes mellitus.    Last HbA1c of 7.8 on 06/18. PTA regimen of metformin. Recent admission Lantus had been increased to 18u BID, carb coverage with meals of 1:10 with breakfast/lunch, 1:15 with dinner.  - Hold metformin  - Continue Lantus at 15u at bedtime, uptitrate with improvement in oral intake  - Prandial insulin at 1u:15g per meal  - High ssi  - Accuchecks QID     History of hypertension   Hold PTA meds to allow permissive hypertension  See above, goal -160 mmHg     Hidradenitis / Rt axillary wound  WOCN consult previous recs:   Right axilla wound: Daily  and PRN   Cleanse wound with NS or wound cleanser (omit this step if patient cleans wound in shower)  Dry and protect surrounding skin with no sting barrier film wipe  "#[id number]  Apply a small amount of iodesorb gel #[id number] to bandaid  If Band-Aid needs to be changed more than once a day. Apply iodesorb gel #[id number] to adaptic #[id number] and cover with Mepilex #[id number]     Generalized pain: PTA on gabapentin 300 mg 3 times daily.    - Holding gabapentin given lethargy, resume when more wakeful  - Continue as needed Tylenol as needed. Avoid opioids        Diet: Regular Diet Adult    DVT Prophylaxis: Pneumatic Compression Devices  Cortez Catheter: Not present  Central Lines: None  Cardiac Monitoring: ACTIVE order. Indication: Electrolyte Imbalance (24 hours)- Magnesium <1.3 mg/ml; Potassium < =2.8 or > 5.5 mg/ml  Code Status: Full Code      Disposition Plan      Expected Discharge Date: 07/08/2022        Discharge Comments: Patient non-compliant. Placement will be difficult        The patient's care was discussed with the Attending Physician, Dr. Julian, Bedside Nurse and Patient.    Nikita Huang PA-C  Hospitalist Service  [facility]  Securely message with the Vocera Web Console (learn more here)  Text page via [employer] Paging/Directory         Clinically Significant Risk Factors Present on Admission                 # Hypertension: home medication list includes antihypertensive(s)    # Hypertension: home medication list includes antihypertensive(s)  # Overweight: Estimated body mass index is 25.36 kg/m  as calculated from the following:    Height as of 6/24/22: 1.676 m (5' 6\").    Weight as of 6/30/22: 71.3 kg (157 lb 1.6 oz).        ______________________________________________________________________    Interval History   Pt resting in bed, able to answer yes/no questions. Admits he is hungry. Unable to determine when left-sided weakness changed.    Data reviewed today: I reviewed all medications, new labs and imaging results over the last 24 hours. I personally reviewed the EKG tracing showing sinus tach, no ST-T wave changes and the head CT image(s) showing " possible progression of prior infarct.    Physical Exam   Vital Signs: Temp: 99.3  F (37.4  C) Temp src: Axillary BP: 124/78 Pulse: (!) 128   Resp: 18 SpO2: 97 % O2 Device: None (Room air)    Weight: 0 lbs 0 oz  GEN: well-developed, well-nourished, appears comfortable  HEENT: NCAT, sclera clear, conjunctiva normal, nose & mouth patent, mucous membranes dry  CHEST: lungs CTA bilaterally, no increased work of breathing, no wheeze, crackles, rhonchi  HEART: RRR, S1 & S2, no murmur  ABD: soft, nontender, nondistended, no guarding or rigidity, +BS in all 4 quadrants  NEURO: lethargic, opens eyes to voice. Nods head yes/no. Facial droop present on left side. Follows commands. Spontaneously moving RUE, RLE with purposeful movements.  strength 1/5 on left, 5/5 on right; plantar flexion 2/5 on left, 5/5 on right; plantar dorsiflexion 1/5 on left, 5/5 on right. Sensation grossly intact to light touch.   SKIN: warm & dry without rash, no pedal edema    Data   Recent Labs   Lab 07/04/22  1111 07/04/22  1110 07/04/22  0744 07/03/22  1856 06/29/22  1108 06/29/22  1106   WBC 13.2*  --   --  12.0*  --   --    HGB 13.1*  --   --  13.2*  --   --    MCV 96  --   --  97  --   --      --   --  443  --   --      --   --  136  --   --    POTASSIUM 3.8  --   --  3.6  --  3.7   CHLORIDE 107  --   --  102  --   --    CO2 22  --   --  23  --   --    BUN 30  --   --  23  --   --    CR 0.90  --   --  0.64*  --   --    ANIONGAP 11  --   --  11  --   --    DAT 9.8  --   --  9.8  --   --    * 123* 126* 168*   < >  --    ALBUMIN  --   --   --  4.0  --   --    PROTTOTAL  --   --   --  8.2  --   --    BILITOTAL  --   --   --  0.6  --   --    ALKPHOS  --   --   --  65  --   --    ALT  --   --   --  51  --   --    AST  --   --   --  46*  --   --     < > = values in this interval not displayed.     Recent Results (from the past 24 hour(s))   CT Head w/o Contrast    Narrative    EXAM: CT HEAD W/O CONTRAST  LOCATION: [facility]  DATE/TIME: 7/4/2022 12:44 PM    INDICATION: prior cvas, question worsening left sided weakness  COMPARISON: Head CT June 24, 2022. Brain MRI June 25, 2022  TECHNIQUE: Routine CT Head without IV contrast. Multiplanar reformats. Dose reduction techniques were used.    FINDINGS:  INTRACRANIAL CONTENTS: No evidence of acute intracranial hemorrhage or mass consistent effect.  Rounded focus of hypoattenuation within the right corona radiata//centrum semiovale, new since head CT June 24, 2022. On brain MRI June 2015 there is   restricted diffusion within this region, consistent with acute ischemia, however the region of hypoattenuation and is larger than the region of diffusion restriction which is concerning for progression of infarct. Brain MRI could be used to better   characterize.  Evolving subacute infarcts within the right cerebral hemisphere is demonstrated. Chronic infarcts within the bilateral frontal lobes are demonstrated. The ventricles and sulci are prominent consistent with moderate brain parenchymal volume   loss. The basilar cisterns are patent.    VISUALIZED ORBITS/SINUSES/MASTOIDS: The globes are unremarkable. The partially imaged paranasal sinuses, mastoid air cells and middle ear cavities are unremarkable.     BONES/SOFT TISSUES: The visualized skull base and calvarium are unremarkable.      Impression    IMPRESSION:    1.  Rounded focus of hypoattenuation within the right corona radiata//centrum semiovale, new since head CT June 24, 2022. On brain MRI June 25, 2022 there was restricted diffusion within this region, consistent with acute ischemia, however the region of   hypoattenuation and is larger than the region of diffusion restriction which is concerning for progression of infarct. Brain MRI could be used to better characterize.    2.  No evidence of acute intracranial hemorrhage or mass effect.  3.  Moderate nonspecific white matter changes.  4.  Moderate brain  parenchymal volume loss.   XR Chest Port 1 View    Narrative    EXAM: XR CHEST PORT 1 VIEW  LOCATION: [facility]  DATE/TIME: 7/4/2022 2:10 PM    INDICATION: Low-grade fever.  COMPARISON: Chest x-ray 5/6/2022.      Impression    IMPRESSION: Negative chest.     Medications     sodium chloride 100 mL/hr at 07/04/22 1129       aspirin  325 mg Oral Daily     atorvastatin  80 mg Oral Daily     cetirizine  10 mg Oral Daily     clopidogrel  75 mg Oral Daily     insulin aspart  1-10 Units Subcutaneous TID AC     insulin aspart  1-7 Units Subcutaneous At Bedtime     [START ON 7/5/2022] insulin aspart   Subcutaneous Daily with breakfast     insulin aspart   Subcutaneous Daily with lunch     insulin aspart   Subcutaneous Daily with supper     insulin glargine  15 Units Subcutaneous At Bedtime     [Held by provider] lisinopril-hydrochlorothiazide  1 tablet Oral Daily     mirtazapine  15 mg Orally disintegrating tablet At Bedtime

## 2022-07-04 NOTE — H&P
52 year old with multiple strokes and left sided weakness, polysub abuse admitted for being unable to live independently, refused rehab services to TCU in the past now admitted under observation status for placement.      Dictation # [id number]      Jose Marroquin MD

## 2022-07-04 NOTE — PLAN OF CARE
Reason for Admission: Multiple strokes and left sided weakness, polysub abuse  Cognitive/Mentation: A/Ox 4  Neuros/CMS: Stable with L sided weakness. LUE 1/5, LUE 3/5. Slightly agitated overnight. Not following all commands.   VS: VSS on RA   Tele: N/A  GI: BS audible, + flatus, no BM this shift. Continent.  : Due to void, UA needed. External cath in place. Continent/Incontinent.  Pulmonary: LS clear  Pain: Leg pain, prn Tylenol available.   Drains/Lines: PIV SL  Skin: Intact  Activity: Assist x 2 with GB/W.  Diet: Regular diet with thin liquids. Takes pills whole.   Therapies recs: TCU  Discharge: Pending placement   Aggression Stoplight Tool: Green  End of shift summary: No changes this shift.

## 2022-07-04 NOTE — PROVIDER NOTIFICATION
"Text page to PA: \"pt sustaining HR in 120s, too lethargic for PO meds--did not get plavix today. Any intervention?\"    Text page received: EKG to rule out ischemia, otherwise no interventions if sinus, and SLP consulted for swallow eval  "

## 2022-07-05 ENCOUNTER — APPOINTMENT (OUTPATIENT)
Dept: CT IMAGING | Facility: CLINIC | Age: 52
DRG: 065 | End: 2022-07-05
Payer: COMMERCIAL

## 2022-07-05 ENCOUNTER — APPOINTMENT (OUTPATIENT)
Dept: PHYSICAL THERAPY | Facility: CLINIC | Age: 52
DRG: 065 | End: 2022-07-05
Attending: HOSPITALIST
Payer: COMMERCIAL

## 2022-07-05 ENCOUNTER — APPOINTMENT (OUTPATIENT)
Dept: SPEECH THERAPY | Facility: CLINIC | Age: 52
DRG: 065 | End: 2022-07-05
Attending: PHYSICIAN ASSISTANT
Payer: COMMERCIAL

## 2022-07-05 ENCOUNTER — APPOINTMENT (OUTPATIENT)
Dept: OCCUPATIONAL THERAPY | Facility: CLINIC | Age: 52
DRG: 065 | End: 2022-07-05
Attending: PHYSICIAN ASSISTANT
Payer: COMMERCIAL

## 2022-07-05 LAB
ANION GAP SERPL CALCULATED.3IONS-SCNC: 7 MMOL/L (ref 3–14)
BASOPHILS # BLD AUTO: 0 10E3/UL (ref 0–0.2)
BASOPHILS NFR BLD AUTO: 0 %
BUN SERPL-MCNC: 35 MG/DL (ref 7–30)
CALCIUM SERPL-MCNC: 8.9 MG/DL (ref 8.5–10.1)
CHLORIDE BLD-SCNC: 110 MMOL/L (ref 94–109)
CO2 SERPL-SCNC: 23 MMOL/L (ref 20–32)
CREAT SERPL-MCNC: 0.69 MG/DL (ref 0.66–1.25)
EOSINOPHIL # BLD AUTO: 0.1 10E3/UL (ref 0–0.7)
EOSINOPHIL NFR BLD AUTO: 1 %
ERYTHROCYTE [DISTWIDTH] IN BLOOD BY AUTOMATED COUNT: 12.7 % (ref 10–15)
GFR SERPL CREATININE-BSD FRML MDRD: >90 ML/MIN/1.73M2
GLUCOSE BLD-MCNC: 138 MG/DL (ref 70–99)
GLUCOSE BLDC GLUCOMTR-MCNC: 130 MG/DL (ref 70–99)
GLUCOSE BLDC GLUCOMTR-MCNC: 147 MG/DL (ref 70–99)
GLUCOSE BLDC GLUCOMTR-MCNC: 151 MG/DL (ref 70–99)
GLUCOSE BLDC GLUCOMTR-MCNC: 158 MG/DL (ref 70–99)
HCT VFR BLD AUTO: 35.8 % (ref 40–53)
HGB BLD-MCNC: 11.6 G/DL (ref 13.3–17.7)
IMM GRANULOCYTES # BLD: 0.1 10E3/UL
IMM GRANULOCYTES NFR BLD: 0 %
LYMPHOCYTES # BLD AUTO: 1.9 10E3/UL (ref 0.8–5.3)
LYMPHOCYTES NFR BLD AUTO: 16 %
MCH RBC QN AUTO: 30.6 PG (ref 26.5–33)
MCHC RBC AUTO-ENTMCNC: 32.4 G/DL (ref 31.5–36.5)
MCV RBC AUTO: 95 FL (ref 78–100)
MONOCYTES # BLD AUTO: 1.2 10E3/UL (ref 0–1.3)
MONOCYTES NFR BLD AUTO: 10 %
NEUTROPHILS # BLD AUTO: 8.3 10E3/UL (ref 1.6–8.3)
NEUTROPHILS NFR BLD AUTO: 73 %
NRBC # BLD AUTO: 0 10E3/UL
NRBC BLD AUTO-RTO: 0 /100
PLATELET # BLD AUTO: 388 10E3/UL (ref 150–450)
POTASSIUM BLD-SCNC: 3.7 MMOL/L (ref 3.4–5.3)
PROCALCITONIN SERPL-MCNC: <0.05 NG/ML
RBC # BLD AUTO: 3.79 10E6/UL (ref 4.4–5.9)
SODIUM SERPL-SCNC: 140 MMOL/L (ref 133–144)
WBC # BLD AUTO: 11.6 10E3/UL (ref 4–11)

## 2022-07-05 PROCEDURE — 92526 ORAL FUNCTION THERAPY: CPT | Mod: GN | Performed by: SPEECH-LANGUAGE PATHOLOGIST

## 2022-07-05 PROCEDURE — 250N000011 HC RX IP 250 OP 636: Performed by: INTERNAL MEDICINE

## 2022-07-05 PROCEDURE — 97162 PT EVAL MOD COMPLEX 30 MIN: CPT | Mod: GP

## 2022-07-05 PROCEDURE — 92610 EVALUATE SWALLOWING FUNCTION: CPT | Mod: GN | Performed by: SPEECH-LANGUAGE PATHOLOGIST

## 2022-07-05 PROCEDURE — 250N000009 HC RX 250: Performed by: INTERNAL MEDICINE

## 2022-07-05 PROCEDURE — 97530 THERAPEUTIC ACTIVITIES: CPT | Mod: GP

## 2022-07-05 PROCEDURE — 36415 COLL VENOUS BLD VENIPUNCTURE: CPT | Performed by: INTERNAL MEDICINE

## 2022-07-05 PROCEDURE — 84145 PROCALCITONIN (PCT): CPT | Performed by: INTERNAL MEDICINE

## 2022-07-05 PROCEDURE — 99222 1ST HOSP IP/OBS MODERATE 55: CPT | Mod: GC | Performed by: STUDENT IN AN ORGANIZED HEALTH CARE EDUCATION/TRAINING PROGRAM

## 2022-07-05 PROCEDURE — 250N000012 HC RX MED GY IP 250 OP 636 PS 637: Performed by: INTERNAL MEDICINE

## 2022-07-05 PROCEDURE — 80048 BASIC METABOLIC PNL TOTAL CA: CPT | Performed by: INTERNAL MEDICINE

## 2022-07-05 PROCEDURE — 85025 COMPLETE CBC W/AUTO DIFF WBC: CPT | Performed by: PHYSICIAN ASSISTANT

## 2022-07-05 PROCEDURE — 250N000013 HC RX MED GY IP 250 OP 250 PS 637: Performed by: INTERNAL MEDICINE

## 2022-07-05 PROCEDURE — 97166 OT EVAL MOD COMPLEX 45 MIN: CPT | Mod: GO | Performed by: OCCUPATIONAL THERAPIST

## 2022-07-05 PROCEDURE — 120N000001 HC R&B MED SURG/OB

## 2022-07-05 PROCEDURE — 99233 SBSQ HOSP IP/OBS HIGH 50: CPT | Performed by: INTERNAL MEDICINE

## 2022-07-05 PROCEDURE — 70496 CT ANGIOGRAPHY HEAD: CPT

## 2022-07-05 PROCEDURE — 70450 CT HEAD/BRAIN W/O DYE: CPT

## 2022-07-05 RX ORDER — ACETAMINOPHEN 325 MG/1
650 TABLET ORAL EVERY 4 HOURS PRN
Status: DISCONTINUED | OUTPATIENT
Start: 2022-07-05 | End: 2022-07-11

## 2022-07-05 RX ORDER — ASPIRIN 300 MG/1
300 SUPPOSITORY RECTAL DAILY
Status: DISCONTINUED | OUTPATIENT
Start: 2022-07-05 | End: 2022-07-06

## 2022-07-05 RX ORDER — IOPAMIDOL 755 MG/ML
75 INJECTION, SOLUTION INTRAVASCULAR ONCE
Status: COMPLETED | OUTPATIENT
Start: 2022-07-05 | End: 2022-07-05

## 2022-07-05 RX ORDER — ACETAMINOPHEN 650 MG/1
650 SUPPOSITORY RECTAL EVERY 4 HOURS PRN
Status: DISCONTINUED | OUTPATIENT
Start: 2022-07-05 | End: 2022-07-11

## 2022-07-05 RX ADMIN — INSULIN GLARGINE 10 UNITS: 100 INJECTION, SOLUTION SUBCUTANEOUS at 22:56

## 2022-07-05 RX ADMIN — IOPAMIDOL 75 ML: 755 INJECTION, SOLUTION INTRAVENOUS at 17:17

## 2022-07-05 RX ADMIN — SODIUM CHLORIDE 100 ML: 9 INJECTION, SOLUTION INTRAVENOUS at 17:17

## 2022-07-05 RX ADMIN — MIRTAZAPINE 15 MG: 15 TABLET, ORALLY DISINTEGRATING ORAL at 22:26

## 2022-07-05 RX ADMIN — ASPIRIN 300 MG: 300 SUPPOSITORY RECTAL at 12:22

## 2022-07-05 ASSESSMENT — ACTIVITIES OF DAILY LIVING (ADL)
ADLS_ACUITY_SCORE: 45
ADLS_ACUITY_SCORE: 49
ADLS_ACUITY_SCORE: 49
ADLS_ACUITY_SCORE: 45
ADLS_ACUITY_SCORE: 49
ADLS_ACUITY_SCORE: 45
ADLS_ACUITY_SCORE: 45
ADLS_ACUITY_SCORE: 49
ADLS_ACUITY_SCORE: 45
ADLS_ACUITY_SCORE: 41

## 2022-07-05 NOTE — PROGRESS NOTES
07/05/22 1515   Quick Adds   Quick Adds Student Supervision-Eval Only   Type of Visit Initial PT Evaluation   Living Environment   People in Home alone   Current Living Arrangements apartment   Living Environment Comments Refer to evaluation done on 6/26 for information on living environment. Pt reports that he still lives alone in an apt, greater than 10 stairs to enter   Self-Care   Usual Activity Tolerance good   Current Activity Tolerance poor   Equipment Currently Used at Home cane, straight  (per chart)   Fall history within last six months yes   Number of times patient has fallen within last six months 5  (per chart)   Activity/Exercise/Self-Care Comment Last IP PT rx: 200' CGA RW LOB. Refer to eval from 6/26 for information on level of function before previous admission. See epic flowsheets for information on pt function here at the hospital between 6/26 and 6/30.   General Information   Onset of Illness/Injury or Date of Surgery 07/03/22   Referring Physician Martine Julian MD   Patient/Family Therapy Goals Statement (PT) OOB activity and ambulation  (Simultaneous filing. User may not have seen previous data.)   Pertinent History of Current Problem (include personal factors and/or comorbidities that impact the POC) Multiple ischemic CVAs (MCA, KWADWO) with L hemiparesis - suspect progression with worse hemiparesis. FTT, tachy, polysubstance abuse.   Existing Precautions/Restrictions fall   Weight-Bearing Status - LUE full weight-bearing  (all)   Cognition   Affect/Mental Status (Cognition) agitated;emotionally labile;sad/depressed affect  (Simultaneous filing. User may not have seen previous data.)   Orientation Status (Cognition) oriented to;person   Follows Commands (Cognition) follows one-step commands;50-74% accuracy;increased processing time needed;delayed response/completion;verbal cues/prompting required   Behavioral Issues unable/difficult to assess   Safety Deficit (Cognition) awareness of need  for assistance;insight into deficits/self-awareness   Memory Deficit (Cognition) unable/difficult to assess   Cognitive Status Comments Appears to have inconsistent yes/nos, head nods & shakes appear to be accurate (pt non-verbal).   Pain Assessment   Patient Currently in Pain Yes, see Vital Sign flowsheet  (First says wants RN then says no.)   Posture    Posture Comments Impaired postural control - head into ext, head turned to R.   Range of Motion (ROM)   ROM Comment Limited by pain from spasticity on L. Increased risk of contractures given spasticity & impaired mobiltiy.   Strength Comprehensive (MMT)   Comment, General Manual Muscle Testing (MMT) Assessment LUE 0/5, R SLR and functional R UE use noted.   Strength (Manual Muscle Testing)   Strength (Manual Muscle Testing) Able to perform R SLR;Deficits observed during functional mobility   Strength Comments R sided simple command following, 0/5 L UE and LE   Bed Mobility   Bed Mobility rolling left;rolling right   Rolling Left Forrest (Bed Mobility) dependent (less than 25% patient effort)   Rolling Right Forrest (Bed Mobility) dependent (less than 25% patient effort)   Bed Mobility Limitations decreased ability to use arms for pushing/pulling;cognitive deficits;decreased ability to use legs for bridging/pushing   Impairments Contributing to Impaired Bed Mobility impaired sensory feedback;decreased strength;decreased sensation;pain;impaired motor control   Assistive Device (Bed Mobility) lift device   Transfers   Transfers bed-chair transfer  (Overhead lift)   Bed-Chair Transfer   Assistive Device (Bed-Chair Transfers) lift device   Bed-Chair Forrest (Transfers) dependent (less than 25% patient effort);2 person assist   Gait/Stairs (Locomotion)   Distance in Feet (Required for LE Total Joints) Non-amb.   Balance   Balance Comments Head control poor in rosalia with Max-Total assist - some ext posturing in sidelying.    Sitting balance supported in  patient/other/mother/grandma recliner upright with MaxA R LOB.   Coordination   Coordination Comments Grossly impaired on L, non-functional.   Muscle Tone   Muscle Tone other (see comments)   Muscle Tone Comments Wrist extension: 0 L; Bicep flexion: 2 L; Clonus in L gastroc   Clinical Impression   Criteria for Skilled Therapeutic Intervention Yes, treatment indicated   PT Diagnosis (PT) Grossly impaired functional mobility   Influenced by the following impairments Grossly impaired motor control, tone, strength, balance, coordination, visual attention, decreased insight into deficits   Functional limitations due to impairments Unable to mobilize OOB   Clinical Presentation (PT Evaluation Complexity) Evolving/Changing   Clinical Presentation Rationale Evolving/repeat CVA, sinus tachy, axillary wound   Clinical Decision Making (Complexity) moderate complexity   Planned Therapy Interventions (PT) balance training;gait training;home exercise program;motor coordination training;neuromuscular re-education;postural re-education;strengthening;transfer training;bed mobility training;patient/family education;ROM (range of motion);stretching;progressive activity/exercise;risk factor education;home program guidelines   Anticipated Equipment Needs at Discharge (PT) walker, standard   Risk & Benefits of therapy have been explained evaluation/treatment results reviewed;care plan/treatment goals reviewed;risks/benefits reviewed;current/potential barriers reviewed;participants voiced agreement with care plan;participants included;patient   PT Discharge Planning   PT Discharge Recommendation (DC Rec) Transitional Care Facility   PT Rationale for DC Rec Pt is below prior hospitalization functioning (CGA-Faisal FWW) and is currently non-verbal and TotalA of 2 for lift transfers. Pt stands to benefit from continued skilled therapy services at TCU to advance mobility capabilities towards baseline, and reduce assist level for transfers/ADLs.   PT Brief overview of  current status TotalAx2 lift transfer all mobility and tx   Plan of Care Review   Plan of Care Reviewed With patient   Total Evaluation Time   Total Evaluation Time (Minutes) 15   Physical Therapy Goals   PT Frequency Daily   PT Predicted Duration/Target Date for Goal Attainment 07/12/22   PT Goals Bed Mobility;Transfers   PT: Bed Mobility Moderate assist;Rolling   PT: Transfers Maximum assist;Bed to/from chair;Assistive device;Sit to/from stand  (Lift device)   PT: Gait Maximum assist;10 feet;Assistive device   PT: Goal 1 Unsupported sitting ModA.

## 2022-07-05 NOTE — PROGRESS NOTES
Care Management Follow Up  Length of Stay (days): 1    Expected Discharge Date: 07/08/2022     Concerns to be Addressed:       Patient plan of care discussed at interdisciplinary rounds:     Anticipated Discharge Disposition:  TCU recommended.     Anticipated Discharge Services:    Anticipated Discharge DME:      Patient/family educated on Medicare website which has current facility and service quality ratings: no   Education Provided on the Discharge Plan:  yes  Patient/Family in Agreement with the Plan:      Referrals Placed by CM/SW:  TCU referrals out  Private pay costs discussed:     Additional Information:  SW received call from  ARU liaison regarding pt. In anticipation of possible referral,  ARU liaison and team have reviewed pt and determined that they will not accept pt for inpatient rehab as he was considered twice before and left AMA before placement could be confirmed.    No TCU responded to referrals sent.    MAYANK Morris  [facility]  Care Transitions  [phone]        MAYANK Morris

## 2022-07-05 NOTE — PROGRESS NOTES
Clinical Swallow Evaluation 07/05/22 1109   General Information   Onset of Illness/Injury or Date of Surgery 07/03/22   Patient/Family Therapy Goal Statement (SLP) uanble; pt pointing to mouth   Pertinent History of Current Problem R CVA; see EMR for full review   General Observations Pt appeared lethargic and frequent cues to remain alert, pt able to initiate motion for thumbs up and squeezing hand, however, unable to follow for oral motor exam and tighly clenched teeth; suspect severe apraxia; pt attempting to voice x1   Past History of Dysphagia 6/24/22 pt evaluated by SLP witha functional swallow and dysarthria resolved.   Type of Evaluation   Type of Evaluation Swallow Evaluation   Oral Motor   Oral Musculature anomalies present   Dentition (Oral Motor)   Dentition (Oral Motor) natural dentition   Facial Symmetry (Oral Motor)   Facial Symmetry (Oral Motor) left side impairment   Left Side Facial Asymmetry severe impairment   Lip Function (Oral Motor)   Lip Range of Motion (Oral Motor) protrusion impairment;retraction impairment;unable/difficult to assess   Lip Sensitivity (Oral Motor) left upper lip decreased;left lower lip decreased;right upper lip decreased;right lower lip decreased   Vocal Quality/Secretion Management (Oral Motor)   Vocal Quality (Oral Motor) aphonia;hypophonic;wet/gurgly   Secretion Management (Oral Motor) difficulty swallowing secretions   Comment, Vocal Quality/Secretion Management (Oral Motor) suction required   General Swallowing Observations   Current Diet/Method of Nutritional Intake (General Swallowing Observations, NIS) NPO   Swallowing Evaluation Clinical swallow evaluation   Clinical Swallow Evaluation   Clinical Swallow Evaluation Textures Trialed pureed   Clinical Swallow Eval: Thin Liquid Texture Trial   Volume of Liquid or Food Presented ice chip x1   Diagnostic Statement Ice chip x1 placed on pt's left and right lip. Pt unable to contain bolus with anteior spillage despite  appearing alert and max cues provided. No attempt to masticate or swallow. Bolus removed.   Clinical Swallow Evaluation: Puree Solid Texture Trial   Volume of Puree Presented 1/4 tsp of pudding x1   Diagnostic Statement Pudding placed on pt's lips (unable to get in oral cavity d/t tightly clenched teeth). No attempt at oral manipulation or sensation.   Swallowing Recommendations   Diet Consistency Recommendations NPO   Medication Administration Recommendations, Swallowing (SLP) Non oral   General Therapy Interventions   Planned Therapy Interventions Dysphagia Treatment   Dysphagia treatment Instruction of safe swallow strategies;Modified diet education;Oropharyngeal exercise training   Clinical Impression   Criteria for Skilled Therapeutic Interventions Met (SLP Eval) Yes, treatment indicated   SLP Diagnosis Severe dysphagia   Clinical Impression Comments Pt presents with severe oral dysphagia and inability to manipulate oral bolus to initiate a swallow. Pt following one step commands and appeared alert. Suction required to help clear secretions and remove PO trial attempts. Pt not appropriate for PO at this time.   SLP Discharge Planning   SLP Discharge Recommendation Acute Rehab Center-Motivated patient will benefit from intensive, interdisciplinary therapy.  Anticipate will be able to tolerate 3 hours of therapy per day   SLP Rationale for DC Rec Pt will need ongoing SLP services for swallowing and speech/language evaluation   SLP Brief overview of current status  NPO with oral cares and continued suction as needed; anticipate need for alternative nutrition at this time. Will continue to follow daily.    Total Evaluation Time   Total Evaluation Time (Minutes) 20   SLP Goals   Therapy Frequency (SLP Eval) daily   SLP Predicted Duration/Target Date for Goal Attainment 07/26/22   SLP Goals Swallow   SLP: Safely tolerate diet without signs/symptoms of aspiration One P.O. texture   Psychosocial Support   Trust  Relationship/Rapport care explained;reassurance provided

## 2022-07-05 NOTE — PLAN OF CARE
Pt here with multiple stroke. Unable to fully assess, lethargic, not following commands, arouses to voice with brief period of eye opening.  Neuros are L sided weakness. VSS. NS at 100 ml/hr. Tele sinus tachy. NPO, no oral med given this shift.  Pt scoring green on the Aggression Stop Light Tool. Plan is be seen to therapies. Discharge pending.

## 2022-07-05 NOTE — PROGRESS NOTES
"   07/05/22 1515   Quick Adds   Type of Visit Initial Occupational Therapy Evaluation   Living Environment   People in Home alone   Current Living Arrangements apartment   Living Environment Comments Refer to evaluation done on 6/26 for information on living environment. Pt reports that he still lives alone in an apt, greater than 10 stairs to enter   Self-Care   Usual Activity Tolerance good   Current Activity Tolerance poor   Activity/Exercise/Self-Care Comment Refer to eval from 6/26 for information on level of function before previous admission. See epic flowsheets for information on pt function here at the hospital between 6/26 and 6/30.   General Information   Onset of Illness/Injury or Date of Surgery 07/04/22   Referring Physician Nikita Huang PA-C   Patient/Family Therapy Goal Statement (OT) unable to obtain   Additional Occupational Profile Info/Pertinent History of Current Problem Per chart: \"Josue Parisi is 52, who has had multiple strokes with some significant compliance issues, ongoing polysubstance abuse, being admitted under observation status for placement at a TCU, as he is unable to care for himself.\" Pt left AMA on 6/30, returned 7/3 with increased weakness on the L side and concern for progression following previous CVA.   General Observations and Info Pt nonverbal and communcating through head nods and some hand gestures.   Cognitive Status Examination   Orientation Status person   Behavioral Issues combative/physical outbursts   Affect/Mental Status (Cognitive) confused;agitated   Follows Commands 25-49% accuracy   Cognitive Status Comments Pt showing possible emotional lability with sudden short spurts of crying. Pt following commands poorly throughout eval and only shaking his head to about half the yes/no questions asked of him.   Visual Perception   Impact of Vision Impairment on Function (Vision) Pt appears to be neglecting L field of vision. Does not look at OT when standing on his " L side despite cues.   Sensory   Sensory Comments Unable to assess due to pt difficulty with communication/command following.   Range of Motion Comprehensive   Comment, General Range of Motion Severe AROM defecits at LUE. RUE not assesed due pts R hand in restraining glove and pt swinging at OT with R hand. appears to have spontaneous AROM WFL   Strength Comprehensive (MMT)   Comment, General Manual Muscle Testing (MMT) Assessment LUE 0/5, R not formally tested du to aggitation   Coordination   Upper Extremity Coordination Left UE impaired   Fine Motor Coordination L UE impaired   Functional Limitations Impaired ability to perform bilateral tasks;Fine motor ADL performance impaired;Decreased speed   Coordination Comments severely impaired L UE   Transfers   Transfer Comments Pt requires ceiling lift to transfer from bed to recliner   Activities of Daily Living   BADL Assessment/Intervention other (see comments)  (BADLs not assessed but it is expected that pt will require significant assistance with all I/ADLs)   Lower Body Dressing Assessment/Training   Bailey Level (Lower Body Dressing) unable to perform   Grooming Assessment/Training   Bailey Level (Grooming) unable to perform   Clinical Impression   Criteria for Skilled Therapeutic Interventions Met (OT) Yes, treatment indicated   OT Diagnosis Decreased independence with self-cares and functional mobility   OT Problem List-Impairments impacting ADL problems related to;activity tolerance impaired;balance;cognition;coordination;inability to direct their own care;mobility;motor control;range of motion (ROM);sensation;strength   Assessment of Occupational Performance 5 or more Performance Deficits   Identified Performance Deficits Limitations in dressing, bathing, grooming, toileting, driving, home mgmt, social participation, liesure   Planned Therapy Interventions (OT) ADL retraining;IADL retraining;cognition;motor coordination training;neuromuscular  re-education;ROM;strengthening;stretching;progressive activity/exercise;transfer training   Clinical Decision Making Complexity (OT) moderate complexity   Risk & Benefits of therapy have been explained evaluation/treatment results reviewed;care plan/treatment goals reviewed;risks/benefits reviewed;current/potential barriers reviewed;participants voiced agreement with care plan;participants included;patient   OT Discharge Planning   OT Discharge Recommendation (DC Rec) Transitional Care Facility   OT Rationale for DC Rec Pt below baseline with flaccidity at [facility]/ and is requiring ceiling lift for functional mobility this date. Pt lives alone and has a history of non-compliance. Pt would benefit from continued skilled therapy at TCU to progress independence in ADLs and functional mobility.   Total Evaluation Time (Minutes)   Total Evaluation Time (Minutes) 8   OT Goals   Therapy Frequency (OT) Daily  (will trial daily and assess ability for participation)   OT Predicted Duration/Target Date for Goal Attainment 07/12/22   OT Goals Upper Body Dressing;Hygiene/Grooming;Lower Body Dressing;Upper Body Bathing;Lower Body Bathing;Toilet Transfer/Toileting   OT: Hygiene/Grooming minimal assist   OT: Upper Body Dressing Supervision/stand-by assist   OT: Lower Body Dressing Minimal assist;using adaptive equipment   OT: Upper Body Bathing Minimal assist   OT: Lower Body Bathing Moderate assist   OT: Toilet Transfer/Toileting Moderate assist;cleaning and garment management

## 2022-07-05 NOTE — PROGRESS NOTES
[facility]    Medicine Progress Note - Hospitalist Service    Date of Admission:  7/3/2022    Assessment & Plan        Josue Parisi is 52, who has had multiple strokes with some significant compliance issues, ongoing polysubstance abuse, being admitted under observation status for placement at a TCU, as he is unable to care for himself.     Multiple ischemic CVAs involving MCA, KWADWO with left-sided weakness and concern for progression  Medication noncompliance  Polysubstance abuse  Initial CVA occurred in 03/2022 with right KWADWO, large territory defect and residual left-sided weakness recommended to discharge to ARU however declined and discharged AMA. Subsequently returned 6/18 with progressive weakness, again referred to TCU however declined and returned home. Hospitalized 6/24 with worsening left-sided weakness, paresthesias, numbness with findings of new nonhemorrhagic infarction within right prefrontal gyrus and deep MCA watershed infarct within the right centrum semiovale of the right frontal and parietal lobes. Patient was evaluated by stroke neurology each admission, most recently felt symptoms were suggestive of new infarct rather than hypoperfused tissue. Recommended liberal SBP goal of 140-160 and DAPT with plavix/ for 90 days. Patient most recently left AMA on 6/30 after being recommended for TCU. At time of discharge, it appears the plavix was discontinued in an effort to limit medications and encourage compliance.   * Returned to ED 7/3/22 with reports of failing at home. It was unclear on admission if patient's left-sided symptoms had persisted or worsened due to presence of polysubstances and limited ability to participate in physical exam. Admitted to cocaine use as recently as 7/2. UTox on admission positive for cocaine, amphetamines. Admitted under observation care for presumed placement.  * 7/4 exam noted with 1/5 LUE/LLE strength, limited ability to  communicate verbally but cognitively able to answer yes/no questions with gestures, shakes of head. RNs present at bedside had cared for patient week prior and reported change in function. Repeat CTH revealed rounded focus of hypoattenuation within right corona radiata/centrum semiovale, MRI brain 6/25 indicated restricted diffusion within this region however area of hypoattenuation is larger than region of diffusion restriction concerning for progression of infarct.  - Cardiac monitoring  - Hold PTA antihypertensive to encourage -160  - A/W Stroke neurology consult. ? FILEMON  - DAPT with plavix/  - Continue statin  - PT/OT consults  - Reconsult speech therapy -appreciate recommendations.    SIRS syndrome  Pt with mild leukocytosis 12-13, procalcitonin on admission negative. Low-grade fever and sinus tachycardia persisting today. UA without evidence of infection. CXR neg. ?due to polysubstance abuse with amphetamines, cocaine in UTox.  - Monitor on tele  - Monitor fever curve  - PRN tylenol for fever  - Repeat CBC trending down   - Low threshold to obtain blood cultures, initiate broad-spectrum abx      Type 2 diabetes mellitus.    Last HbA1c of 7.8 on 06/18. PTA regimen of metformin. Recent admission Lantus had been increased to 18u BID, carb coverage with meals of 1:10 with breakfast/lunch, 1:15 with dinner.  - Hold metformin  Recent Labs   Lab 07/05/22  1129 07/05/22  0902 07/05/22  0829 07/04/22  2245 07/04/22  1841 07/04/22  1111   * 138* 130* 161* 170* 123*     - 7/5; Decrease Lantus to 10u at bedtime  - Prandial insulin at 1u:15g per meal  - High ssi  - Accuchecks QID     History of hypertension   Hold PTA meds to allow permissive hypertension  See above, goal -160 mmHg     Hidradenitis / Rt axillary wound  WOCN consult previous recs:   Right axilla wound: Daily  and PRN   Cleanse wound with NS or wound cleanser (omit this step if patient cleans wound in shower)  Dry and protect  "surrounding skin with no sting barrier film wipe #[id number]  Apply a small amount of iodesorb gel #[id number] to bandaid  If Band-Aid needs to be changed more than once a day. Apply iodesorb gel #[id number] to adaptic #[id number] and cover with Mepilex #[id number]     Generalized pain: PTA on gabapentin 300 mg 3 times daily.    - Holding gabapentin given lethargy, resume when more wakeful  - Continue as needed Tylenol as needed. Avoid opioids      Diet: Regular Diet Adult    DVT Prophylaxis: Pneumatic Compression Devices  Cortez Catheter: Not present  Central Lines: None  Cardiac Monitoring: ACTIVE order. Indication: Electrolyte Imbalance (24 hours)- Magnesium <1.3 mg/ml; Potassium < =2.8 or > 5.5 mg/ml  Code Status: Full Code      Disposition Plan     Expected Discharge Date: 07/08/2022        Discharge Comments: Patient non-compliant. Placement will be difficult        The patient's care was discussed with the Bedside Nurse and Patient.    Quinn Monge MD, MD  Hospitalist Service  [facility]  Securely message with the Vocera Web Console (learn more here)  Text page via [employer] Paging/Directory         Clinically Significant Risk Factors Present on Admission                 # Hypertension: home medication list includes antihypertensive(s)    # Hypertension: home medication list includes antihypertensive(s)  # Overweight: Estimated body mass index is 25.36 kg/m  as calculated from the following:    Height as of 6/24/22: 1.676 m (5' 6\").    Weight as of 6/30/22: 71.3 kg (157 lb 1.6 oz).        ______________________________________________________________________    Interval History   Discussed with RN.  Patient requesting some water but remains aphasic.   No other specific complaints.    4 point ROS done and negative unless mentioned    Data reviewed today: I reviewed all medications, new labs and imaging results over the last 24 hours. I personally reviewed the brain MRI image(s) showing as below.    Physical " Exam   BP (!) 143/89 (BP Location: Left arm)   Pulse 119   Temp 98.1  F (36.7  C) (Axillary)   Resp 18   SpO2 100%   Gen- pleasant  lying in bed  HEENT- NAD, CALIXTO  Neck- supple, no JVD elevation, no thyromegaly  CVS- I+II+ no m/r/g  RS- CTAB  Abdo- soft, no tenderness . No g/r/r   Ext- no edema  CNS-expressive aphasia  Left-sided weakness.  Power 2/5    Data   Recent Results (from the past 24 hour(s))   CT Head w/o Contrast    Narrative    EXAM: CT HEAD W/O CONTRAST  LOCATION: [facility]  DATE/TIME: 7/4/2022 12:44 PM    INDICATION: prior cvas, question worsening left sided weakness  COMPARISON: Head CT June 24, 2022. Brain MRI June 25, 2022  TECHNIQUE: Routine CT Head without IV contrast. Multiplanar reformats. Dose reduction techniques were used.    FINDINGS:  INTRACRANIAL CONTENTS: No evidence of acute intracranial hemorrhage or mass consistent effect.  Rounded focus of hypoattenuation within the right corona radiata//centrum semiovale, new since head CT June 24, 2022. On brain MRI June 2015 there is   restricted diffusion within this region, consistent with acute ischemia, however the region of hypoattenuation and is larger than the region of diffusion restriction which is concerning for progression of infarct. Brain MRI could be used to better   characterize.  Evolving subacute infarcts within the right cerebral hemisphere is demonstrated. Chronic infarcts within the bilateral frontal lobes are demonstrated. The ventricles and sulci are prominent consistent with moderate brain parenchymal volume   loss. The basilar cisterns are patent.    VISUALIZED ORBITS/SINUSES/MASTOIDS: The globes are unremarkable. The partially imaged paranasal sinuses, mastoid air cells and middle ear cavities are unremarkable.     BONES/SOFT TISSUES: The visualized skull base and calvarium are unremarkable.      Impression    IMPRESSION:    1.  Rounded focus of hypoattenuation within the right corona  radiata//centrum semiovale, new since head CT June 24, 2022. On brain MRI June 25, 2022 there was restricted diffusion within this region, consistent with acute ischemia, however the region of   hypoattenuation and is larger than the region of diffusion restriction which is concerning for progression of infarct. Brain MRI could be used to better characterize.    2.  No evidence of acute intracranial hemorrhage or mass effect.  3.  Moderate nonspecific white matter changes.  4.  Moderate brain parenchymal volume loss.   XR Chest Port 1 View    Narrative    EXAM: XR CHEST PORT 1 VIEW  LOCATION: [facility]  DATE/TIME: 7/4/2022 2:10 PM    INDICATION: Low-grade fever.  COMPARISON: Chest x-ray 5/6/2022.      Impression    IMPRESSION: Negative chest.   MR Brain w/o Contrast    Narrative    EXAM: MR BRAIN W/O CONTRAST  LOCATION: [facility]  DATE/TIME: 7/4/2022 9:23 PM    INDICATION: Evaluate for interval change in known stroke  COMPARISON: CT head 07/04/2022, MRI brain 06/24/2022  TECHNIQUE: Routine multiplanar multisequence head MRI without intravenous contrast.    FINDINGS:  INTRACRANIAL CONTENTS:   Prior MRI brain 06/24/2022 demonstrated a small cortical based infarct within the right posterior frontal lobe high convexity, multiple patchy acute infarcts right frontoparietal white matter watershed distribution, and punctate acute infarct right   anterior corpus callosum genu. These have evolved and appear as subacute infarcts on current exam.    Large new acute infarct within the right posterior frontal lobe, right parietal lobe, right pre and postcentral gyrus, right insula, and right corpus callosum anterior body. This acute infarct is predominantly in the right MCA territory with small areas   extending into the right KWADWO territory.     No midline shift. No susceptibility on GRE to suggest microhemorrhage. Scattered nonspecific T2/FLAIR hyperintensities within the  cerebral white matter most consistent with mild chronic microvascular ischemic change. Mild to moderate generalized   cerebral atrophy. No hydrocephalus. Normal position of the cerebellar tonsils.     Left anterior cingulate gyrus and left corpus callosum genu chronic infarcts left KWADWO territory. Left basal ganglia small chronic infarct. Left inferior cerebellum punctate chronic infarct.    SELLA: No abnormality accounting for technique.    OSSEOUS STRUCTURES/SOFT TISSUES: Normal marrow signal. The major intracranial vascular flow voids are maintained. Multiple small and prominent lymph nodes within the neck.    ORBITS: No abnormality accounting for technique.     SINUSES/MASTOIDS: Mild mucosal thickening scattered about the paranasal sinuses. Left sphenoid sinus small air-fluid level. Please correlate for acute sinusitis. No middle ear or mastoid effusion.       Impression    IMPRESSION:  1.  Right MCA territory and portions of the right KWADWO territory demonstrate a new large acute infarct superimposed on subacute infarcts described above.  2.  No susceptibility on GRE to suggest microhemorrhage.  3.  Chronic intracranial changes described above.  4.  Left sphenoid sinus small air-fluid level. Please correlate for acute sinusitis.     BMPRecent Labs   Lab 07/05/22  1129 07/05/22  0902 07/05/22  0829 07/04/22  2245 07/04/22  1841 07/04/22  1111 07/04/22  0744 07/03/22  1856 06/29/22  1108 06/29/22  1106   NA  --  140  --   --   --  140  --  136  --   --    POTASSIUM  --  3.7  --   --   --  3.8  --  3.6  --  3.7   CHLORIDE  --  110*  --   --   --  107  --  102  --   --    DAT  --  8.9  --   --   --  9.8  --  9.8  --   --    CO2  --  23  --   --   --  22  --  23  --   --    BUN  --  35*  --   --   --  30  --  23  --   --    CR  --  0.69  --   --   --  0.90  --  0.64*  --   --    * 138* 130* 161*   < > 123*   < > 168*   < >  --     < > = values in this interval not displayed.     CBC  Recent Labs   Lab  07/05/22  0902 07/04/22  1111 07/03/22  1856   WBC 11.6* 13.2* 12.0*   RBC 3.79* 4.22* 4.30*   HGB 11.6* 13.1* 13.2*   HCT 35.8* 40.5 41.6   MCV 95 96 97   MCH 30.6 31.0 30.7   MCHC 32.4 32.3 31.7   RDW 12.7 12.7 12.5    426 443     INRNo lab results found in last 7 days.  LFTs  Recent Labs   Lab 07/03/22  1856   ALKPHOS 65   AST 46*   ALT 51   BILITOTAL 0.6   PROTTOTAL 8.2   ALBUMIN 4.0      PANCNo lab results found in last 7 days.

## 2022-07-05 NOTE — PROVIDER NOTIFICATION
"Paged Dr. Varinder Juarez with stroke neuro \"FYI: head CT scan result is available for review. the result seems normal, please double check. Thanks\" paged again at [phone].   "

## 2022-07-05 NOTE — PLAN OF CARE
Goal Outcome Evaluation:    Reason for Admission: Multiple ischemic CVAs involving MCA, KWADWO with left-sided weakness     Code status: FC  Seizure or isolation precaution: N/A   Cognitive/Mentation: SEAN, pt is mainly nonverbal ad not following commands, only makes incomprehensible speech   Use of CPAP: No  Neuros/CMS: no new changes, neuro status is at baseline. see in flow sheet for details. Intact ex. Pt is lethargic, withdrawn, flat affect, LUE 0/5, LLE 3/5, R. Side 5/5   CIWA Protocol: NA  VS: stable   Tele: SR  GI: BS active x4, passing flatus, pt had medium soft BM on this shift. incontinent.  : voiding w/o difficulty. incontinent.  Use of fair, external catheter, or urinal: placed 16 Turkmen fair today. Received written order from hospitalist to place a fair due to urinary retention.   Pulmonary: LS clear   Pain: No pain   Critical Labs to Monitor: N/A  Electrolyte Replacement Protocol: N/A   Use of oxygen: N/A   Heparin or other drips: N/A     BG checks: q4h, on sliding scale insulin  Tests, MRI, CT, FILEMON, Echo: pt is currently having head CT, result pending.    Drains: NS infusing at 100ml/hr.   Skin: intact   Edema: No edema   Surgical site: N/A   Activity: Assist x 2 with lift  Diet: NPO, pt failed swallow eval by SPL. Hospitalist was notified that pt failed swallow eval by [facility].   Tube Feeding: N/A  Discharge: pending. TCU.

## 2022-07-05 NOTE — CONSULTS
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 Interval neuroaxis imaging concerning for acute/sub-acute large R MCA/KWADWO infarct.      Vitals unremarkable on presentation, labs most remarkable for tox screen +cocain, amphetamine.     Of note, left AMA on last hospitalization and did not pursue acute rehab as planned, and pt has hx of medication non-compliance, likely was not taking secondary stroke prevention medications.     INTERVAL HX:   - NAEON  -  MRI as below     Stroke Evaluation Summarized    MRI/Head CT MRI:  1. Large new acute infarct within the right posterior frontal lobe, right parietal lobe, right pre and postcentral gyrus, right insula, and right corpus callosum anterior body. This acute infarct is predominantly in the right MCA territory with small areas extending into the right KWADWO territory.  2.  No susceptibility on GRE to suggest microhemorrhage.    Head CT:  1.  Rounded focus of hypoattenuation within the right corona radiata//centrum semiovale, new since head CT June 24, 2022. On brain MRI June 25, 2022 there was restricted diffusion within this region, consistent with acute ischemia, however the region of   hypoattenuation and is larger than the region of diffusion restriction which is concerning for progression of infarct. Brain MRI could be used to better characterize.     Intracranial Vasculature HEAD CTA:  1.  Abrupt termination of the mid M1 segment of the right middle cerebral artery is a change  versus prior. This is favored to reflect acute thromboembolism. Stenosis progression since the previous exam is the other primary consideration.  2.  Relatively unchanged degree of filling of more distal right middle cerebral artery branches.  3.  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   Cervical Vasculature NECK CTA:  1.  Chronic occlusion of the proximal right internal carotid artery, unchanged versus prior.     Echocardiogram Normal L atrium size, EF 60-65%, no LVT   EKG/Telemetry Sinus tachycardia   Otherwise normal ECG   Other Testing Not Applicable     LDL  6/24/2022: 53 mg/dL   A1C  6/18/2022: 7.8 %   Troponin No lab value available in past 48 hrs       Impression  Subacute-acute large R MCA/KWADWO territory infarcts in setting of known chronic R cervical ICA occlusion, R M1 occlusion. Stroke etiology likely multifactorial - given chronic R cervical ICA occlusion cardioembolic is very unlikely. Stroke like more related to hemodynamic compromise of R MCA/KWADWO territories with chronic occlusions/stenosis in these vascular territories vs atherothrombotic progression of R anterior circulation vessels vs less likely artery to artery emboli of distal R M1 occlusion. He was reliant on ECA to R anterior circulation and leptomeningial collaterals to perfuse R hemisphere with described chronic arteriopathy - and as such, cocaine/amphetamine use, non-compliance with medication, uncontrolled diabetes, factors such as these can lead to hemodynamic compromise and large progressive stroke burden. His symptoms of L hemiparesis is referable to lesion.        Recommendations  - PT/OT/ST  - SBP goal 120-180 given occlusive arteriopathies, and concern for potential flow dependency   - Normoglycemia, normothermia, normonatremia  - If pt has worsening of L MCA/KWADWO symptoms would lay flat, bolus with 500-1000 ml of NS  "and see if this helps improves symptoms   - Rec NGT given failed swallow study per ST   -  mg ND (pt is NPO), can switch to  mg daily once he has nasogastric access  - Per [facility] plavix 75 mg daily, no load given stroke volume  - Atorvastatin 80 mg  - Long-term outpt goals: SBP < 120, LDL < 70, A1c < 7  - NCCT head for interval monitoring of cerebral edema/hemorrhagic conversion, CTA H/N to eval for interval changes to vasculature    Patient Follow-up    - final recommendation pending work-up    Thank you for this consult. We will continue to follow.     The Stroke Fellow is Dr. Juarez. The Stroke Staff is Dr. Estrada.    Harvinder Garcia  Medical Student  To page a member of the stroke/neurocritical care service, click here:  AMCOM   Choose \"On Call\" tab at top, then search dropdown box for \"Neurology Adult\", select location, press Enter, then look for stroke/neuro ICU/telestroke.    I Varinder Juarez PGY 5 Stroke Fellow revised and edited Medical Student note as needed and agree with plan as outlined.     _____________________________________________________    Clinically Significant Risk Factors Present on Admission                 Past Medical History   Past Medical History:   Diagnosis Date     CVA (cerebral infarction)      Hypertension      Past Surgical History   No past surgical history on file.  Medications   Home Meds  Prior to Admission medications    Medication Sig Start Date End Date Taking? Authorizing Provider   ALPRAZolam (XANAX) 1 MG tablet Take 1 mg by mouth nightly as needed for anxiety   Yes Unknown, Entered By History   aspirin (ASA) 325 MG tablet Take 1 tablet (325 mg) by mouth daily 2/7/22  Yes Israel Neal MD   atorvastatin (LIPITOR) 80 MG tablet Take 1 tablet (80 mg) by mouth daily 2/7/22  Yes Israel Neal MD   blood glucose (NO BRAND SPECIFIED) test strip Use to test blood sugar 1 times daily or as directed. 6/24/22  Yes Betsy Perkins PA-C   blood glucose " monitoring (NO BRAND SPECIFIED) meter device kit Use to test blood sugar 1 times daily, first thing in the morning. 6/24/22  Yes Betsy Perkins PA-C   cetirizine (ZYRTEC) 10 MG tablet Take 10 mg by mouth daily   Yes Unknown, Entered By History   chlorhexidine (HIBICLENS) 4 % liquid Apply topically daily as needed for wound care   Yes Unknown, Entered By History   gabapentin (NEURONTIN) 300 MG capsule Take 300 mg by mouth 3 times daily 5/27/21  Yes Unknown, Entered By History   insulin glargine (LANTUS PEN) 100 UNIT/ML pen Inject 15 Units Subcutaneous At Bedtime 6/24/22  Yes Betsy Perkins PA-C   lisinopril-hydrochlorothiazide (ZESTORETIC) 20-25 MG tablet Take 1 tablet by mouth daily 6/24/22  Yes Betsy Perkins PA-C   metFORMIN (GLUCOPHAGE) 1000 MG tablet Take 1,000 mg by mouth 2 times daily (with meals)   Yes Unknown, Entered By History   sodium chloride (OCEAN) 0.65 % nasal spray Spray 2 sprays in nostril every 2 hours as needed for congestion   Yes Unknown, Entered By History   Lancets 30G MISC 120 30 gauge lancets (substitute as needed) 6/24/22   Betsy Perkins PA-C   clopidogrel (PLAVIX) 75 MG tablet Take 1 tablet (75 mg) by mouth daily Take for 90 days, then stop 2/7/22 6/24/22  Israel Neal MD   hydrochlorothiazide (HYDRODIURIL) 25 MG tablet Take 25 mg by mouth daily  6/24/22  Unknown, Entered By History       Scheduled Meds    aspirin  300 mg Rectal Daily     atorvastatin  80 mg Oral Daily     cetirizine  10 mg Oral Daily     clopidogrel  75 mg Oral Daily     insulin aspart  1-10 Units Subcutaneous TID AC     insulin aspart  1-7 Units Subcutaneous At Bedtime     insulin aspart   Subcutaneous Daily with breakfast     insulin aspart   Subcutaneous Daily with lunch     insulin aspart   Subcutaneous Daily with supper     insulin glargine  10 Units Subcutaneous At Bedtime     [Held by provider] lisinopril-hydrochlorothiazide  1 tablet Oral Daily     mirtazapine  15 mg Orally  disintegrating tablet At Bedtime       Infusion Meds    sodium chloride 100 mL/hr at 07/05/22 0900       PRN Meds  acetaminophen **OR** acetaminophen, glucose **OR** dextrose **OR** glucagon, melatonin, ondansetron **OR** ondansetron, sodium chloride    Allergies   No Known Allergies  Family History   No family history on file.  Social History   Social History     Tobacco Use     Smoking status: Current Every Day Smoker     Packs/day: 0.50   Substance Use Topics     Alcohol use: No     Drug use: No       Review of Systems   The 10 point Review of Systems is negative other than noted in the HPI or here.        PHYSICAL EXAMINATION   Temp:  [98.1  F (36.7  C)-99.1  F (37.3  C)] 98.1  F (36.7  C)  Pulse:  [103-122] 119  Resp:  [18] 18  BP: (109-143)/(75-89) 143/89  SpO2:  [94 %-100 %] 100 %    Neurologic  Mental Status:  alert, oriented x 3, follows commands  Cranial Nerves:  PERRL  Motor:  strength 2/5 throughout L upper and lower extremities  Reflexes:  toes down-going  Sensory:  Diminished light touch sensation L upper and lower extremities  Coordination: Finger-to-nose R side without dysmetria  Station/Gait:  deferred    Dysphagia Screen  Per Nursing    Stroke Scales  Stroke scale not assessed due to patient non-compliance.    Imaging  I personally reviewed all imaging; relevant findings per HPI.    Labs Data   CBC  Recent Labs   Lab 07/05/22  0902 07/04/22  1111 07/03/22  1856   WBC 11.6* 13.2* 12.0*   RBC 3.79* 4.22* 4.30*   HGB 11.6* 13.1* 13.2*   HCT 35.8* 40.5 41.6    426 443     Basic Metabolic Panel   Recent Labs   Lab 07/05/22  1129 07/05/22  0902 07/05/22  0829 07/04/22  1841 07/04/22  1111 07/04/22  0744 07/03/22  1856   NA  --  140  --   --  140  --  136   POTASSIUM  --  3.7  --   --  3.8  --  3.6   CHLORIDE  --  110*  --   --  107  --  102   CO2  --  23  --   --  22  --  23   BUN  --  35*  --   --  30  --  23   CR  --  0.69  --   --  0.90  --  0.64*   * 138* 130*   < > 123*   < > 168*    DAT  --  8.9  --   --  9.8  --  9.8    < > = values in this interval not displayed.     Liver Panel  Recent Labs   Lab 07/03/22  1856   PROTTOTAL 8.2   ALBUMIN 4.0   BILITOTAL 0.6   ALKPHOS 65   AST 46*   ALT 51     INR    Recent Labs   Lab Test 06/24/22  1805 02/05/22  1919 04/18/21  1553   INR 1.08 0.97 1.05      Lipid Profile    Recent Labs   Lab Test 06/24/22  1805 03/20/22  0558 02/06/22  0617   CHOL 102 93 193   HDL 30* 34* 38*   LDL 53 46 126*   TRIG 95 66 144     A1C    Recent Labs   Lab Test 06/18/22  2150 03/20/22  0558 02/06/22  0617   A1C 7.8* 8.8* 10.5*     Troponin I    Recent Labs   Lab 07/03/22  1856   TROPONINIS 10

## 2022-07-05 NOTE — PROVIDER NOTIFICATION
"Paged Dr. Mariposa Ball \"pt was seen by SPL, he failed swallow study, SPL suggested for pt to be NPO. please change oral meds to IV. Thanks\"     Paged MD again at 1314 \"FYI: the 3rd time bladder scan was greater than 300ml, writer will insert Cortez catheter at this time. Thanks\"   "

## 2022-07-05 NOTE — PROVIDER NOTIFICATION
"Paged Dr. Jovi Mejia \"FYI: head CT scan result is available for review. the result seems normal, please double check. Thanks\"   "

## 2022-07-06 ENCOUNTER — APPOINTMENT (OUTPATIENT)
Dept: GENERAL RADIOLOGY | Facility: CLINIC | Age: 52
DRG: 065 | End: 2022-07-06
Attending: INTERNAL MEDICINE
Payer: COMMERCIAL

## 2022-07-06 ENCOUNTER — APPOINTMENT (OUTPATIENT)
Dept: OCCUPATIONAL THERAPY | Facility: CLINIC | Age: 52
DRG: 065 | End: 2022-07-06
Payer: COMMERCIAL

## 2022-07-06 ENCOUNTER — APPOINTMENT (OUTPATIENT)
Dept: SPEECH THERAPY | Facility: CLINIC | Age: 52
DRG: 065 | End: 2022-07-06
Payer: COMMERCIAL

## 2022-07-06 ENCOUNTER — APPOINTMENT (OUTPATIENT)
Dept: PHYSICAL THERAPY | Facility: CLINIC | Age: 52
DRG: 065 | End: 2022-07-06
Payer: COMMERCIAL

## 2022-07-06 LAB
GLUCOSE BLDC GLUCOMTR-MCNC: 108 MG/DL (ref 70–99)
GLUCOSE BLDC GLUCOMTR-MCNC: 118 MG/DL (ref 70–99)
GLUCOSE BLDC GLUCOMTR-MCNC: 124 MG/DL (ref 70–99)
GLUCOSE BLDC GLUCOMTR-MCNC: 136 MG/DL (ref 70–99)

## 2022-07-06 PROCEDURE — 255N000002 HC RX 255 OP 636: Performed by: RADIOLOGY

## 2022-07-06 PROCEDURE — 97530 THERAPEUTIC ACTIVITIES: CPT | Mod: GO

## 2022-07-06 PROCEDURE — 258N000003 HC RX IP 258 OP 636: Performed by: PHYSICIAN ASSISTANT

## 2022-07-06 PROCEDURE — 97530 THERAPEUTIC ACTIVITIES: CPT | Mod: GP

## 2022-07-06 PROCEDURE — 250N000009 HC RX 250: Performed by: INTERNAL MEDICINE

## 2022-07-06 PROCEDURE — 74340 X-RAY GUIDE FOR GI TUBE: CPT

## 2022-07-06 PROCEDURE — 97535 SELF CARE MNGMENT TRAINING: CPT | Mod: GO

## 2022-07-06 PROCEDURE — 92526 ORAL FUNCTION THERAPY: CPT | Mod: GN

## 2022-07-06 PROCEDURE — 0DH97UZ INSERTION OF FEEDING DEVICE INTO DUODENUM, VIA NATURAL OR ARTIFICIAL OPENING: ICD-10-PCS | Performed by: RADIOLOGY

## 2022-07-06 PROCEDURE — 250N000013 HC RX MED GY IP 250 OP 250 PS 637: Performed by: INTERNAL MEDICINE

## 2022-07-06 PROCEDURE — 99233 SBSQ HOSP IP/OBS HIGH 50: CPT | Performed by: INTERNAL MEDICINE

## 2022-07-06 PROCEDURE — 99233 SBSQ HOSP IP/OBS HIGH 50: CPT | Mod: GC | Performed by: STUDENT IN AN ORGANIZED HEALTH CARE EDUCATION/TRAINING PROGRAM

## 2022-07-06 PROCEDURE — 120N000001 HC R&B MED SURG/OB

## 2022-07-06 RX ORDER — ASPIRIN 300 MG/1
300 SUPPOSITORY RECTAL DAILY
Status: DISCONTINUED | OUTPATIENT
Start: 2022-07-06 | End: 2022-08-02 | Stop reason: HOSPADM

## 2022-07-06 RX ORDER — IOPAMIDOL 612 MG/ML
100 INJECTION, SOLUTION INTRATHECAL ONCE
Status: DISCONTINUED | OUTPATIENT
Start: 2022-07-06 | End: 2022-07-06 | Stop reason: CLARIF

## 2022-07-06 RX ORDER — DEXTROSE MONOHYDRATE 100 MG/ML
INJECTION, SOLUTION INTRAVENOUS CONTINUOUS PRN
Status: DISCONTINUED | OUTPATIENT
Start: 2022-07-06 | End: 2022-08-02 | Stop reason: HOSPADM

## 2022-07-06 RX ORDER — ASPIRIN 325 MG
325 TABLET ORAL DAILY
Status: DISCONTINUED | OUTPATIENT
Start: 2022-07-06 | End: 2022-08-02 | Stop reason: HOSPADM

## 2022-07-06 RX ORDER — LIDOCAINE HYDROCHLORIDE 20 MG/ML
10 JELLY TOPICAL ONCE
Status: COMPLETED | OUTPATIENT
Start: 2022-07-06 | End: 2022-07-06

## 2022-07-06 RX ORDER — CLOPIDOGREL BISULFATE 75 MG/1
75 TABLET ORAL DAILY
Status: DISCONTINUED | OUTPATIENT
Start: 2022-07-06 | End: 2022-08-02 | Stop reason: HOSPADM

## 2022-07-06 RX ORDER — LIDOCAINE 40 MG/G
CREAM TOPICAL
Status: DISCONTINUED | OUTPATIENT
Start: 2022-07-06 | End: 2022-08-02 | Stop reason: HOSPADM

## 2022-07-06 RX ORDER — CETIRIZINE HYDROCHLORIDE 5 MG/1
10 TABLET ORAL DAILY
Status: DISCONTINUED | OUTPATIENT
Start: 2022-07-06 | End: 2022-08-02 | Stop reason: HOSPADM

## 2022-07-06 RX ORDER — ATORVASTATIN CALCIUM 80 MG/1
80 TABLET, FILM COATED ORAL DAILY
Status: DISCONTINUED | OUTPATIENT
Start: 2022-07-06 | End: 2022-08-02 | Stop reason: HOSPADM

## 2022-07-06 RX ORDER — ASPIRIN 81 MG/1
325 TABLET, CHEWABLE ORAL DAILY
Status: DISCONTINUED | OUTPATIENT
Start: 2022-07-06 | End: 2022-07-06 | Stop reason: ALTCHOICE

## 2022-07-06 RX ADMIN — IOHEXOL 10 ML: 300 INJECTION, SOLUTION INTRAVENOUS at 11:57

## 2022-07-06 RX ADMIN — SODIUM CHLORIDE: 9 INJECTION, SOLUTION INTRAVENOUS at 10:15

## 2022-07-06 RX ADMIN — ASPIRIN 300 MG: 300 SUPPOSITORY RECTAL at 16:41

## 2022-07-06 RX ADMIN — LIDOCAINE HYDROCHLORIDE 6 ML: 20 JELLY TOPICAL at 11:56

## 2022-07-06 RX ADMIN — LIDOCAINE HYDROCHLORIDE 6 ML: 20 JELLY TOPICAL at 12:07

## 2022-07-06 RX ADMIN — MIRTAZAPINE 15 MG: 15 TABLET, ORALLY DISINTEGRATING ORAL at 21:32

## 2022-07-06 RX ADMIN — SODIUM CHLORIDE: 9 INJECTION, SOLUTION INTRAVENOUS at 20:09

## 2022-07-06 ASSESSMENT — ACTIVITIES OF DAILY LIVING (ADL)
ADLS_ACUITY_SCORE: 47
ADLS_ACUITY_SCORE: 49
ADLS_ACUITY_SCORE: 47
ADLS_ACUITY_SCORE: 47
ADLS_ACUITY_SCORE: 49

## 2022-07-06 NOTE — PROGRESS NOTES
[facility]    Stroke Progress Note    Interval Events- VSS, SBP trending 140-150s.  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 Interval neuro imaging concerning for acute/sub-acute large R MCA/KWADWO infarct.       Vitals unremarkable on presentation, labs most remarkable for tox screen +cocaine, amphetamine.      Of note, left AMA on last hospitalization and did not pursue acute rehab as planned, and pt has hx of medication non-compliance, likely was not taking secondary stroke prevention medications.      Patient continues to request water but remains aphasic. Currently under observation awaiting discharge to TCU.     Stroke Evaluation Summarized     MRI/Head CT MRI 7/5/22:   1. Large acute infarct within the right posterior frontal lobe, right parietal lobe, right pre and postcentral gyrus, right insula, and right corpus callosum anterior body. This acute infarct is predominantly in the right MCA territory with small areas extending into the right KWADWO territory.  2.  No susceptibility on GRE to suggest microhemorrhage.     Head CT 7/5/22:  1.  Rounded focus of hypoattenuation within the right corona radiata//centrum semiovale, new since head CT June 24, 2022. On brain MRI June 25, 2022 there was restricted diffusion within this region, consistent with acute ischemia, however the region of   hypoattenuation and is larger than the region of diffusion restriction which is concerning for progression of infarct. Brain MRI  could be used to better characterize.     Intracranial Vasculature HEAD CTA:  1.  Abrupt termination of the mid M1 segment of the right middle cerebral artery is a change versus prior. This is favored to reflect acute thromboembolism. Stenosis progression since the previous exam is the other primary consideration.  2.  Relatively unchanged degree of filling of more distal right middle cerebral artery branches.  3.  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   Cervical Vasculature NECK CTA:  1.  Chronic occlusion of the proximal right internal carotid artery, unchanged versus prior.      Echocardiogram Normal L atrium size, EF 60-65%   EKG/Telemetry Sinus tachycardia   Otherwise normal ECG   Other Testing Not Applicable      LDL  6/24/2022: 53 mg/dL   A1C  6/18/2022: 7.8 %   Troponin No lab value available in past 48 hrs     Impression   Subacute-acute large R MCA/KWADWO territory infarcts in setting of known chronic R cervical ICA occlusion, R M1 occlusion. Stroke etiology likely multifactorial - given chronic R cervical ICA occlusion cardioembolic is very unlikely. Stroke like more related to hemodynamic compromise of R MCA/KWADWO territories with chronic occlusions/stenosis in these vascular territories vs atherothrombotic progression of R anterior circulation vessels vs less likely artery to artery emboli of distal R M1 occlusion. He was reliant on ECA to R anterior circulation and leptomeningial collaterals to perfuse R hemisphere with described chronic arteriopathy - and as such, cocaine/amphetamine use, non-compliance with medication, uncontrolled diabetes, factors such as these can lead to hemodynamic compromise and progression of stroke burden.       Repeat CT/CTA 7/5 consistent with prior findings.     Plan  - Continue PT/OT/SLP  - SBP goal 120-160 to allow adequate cerebral perfusion  -  "Normoglycemia, normothermia, normonatremia  - No indication for repeat echocardiogram. Low suspicion for cardio embolic etiology as vessel was chronically occluded  - If pt has worsening of L MCA/KWADWO symptoms would lay flat, bolus with 500-1000 ml of NS and see if this helps improve symptoms   - Recommend NG placement so patient can received DAPT   - DAPT with ASA 81 mg daily and Plavix 75 mg daily   - If no access if obtained, recommend daily  mg suppository   - Atorvastatin 80 mg  - Long-term outpt goals: SBP < 120, LDL < 70, A1c < 7    Patient Follow-up    - Discharge TCU when appropriate  - Will need to follow up with Neurology 4-6 weeks after discharge    We will continue to follow.    The patient was seen and discussed with Stroke Staff Dr. Estrada.    Harvinder Garcia  Medical Student  To page a member of the stroke/neurocritical care service, click here:  AMCOM   Choose \"On Call\" tab at top, then search dropdown box for \"Neurology Adult\", select location, press Enter, then look for stroke/neuro ICU/telestroke.    Resident/Fellow Attestation   I, Jacquie Swift MD, was present with the medical student who participated in the service and in the documentation of the note.  I have verified the history and personally performed the physical exam and medical decision making.  I agree with the assessment and plan of care as documented in the note.      Jacquie Swift MD  PGY3  Date of Service (when I saw the patient): 07/06/22      ______________________________________________________    Clinically Significant Risk Factors Present on Admission                 Medications   Scheduled Meds    aspirin  300 mg Rectal Daily     atorvastatin  80 mg Oral Daily     cetirizine  10 mg Oral Daily     clopidogrel  75 mg Oral Daily     insulin aspart  1-10 Units Subcutaneous TID AC     insulin aspart  1-7 Units Subcutaneous At Bedtime     insulin aspart   Subcutaneous Daily with breakfast     insulin aspart   Subcutaneous " Daily with lunch     insulin aspart   Subcutaneous Daily with supper     insulin glargine  10 Units Subcutaneous At Bedtime     [Held by provider] lisinopril-hydrochlorothiazide  1 tablet Oral Daily     mirtazapine  15 mg Orally disintegrating tablet At Bedtime       Infusion Meds    sodium chloride 100 mL/hr at 07/05/22 0900       PRN Meds  acetaminophen **OR** acetaminophen, glucose **OR** dextrose **OR** glucagon, melatonin, ondansetron **OR** ondansetron, sodium chloride       PHYSICAL EXAMINATION  Temp:  [98.1  F (36.7  C)-98.9  F (37.2  C)] 98.9  F (37.2  C)  Pulse:  [] 120  Resp:  [18-20] 20  BP: (134-159)/(77-92) 134/91  SpO2:  [95 %-100 %] 97 %      Neurologic  Mental Status:  no verbal output, follows commands intermittently  Cranial Nerves:  PERRLA, right gaze preference, tracks to midline occasionally, L facial droop  Motor:  strength 2/5 throughout L upper and lower extremities  Reflexes:  toes down-going  Sensory:  Diminished light touch sensation L upper and lower extremities  Coordination: Finger-to-nose R side without dysmetria  Station/Gait:  deferred    Stroke Scales   Stroke scale not assessed due to patient non-compliance.      Imaging  I personally reviewed all imaging; relevant findings per HPI.     Lab Results Data   CBC  Recent Labs   Lab 07/05/22  0902 07/04/22  1111 07/03/22  1856   WBC 11.6* 13.2* 12.0*   RBC 3.79* 4.22* 4.30*   HGB 11.6* 13.1* 13.2*   HCT 35.8* 40.5 41.6    426 443     Basic Metabolic Panel    Recent Labs   Lab 07/05/22  2141 07/05/22  1554 07/05/22  1129 07/05/22  0902 07/04/22  1841 07/04/22  1111 07/04/22  0744 07/03/22  1856   NA  --   --   --  140  --  140  --  136   POTASSIUM  --   --   --  3.7  --  3.8  --  3.6   CHLORIDE  --   --   --  110*  --  107  --  102   CO2  --   --   --  23  --  22  --  23   BUN  --   --   --  35*  --  30  --  23   CR  --   --   --  0.69  --  0.90  --  0.64*   * 158* 147* 138*   < > 123*   < > 168*   DAT  --   --   --   8.9  --  9.8  --  9.8    < > = values in this interval not displayed.     Liver Panel  Recent Labs   Lab 07/03/22  1856   PROTTOTAL 8.2   ALBUMIN 4.0   BILITOTAL 0.6   ALKPHOS 65   AST 46*   ALT 51     INR    Recent Labs   Lab Test 06/24/22  1805 02/05/22  1919 04/18/21  1553   INR 1.08 0.97 1.05      Lipid Profile    Recent Labs   Lab Test 06/24/22  1805 03/20/22  0558 02/06/22  0617   CHOL 102 93 193   HDL 30* 34* 38*   LDL 53 46 126*   TRIG 95 66 144     A1C    Recent Labs   Lab Test 06/18/22  2150 03/20/22  0558 02/06/22  0617   A1C 7.8* 8.8* 10.5*     Troponin I    Recent Labs   Lab 07/03/22  1856   TROPONINIS 10

## 2022-07-06 NOTE — PLAN OF CARE
"[id number]: Pt here with multiple ischemic CVAs involving MCA, KWADWO. Intermittently alert throughout shift. Able to follow most commands with good strength to right side of body. LUE absent, no withdrawal from pain. LLE moderately impaired with strength 2-3/5. Withdraws from pain. Incomprehensible sounds when attempting to speak. Able to gesture appropriately and utilized thumbs up for \"yes\" and thumbs down for no\"; able to answer yes/no questions with RN this way. Up in chair at start of shift and assisted into bed with mechanical lift/assist x2. Cortez patent. SLP recommending NPO at this time. Sticky note left for provider. Discharge plan pending, therapies recommending TCU. Referrals have been sent and SW following.  "

## 2022-07-06 NOTE — PROGRESS NOTES
Care Management Follow Up    Length of Stay (days): 2    Expected Discharge Date: 07/08/2022     Concerns to be Addressed:       Patient plan of care discussed at interdisciplinary rounds: Yes    Anticipated Discharge Disposition:       Anticipated Discharge Services:    Anticipated Discharge DME:      Patient/family educated on Medicare website which has current facility and service quality ratings:    Education Provided on the Discharge Plan:    Patient/Family in Agreement with the Plan:      Referrals Placed by CM/SW:    Private pay costs discussed: Not applicable    Additional Information:  Writer followed up on TCU referrals -   [facility]- reviewing referral  [facility] - [facility] mail  [facility] - declined    Will continue to follow.    ZAKIA Real, LGSW    [facility]

## 2022-07-06 NOTE — PROGRESS NOTES
[facility]    Medicine Progress Note - Hospitalist Service    Date of Admission:  7/3/2022    Assessment & Plan        Josue Parisi is 52, who has had multiple strokes with some significant compliance issues, ongoing polysubstance abuse, being admitted under observation status for placement at a TCU, as he is unable to care for himself.     Multiple ischemic CVAs involving MCA, KWADWO with left-sided weakness and concern for progression  Medication noncompliance  Polysubstance abuse  Initial CVA occurred in 03/2022 with right KWADWO, large territory defect and residual left-sided weakness recommended to discharge to ARU however declined and discharged AMA. Subsequently returned 6/18 with progressive weakness, again referred to TCU however declined and returned home. Hospitalized 6/24 with worsening left-sided weakness, paresthesias, numbness with findings of new nonhemorrhagic infarction within right prefrontal gyrus and deep MCA watershed infarct within the right centrum semiovale of the right frontal and parietal lobes. Patient was evaluated by stroke neurology each admission, most recently felt symptoms were suggestive of new infarct rather than hypoperfused tissue. Recommended liberal SBP goal of 140-160 and DAPT with plavix/ for 90 days. Patient most recently left AMA on 6/30 after being recommended for TCU. At time of discharge, it appears the plavix was discontinued in an effort to limit medications and encourage compliance.   * Returned to ED 7/3/22 with reports of failing at home. It was unclear on admission if patient's left-sided symptoms had persisted or worsened due to presence of polysubstances and limited ability to participate in physical exam. Admitted to cocaine use as recently as 7/2. UTox on admission positive for cocaine, amphetamines. Admitted under observation care for presumed placement.  * 7/4 exam noted with 1/5 LUE/LLE strength, limited ability to  communicate verbally but cognitively able to answer yes/no questions with gestures, shakes of head. RNs present at bedside had cared for patient week prior and reported change in function. Repeat CTH revealed rounded focus of hypoattenuation within right corona radiata/centrum semiovale, MRI brain 6/25 indicated restricted diffusion within this region however area of hypoattenuation is larger than region of diffusion restriction concerning for progression of infarct.  - Cardiac monitoring  - Hold PTA antihypertensive to encourage -160  - A/W Stroke neurology consult. ? FILEMON.   -Continue DAPT with plavix/ as prescribed during last admission, pending further neurology review.  ?need more testing  - Continue statin  - PT/OT consults  - Reconsult speech therapy -appreciate recommendations.- NPO . Will get NJ tube    SIRS syndrome  Pt with mild leukocytosis 12-13, procalcitonin on admission negative. Low-grade fever and sinus tachycardia persisting today. UA without evidence of infection. CXR neg. ?due to polysubstance abuse with amphetamines, cocaine in UTox.  - Monitor on tele  - Monitor fever curve  - PRN tylenol for fever  - Repeat CBC trending down   - Low threshold to obtain blood cultures, initiate broad-spectrum abx      Type 2 diabetes mellitus.    Last HbA1c of 7.8 on 06/18. PTA regimen of metformin. Recent admission Lantus had been increased to 18u BID, carb coverage with meals of 1:10 with breakfast/lunch, 1:15 with dinner.  - Hold metformin  Recent Labs   Lab 07/06/22  1229 07/06/22  1000 07/05/22  2141 07/05/22  1554 07/05/22  1129 07/05/22  0902   * 124* 151* 158* 147* 138*     - 7/6; Decreased Lantus to 6u at bedtime  -Change to every 4 hours sliding coverage     History of hypertension   Hold PTA meds to allow permissive hypertension  See above, goal -160 mmHg     Hidradenitis / Rt axillary wound  WOCN consult previous recs:   Right axilla wound: Daily  and PRN   Cleanse wound  "with NS or wound cleanser (omit this step if patient cleans wound in shower)  Dry and protect surrounding skin with no sting barrier film wipe #[id number]  Apply a small amount of iodesorb gel #[id number] to bandaid  If Band-Aid needs to be changed more than once a day. Apply iodesorb gel #[id number] to adaptic #[id number] and cover with Mepilex #[id number]     Generalized pain: PTA on gabapentin 300 mg 3 times daily.    - Holding gabapentin given lethargy, resume when more wakeful  - Continue as needed Tylenol as needed. Avoid opioids      Diet: NPO for Medical/Clinical Reasons Except for: Meds, Ice Chips    DVT Prophylaxis: Pneumatic Compression Devices  Cortez Catheter: PRESENT, indication: Retention  Central Lines: None  Cardiac Monitoring: ACTIVE order. Indication: Stroke, acute (48 hours)  Code Status: Full Code      Disposition Plan      Expected Discharge Date: 07/08/2022        Discharge Comments: Patient non-compliant. Placement will be difficult        The patient's care was discussed with the Bedside Nurse and Patient.    Quinn Monge MD, MD  Hospitalist Service  [facility]  Securely message with the Vocera Web Console (learn more here)  Text page via Screen Paging/Directory         Clinically Significant Risk Factors Present on Admission                # Hypertension: home medication list includes antihypertensive(s)  # Overweight: Estimated body mass index is 25.36 kg/m  as calculated from the following:    Height as of 6/24/22: 1.676 m (5' 6\").    Weight as of 6/30/22: 71.3 kg (157 lb 1.6 oz).        ______________________________________________________________________    Interval History     Last 24-hour events noted.  No specific new complaints.  Continues to remain aphasic and requesting water     4 point ROS done and negative unless mentioned    Data reviewed today: I reviewed all medications, new labs and imaging results over the last 24 hours. I personally reviewed the brain MRI image(s) " showing as below.    Physical Exam   BP (!) 156/104 (BP Location: Left arm)   Pulse 110   Temp 98.6  F (37  C) (Axillary)   Resp 20   SpO2 97%   Gen- pleasant  lying in bed  Neck- supple, no JVD elevation, no thyromegaly  CVS- I+II+ no m/r/g  RS- CTAB  Abdo- soft, no tenderness . No g/r/r   Ext- no edema  CNS-expressive aphasia  Left-sided weakness.  Power 1-2/5    Data   Recent Results (from the past 24 hour(s))   CT Head w/o Contrast    Narrative    EXAM: CT HEAD W/O CONTRAST  LOCATION: [facility]  DATE/TIME: 7/5/2022 5:17 PM    INDICATION: Acute/subacute infarction right cerebral hemisphere  COMPARISON: MRI head 07/04/2022  TECHNIQUE: Routine CT Head without IV contrast. Multiplanar reformats. Dose reduction techniques were used.    FINDINGS:  INTRACRANIAL CONTENTS: Moderate to large hypodensity centered in the right frontoparietal region, corresponding to restricted diffusion on prior MRI and consistent with acute/subacute infarction. No definite associated space occupying parenchymal   hemorrhage. Mild associated mass effect without significant midline shift. Mild chronic encephalomalacia parasagittal left frontal lobe anteriorly. Normal parenchymal attenuation. Normal ventricles and sulci.     VISUALIZED ORBITS/SINUSES/MASTOIDS: No intraorbital abnormality. Mild mucosal thickening left maxillary sinus. No middle ear or mastoid effusion.    BONES/SOFT TISSUES: No acute abnormality.      Impression    IMPRESSION:  1.  Moderate to large hypodensity centered in the right frontoparietal region, corresponding to restricted diffusion on prior MRI and consistent with acute/subacute infarction.   2.  No definite associated space occupying parenchymal hemorrhage.   3.  Mild associated mass effect without significant midline shift.   CTA Head Neck with Contrast    Narrative    EXAM: CTA HEAD NECK W CONTRAST  LOCATION: [facility]  DATE/TIME: 7/5/2022 5:18  PM    INDICATION: Acute infarction; known R cervical ICA R M1 tandem occlusions  COMPARISON: 06/24/2022  CONTRAST: 75 mL Isovue 370  TECHNIQUE: Head and neck CT angiogram with IV contrast. Axial helical CT images of the head and neck vessels obtained during the arterial phase of intravenous contrast administration. Axial 2D reconstructed images and multiplanar 3D MIP reconstructed   images of the head and neck vessels were performed by the technologist. Dose reduction techniques were used. All stenosis measurements made according to NASCET criteria unless otherwise specified.    FINDINGS:   HEAD CTA:  ANTERIOR CIRCULATION: Redemonstration of marked narrowing to occlusion the midportion of right M1, similar to prior. Mild asymmetry in the appearance of MCA distributions with mildly less opacified branches on the right, similar to prior. Mild   atherosclerotic changes cavernous and supraclinoid ICA on the left. Mildly attenuated opacification of anterior cerebral artery distribution, similar to prior. Well developed right posterior cerebral artery.    POSTERIOR CIRCULATION: No stenosis/occlusion, aneurysm, or high flow vascular malformation. Dominant left and smaller right vertebral artery contribute to a normal basilar artery.     DURAL VENOUS SINUSES: Expected enhancement of the major dural venous sinuses.    NECK CTA:  RIGHT CAROTID: Redemonstration of occlusion of right ICA in its proximal aspect.    LEFT CAROTID: No measurable stenosis or dissection.    VERTEBRAL ARTERIES: No focal stenosis or dissection. Dominant left and smaller right vertebral arteries.    AORTIC ARCH: Classic aortic arch anatomy with no significant stenosis at the origin of the great vessels.    NONVASCULAR STRUCTURES: Unremarkable.      Impression    IMPRESSION:     HEAD CTA:   1.  Redemonstration of marked narrowing to occlusion the midportion of right M1, similar to prior.   2.  Mild asymmetry in the appearance of MCA distributions with  mildly less opacified branches on the right, similar to prior.   3.  Mild atherosclerotic changes cavernous and supraclinoid ICA on the left.   4.  Mildly attenuated opacification of anterior cerebral artery distribution, similar to prior.  5.  Remaining intracranial circulation appears normal.    NECK CTA:  1.  Redemonstration of occlusion of right ICA in its proximal aspect.  2.  No hemodynamically significant narrowing of the left ICA.  3.  Normal vertebral arteries.     BMP  Recent Labs   Lab 07/06/22  1229 07/06/22  1000 07/05/22  2141 07/05/22  1554 07/05/22  1129 07/05/22  0902 07/04/22  1841 07/04/22  1111 07/04/22  0744 07/03/22  1856   NA  --   --   --   --   --  140  --  140  --  136   POTASSIUM  --   --   --   --   --  3.7  --  3.8  --  3.6   CHLORIDE  --   --   --   --   --  110*  --  107  --  102   DAT  --   --   --   --   --  8.9  --  9.8  --  9.8   CO2  --   --   --   --   --  23  --  22  --  23   BUN  --   --   --   --   --  35*  --  30  --  23   CR  --   --   --   --   --  0.69  --  0.90  --  0.64*   * 124* 151* 158*   < > 138*   < > 123*   < > 168*    < > = values in this interval not displayed.     CBC  Recent Labs   Lab 07/05/22  0902 07/04/22  1111 07/03/22  1856   WBC 11.6* 13.2* 12.0*   RBC 3.79* 4.22* 4.30*   HGB 11.6* 13.1* 13.2*   HCT 35.8* 40.5 41.6   MCV 95 96 97   MCH 30.6 31.0 30.7   MCHC 32.4 32.3 31.7   RDW 12.7 12.7 12.5    426 443     INRNo lab results found in last 7 days.  LFTs  Recent Labs   Lab 07/03/22  1856   ALKPHOS 65   AST 46*   ALT 51   BILITOTAL 0.6   PROTTOTAL 8.2   ALBUMIN 4.0      PANCNo lab results found in last 7 days.

## 2022-07-06 NOTE — PROVIDER NOTIFICATION
"MD Notification    Notified Person: MD    Notified Person Name:  Monge    Notification Date/Time: 7/6/2022 1:15 PM    Notification Interaction: Vocera Messaging    Purpose of Notification:    Pt pulled out his NG tube. Can you order another? Might not get placed today per xray. Will get a sitter when it's replaced. Can you have the aspirin be a linked order with a suppository?    Orders Received: Xray feeding tube placement.      Addendum 1400: Repaged Mariposa    \"since pt pulled out NG and pt understands yes/no but difficult to assess full comprehension d/t aphasia x-ray wants to ensure pt wants NG (They don't want to place if he doesn't want it). Attempted to talk to him about it which he had no real reaction, then shortly there after pointing to his stomach and mouth like he wanted to eat. Do you want to try to explain to him again?\"     Addendum 2 0480: Messaged Mariposa  \"Addressed it with the pt. He is giving the thumbs down and flipping us off.\"    "

## 2022-07-06 NOTE — CONSULTS
"CLINICAL NUTRITION SERVICES  -  ASSESSMENT NOTE      Recommendations Ordered by Registered Dietitian (RD):     Nutrition Support Enteral:  Type of Feeding Tube: to be placed  Enteral Frequency:  Continuous  Enteral Regimen: Jevity 1.5 @ 60 mL/hr (goal rate)  Total Enteral Provisions: 2160 cals (30 cals/kg), 92 gm pro (1.3 gm/kg), 30 gm fiber, 1094 mL free water  Free Water Flush: 60 mL every 4 hrs (for tube patency while on IVF)    Would start TF at 20 mL/hr.  Increase by 20 mL every 24 hrs to goal rate     Malnutrition:   % Weight Loss:  Unable to assess - no current wt available  % Intake:  </= 50% for >/= 5 days (severe malnutrition)  Subcutaneous Fat Loss:  None observed  Muscle Loss:  Clavicle bone region - mild depletion, prominent cheekbones  Fluid Retention:  None noted    Malnutrition Diagnosis: Suspect Moderate malnutrition  In Context of:  Acute illness or injury on Chronic illness or disease        REASON FOR ASSESSMENT  Josue Parisi is a 52 year old male seen by Registered Dietitian for Provider Order - Registered Dietitian to Assess and Order TF per Medical Nutrition Therapy Protocol      NUTRITION HISTORY  Chart reviewed  Pt came in by ambulance essentially because he is unable to care for himself at home.    He was recently admitted to the hospital for a CVA, and he left AMA (6/30) rather than going to a rehab facility     Recent admit - RD Assess 6/27/22:  Met with patient briefly this morning.  States that once he starting having strokes, he began to eat better (limiting fast food, salt and carbohydrates and now cooking from home) which in turn kept his weight stable (or with a few pounds lost intentionally) instead of increasing   Reports being ~150 lbs for \"a while\"     -continues to smoke  -admits to using cocaine either Friday(7/1) or Saturday (7/2)    Pt with aphasia - unable to obtain diet hx      CURRENT NUTRITION ORDERS  Diet Order:     NPO    7/5: SLP ---> Pt presents with severe " "oral dysphagia and inability to manipulate oral bolus to initiate a swallow. Not appropriate for po at this time    7/6: Pt had FT placed - pulled this afternoon    Stopped by to see pt this afternoon  Motioning continuously that he wants something to drink  Mentioned the FT and pt flipped me off        NUTRITION FOCUSED PHYSICAL ASSESSMENT FOR DIAGNOSING MALNUTRITION)  Yes             Observed:    Muscle wasting (refer to documentation in Malnutrition section) and Mouth very dry    Obtained from Chart/Interdisciplinary Team:  Hx CVA - Paralysis to his left arm and left leg.    ANTHROPOMETRICS  Height: 5'6\"  Weight: no wt taken this admit  IBW: 64.5 kg  Weight History:   Wt Readings from Last 10 Encounters:   06/30/22 71.3 kg (157 lb 1.6 oz)   06/19/22 66.6 kg (146 lb 12.8 oz)   03/21/22 68.8 kg (151 lb 9.6 oz)   02/06/22 71.8 kg (158 lb 6.4 oz)   01/31/12 81.6 kg (180 lb)         LABS  Labs reviewed    MEDICATIONS  IVF  @100 mL/hr      ASSESSED NUTRITION NEEDS PER APPROVED PRACTICE GUIDELINES:    Dosing Weight (6/30) 71.3 kg  Estimated Energy Needs: 2065-4946 kcals (25-30 Kcal/Kg)  Justification: maintenance  Estimated Protein Needs:  grams protein (1.2-1.5 g pro/Kg)  Justification: preservation of lean body mass  Estimated Fluid Needs: 6880-2401  mL (1 mL/Kcal)  Justification: maintenance    MALNUTRITION:  % Weight Loss:  Unable to assess - no current wt available  % Intake:  </= 50% for >/= 5 days (severe malnutrition)  Subcutaneous Fat Loss:  None observed  Muscle Loss:  Clavicle bone region - mild depletion, prominent cheekbones  Fluid Retention:  None noted    Malnutrition Diagnosis: Suspect Moderate malnutrition  In Context of:  Acute illness or injury on Chronic illness or disease    NUTRITION DIAGNOSIS:  Inadequate protein-energy intake related to NPO status as evidenced by pt not meeting nutrition needs      NUTRITION INTERVENTIONS  Recommendations / Nutrition Prescription  NPO (per SLP)    Nutrition " Support Enteral:  Type of Feeding Tube: to be placed  Enteral Frequency:  Continuous  Enteral Regimen: Jevity 1.5 @ 60 mL/hr (goal rate)  Total Enteral Provisions: 2160 cals (30 cals/kg), 92 gm pro (1.3 gm/kg), 30 gm fiber, 1094 mL free water  Free Water Flush: 60 mL every 4 hrs (for tube patency while on IVF)    Would start TF at 20 mL/hr.  Increase by 20 mL every 24 hrs to goal rate  .      Implementation  Nutrition education: Not appropriate at this time due to patient condition  EN Composition and EN Schedule: orders entered in Epic  Ordered a current wt (no wt taken this admit)  .      Nutrition Goals  Pt to receive nutrition in the next 48-72 hrs (po vs EN)  .      MONITORING AND EVALUATION:  Progress towards goals will be monitored and evaluated per protocol and Practice Guidelines

## 2022-07-06 NOTE — PLAN OF CARE
Pt here with multiple strokes. SEAN orientation as pt is lethargic, not following commands, arouses to voice. Pt sometimes will give thumbs up/down for yes/no questions. Neuros are L sided weakness. VSS. Tele sinus rhythm. NPO per speech. Pt scoring green on the Aggression Stop Light Tool. Plan is be seen to therapies. Discharge pending.

## 2022-07-06 NOTE — PLAN OF CARE
Goal Outcome Evaluation:    Plan of Care Reviewed With: patient     Overall Patient Progress: no change       Reason for Admission: CVA    Cognitive/Mentation: Ox 4 and lethargic. Frustrated, agitated, flipping off staff and swatting at staff while trying to perform cares and assessments intermittently.  Neuros/CMS: Intact ex L asymmetry (difficult to assess d/t not following commands), LUE hemiplegia, LLE hemiparesis, decreased sensation on L and incomprehensible sounds  VS: Elevated blood pressure and tachycardia.   Tele: ST.  GI: BS Active, + flatus, last BM unknown. Incontinent.  : Retention Cortez.   Pulmonary: LS clear.  Pain: none.     Drains/Lines:  ml/hr  Skin: Intact  Activity: Assist x 2 with lift.  Diet: NPO. Removed NG tube.     Therapies recs: TCU  Discharge: Pending medical clearance    Aggression Stoplight Tool: RED    End of shift summary: Pulled out NG. Reassess tomorrow, pt appears non-agreeable for placement.

## 2022-07-06 NOTE — PROVIDER NOTIFICATION
MD Notification    Notified Person: MD    Notified Person Name: Dr. Monge    Notification Date/Time: 7/6/2022 12:41 PM    Notification Interaction: Locappy Messaging    Purpose of Notification:    Can you order Aspirin chewable/microcoated now that pt has NG placed. Do you want insulin Q4h instead of with meals? Pt has acute/subacute stroke, do you want NIHSS ordered? [phone] SG Dodge    Orders Received:    Comments:

## 2022-07-07 ENCOUNTER — APPOINTMENT (OUTPATIENT)
Dept: GENERAL RADIOLOGY | Facility: CLINIC | Age: 52
DRG: 065 | End: 2022-07-07
Attending: INTERNAL MEDICINE
Payer: COMMERCIAL

## 2022-07-07 LAB
GLUCOSE BLDC GLUCOMTR-MCNC: 100 MG/DL (ref 70–99)
GLUCOSE BLDC GLUCOMTR-MCNC: 110 MG/DL (ref 70–99)
GLUCOSE BLDC GLUCOMTR-MCNC: 117 MG/DL (ref 70–99)
GLUCOSE BLDC GLUCOMTR-MCNC: 87 MG/DL (ref 70–99)
GLUCOSE BLDC GLUCOMTR-MCNC: 92 MG/DL (ref 70–99)
GLUCOSE BLDC GLUCOMTR-MCNC: 99 MG/DL (ref 70–99)

## 2022-07-07 PROCEDURE — 258N000003 HC RX IP 258 OP 636: Performed by: PHYSICIAN ASSISTANT

## 2022-07-07 PROCEDURE — 74340 X-RAY GUIDE FOR GI TUBE: CPT

## 2022-07-07 PROCEDURE — 0DH67UZ INSERTION OF FEEDING DEVICE INTO STOMACH, VIA NATURAL OR ARTIFICIAL OPENING: ICD-10-PCS | Performed by: RADIOLOGY

## 2022-07-07 PROCEDURE — 250N000013 HC RX MED GY IP 250 OP 250 PS 637: Performed by: INTERNAL MEDICINE

## 2022-07-07 PROCEDURE — 120N000001 HC R&B MED SURG/OB

## 2022-07-07 PROCEDURE — 99233 SBSQ HOSP IP/OBS HIGH 50: CPT | Performed by: INTERNAL MEDICINE

## 2022-07-07 PROCEDURE — 250N000013 HC RX MED GY IP 250 OP 250 PS 637: Performed by: PSYCHIATRY & NEUROLOGY

## 2022-07-07 PROCEDURE — 99233 SBSQ HOSP IP/OBS HIGH 50: CPT | Mod: GC | Performed by: STUDENT IN AN ORGANIZED HEALTH CARE EDUCATION/TRAINING PROGRAM

## 2022-07-07 PROCEDURE — 250N000009 HC RX 250: Performed by: INTERNAL MEDICINE

## 2022-07-07 RX ORDER — LIDOCAINE HYDROCHLORIDE 20 MG/ML
10 JELLY TOPICAL ONCE
Status: COMPLETED | OUTPATIENT
Start: 2022-07-07 | End: 2022-07-07

## 2022-07-07 RX ADMIN — ASPIRIN 300 MG: 300 SUPPOSITORY RECTAL at 10:17

## 2022-07-07 RX ADMIN — MIRTAZAPINE 15 MG: 15 TABLET, ORALLY DISINTEGRATING ORAL at 21:00

## 2022-07-07 RX ADMIN — ACETAMINOPHEN 650 MG: 325 TABLET ORAL at 23:50

## 2022-07-07 RX ADMIN — LIDOCAINE HYDROCHLORIDE 6 ML: 20 JELLY TOPICAL at 15:00

## 2022-07-07 RX ADMIN — SODIUM CHLORIDE: 9 INJECTION, SOLUTION INTRAVENOUS at 17:32

## 2022-07-07 RX ADMIN — SODIUM CHLORIDE: 9 INJECTION, SOLUTION INTRAVENOUS at 06:09

## 2022-07-07 ASSESSMENT — ACTIVITIES OF DAILY LIVING (ADL)
ADLS_ACUITY_SCORE: 49
ADLS_ACUITY_SCORE: 47
ADLS_ACUITY_SCORE: 49
ADLS_ACUITY_SCORE: 47
ADLS_ACUITY_SCORE: 49
ADLS_ACUITY_SCORE: 47

## 2022-07-07 NOTE — PROGRESS NOTES
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 Interval neuro imaging with new confluent acute/sub-acute large R MCA/KWADWO infarct.       Vitals unremarkable on presentation, labs most remarkable for tox screen +cocaine, amphetamine.      Of note, left AMA on last hospitalization and did not pursue acute rehab as planned, and pt has hx of medication non-compliance, likely was not taking secondary stroke prevention medications.     Stroke Evaluation Summarized     MRI/Head CT MRI 7/5/22:   1. Large acute infarct within the right posterior frontal lobe, right parietal lobe, right pre and postcentral gyrus, right insula, and right corpus callosum anterior body. This acute infarct is predominantly in the right MCA territory with small areas extending into the right KWADWO territory.  2.  No susceptibility on GRE to suggest microhemorrhage.     Head CT 7/5/22:  1.  Rounded focus of hypoattenuation within the right corona radiata//centrum semiovale, new since head CT June 24, 2022. On brain MRI June 25, 2022 there was restricted diffusion within this region, consistent with acute ischemia, however the region of   hypoattenuation and is larger than the region of diffusion restriction which is concerning for progression of infarct. Brain MRI could be used to better characterize.     Intracranial Vasculature HEAD CTA:  1.  Redemonstration of marked narrowing to occlusion the midportion of right M1, similar to  prior.   2.  Mild asymmetry in the appearance of MCA distributions with mildly less opacified branches on the right, similar to prior.   3.  Mild atherosclerotic changes cavernous and supraclinoid ICA on the left.   4.  Mildly attenuated opacification of anterior cerebral artery distribution, similar to prior.  5.  Remaining intracranial circulation appears normal.   Cervical Vasculature NECK CTA:  1.  Redemonstration of occlusion of right ICA in its proximal aspect.  2.  No hemodynamically significant narrowing of the left ICA.  3.  Normal vertebral arteries.      Echocardiogram Normal L atrium size, EF 60-65%   EKG/Telemetry Sinus tachycardia   Otherwise normal ECG   Other Testing Not Applicable      LDL  6/24/2022: 53 mg/dL   A1C  6/18/2022: 7.8 %   Troponin No lab value available in past 48 hrs     Impression   Subacute-acute large R MCA/KWADWO territory infarcts in setting of known chronic R cervical ICA occlusion, R M1 occlusion. Interval imaging with some recanalization of R M1. Given this, most likely stroke etiology is artery to artery embolization, although hemodynamic compromise of collateral flow remains a possibility given non-compliance with medications, uncontrolled diabetes/HTN, and cocaine/amphetamine use.     Plan  - Continue PT/OT/SLP  - SBP goal 120-160 to allow adequate cerebral perfusion  - Normoglycemia, normothermia, normonatremia  - No indication for repeat echocardiogram. Low suspicion for cardio embolic etiology as vessel was chronically occluded  - If pt has worsening of L MCA/KWADWO symptoms would lay flat, bolus with 500-1000 ml of NS and see if this helps improve symptoms   - Given some recanalization of R M1,  mg daily indefintely  - Plavix 75 mg daily for 90 days per SAMRIS  - Atorvastatin 80 mg   - Pt will likely need a PEG for long-term nutrition/medications   - Long-term outpt goals: SBP < 120, LDL < 70, A1c < 7, titrate medications accordingly to meet goal    Patient Follow-up  "   - Discharge TCU when appropriate  - Will need to follow up with Neurology 4-6 weeks after discharge    We will continue to follow.    The patient was seen and discussed with Stroke Staff Dr. Estrada.    Harvinder Garcia  Medical Student  To page a member of the stroke/neurocritical care service, click here:  AMCOM   Choose \"On Call\" tab at top, then search dropdown box for \"Neurology Adult\", select location, press Enter, then look for stroke/neuro ICU/telestroke.    Resident/Fellow Attestation   I, Varinder Juarez MD, was present with the medical student who participated in the service and in the documentation of the note.  I have verified the history and personally performed the physical exam and medical decision making.  I agree with the assessment and plan of care as documented in the note.      Varinder Juarez MD PGY5 Stroke Fellow    ______________________________________________________    Clinically Significant Risk Factors Present on Admission            # Moderate Malnutrition: based on nutrition assessment      Medications   Scheduled Meds    aspirin  300 mg Rectal Daily    Or     aspirin  325 mg Oral or Feeding Tube Daily     atorvastatin  80 mg Oral or Feeding Tube Daily     cetirizine  10 mg Oral or Feeding Tube Daily     clopidogrel  75 mg Oral or Feeding Tube Daily     insulin aspart  1-6 Units Subcutaneous Q4H     insulin glargine  6 Units Subcutaneous At Bedtime     [Held by provider] lisinopril-hydrochlorothiazide  1 tablet Oral Daily     mirtazapine  15 mg Orally disintegrating tablet At Bedtime     sodium chloride (PF)  3 mL Intracatheter Q8H       Infusion Meds    dextrose       - MEDICATION INSTRUCTIONS -       - MEDICATION INSTRUCTIONS -       sodium chloride 100 mL/hr at 07/07/22 0609       PRN Meds  acetaminophen **OR** acetaminophen, dextrose, glucose **OR** dextrose **OR** glucagon, lidocaine 4%, lidocaine (buffered or not buffered), - MEDICATION INSTRUCTIONS -, - MEDICATION INSTRUCTIONS -, melatonin, " ondansetron **OR** ondansetron, sodium chloride, sodium chloride (PF)       PHYSICAL EXAMINATION  Temp:  [97.7  F (36.5  C)-99.4  F (37.4  C)] 98  F (36.7  C)  Pulse:  [106-125] 106  Resp:  [16-20] 20  BP: (138-179)/() 155/80  SpO2:  [97 %-99 %] 97 %      Neurologic  Mental Status:  no verbal output, follows commands intermittently  Cranial Nerves:  PERRLA, right gaze preference, tracks to midline occasionally, L facial droop  Motor:  strength 0/5 LUE, 2/5 LLE  Reflexes:  toes down-going  Sensory:  Diminished light touch sensation L upper and lower extremities  Coordination: Finger-to-nose R side without dysmetria  Station/Gait:  deferred    Stroke Scales   Stroke scale not assessed due to patient non-compliance.      Imaging  I personally reviewed all imaging; relevant findings per HPI.     Lab Results Data   CBC  Recent Labs   Lab 07/05/22  0902 07/04/22  1111 07/03/22  1856   WBC 11.6* 13.2* 12.0*   RBC 3.79* 4.22* 4.30*   HGB 11.6* 13.1* 13.2*   HCT 35.8* 40.5 41.6    426 443     Basic Metabolic Panel    Recent Labs   Lab 07/07/22  0516 07/07/22  0055 07/06/22  2134 07/05/22  1129 07/05/22  0902 07/04/22  1841 07/04/22  1111 07/04/22  0744 07/03/22  1856   NA  --   --   --   --  140  --  140  --  136   POTASSIUM  --   --   --   --  3.7  --  3.8  --  3.6   CHLORIDE  --   --   --   --  110*  --  107  --  102   CO2  --   --   --   --  23  --  22  --  23   BUN  --   --   --   --  35*  --  30  --  23   CR  --   --   --   --  0.69  --  0.90  --  0.64*   GLC 87 92 108*   < > 138*   < > 123*   < > 168*   DAT  --   --   --   --  8.9  --  9.8  --  9.8    < > = values in this interval not displayed.     Liver Panel  Recent Labs   Lab 07/03/22  1856   PROTTOTAL 8.2   ALBUMIN 4.0   BILITOTAL 0.6   ALKPHOS 65   AST 46*   ALT 51     INR    Recent Labs   Lab Test 06/24/22  1805 02/05/22  1919 04/18/21  1553   INR 1.08 0.97 1.05      Lipid Profile    Recent Labs   Lab Test 06/24/22 1805 03/20/22  0558  02/06/22  0617   CHOL 102 93 193   HDL 30* 34* 38*   LDL 53 46 126*   TRIG 95 66 144     A1C    Recent Labs   Lab Test 06/18/22  2150 03/20/22  0558 02/06/22  0617   A1C 7.8* 8.8* 10.5*     Troponin I    Recent Labs   Lab 07/03/22  1856   TROPONINIS 10

## 2022-07-07 NOTE — PHARMACY-CONSULT NOTE
Pharmacy Consult to evaluate for medication related stroke core measures    Josue Praisi, 52 year old male admitted for subacute-acute large R MCA/KWADWO territory infarcts in setting of known chronic R cervical ICA occlusion, R M1 occlusion on 7/3/2022 - consults for stroke measure evaluation placed 7/6/22.    Thrombolytic was not given because of Patient history contraindications    VTE Prophylaxis SCDs /PCDs placed on 7/6/22.    Antithrombotic: aspirin started on 7/3/22, as appropriate by end of hospital day 2. Continue antithrombotic therapy on discharge to meet quality measures, unless contraindicated.    Anticoagulation if history of A-fib/flutter: Patient does not have history of A-fib/flutter - anticoagulation not required for medication related stroke core measures.     LDL Cholesterol Calculated   Date Value Ref Range Status   06/24/2022 53 <=100 mg/dL Final       Patient's home statin, Lipitor (atorvastatin) restarted; continue statin on discharge to meet quality measures, unless contraindicated.     Recommendations: None at this time    Thank you for the consult.    Fanny Ren [facility] 7/6/2022 7:30 PM

## 2022-07-07 NOTE — PLAN OF CARE
Goal Outcome Evaluation:    Plan of Care Reviewed With: patient, mother, sibling        Patient lethargic, non-verbal, able to eryn some needs known, and appears to understand some. VSS. Neuro's with LUE and LLE hemiplegia. Cortez catheter in place. Turned and repositioned, mepilex in lace on sacral area. NG tube placed this afternoon, TF started at 20 ml/hr at 16:10. Plan to increase to 40 ml/hr at 16:10 on 7/8. Sitter at bedside, patient appears to understand he should not take out his feeding tube. Sister and mother at bedside, supportive of patient. Mother (Flory Reno)  is now patient's decision maker.

## 2022-07-07 NOTE — PLAN OF CARE
Pt here with acute stroke, history of multiple strokes. Difficult to assess orientation as pt is mostly nonverbal, A&Ox4 based on yes/no questions. Pt is lethargic, inconsistently following commands, arouses to voice. Pt sometimes will give thumbs up/down for yes/no questions. Neuros are L sided weakness. VSS on RA. Tele sinus tach. NPO. Pt self removed NG tube on 7/6. NS infusing at 100 ml/hr. Cortez with adequate urine output. Pt appears to be resting comfortably, turned q2h. Up with lift. Oral cares provided. Pt scoring green on the Aggression Stop Light Tool. Discharge pending.

## 2022-07-07 NOTE — PLAN OF CARE
Goal Outcome Evaluation:    Plan of Care Reviewed With: patient, family     Overall Patient Progress: no change    Pt here with acute stroke. Alert, nonverbal but able to indicate yes/no with hand gestures. Oriented x4. Neuros LUE 0/5, LLE 2/5. Tele sinus tach. Patient NPO. Oral cares provided with swabs and oral suctioning. Up with A2 lift. Turn Q2. Denies pain. Cortez catheter in place for retention. Pt scoring green on the Aggression Stop Light Tool. Plan pending patient decisions on feeding tube/ ST continued evals for possible improvement. Discharge pending.    Patient family came to visit this evening 1830 and upset patient has not eaten since he has been here. Updated them on speech therapy assessments and patient refusal of reinsertion of NGT. Patient family requests to be here for reinsertion of NGT tomorrow. They request to not have to wait long when they get here, RN advised they call in the morning to get a better idea of what time the tube can be placed. Answered questions about IV fluids, hydration, and oral suctioning as well.

## 2022-07-07 NOTE — PROGRESS NOTES
[facility]    Medicine Progress Note - Hospitalist Service    Date of Admission:  7/3/2022    Assessment & Plan        Josue Parisi is 52, who has had multiple strokes with some significant compliance issues, ongoing polysubstance abuse, being admitted under observation status for placement at a TCU, as he is unable to care for himself.     Multiple ischemic CVAs involving MCA, KWADWO with left-sided weakness and concern for progression  Medication noncompliance  Polysubstance abuse  Initial CVA occurred in 03/2022 with right KWADWO, large territory defect and residual left-sided weakness recommended to discharge to ARU however declined and discharged AMA. Subsequently returned 6/18 with progressive weakness, again referred to TCU however declined and returned home. Hospitalized 6/24 with worsening left-sided weakness, paresthesias, numbness with findings of new nonhemorrhagic infarction within right prefrontal gyrus and deep MCA watershed infarct within the right centrum semiovale of the right frontal and parietal lobes. Patient was evaluated by stroke neurology each admission, most recently felt symptoms were suggestive of new infarct rather than hypoperfused tissue. Recommended liberal SBP goal of 140-160 and DAPT with plavix/ for 90 days. Patient most recently left AMA on 6/30 after being recommended for TCU. At time of discharge, it appears the plavix was discontinued in an effort to limit medications and encourage compliance.   * Returned to ED 7/3/22 with reports of failing at home. It was unclear on admission if patient's left-sided symptoms had persisted or worsened due to presence of polysubstances and limited ability to participate in physical exam. Admitted to cocaine use as recently as 7/2. UTox on admission positive for cocaine, amphetamines. Admitted under observation care for presumed placement.  * 7/4 exam noted with 1/5 LUE/LLE strength, limited ability to  "communicate verbally but cognitively able to answer yes/no questions with gestures, shakes of head. RNs present at bedside had cared for patient week prior and reported change in function. Repeat CTH revealed rounded focus of hypoattenuation within right corona radiata/centrum semiovale, MRI brain 6/25 indicated restricted diffusion within this region however area of hypoattenuation is larger than region of diffusion restriction concerning for progression of infarct.  - Cardiac monitoring  - Hold PTA antihypertensive to encourage -160    -Neuro note 7/6- \"recommend SAMMPRIS trial guided DAPT (+clopidogrel 75 x90 days) in an attempt to stabilize the M1 segment and prevent inferior division infarct. \"   Antiplatelet agents could be held for a brief period to permit G tube placement, but I would prefer if he is bridged with a heparin gtt if that is necessary.   (Currently no access, so receiving ASA 300mg rectally)    - Continue statin  - PT/OT consults  - Reconsult speech therapy -appreciate recommendations.- NPO .  Pulled NJ tube 07/06.  07/07: Long discussion with sister and mother.  Mother is the decision-maker and understand patient's current condition.  Agrees for NG tube and PEG tube placement.    SIRS syndrome  Pt with mild leukocytosis 12-13, procalcitonin on admission negative. Low-grade fever and sinus tachycardia persisting today. UA without evidence of infection. CXR neg. ?due to polysubstance abuse with amphetamines, cocaine in UTox.  - Monitor on tele  - Monitor fever curve  - PRN tylenol for fever  - Repeat CBC trending down   - Low threshold to obtain blood cultures, initiate broad-spectrum abx      Type 2 diabetes mellitus.    Last HbA1c of 7.8 on 06/18. PTA regimen of metformin. Recent admission Lantus had been increased to 18u BID, carb coverage with meals of 1:10 with breakfast/lunch, 1:15 with dinner.  - Hold metformin  Recent Labs   Lab 07/07/22  1146 07/07/22  0844 07/07/22  0516 " 07/07/22  0055 07/06/22  2134 07/06/22  1647   * 100* 87 92 108* 136*     - 7/7; HOLD Lantus   -Change to every 4 hours sliding coverage     History of hypertension   Hold PTA meds to allow permissive hypertension  See above, goal -160 mmHg     Hidradenitis / Rt axillary wound  WOCN consult previous recs:   Right axilla wound: Daily  and PRN   Cleanse wound with NS or wound cleanser (omit this step if patient cleans wound in shower)  Dry and protect surrounding skin with no sting barrier film wipe #[id number]  Apply a small amount of iodesorb gel #[id number] to bandaid  If Band-Aid needs to be changed more than once a day. Apply iodesorb gel #[id number] to adaptic #[id number] and cover with Mepilex #[id number]     Generalized pain: PTA on gabapentin 300 mg 3 times daily.    - Holding gabapentin given lethargy, resume when more wakeful  - Continue as needed Tylenol as needed. Avoid opioids      Diet: NPO for Medical/Clinical Reasons Except for: No Exceptions  Adult Formula Drip Feeding: Continuous Jevity 1.5; Other - Specify in Comment; Goal Rate: 60; mL/hr; Medication - Feeding Tube Flush Frequency: At least 15-30 mL water before and after medication administration and with tube clogging; Amount to Se...    DVT Prophylaxis: Pneumatic Compression Devices  Cortez Catheter: PRESENT, indication: Retention  Central Lines: None  Cardiac Monitoring: ACTIVE order. Indication: Stroke, acute (48 hours)  Code Status: Full Code      Disposition Plan      Expected Discharge Date: 07/09/2022        Discharge Comments: Patient non-compliant. Placement will be difficult.        The patient's care was discussed with the Bedside Nurse and Patient.    Quinn Monge MD, MD  Hospitalist Service  [facility]  Securely message with the Vocera Web Console (learn more here)  Text page via [employer] Paging/Directory         Clinically Significant Risk Factors Present on Admission                # Hypertension: home medication  "list includes antihypertensive(s)  # Overweight: Estimated body mass index is 25.36 kg/m  as calculated from the following:    Height as of 6/24/22: 1.676 m (5' 6\").    Weight as of 6/30/22: 71.3 kg (157 lb 1.6 oz).    # Moderate Malnutrition: based on nutrition assessment     ______________________________________________________________________    Interval History     Last 24-hour events noted.  Pulled NG tube.  Continues to be aphasic and asking for water with signs.  No other new concerns    Long discussion and update to family (mother and sister)    4 point ROS done and negative unless mentioned    Data reviewed today: I reviewed all medications, new labs and imaging results over the last 24 hours. I personally reviewed no images or EKG's today.    Physical Exam   BP (!) 160/90 (BP Location: Left arm)   Pulse 100   Temp 99.4  F (37.4  C) (Axillary)   Resp 18   SpO2 99%   Gen- pleasant  lying in bed  Neck- supple, no JVD elevation, no thyromegaly  CVS- I+II+ no m/r/g  RS- CTAB  Abdo- soft, no tenderness . No g/r/r   Ext- no edema  CNS-expressive aphasia  Left-sided weakness.  Power 1-2/5    Data   No results found for this or any previous visit (from the past 24 hour(s)).  BMP  Recent Labs   Lab 07/07/22  1146 07/07/22  0844 07/07/22  0516 07/07/22  0055 07/05/22  1129 07/05/22  0902 07/04/22  1841 07/04/22  1111 07/04/22  0744 07/03/22  1856   NA  --   --   --   --   --  140  --  140  --  136   POTASSIUM  --   --   --   --   --  3.7  --  3.8  --  3.6   CHLORIDE  --   --   --   --   --  110*  --  107  --  102   DAT  --   --   --   --   --  8.9  --  9.8  --  9.8   CO2  --   --   --   --   --  23  --  22  --  23   BUN  --   --   --   --   --  35*  --  30  --  23   CR  --   --   --   --   --  0.69  --  0.90  --  0.64*   * 100* 87 92   < > 138*   < > 123*   < > 168*    < > = values in this interval not displayed.     CBC  Recent Labs   Lab 07/05/22  0902 07/04/22  1111 07/03/22  1856   WBC 11.6* 13.2* " 12.0*   RBC 3.79* 4.22* 4.30*   HGB 11.6* 13.1* 13.2*   HCT 35.8* 40.5 41.6   MCV 95 96 97   MCH 30.6 31.0 30.7   MCHC 32.4 32.3 31.7   RDW 12.7 12.7 12.5    426 443     INRNo lab results found in last 7 days.  LFTs  Recent Labs   Lab 07/03/22  1856   ALKPHOS 65   AST 46*   ALT 51   BILITOTAL 0.6   PROTTOTAL 8.2   ALBUMIN 4.0      PANCNo lab results found in last 7 days.

## 2022-07-07 NOTE — PLAN OF CARE
Goal Outcome Evaluation:    Plan of Care Reviewed With: patient, mother, sibling     Overall Patient Progress: no change    Reason for Admission: R MCA CVA    Cognitive/Mentation: A/Ox self. Difficult to fully assess d/t aphasia.   Neuros/CMS: LUE 0/5. LLE 1-2/5. Decreased sensation. Slight L droop. Pt not cooperative with full assessment.  VS: stable on RA.   Tele: NSR.  GI: BS +, + flatus.   : Cortez for rentention.  Pulmonary: LS clear.  Pain: denies.   Drains/Lines: R forearm PIV. Cortez.   Skin: WNL  Activity: Assist x 2 with lift.  Diet: NPO. IVF.    Therapies recs: TCU  Discharge: pending    Aggression Stoplight Tool: red    End of shift summary: Patient continues to be frustrated/irritable. Difficult to fully assess d/t unwillingness to cooperate. Plan for NG to be placed and 1:1 sitter initiated to prevent pulling of NG. Mother and sister supportive at bedside.

## 2022-07-08 ENCOUNTER — APPOINTMENT (OUTPATIENT)
Dept: SPEECH THERAPY | Facility: CLINIC | Age: 52
DRG: 065 | End: 2022-07-08
Payer: COMMERCIAL

## 2022-07-08 ENCOUNTER — APPOINTMENT (OUTPATIENT)
Dept: INTERVENTIONAL RADIOLOGY/VASCULAR | Facility: CLINIC | Age: 52
DRG: 065 | End: 2022-07-08
Attending: INTERNAL MEDICINE
Payer: COMMERCIAL

## 2022-07-08 ENCOUNTER — APPOINTMENT (OUTPATIENT)
Dept: PHYSICAL THERAPY | Facility: CLINIC | Age: 52
DRG: 065 | End: 2022-07-08
Payer: COMMERCIAL

## 2022-07-08 LAB
ANION GAP SERPL CALCULATED.3IONS-SCNC: 6 MMOL/L (ref 3–14)
ATRIAL RATE - MUSE: 111 BPM
BUN SERPL-MCNC: 11 MG/DL (ref 7–30)
CALCIUM SERPL-MCNC: 8.8 MG/DL (ref 8.5–10.1)
CHLORIDE BLD-SCNC: 106 MMOL/L (ref 94–109)
CO2 SERPL-SCNC: 24 MMOL/L (ref 20–32)
CREAT SERPL-MCNC: 0.57 MG/DL (ref 0.66–1.25)
DIASTOLIC BLOOD PRESSURE - MUSE: NORMAL MMHG
ERYTHROCYTE [DISTWIDTH] IN BLOOD BY AUTOMATED COUNT: 12 % (ref 10–15)
GFR SERPL CREATININE-BSD FRML MDRD: >90 ML/MIN/1.73M2
GLUCOSE BLD-MCNC: 168 MG/DL (ref 70–99)
GLUCOSE BLDC GLUCOMTR-MCNC: 139 MG/DL (ref 70–99)
GLUCOSE BLDC GLUCOMTR-MCNC: 147 MG/DL (ref 70–99)
GLUCOSE BLDC GLUCOMTR-MCNC: 148 MG/DL (ref 70–99)
GLUCOSE BLDC GLUCOMTR-MCNC: 159 MG/DL (ref 70–99)
GLUCOSE BLDC GLUCOMTR-MCNC: 161 MG/DL (ref 70–99)
GLUCOSE BLDC GLUCOMTR-MCNC: 200 MG/DL (ref 70–99)
HCT VFR BLD AUTO: 36.8 % (ref 40–53)
HGB BLD-MCNC: 11.8 G/DL (ref 13.3–17.7)
INTERPRETATION ECG - MUSE: NORMAL
MAGNESIUM SERPL-MCNC: 2 MG/DL (ref 1.6–2.3)
MCH RBC QN AUTO: 30.6 PG (ref 26.5–33)
MCHC RBC AUTO-ENTMCNC: 32.1 G/DL (ref 31.5–36.5)
MCV RBC AUTO: 95 FL (ref 78–100)
P AXIS - MUSE: 71 DEGREES
PHOSPHATE SERPL-MCNC: 2 MG/DL (ref 2.5–4.5)
PLATELET # BLD AUTO: 350 10E3/UL (ref 150–450)
POTASSIUM BLD-SCNC: 3.6 MMOL/L (ref 3.4–5.3)
PR INTERVAL - MUSE: 140 MS
QRS DURATION - MUSE: 70 MS
QT - MUSE: 350 MS
QTC - MUSE: 476 MS
R AXIS - MUSE: 49 DEGREES
RBC # BLD AUTO: 3.86 10E6/UL (ref 4.4–5.9)
SODIUM SERPL-SCNC: 136 MMOL/L (ref 133–144)
SYSTOLIC BLOOD PRESSURE - MUSE: NORMAL MMHG
T AXIS - MUSE: 64 DEGREES
VENTRICULAR RATE- MUSE: 111 BPM
WBC # BLD AUTO: 10.5 10E3/UL (ref 4–11)

## 2022-07-08 PROCEDURE — 92507 TX SP LANG VOICE COMM INDIV: CPT | Mod: GN | Performed by: SPEECH-LANGUAGE PATHOLOGIST

## 2022-07-08 PROCEDURE — 250N000013 HC RX MED GY IP 250 OP 250 PS 637: Performed by: INTERNAL MEDICINE

## 2022-07-08 PROCEDURE — 120N000001 HC R&B MED SURG/OB

## 2022-07-08 PROCEDURE — 99233 SBSQ HOSP IP/OBS HIGH 50: CPT | Performed by: INTERNAL MEDICINE

## 2022-07-08 PROCEDURE — 99406 BEHAV CHNG SMOKING 3-10 MIN: CPT

## 2022-07-08 PROCEDURE — 272N000187 HC ACCESSORY CR11

## 2022-07-08 PROCEDURE — 99233 SBSQ HOSP IP/OBS HIGH 50: CPT | Mod: GC | Performed by: STUDENT IN AN ORGANIZED HEALTH CARE EDUCATION/TRAINING PROGRAM

## 2022-07-08 PROCEDURE — 36415 COLL VENOUS BLD VENIPUNCTURE: CPT | Performed by: INTERNAL MEDICINE

## 2022-07-08 PROCEDURE — 250N000009 HC RX 250: Performed by: NURSE PRACTITIONER

## 2022-07-08 PROCEDURE — 97530 THERAPEUTIC ACTIVITIES: CPT | Mod: GP

## 2022-07-08 PROCEDURE — 258N000003 HC RX IP 258 OP 636: Performed by: PHYSICIAN ASSISTANT

## 2022-07-08 PROCEDURE — 97161 PT EVAL LOW COMPLEX 20 MIN: CPT | Mod: GP

## 2022-07-08 PROCEDURE — 250N000013 HC RX MED GY IP 250 OP 250 PS 637: Performed by: PSYCHIATRY & NEUROLOGY

## 2022-07-08 PROCEDURE — 84100 ASSAY OF PHOSPHORUS: CPT | Performed by: INTERNAL MEDICINE

## 2022-07-08 PROCEDURE — 82310 ASSAY OF CALCIUM: CPT | Performed by: INTERNAL MEDICINE

## 2022-07-08 PROCEDURE — 83735 ASSAY OF MAGNESIUM: CPT | Performed by: INTERNAL MEDICINE

## 2022-07-08 PROCEDURE — 49440 PLACE GASTROSTOMY TUBE PERC: CPT

## 2022-07-08 PROCEDURE — 92523 SPEECH SOUND LANG COMPREHEN: CPT | Mod: GN | Performed by: SPEECH-LANGUAGE PATHOLOGIST

## 2022-07-08 PROCEDURE — 250N000011 HC RX IP 250 OP 636: Performed by: NURSE PRACTITIONER

## 2022-07-08 PROCEDURE — 85027 COMPLETE CBC AUTOMATED: CPT | Performed by: INTERNAL MEDICINE

## 2022-07-08 RX ORDER — NALOXONE HYDROCHLORIDE 0.4 MG/ML
0.4 INJECTION, SOLUTION INTRAMUSCULAR; INTRAVENOUS; SUBCUTANEOUS
Status: DISCONTINUED | OUTPATIENT
Start: 2022-07-08 | End: 2022-07-08 | Stop reason: HOSPADM

## 2022-07-08 RX ORDER — FLUMAZENIL 0.1 MG/ML
0.2 INJECTION, SOLUTION INTRAVENOUS
Status: DISCONTINUED | OUTPATIENT
Start: 2022-07-08 | End: 2022-07-08 | Stop reason: HOSPADM

## 2022-07-08 RX ORDER — NALOXONE HYDROCHLORIDE 0.4 MG/ML
0.2 INJECTION, SOLUTION INTRAMUSCULAR; INTRAVENOUS; SUBCUTANEOUS
Status: DISCONTINUED | OUTPATIENT
Start: 2022-07-08 | End: 2022-07-08 | Stop reason: HOSPADM

## 2022-07-08 RX ORDER — FENTANYL CITRATE 50 UG/ML
25-50 INJECTION, SOLUTION INTRAMUSCULAR; INTRAVENOUS EVERY 5 MIN PRN
Status: DISCONTINUED | OUTPATIENT
Start: 2022-07-08 | End: 2022-07-08 | Stop reason: HOSPADM

## 2022-07-08 RX ADMIN — ACETAMINOPHEN 650 MG: 325 TABLET ORAL at 22:26

## 2022-07-08 RX ADMIN — FENTANYL CITRATE 50 MCG: 50 INJECTION, SOLUTION INTRAMUSCULAR; INTRAVENOUS at 12:22

## 2022-07-08 RX ADMIN — ATORVASTATIN CALCIUM 80 MG: 80 TABLET, FILM COATED ORAL at 09:11

## 2022-07-08 RX ADMIN — SODIUM CHLORIDE: 9 INJECTION, SOLUTION INTRAVENOUS at 13:33

## 2022-07-08 RX ADMIN — INSULIN ASPART 1 UNITS: 100 INJECTION, SOLUTION INTRAVENOUS; SUBCUTANEOUS at 05:38

## 2022-07-08 RX ADMIN — MIDAZOLAM HYDROCHLORIDE 1 MG: 1 INJECTION, SOLUTION INTRAMUSCULAR; INTRAVENOUS at 12:23

## 2022-07-08 RX ADMIN — MIRTAZAPINE 15 MG: 15 TABLET, ORALLY DISINTEGRATING ORAL at 22:17

## 2022-07-08 RX ADMIN — ASPIRIN 325 MG ORAL TABLET 325 MG: 325 PILL ORAL at 09:11

## 2022-07-08 RX ADMIN — CETIRIZINE HYDROCHLORIDE 10 MG: 5 SOLUTION ORAL at 10:52

## 2022-07-08 RX ADMIN — INSULIN ASPART 1 UNITS: 100 INJECTION, SOLUTION INTRAVENOUS; SUBCUTANEOUS at 01:04

## 2022-07-08 RX ADMIN — SODIUM CHLORIDE: 9 INJECTION, SOLUTION INTRAVENOUS at 03:25

## 2022-07-08 RX ADMIN — LIDOCAINE HYDROCHLORIDE 10 ML: 10 INJECTION, SOLUTION INFILTRATION; PERINEURAL at 12:22

## 2022-07-08 ASSESSMENT — ACTIVITIES OF DAILY LIVING (ADL)
ADLS_ACUITY_SCORE: 49
ADLS_ACUITY_SCORE: 42
ADLS_ACUITY_SCORE: 49
ADLS_ACUITY_SCORE: 49
ADLS_ACUITY_SCORE: 42

## 2022-07-08 NOTE — PLAN OF CARE
BP (!) 159/95 (BP Location: Right arm)   Pulse 96   Temp 98.2  F (36.8  C) (Axillary)   Resp 18   SpO2 99%     Patient name: Josue Parisi    Nursing shift note  Summary: Patient in with CVA  Alertness/orientation: Opens eyes spontaneously, very lethargic, SEAN orientation due to aphasia  Neuro: Left hemiparesis  Cardiac: Tachicardic  Resp: Snoring respirations  GI: WDL NJ tube running, will send for peg placement  : Cortez in place  IV: Right PIV   Mobility: Total care  CMS: Intact  Pain: None  Diet: NPO  Takes meds: VIA PEG tube  Skin: New PEG tube  Plan: Continue therapies  Other Important Info:       Carlos A Healy, RN  [facility]  [phone]

## 2022-07-08 NOTE — PROGRESS NOTES
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 Interval neuro imaging with new confluent acute/sub-acute large R MCA/KWADWO infarct.       Vitals unremarkable on presentation, labs most remarkable for tox screen +cocaine, amphetamine.      Of note, left AMA on last hospitalization and did not pursue acute rehab as planned, and pt has hx of medication non-compliance, likely was not taking secondary stroke prevention medications.     Stroke Evaluation Summarized     MRI/Head CT MRI 7/5/22:   1. Large acute infarct within the right posterior frontal lobe, right parietal lobe, right pre and postcentral gyrus, right insula, and right corpus callosum anterior body. This acute infarct is predominantly in the right MCA territory with small areas extending into the right KWADWO territory.  2.  No susceptibility on GRE to suggest microhemorrhage.     Head CT 7/5/22:  1.  Rounded focus of hypoattenuation within the right corona radiata//centrum semiovale, new since head CT June 24, 2022. On brain MRI June 25, 2022 there was restricted diffusion within this region, consistent with acute ischemia, however the region of   hypoattenuation and is larger than the region of diffusion restriction which is concerning for progression of infarct. Brain MRI could be used to better characterize.     Intracranial Vasculature HEAD CTA:  1.  Redemonstration of marked narrowing to occlusion the midportion of right M1, similar to prior.   2.  Mild asymmetry in the appearance of MCA distributions with mildly less  opacified branches on the right, similar to prior.   3.  Mild atherosclerotic changes cavernous and supraclinoid ICA on the left.   4.  Mildly attenuated opacification of anterior cerebral artery distribution, similar to prior.  5.  Remaining intracranial circulation appears normal.   Cervical Vasculature NECK CTA:  1.  Redemonstration of occlusion of right ICA in its proximal aspect.  2.  No hemodynamically significant narrowing of the left ICA.  3.  Normal vertebral arteries.      Echocardiogram Normal L atrium size, EF 60-65%   EKG/Telemetry Sinus tachycardia   Otherwise normal ECG   Other Testing Not Applicable      LDL  6/24/2022: 53 mg/dL   A1C  6/18/2022: 7.8 %   Troponin No lab value available in past 48 hrs     Impression   # Subacute-acute large R MCA/KWADWO territory infarcts in setting of known chronic R cervical ICA occlusion, R M1 occlusion. Interval imaging with some recanalization of R M1. Given this, most likely stroke etiology is artery to artery embolization, although hemodynamic compromise of collateral flow remains a possibility given non-compliance with medications, uncontrolled diabetes/HTN, and cocaine/amphetamine use. NIH 23 on presentation, exam with dense LUE/LLE flaccid hemiplegia.     # LUE shoulder pain. This may have a central etiology with traction of rotator cuff from flaccid hemiplegia as result of above stroke. No signs of spasticity, point tenderness on exam which is reassuring against fracture.     Plan  - Continue PT/OT/SLP  - SBP goal 120-160 to allow adequate cerebral perfusion  - Normoglycemia, normothermia, normonatremia  - No indication for repeat echocardiogram. Low suspicion for cardio embolic etiology as vessel was chronically occluded  - If pt has worsening of L MCA/KWADWO symptoms would lay flat, bolus with 500-1000 ml of NS and see if this helps improve symptoms   - Given some recanalization of R M1,  mg daily indefintely  - Plavix 75 mg daily for 90 days per  "SAMMPRIS  - Atorvastatin 80 mg   - Pt will likely need a PEG for long-term nutrition/medications - plan for PEG today  - Long-term outpt goals: SBP < 120, LDL < 70, A1c < 7, titrate medications accordingly to meet goal  - LUE shoulder pain concerning for rotator cuff injury (see above in impression) from central flaccid hemplegia, can consider conservative management with lidocaine patch/diclofenac gel/ice or heating pads, and if no response over time consider imaging as outpatient     Patient Follow-up    - Discharge to TCU when appropriate  - Will need to follow up with Neurology 4-6 weeks after discharge    We will continue to follow.    The patient was seen and discussed with Stroke Staff Dr. Estrada.    Harvinder Garcia  Medical Student  To page a member of the stroke/neurocritical care service, click here:  AMCOM   Choose \"On Call\" tab at top, then search dropdown box for \"Neurology Adult\", select location, press Enter, then look for stroke/neuro ICU/telestroke.    Resident/Fellow Attestation   I, Varinder Juarez MD, was present with the medical student who participated in the service and in the documentation of the note.  I have verified the history and personally performed the physical exam and medical decision making.  I agree with the assessment and plan of care as documented in the note.      Varinder Juarez MD PGY5 Stroke Fellow    ______________________________________________________    Clinically Significant Risk Factors Present on Admission            # Moderate Malnutrition: based on nutrition assessment      Medications   Scheduled Meds    aspirin  300 mg Rectal Daily    Or     aspirin  325 mg Oral or Feeding Tube Daily     atorvastatin  80 mg Oral or Feeding Tube Daily     cetirizine  10 mg Oral or Feeding Tube Daily     clopidogrel  75 mg Oral or Feeding Tube Daily     insulin aspart  1-6 Units Subcutaneous Q4H     [Held by provider] insulin glargine  6 Units Subcutaneous At Bedtime     iohexol  50 mL Per G Tube " Once     [Held by provider] lisinopril-hydrochlorothiazide  1 tablet Oral Daily     mirtazapine  15 mg Orally disintegrating tablet At Bedtime     sodium chloride (PF)  3 mL Intracatheter Q8H       Infusion Meds    dextrose       - MEDICATION INSTRUCTIONS -       - MEDICATION INSTRUCTIONS -       sodium chloride 0.9%       sodium chloride 100 mL/hr at 07/08/22 0325       PRN Meds  acetaminophen **OR** acetaminophen, dextrose, glucose **OR** dextrose **OR** glucagon, fentaNYL, flumazenil, lidocaine 4%, lidocaine (buffered or not buffered), lidocaine (buffered or not buffered), - MEDICATION INSTRUCTIONS -, - MEDICATION INSTRUCTIONS -, melatonin, midazolam, naloxone **OR** naloxone **OR** naloxone **OR** naloxone, ondansetron **OR** ondansetron, sodium chloride, sodium chloride (PF), sodium chloride 0.9%       PHYSICAL EXAMINATION  Temp:  [97  F (36.1  C)-99.2  F (37.3  C)] 97.2  F (36.2  C)  Pulse:  [] 98  Resp:  [18-20] 18  BP: (156-173)/() 158/100  SpO2:  [95 %-100 %] 100 %      Neurologic  Mental Status:  no verbal output, follows commands intermittently  Cranial Nerves:  PERRLA, right gaze preference, tracks to midline occasionally, L facial droop  Motor:  strength 0/5 LUE, 2/5 LLE  Reflexes:  toes down-going  Sensory:  Diminished light touch sensation L upper and lower extremities  Coordination: Finger-to-nose R side without dysmetria  Station/Gait:  deferred    Stroke Scales   Stroke scale not assessed due to patient non-compliance.      Imaging  I personally reviewed all imaging; relevant findings per HPI.     Lab Results Data   CBC  Recent Labs   Lab 07/08/22  1036 07/05/22  0902 07/04/22  1111   WBC 10.5 11.6* 13.2*   RBC 3.86* 3.79* 4.22*   HGB 11.8* 11.6* 13.1*   HCT 36.8* 35.8* 40.5    388 426     Basic Metabolic Panel    Recent Labs   Lab 07/08/22  1036 07/08/22  0925 07/08/22  0442 07/05/22  1129 07/05/22  0902 07/04/22  1841 07/04/22  1111     --   --   --  140  --  140    POTASSIUM 3.6  --   --   --  3.7  --  3.8   CHLORIDE 106  --   --   --  110*  --  107   CO2 24  --   --   --  23  --  22   BUN 11  --   --   --  35*  --  30   CR 0.57*  --   --   --  0.69  --  0.90   * 200* 148*   < > 138*   < > 123*   DAT 8.8  --   --   --  8.9  --  9.8    < > = values in this interval not displayed.     Liver Panel  Recent Labs   Lab 07/03/22  1856   PROTTOTAL 8.2   ALBUMIN 4.0   BILITOTAL 0.6   ALKPHOS 65   AST 46*   ALT 51     INR    Recent Labs   Lab Test 06/24/22  1805 02/05/22  1919 04/18/21  1553   INR 1.08 0.97 1.05      Lipid Profile    Recent Labs   Lab Test 06/24/22  1805 03/20/22  0558 02/06/22  0617   CHOL 102 93 193   HDL 30* 34* 38*   LDL 53 46 126*   TRIG 95 66 144     A1C    Recent Labs   Lab Test 06/18/22  2150 03/20/22  0558 02/06/22  0617   A1C 7.8* 8.8* 10.5*     Troponin I    Recent Labs   Lab 07/03/22  1856   TROPONINIS 10

## 2022-07-08 NOTE — PLAN OF CARE
Reason for Admission: Acute stroke, hx of multiple strokes.   Cognitive/Mentation: Lethargic, unable with assess orientation.   Neuros/CMS: Stable with LUE and LLE hemiplegia. L sided numbness and tingling. Pt is nonverbal, but will point and nod head yes/no. Doesn't follow all commands.   VS: VSS on RA, slightly HTN.   Tele: ST  GI: BS audible, + flatus, no BM this shift. Continent.  : Cortez in place for retention, good output.   Pulmonary: LS diminished, productive cough with frequent suction.   Pain: L sided of body pain, improved with prn Tylenol.   Drains/Lines: PIV infusing NS at 100 mL/hr. NG tube running tube feed at 20 mL/hr with q4hr 30 mL flushes.   Skin: Intact, T/R.   Activity: Assist x 2 with lift device.  Diet: NPO diet. Takes pills crushed in NG tube.   Therapies recs: TCU  Discharge: Pending  Aggression Stoplight Tool: Green  End of shift summary: No changes this shift. Tube feed to be increased to 40 mL/hr today at 1600.

## 2022-07-08 NOTE — PROGRESS NOTES
[facility]    Medicine Progress Note - Hospitalist Service    Date of Admission:  7/3/2022    Assessment & Plan        Josue Parisi is 52, who has had multiple strokes with some significant compliance issues, ongoing polysubstance abuse, being admitted under observation status for placement at a TCU, as he is unable to care for himself.     Multiple ischemic CVAs involving MCA, KWADWO with left-sided weakness and concern for progression  Medication noncompliance  Polysubstance abuse  Initial CVA occurred in 03/2022 with right KWADWO, large territory defect and residual left-sided weakness recommended to discharge to ARU however declined and discharged AMA. Subsequently returned 6/18 with progressive weakness, again referred to TCU however declined and returned home. Hospitalized 6/24 with worsening left-sided weakness, paresthesias, numbness with findings of new nonhemorrhagic infarction within right prefrontal gyrus and deep MCA watershed infarct within the right centrum semiovale of the right frontal and parietal lobes. Patient was evaluated by stroke neurology each admission, most recently felt symptoms were suggestive of new infarct rather than hypoperfused tissue. Recommended liberal SBP goal of 140-160 and DAPT with plavix/ for 90 days. Patient most recently left AMA on 6/30 after being recommended for TCU. At time of discharge, it appears the plavix was discontinued in an effort to limit medications and encourage compliance.   * Returned to ED 7/3/22 with reports of failing at home. It was unclear on admission if patient's left-sided symptoms had persisted or worsened due to presence of polysubstances and limited ability to participate in physical exam. Admitted to cocaine use as recently as 7/2. UTox on admission positive for cocaine, amphetamines. Admitted under observation care for presumed placement.  * 7/4 exam noted with 1/5 LUE/LLE strength, limited ability to  "communicate verbally but cognitively able to answer yes/no questions with gestures, shakes of head. RNs present at bedside had cared for patient week prior and reported change in function. Repeat CTH revealed rounded focus of hypoattenuation within right corona radiata/centrum semiovale, MRI brain 6/25 indicated restricted diffusion within this region however area of hypoattenuation is larger than region of diffusion restriction concerning for progression of infarct.  - Cardiac monitoring  - Hold PTA antihypertensive to encourage -160    -Neuro note 7/6- \"recommend SAMMPRIS trial guided DAPT (+clopidogrel 75 x90 days) in an attempt to stabilize the M1 segment and prevent inferior division infarct. \"   Antiplatelet agents could be held for a brief period to permit G tube placement, but I would prefer if he is bridged with a heparin gtt if that is necessary.   (Currently no access, so receiving ASA 300mg rectally)    - Continue statin  - PT/OT consults  - Reconsult speech therapy -appreciate recommendations.  07/07: Long discussion with sister and mother.  Mother is the decision-maker and understand patient's current condition.  Agrees for NG tube and PEG tube placement.  On 7/08: Plan for G-tube placement    SIRS syndrome  Pt with mild leukocytosis 12-13, procalcitonin on admission negative. Low-grade fever and sinus tachycardia persisting today. UA without evidence of infection. CXR neg. ?due to polysubstance abuse with amphetamines, cocaine in UTox.  - Monitor on tele  - Monitor fever curve  - PRN tylenol for fever  - Repeat CBC trending down   - Low threshold to obtain blood cultures, initiate broad-spectrum abx      Type 2 diabetes mellitus.    Last HbA1c of 7.8 on 06/18. PTA regimen of metformin. Recent admission Lantus had been increased to 18u BID, carb coverage with meals of 1:10 with breakfast/lunch, 1:15 with dinner.  - Hold metformin  Recent Labs   Lab 07/08/22  1036 07/08/22  0925 " 07/08/22  0442 07/08/22  0003 07/07/22  2055 07/07/22  1619   * 200* 148* 159* 117* 99     - 7/7; HOLD Lantus   -Changed to every 4 hours sliding coverage     History of hypertension   Hold PTA meds to allow permissive hypertension  See above, goal -160 mmHg     Hidradenitis / Rt axillary wound  WOCN consult previous recs:   Right axilla wound: Daily  and PRN   Cleanse wound with NS or wound cleanser (omit this step if patient cleans wound in shower)  Dry and protect surrounding skin with no sting barrier film wipe #[id number]  Apply a small amount of iodesorb gel #[id number] to bandaid  If Band-Aid needs to be changed more than once a day. Apply iodesorb gel #[id number] to adaptic #[id number] and cover with Mepilex #[id number]     Generalized pain: PTA on gabapentin 300 mg 3 times daily.    - Holding gabapentin given lethargy, resume when more wakeful  - Continue as needed Tylenol as needed. Avoid opioids      Diet: Adult Formula Drip Feeding: Continuous Jevity 1.5; Other - Specify in Comment; Goal Rate: 60; mL/hr; Medication - Feeding Tube Flush Frequency: At least 15-30 mL water before and after medication administration and with tube clogging; Amount to Se...  NPO per Anesthesia Guidelines for Procedure/Surgery Except for: Meds, Other; Specify: please stop tube feedings    DVT Prophylaxis: Pneumatic Compression Devices  Cortez Catheter: PRESENT, indication: Retention  Central Lines: None  Cardiac Monitoring: ACTIVE order. Indication: Stroke, acute (48 hours)  Code Status: Full Code      Disposition Plan      Expected Discharge Date: 07/09/2022        Discharge Comments: Patient non-compliant. Placement will be difficult.        The patient's care was discussed with the Bedside Nurse and Patient.    Quinn Monge MD, MD  Hospitalist Service  [facility]  Securely message with the Vocera Web Console (learn more here)  Text page via [employer] Paging/Directory         Clinically Significant Risk  "Factors Present on Admission                # Hypertension: home medication list includes antihypertensive(s)  # Overweight: Estimated body mass index is 25.36 kg/m  as calculated from the following:    Height as of 6/24/22: 1.676 m (5' 6\").    Weight as of 6/30/22: 71.3 kg (157 lb 1.6 oz).    # Moderate Malnutrition: based on nutrition assessment     ______________________________________________________________________    Interval History     Last 24-hour events noted.  Tolerating NG tube feeding.  Plan for G-tube placement today    4 point ROS done and negative unless mentioned    Data reviewed today: I reviewed all medications, new labs and imaging results over the last 24 hours. I personally reviewed no images or EKG's today.    Physical Exam   BP (!) 158/100 (BP Location: Right arm)   Pulse 98   Temp 97.2  F (36.2  C) (Oral)   Resp 18   SpO2 100%   Gen- pleasant  lying in bed  Neck- supple, no JVD elevation, no thyromegaly  CVS- I+II+ no m/r/g  RS- CTAB  Abdo- soft, no tenderness . No g/r/r   Ext- no edema  CNS-expressive aphasia  Left-sided hemiplegia    Data   Recent Results (from the past 24 hour(s))   XR Feeding Tube Placement    Narrative    FLUOROSCOPY GUIDED FEEDING TUBE PLACEMENT  7/7/2022 3:06 PM     HISTORY: stroke. please also place bridal Feeding tube needed for  nutrition.    COMPARISON: 7/6/2022.    TECHNIQUE: After injection of Xylocaine gel into the nose, a feeding  tube was advanced under fluoroscopic guidance.    FLUOROSCOPY TIME: 5.3 minutes.    SPOT FILMS: 1    FINDINGS: The feeding tube is advanced with the tip in the pylorus.  The patient was combative and further attempts to advance the tube  were not made. A small amount of barium was injected to define  anatomy.      Impression    IMPRESSION: Feeding tube placement with tip in the pylorus. The  patient was combative and further attempts to advance the tube were  not made.    MARCELO HORTA MD         SYSTEM ID:  [id number] "     BMP  Recent Labs   Lab 07/08/22  1036 07/08/22  0925 07/08/22  0442 07/08/22  0003 07/05/22  1129 07/05/22  0902 07/04/22  1841 07/04/22  1111 07/04/22  0744 07/03/22  1856     --   --   --   --  140  --  140  --  136   POTASSIUM 3.6  --   --   --   --  3.7  --  3.8  --  3.6   CHLORIDE 106  --   --   --   --  110*  --  107  --  102   DAT 8.8  --   --   --   --  8.9  --  9.8  --  9.8   CO2 24  --   --   --   --  23  --  22  --  23   BUN 11  --   --   --   --  35*  --  30  --  23   CR 0.57*  --   --   --   --  0.69  --  0.90  --  0.64*   * 200* 148* 159*   < > 138*   < > 123*   < > 168*    < > = values in this interval not displayed.     CBC  Recent Labs   Lab 07/08/22  1036 07/05/22  0902 07/04/22  1111 07/03/22  1856   WBC 10.5 11.6* 13.2* 12.0*   RBC 3.86* 3.79* 4.22* 4.30*   HGB 11.8* 11.6* 13.1* 13.2*   HCT 36.8* 35.8* 40.5 41.6   MCV 95 95 96 97   MCH 30.6 30.6 31.0 30.7   MCHC 32.1 32.4 32.3 31.7   RDW 12.0 12.7 12.7 12.5    388 426 443     INRNo lab results found in last 7 days.  LFTs  Recent Labs   Lab 07/03/22  1856   ALKPHOS 65   AST 46*   ALT 51   BILITOTAL 0.6   PROTTOTAL 8.2   ALBUMIN 4.0      PANCNo lab results found in last 7 days.

## 2022-07-08 NOTE — IR NOTE
Interventional Radiology Intra-procedural Nursing Note    Patient Name: Josue Parisi  Medical Record Number: [id number]  Today's Date: July 8, 2022    Procedure: Gastrostomy tube placement with IV moderate sedation  Start time: 1221  End time: 1225  Report provided to: SG Strauss  Patient depart time and location: 1235 to Room 721    Note: Patient entered Interventional Radiology Suite number 1 via cart.  Assisted onto procedural table in supine position. Prepped and draped.  Dr. Wray in room. Time out and procedure started. Vital signs stable. Telemetry reading NSR    Procedure well tolerated by patient without complications. Procedure end with debrief by Dr. Wray.  Gauze and tegaderm dressing applied to LUQ; dressing is c/d/i.    Do not use feeding tube for 6 hours, from [id number].    Administered medication totals:  Lidocaine 1% 10 mL Intradermal  Versed 1 mg IVP  Fentanyl 50 mcg IVP    Last dose of sedation administered at 1222.

## 2022-07-08 NOTE — PRE-PROCEDURE
GENERAL PRE-PROCEDURE:   Procedure:  Gastrostomy tube placement with IV moderate sedation  Date/Time:  7/8/2022 11:16 AM    Written consent obtained?: Yes    Risks and benefits: Risks, benefits and alternatives were discussed    Consent given by:  Patient  Patient states understanding of procedure being performed: Yes    Patient's understanding of procedure matches consent: Yes    Procedure consent matches procedure scheduled: Yes    Expected level of sedation:  Moderate  Appropriately NPO:  Yes  ASA Class:  3  Mallampati  :  N/A- Alternate secured airway (Unable to assess)  Lungs:  Lungs clear with good breath sounds bilaterally and other (comment)  Lung exam comment:  Decreased in bases  Heart:  Normal heart sounds and rate  History & Physical reviewed:  History and physical reviewed and no updates needed  Statement of review:  I have reviewed the lab findings, diagnostic data, medications, and the plan for sedation  Consent obtained from mother this morning and is in IR.     Thanks [facility] Interventional Radiology CNP ([phone]) (phone [phone])

## 2022-07-08 NOTE — PROGRESS NOTES
Patient is on IR schedule today Friday 7/8/22 for a G tube placement.     -Labs WNL for procedure.    -Orders for NPO have been entered.   -Phone consent was obtained from mother after explaining the procedure, risks and benefits and consent is in IR.     [place] the IR department at [phone] for procedural related questions.     Discussed with SG Strauss today.    Thanks, An[facility] Interventional Radiology CNP ([phone]) (phone [phone])

## 2022-07-09 ENCOUNTER — APPOINTMENT (OUTPATIENT)
Dept: SPEECH THERAPY | Facility: CLINIC | Age: 52
DRG: 065 | End: 2022-07-09
Payer: COMMERCIAL

## 2022-07-09 ENCOUNTER — APPOINTMENT (OUTPATIENT)
Dept: PHYSICAL THERAPY | Facility: CLINIC | Age: 52
DRG: 065 | End: 2022-07-09
Payer: COMMERCIAL

## 2022-07-09 ENCOUNTER — APPOINTMENT (OUTPATIENT)
Dept: OCCUPATIONAL THERAPY | Facility: CLINIC | Age: 52
DRG: 065 | End: 2022-07-09
Payer: COMMERCIAL

## 2022-07-09 LAB
GLUCOSE BLDC GLUCOMTR-MCNC: 174 MG/DL (ref 70–99)
GLUCOSE BLDC GLUCOMTR-MCNC: 182 MG/DL (ref 70–99)
GLUCOSE BLDC GLUCOMTR-MCNC: 185 MG/DL (ref 70–99)
GLUCOSE BLDC GLUCOMTR-MCNC: 202 MG/DL (ref 70–99)
GLUCOSE BLDC GLUCOMTR-MCNC: 202 MG/DL (ref 70–99)
GLUCOSE BLDC GLUCOMTR-MCNC: 215 MG/DL (ref 70–99)
PHOSPHATE SERPL-MCNC: 2.8 MG/DL (ref 2.5–4.5)

## 2022-07-09 PROCEDURE — 250N000009 HC RX 250: Performed by: INTERNAL MEDICINE

## 2022-07-09 PROCEDURE — 250N000013 HC RX MED GY IP 250 OP 250 PS 637: Performed by: INTERNAL MEDICINE

## 2022-07-09 PROCEDURE — 84100 ASSAY OF PHOSPHORUS: CPT | Performed by: INTERNAL MEDICINE

## 2022-07-09 PROCEDURE — 97530 THERAPEUTIC ACTIVITIES: CPT | Mod: GP

## 2022-07-09 PROCEDURE — 99233 SBSQ HOSP IP/OBS HIGH 50: CPT | Mod: GC | Performed by: PSYCHIATRY & NEUROLOGY

## 2022-07-09 PROCEDURE — 258N000003 HC RX IP 258 OP 636: Performed by: INTERNAL MEDICINE

## 2022-07-09 PROCEDURE — 120N000001 HC R&B MED SURG/OB

## 2022-07-09 PROCEDURE — 99233 SBSQ HOSP IP/OBS HIGH 50: CPT | Performed by: INTERNAL MEDICINE

## 2022-07-09 PROCEDURE — 97112 NEUROMUSCULAR REEDUCATION: CPT | Mod: GO | Performed by: OCCUPATIONAL THERAPIST

## 2022-07-09 PROCEDURE — 36415 COLL VENOUS BLD VENIPUNCTURE: CPT | Performed by: INTERNAL MEDICINE

## 2022-07-09 PROCEDURE — 250N000013 HC RX MED GY IP 250 OP 250 PS 637: Performed by: PSYCHIATRY & NEUROLOGY

## 2022-07-09 PROCEDURE — 92507 TX SP LANG VOICE COMM INDIV: CPT | Mod: GN | Performed by: SPEECH-LANGUAGE PATHOLOGIST

## 2022-07-09 RX ADMIN — ACETAMINOPHEN 650 MG: 325 TABLET ORAL at 10:22

## 2022-07-09 RX ADMIN — INSULIN ASPART 1 UNITS: 100 INJECTION, SOLUTION INTRAVENOUS; SUBCUTANEOUS at 07:44

## 2022-07-09 RX ADMIN — ACETAMINOPHEN 650 MG: 325 TABLET ORAL at 23:11

## 2022-07-09 RX ADMIN — MIRTAZAPINE 15 MG: 15 TABLET, ORALLY DISINTEGRATING ORAL at 22:37

## 2022-07-09 RX ADMIN — CLOPIDOGREL BISULFATE 75 MG: 75 TABLET ORAL at 09:19

## 2022-07-09 RX ADMIN — INSULIN ASPART 1 UNITS: 100 INJECTION, SOLUTION INTRAVENOUS; SUBCUTANEOUS at 03:02

## 2022-07-09 RX ADMIN — INSULIN ASPART 2 UNITS: 100 INJECTION, SOLUTION INTRAVENOUS; SUBCUTANEOUS at 17:47

## 2022-07-09 RX ADMIN — INSULIN ASPART 2 UNITS: 100 INJECTION, SOLUTION INTRAVENOUS; SUBCUTANEOUS at 09:20

## 2022-07-09 RX ADMIN — CETIRIZINE HYDROCHLORIDE 10 MG: 5 SOLUTION ORAL at 09:19

## 2022-07-09 RX ADMIN — ATORVASTATIN CALCIUM 80 MG: 80 TABLET, FILM COATED ORAL at 09:20

## 2022-07-09 RX ADMIN — ACETAMINOPHEN 650 MG: 325 TABLET ORAL at 14:58

## 2022-07-09 RX ADMIN — SODIUM PHOSPHATE, MONOBASIC, MONOHYDRATE 9 MMOL: 276; 142 INJECTION, SOLUTION INTRAVENOUS at 00:34

## 2022-07-09 RX ADMIN — INSULIN ASPART 1 UNITS: 100 INJECTION, SOLUTION INTRAVENOUS; SUBCUTANEOUS at 12:25

## 2022-07-09 RX ADMIN — ASPIRIN 325 MG ORAL TABLET 325 MG: 325 PILL ORAL at 09:19

## 2022-07-09 RX ADMIN — INSULIN ASPART 2 UNITS: 100 INJECTION, SOLUTION INTRAVENOUS; SUBCUTANEOUS at 22:00

## 2022-07-09 ASSESSMENT — ACTIVITIES OF DAILY LIVING (ADL)
ADLS_ACUITY_SCORE: 52
CHANGE_IN_FUNCTIONAL_STATUS_SINCE_ONSET_OF_CURRENT_ILLNESS/INJURY: NO
DIFFICULTY_EATING/SWALLOWING: YES
WALKING_OR_CLIMBING_STAIRS_DIFFICULTY: YES
DOING_ERRANDS_INDEPENDENTLY_DIFFICULTY: YES
COMMUNICATION: DIFFICULTY SPEAKING
ADLS_ACUITY_SCORE: 52
DRESSING/BATHING_DIFFICULTY: YES
ADLS_ACUITY_SCORE: 52
ADLS_ACUITY_SCORE: 52
ADLS_ACUITY_SCORE: 45
NUMBER_OF_TIMES_PATIENT_HAS_FALLEN_WITHIN_LAST_SIX_MONTHS: 5
EATING/SWALLOWING: EATING;SWALLOWING LIQUIDS;SWALLOWING SOLID FOOD
DIFFICULTY_COMMUNICATING: YES
WEAR_GLASSES_OR_BLIND: YES
ADLS_ACUITY_SCORE: 52
TOILETING_ASSISTANCE: TOILETING DIFFICULTY, REQUIRES EQUIPMENT
TOILETING_ISSUES: YES
WALKING_OR_CLIMBING_STAIRS: AMBULATION DIFFICULTY, REQUIRES EQUIPMENT
FALL_HISTORY_WITHIN_LAST_SIX_MONTHS: YES
EQUIPMENT_CURRENTLY_USED_AT_HOME: CANE, STRAIGHT
DRESSING/BATHING: BATHING DIFFICULTY, REQUIRES EQUIPMENT
ADLS_ACUITY_SCORE: 52
ADLS_ACUITY_SCORE: 45
CONCENTRATING,_REMEMBERING_OR_MAKING_DECISIONS_DIFFICULTY: NO
ADLS_ACUITY_SCORE: 45
ADLS_ACUITY_SCORE: 45

## 2022-07-09 NOTE — PROGRESS NOTES
[facility]    Medicine Progress Note - Hospitalist Service    Date of Admission:  7/3/2022    Assessment & Plan        Josue Parisi is 52, who has had multiple strokes with some significant compliance issues, ongoing polysubstance abuse, being admitted under observation status for placement at a TCU, as he is unable to care for himself.     Multiple ischemic CVAs involving MCA, KWADWO with left-sided weakness and concern for progression  Medication noncompliance  Polysubstance abuse  Initial CVA occurred in 03/2022 with right KWADWO, large territory defect and residual left-sided weakness recommended to discharge to ARU however declined and discharged AMA. Subsequently returned 6/18 with progressive weakness, again referred to TCU however declined and returned home. Hospitalized 6/24 with worsening left-sided weakness, paresthesias, numbness with findings of new nonhemorrhagic infarction within right prefrontal gyrus and deep MCA watershed infarct within the right centrum semiovale of the right frontal and parietal lobes. Patient was evaluated by stroke neurology each admission, most recently felt symptoms were suggestive of new infarct rather than hypoperfused tissue. Recommended liberal SBP goal of 140-160 and DAPT with plavix/ for 90 days. Patient most recently left AMA on 6/30 after being recommended for TCU. At time of discharge, it appears the plavix was discontinued in an effort to limit medications and encourage compliance.   * Returned to ED 7/3/22 with reports of failing at home. It was unclear on admission if patient's left-sided symptoms had persisted or worsened due to presence of polysubstances and limited ability to participate in physical exam. Admitted to cocaine use as recently as 7/2. UTox on admission positive for cocaine, amphetamines. Admitted under observation care for presumed placement.  * 7/4 exam noted with 1/5 LUE/LLE strength, limited ability to  "communicate verbally but cognitively able to answer yes/no questions with gestures, shakes of head. RNs present at bedside had cared for patient week prior and reported change in function. Repeat CTH revealed rounded focus of hypoattenuation within right corona radiata/centrum semiovale, MRI brain 6/25 indicated restricted diffusion within this region however area of hypoattenuation is larger than region of diffusion restriction concerning for progression of infarct.  - Cardiac monitoring  - Hold PTA antihypertensive to encourage -160    -Neuro note 7/6- \"recommend SAMMPRIS trial guided DAPT (+clopidogrel 75 x90 days) in an attempt to stabilize the M1 segment and prevent inferior division infarct. \"   Antiplatelet agents could be held for a brief period to permit G tube placement, but I would prefer if he is bridged with a heparin gtt if that is necessary.   (Currently no access, so receiving ASA 300mg rectally)    - Continue statin  - PT/OT consults  - Reconsult speech therapy -appreciate recommendations.  07/07: Long discussion with sister and mother.  Mother is the decision-maker and understand patient's current condition.  Agrees for NG tube and PEG tube placement.  7/08:  G-tube placement  7/9: Continue feeding through G-tube.  Unclear if pain in PEG site or left shoulder (as per neurology).  Monitor    SIRS syndrome  Pt with mild leukocytosis 12-13, procalcitonin on admission negative. Low-grade fever and sinus tachycardia persisting today. UA without evidence of infection. CXR neg. ?due to polysubstance abuse with amphetamines, cocaine in UTox.  - Repeat CBC trending down   - Low threshold to obtain blood cultures, initiate broad-spectrum abx      Type 2 diabetes mellitus.    Last HbA1c of 7.8 on 06/18. PTA regimen of metformin. Recent admission Lantus had been increased to 18u BID, carb coverage with meals of 1:10 with breakfast/lunch, 1:15 with dinner.  - Hold metformin  Recent Labs   Lab " 07/09/22  1154 07/09/22  0920 07/09/22  0550 07/09/22  0205 07/08/22  2136 07/08/22  1609   * 215* 185* 174* 139* 147*     - 7/9; Resume Lantus @ 8 U  -sliding scale insulin -Changed to every 4 hours sliding coverage     History of hypertension   Hold PTA meds to allow permissive hypertension  See above, goal -160 mmHg     Hidradenitis / Rt axillary wound  WOCN consult previous recs:   Right axilla wound: Daily  and PRN   Cleanse wound with NS or wound cleanser (omit this step if patient cleans wound in shower)  Dry and protect surrounding skin with no sting barrier film wipe #[id number]  Apply a small amount of iodesorb gel #[id number] to bandaid  If Band-Aid needs to be changed more than once a day. Apply iodesorb gel #[id number] to adaptic #[id number] and cover with Mepilex #[id number]     Generalized pain: PTA on gabapentin 300 mg 3 times daily.    - Holding gabapentin given lethargy, resume when more wakeful  - Continue as needed Tylenol as needed. Avoid opioids      Diet: Adult Formula Drip Feeding: Continuous Jevity 1.5; Other - Specify in Comment; Goal Rate: 60; mL/hr; Medication - Feeding Tube Flush Frequency: At least 15-30 mL water before and after medication administration and with tube clogging; Amount to Se...  NPO per Anesthesia Guidelines for Procedure/Surgery Except for: Meds, Other; Specify: please stop tube feedings    DVT Prophylaxis: Pneumatic Compression Devices  Cortez Catheter: PRESENT, indication: Retention  Central Lines: None  Cardiac Monitoring: ACTIVE order. Indication: Stroke, acute (48 hours)  Code Status: Full Code      Disposition Plan      Expected Discharge Date: 07/11/2022    Discharge Delays: Placement - TCU    Discharge Comments: Patient non-compliant. Placement will be difficult.        The patient's care was discussed with the Bedside Nurse and Patient.    Quinn Monge MD, MD  Hospitalist Service  [facility]  Securely message with the Vocera Web Console  "(learn more here)  Text page via [facility] Paging/Directory     Clinically Significant Risk Factors Present on Admission                   # Moderate Malnutrition: based on nutrition assessment     ______________________________________________________________________    Interval History     Last 24-hour events noted.  Tolerating feeding tube.    Complains of some pain pointing towards left side (?  New G-tube site ?  Shoulder)     4 point ROS done and negative unless mentioned    Data reviewed today: I reviewed all medications, new labs and imaging results over the last 24 hours. I personally reviewed no images or EKG's today.    Physical Exam   BP (!) 153/99 (BP Location: Right arm)   Pulse 99   Temp 97.5  F (36.4  C) (Axillary)   Resp 19   Ht 1.676 m (5' 6\")   Wt 64.1 kg (141 lb 4.8 oz)   SpO2 98%   BMI 22.81 kg/m    Gen- pleasant  lying in bed  CVS- I+II+ no m/r/g  RS- CTAB  Abdo- soft, no tenderness .  G tube in place . Abdominal binder  Ext- no edema  CNS-expressive aphasia  Left-sided hemiplegia    Data   Recent Results (from the past 24 hour(s))   IR Gastrostomy Tube Percutaneous Plcmnt    Narrative    PROCEDURE(S):  1. Nasogastric tube placement      DATE OF PROCEDURE:  7/8/2022 12:25 PM    MODERATE SEDATION: Versed 1 mg IV; Fentanyl 50 mcg IV. During the time  out, immediately prior to the administration of medications, the  patient was reassessed for adequacy to receive conscious sedation.   Under physician supervision, Versed and fentanyl were administered for  moderate sedation. Pulse oximetry, heart rate and blood pressure were  continuously monitored by an independent trained observer. The  physician spent 4 minutes of face-to-face sedation time with the  patient.    CONTRAST: 20 mL Omnipaque 240 into the gastric lumen    REFERENCED AIR KERMA: 5.12 mGy  FLUOROSCOPY TIME: 1 minutes.     ESTIMATED BLOOD LOSS:  Minimal    COMPLICATIONS:  None    CLINICAL HISTORY/INDICATION: Stroke. Long-term need for " "nutrition    PROCEDURES AND FINDINGS:  Following a discussion of the risks, benefits, indications, and  alternatives to treatment, informed consent was obtained. The patient  was brought to the interventional radiology suite and placed supine on  the table. A limited ultrasound of the abdomen was performed to  localize the liver, which was marked on the skin. The nasal jejunal  tube was pulled back so the tip is in the stomach. The abdomen was  then prepped and draped in a routine sterile fashion.  A timeout was  performed per hospital universal protocol policy to ensure correct  patient, site, and procedure to be performed.    The stomach was then insufflated with air via the indwelling  nasogastric tube.  A suitable site for percutaneous access into the  stomach was marked, and local anesthesia was obtained with 1%  Lidocaine. Needle access into the stomach was obtained. Position was  confirmed by aspiration of gas and dripping of contrast into the  gastric lumen. A T fastener was deployed through the needle, and the  anterior stomach wall was tacked to the anterior abdominal wall. This  procedure was repeated with a second T fastener. A 1 cm incision was  made between the T fasteners, and access into the stomach was obtained  using an 18 G needle. Position was confirmed by aspirating gas and  dripping contrast into the gastric lumen.     A 0.035\" Amplatz wire was then advanced across the needle into the  gastric lumen.  The needle was removed and the tract was serially  dilated.  Finally, a peel-away sheath was placed and through the  sheath and over the wire, an 18 Greenlandic gastrostomy tube was placed  into the stomach. Position was confirmed by dripping contrast through  the tube. The retention balloon was insufflated with 10 mL of dilute  contrast.  The retention disk was cinched to the skin and the T  fasteners were secured to the skin. A sterile dressing was applied,  and the tube was capped. The nasogastric " tube was removed.     Throughout the procedure, the patient was monitored by a radiology  nurse for cardiac rhythm and oxygen saturation which remained stable.  The patient tolerated the procedure well and left interventional  radiology in stable condition.      Impression    IMPRESSION:  Placement of a 18 German gastrostomy tube, as detailed above.       BMP  Recent Labs   Lab 07/09/22  1154 07/09/22  0920 07/09/22  0550 07/09/22  0205 07/08/22  1257 07/08/22  1036 07/05/22  1129 07/05/22  0902 07/04/22  1841 07/04/22  1111 07/04/22  0744 07/03/22  1856   NA  --   --   --   --   --  136  --  140  --  140  --  136   POTASSIUM  --   --   --   --   --  3.6  --  3.7  --  3.8  --  3.6   CHLORIDE  --   --   --   --   --  106  --  110*  --  107  --  102   DAT  --   --   --   --   --  8.8  --  8.9  --  9.8  --  9.8   CO2  --   --   --   --   --  24  --  23  --  22  --  23   BUN  --   --   --   --   --  11  --  35*  --  30  --  23   CR  --   --   --   --   --  0.57*  --  0.69  --  0.90  --  0.64*   * 215* 185* 174*   < > 168*   < > 138*   < > 123*   < > 168*    < > = values in this interval not displayed.     CBC  Recent Labs   Lab 07/08/22  1036 07/05/22  0902 07/04/22  1111 07/03/22  1856   WBC 10.5 11.6* 13.2* 12.0*   RBC 3.86* 3.79* 4.22* 4.30*   HGB 11.8* 11.6* 13.1* 13.2*   HCT 36.8* 35.8* 40.5 41.6   MCV 95 95 96 97   MCH 30.6 30.6 31.0 30.7   MCHC 32.1 32.4 32.3 31.7   RDW 12.0 12.7 12.7 12.5    388 426 443     INRNo lab results found in last 7 days.  LFTs  Recent Labs   Lab 07/03/22  1856   ALKPHOS 65   AST 46*   ALT 51   BILITOTAL 0.6   PROTTOTAL 8.2   ALBUMIN 4.0      PANCNo lab results found in last 7 days.

## 2022-07-09 NOTE — PLAN OF CARE
Goal Outcome Evaluation:      Reason for Admission: Acute stroke R MCA/KWADWO and R ICA occlusion, hx of multiple strokes.   Cognitive/Mentation: Lethargic, unable to assess orientation.   Neuros/CMS: Stable with LUE and LLE hemiplegia. L sided numbness and tingling. Pt is nonverbal, but will point and nod head yes/no. Doesn't follow all commands.   VS: VSS on RA, slightly HTN.   GI: BS audible, no BM this shift. Continent.  : Cortez in place for retention, good output.   Pulmonary: LS diminished, productive cough, occasional suction required.   Pain: PEG placement site pain, improved with prn Tylenol.   Drains/Lines: PIV infusing NS at 100  mL/hr.  PEG running tube feed at 20 mL/hr with q4hr 60 mL flushes.   Skin: Intact, T/R.   Activity: Assist x 2 with lift device.  Diet: NPO diet. Takes pills crushed in PEG tube.   Therapies recs: TCU  Discharge: Pending  Aggression Stoplight Tool: Green  End of shift summary: No changes this shift. Tube feed to be increased to 40 mL/hr today at 1600. Corrective BG Insulin given X 2. Phosphorus replacement administered. Recheck draw this morning.

## 2022-07-09 NOTE — PLAN OF CARE
"BP (!) 153/99 (BP Location: Right arm)   Pulse 99   Temp 97.5  F (36.4  C) (Axillary)   Resp 19   Ht 1.676 m (5' 6\")   Wt 64.1 kg (141 lb 4.8 oz)   SpO2 98%   BMI 22.81 kg/m      Patient is currently here with a right-sided cerebrovascular accident. The patient's level of alertness and orientation as well as his neurological status are extremely difficult to assess as the patient is primarily nonverbal and noncompliant, however, dysarthria, aphasia, lethargy, a flat affect, left-sided neglect, left-sided hemiplegia and a left visual field cut are all quite apparent. VSS on RA with intermittent hypertension. Patient is currently NPO with continuous enteral tube feedings running through the patient's PEG Tube at a rate of 20 mL/hr with 60 mL free water flushes every four hours as ordered. Patient transfers and ambulates with an assist of two plus the utilization of a lift device. Patient is incontinent of both bowel and bladder, currently has an indwelling fair catheter in position for urinary retention that has been displaying adequate urinary output. Patient's skin is intact and in good condition aside from scattered bruising as well as blanchable redness on the patient's right axillary area. Denies pain. Patient is currently scoring green on the Aggression Stop Light Tool. Plan to continue to assess for any potential neurological changes. Discharge plans pending eventual placement in a transitional care unit.      "

## 2022-07-09 NOTE — PROGRESS NOTES
[facility]    Stroke Progress Note    Interval Events-PEG tube placed 7/8  -Continuing on DAPT  -No Major events    HPI Summary   52 year old male with medical history polysubstance use (meth/cocaine), IDDM2, HTN. In addition to known history of of chronic tandem R ICA occlusion with R MCA ischemic infarct of R MCA back in March 2022. Patient also had another R MCA stroke in 2022. Patient is admitted to worsening L hemiparesis with now flaccid left sided LUE and LLE paralysis. Etiology ICAD and also known history of polysubstance use. Patient was started on DAPT  and plavix 75 mg.  Patient is stable, able to follow commands. But he has anarthria noted on exam in addition to right gaze preference.Patient is past edema watch period.        Stroke Evaluation Summarized    MRI/Head CT  MRI 7/5/22:   1. Large acute infarct within the right posterior frontal lobe, right parietal lobe, right pre and postcentral gyrus, right insula, and right corpus callosum anterior body. This acute infarct is predominantly in the right MCA territory with small areas extending into the right KWADWO territory.  2.  No susceptibility on GRE to suggest microhemorrhage.     Head CT 7/5/22:  1.  Rounded focus of hypoattenuation within the right corona radiata//centrum semiovale, new since head CT June 24, 2022. On brain MRI June 25, 2022 there was restricted diffusion within this region, consistent with acute ischemia, however the region of   hypoattenuation and is larger than the region of diffusion restriction which is concerning for progression of infarct. Brain MRI could be used to better characterize.          Intracranial Vasculature HEAD CTA:  1.  Redemonstration of marked narrowing to occlusion the midportion of right M1, similar to prior.   2.  Mild asymmetry in the appearance of MCA distributions with mildly less opacified branches on the right, similar to prior.   3.  Mild atherosclerotic changes  "cavernous and supraclinoid ICA on the left.   4.  Mildly attenuated opacification of anterior cerebral artery distribution, similar to prior.  5.  Remaining intracranial circulation appears normal.   Cervical Vasculature NECK CTA:  1.  Redemonstration of occlusion of right ICA in its proximal aspect.  2.  No hemodynamically significant narrowing of the left ICA.  3.  Normal vertebral arteries.     Echocardiogram EF 60- 65%   EKG/Telemetry Sinus Tachycardia   Other Testing Not Applicable     LDL  6/24/2022: 53 mg/dL   A1C  6/18/2022: 7.8 %   Troponin No lab value available in past 48 hrs       Impression:  # Subacute-acute large R MCA/KWADWO territory infarcts in setting of known chronic R cervical ICA occlusion, R M1 occlusion. Interval imaging with some recanalization of R M1. Given this, most likely stroke etiology is artery to artery embolization, although hemodynamic compromise of collateral flow remains a possibility given non-compliance with medications, uncontrolled diabetes/HTN, and cocaine/amphetamine use. NIH 23 on presentation, exam with dense LUE/LLE flaccid hemiplegia, R gaze prefer ance.     # LUE shoulder pain. This may have a central etiology with traction of rotator cuff from flaccid hemiplegia as result of above stroke. No signs of spasticity, point tenderness on exam which is reassuring against fracture.   Plan  -Continue  mg plus Plavix 75 daily for 90 days  -Continue atorvastatin 80 mg  -Goals: Normotension, normoglycemia, normo natremia.   -PT/O/Speech evaluation    Patient Follow-up    Discharge patient to rehab facility when appropriate per PT, OT recommendations.    We will continue to follow.     The Stroke Staff is Dr. Misael Jin MD  Vascular Neurology Fellow  To page me or covering stroke neurology team member, click here: [facility]   Choose \"On Call\" tab at top, then search dropdown box for \"Neurology Adult\", select location, press Enter, then look for stroke/neuro " ICU/telestroke.    ______________________________________________________    Clinically Significant Risk Factors Present on Admission                  Medications   Scheduled Meds    aspirin  300 mg Rectal Daily    Or     aspirin  325 mg Oral or Feeding Tube Daily     atorvastatin  80 mg Oral or Feeding Tube Daily     cetirizine  10 mg Oral or Feeding Tube Daily     clopidogrel  75 mg Oral or Feeding Tube Daily     insulin aspart  1-6 Units Subcutaneous Q4H     insulin glargine  8 Units Subcutaneous At Bedtime     [Held by provider] lisinopril-hydrochlorothiazide  1 tablet Oral Daily     mirtazapine  15 mg Orally disintegrating tablet At Bedtime     sodium chloride (PF)  3 mL Intracatheter Q8H       Infusion Meds    dextrose       - MEDICATION INSTRUCTIONS -       - MEDICATION INSTRUCTIONS -         PRN Meds  acetaminophen **OR** acetaminophen, dextrose, glucose **OR** dextrose **OR** glucagon, lidocaine 4%, lidocaine (buffered or not buffered), - MEDICATION INSTRUCTIONS -, - MEDICATION INSTRUCTIONS -, melatonin, ondansetron **OR** ondansetron, sodium chloride, sodium chloride (PF)       PHYSICAL EXAMINATION  Temp:  [97.3  F (36.3  C)-99  F (37.2  C)] 97.5  F (36.4  C)  Pulse:  [] 99  Resp:  [18-19] 19  BP: (153-173)/() 153/99  SpO2:  [97 %-100 %] 98 %      Neurologic  Mental Status: Patient is able to say monosyllable sounds every now and then. follows commands. Tries to cough and exhale many times  Cranial Nerves:  PERRLA, right gaze preference, tracks to midline occasionally, L facial droop  Motor:  strength 0/5 LUE, 2/5 LLE. RUE and RLE at least 4+/5.  Reflexes:  toes down-going  Sensory:  Diminished light touch sensation L upper and lower extremities  Coordination: Finger-to-nose R side without dysmetria  Station/Gait:  deferred    Stroke Scales    NIHSS  1a. Level of Consciousness 0-->Alert, keenly responsive   1b. LOC Questions 2-->Answers neither question correctly   1c. LOC Commands 0-->Performs  both tasks correctly   2.   Best Gaze 1-->Partial gaze palsy, gaze is abnormal in one or both eyes, but forced deviation or total gaze paresis is not present   3.   Visual 2-->Complete hemianopia   4.   Facial Palsy 1-->Minor paralysis (flattened nasolabial fold, asymmetry on smiling)   5a. Motor Arm, Left 3-->No effort against gravity, limb falls   5b. Motor Arm, Right 0-->No drift, limb holds 90 (or 45) degrees for full 10 secs   6a. Motor Leg, Left 3-->No effort against gravity, leg falls to bed immediately   6b. Motor Leg, right 0-->No drift, leg holds 30 degree position for full 5 secs   7.   Limb Ataxia 0-->Absent   8.   Sensory 1-->Mild-to-moderate sensory loss, patient feels pinprick is less sharp or is dull on the affected side, or there is a loss of superficial pain with pinprick, but patient is aware of being touched   9.   Best Language 2-->Severe aphasia, all communication is through fragmentary expression, great need for inference, questioning, and guessing by the listener. Range of information that can be exchanged is limited, listener carries burden of. . . (see row details)   10. Dysarthria 1-->Mild-to-moderate dysarthria, patient slurs at least some words and, at worst, can be understood with some difficulty   11. Extinction and Inattention  1-->Visual, tactile, auditory, spatial, or personal inattention or extinction to bilateral simultaneous stimulation in one of the sensory modalities   Total 17 (07/09/22 1442)           Imaging  I personally reviewed all imaging; relevant findings per HPI.     Lab Results Data   CBC  Recent Labs   Lab 07/08/22  1036 07/05/22  0902 07/04/22  1111   WBC 10.5 11.6* 13.2*   RBC 3.86* 3.79* 4.22*   HGB 11.8* 11.6* 13.1*   HCT 36.8* 35.8* 40.5    388 426     Basic Metabolic Panel    Recent Labs   Lab 07/09/22  1154 07/09/22  0920 07/09/22  0550 07/08/22  1257 07/08/22  1036 07/05/22  1129 07/05/22  0902 07/04/22  1841 07/04/22  1111   NA  --   --   --   --   136  --  140  --  140   POTASSIUM  --   --   --   --  3.6  --  3.7  --  3.8   CHLORIDE  --   --   --   --  106  --  110*  --  107   CO2  --   --   --   --  24  --  23  --  22   BUN  --   --   --   --  11  --  35*  --  30   CR  --   --   --   --  0.57*  --  0.69  --  0.90   * 215* 185*   < > 168*   < > 138*   < > 123*   DAT  --   --   --   --  8.8  --  8.9  --  9.8    < > = values in this interval not displayed.     Liver Panel  Recent Labs   Lab 07/03/22 1856   PROTTOTAL 8.2   ALBUMIN 4.0   BILITOTAL 0.6   ALKPHOS 65   AST 46*   ALT 51     INR    Recent Labs   Lab Test 06/24/22  1805 02/05/22  1919 04/18/21  1553   INR 1.08 0.97 1.05      Lipid Profile    Recent Labs   Lab Test 06/24/22  1805 03/20/22  0558 02/06/22  0617   CHOL 102 93 193   HDL 30* 34* 38*   LDL 53 46 126*   TRIG 95 66 144     A1C    Recent Labs   Lab Test 06/18/22 2150 03/20/22  0558 02/06/22  0617   A1C 7.8* 8.8* 10.5*     Troponin I    Recent Labs   Lab 07/03/22  1856   TROPONINIS 10

## 2022-07-10 ENCOUNTER — APPOINTMENT (OUTPATIENT)
Dept: OCCUPATIONAL THERAPY | Facility: CLINIC | Age: 52
DRG: 065 | End: 2022-07-10
Payer: COMMERCIAL

## 2022-07-10 ENCOUNTER — APPOINTMENT (OUTPATIENT)
Dept: PHYSICAL THERAPY | Facility: CLINIC | Age: 52
DRG: 065 | End: 2022-07-10
Payer: COMMERCIAL

## 2022-07-10 ENCOUNTER — APPOINTMENT (OUTPATIENT)
Dept: SPEECH THERAPY | Facility: CLINIC | Age: 52
DRG: 065 | End: 2022-07-10
Payer: COMMERCIAL

## 2022-07-10 LAB
GLUCOSE BLDC GLUCOMTR-MCNC: 204 MG/DL (ref 70–99)
GLUCOSE BLDC GLUCOMTR-MCNC: 208 MG/DL (ref 70–99)
GLUCOSE BLDC GLUCOMTR-MCNC: 215 MG/DL (ref 70–99)
GLUCOSE BLDC GLUCOMTR-MCNC: 216 MG/DL (ref 70–99)
GLUCOSE BLDC GLUCOMTR-MCNC: 234 MG/DL (ref 70–99)
GLUCOSE BLDC GLUCOMTR-MCNC: 235 MG/DL (ref 70–99)

## 2022-07-10 PROCEDURE — 250N000013 HC RX MED GY IP 250 OP 250 PS 637: Performed by: PSYCHIATRY & NEUROLOGY

## 2022-07-10 PROCEDURE — 92526 ORAL FUNCTION THERAPY: CPT | Mod: GN

## 2022-07-10 PROCEDURE — 250N000013 HC RX MED GY IP 250 OP 250 PS 637: Performed by: INTERNAL MEDICINE

## 2022-07-10 PROCEDURE — 99231 SBSQ HOSP IP/OBS SF/LOW 25: CPT | Mod: GC | Performed by: PSYCHIATRY & NEUROLOGY

## 2022-07-10 PROCEDURE — 120N000001 HC R&B MED SURG/OB

## 2022-07-10 PROCEDURE — 250N000012 HC RX MED GY IP 250 OP 636 PS 637: Performed by: INTERNAL MEDICINE

## 2022-07-10 PROCEDURE — 97530 THERAPEUTIC ACTIVITIES: CPT | Mod: GP

## 2022-07-10 PROCEDURE — 92507 TX SP LANG VOICE COMM INDIV: CPT | Mod: GN

## 2022-07-10 PROCEDURE — 99233 SBSQ HOSP IP/OBS HIGH 50: CPT | Performed by: INTERNAL MEDICINE

## 2022-07-10 PROCEDURE — 97112 NEUROMUSCULAR REEDUCATION: CPT | Mod: GO

## 2022-07-10 RX ORDER — BISACODYL 10 MG
10 SUPPOSITORY, RECTAL RECTAL DAILY PRN
Status: DISCONTINUED | OUTPATIENT
Start: 2022-07-10 | End: 2022-08-02 | Stop reason: HOSPADM

## 2022-07-10 RX ORDER — POLYETHYLENE GLYCOL 3350 17 G/17G
17 POWDER, FOR SOLUTION ORAL 2 TIMES DAILY PRN
Status: DISCONTINUED | OUTPATIENT
Start: 2022-07-10 | End: 2022-07-11

## 2022-07-10 RX ORDER — AMOXICILLIN 250 MG
1 CAPSULE ORAL 2 TIMES DAILY PRN
Status: DISCONTINUED | OUTPATIENT
Start: 2022-07-10 | End: 2022-07-10

## 2022-07-10 RX ORDER — AMOXICILLIN 250 MG
1 CAPSULE ORAL 2 TIMES DAILY
Status: DISCONTINUED | OUTPATIENT
Start: 2022-07-10 | End: 2022-07-11

## 2022-07-10 RX ORDER — MIRTAZAPINE 15 MG/1
15 TABLET, FILM COATED ORAL AT BEDTIME
Status: DISCONTINUED | OUTPATIENT
Start: 2022-07-10 | End: 2022-08-02 | Stop reason: HOSPADM

## 2022-07-10 RX ADMIN — INSULIN ASPART 2 UNITS: 100 INJECTION, SOLUTION INTRAVENOUS; SUBCUTANEOUS at 12:05

## 2022-07-10 RX ADMIN — MIRTAZAPINE 15 MG: 15 TABLET, FILM COATED ORAL at 21:57

## 2022-07-10 RX ADMIN — INSULIN ASPART 2 UNITS: 100 INJECTION, SOLUTION INTRAVENOUS; SUBCUTANEOUS at 21:57

## 2022-07-10 RX ADMIN — INSULIN ASPART 2 UNITS: 100 INJECTION, SOLUTION INTRAVENOUS; SUBCUTANEOUS at 08:54

## 2022-07-10 RX ADMIN — SENNOSIDES AND DOCUSATE SODIUM 1 TABLET: 50; 8.6 TABLET ORAL at 05:12

## 2022-07-10 RX ADMIN — ASPIRIN 325 MG ORAL TABLET 325 MG: 325 PILL ORAL at 08:53

## 2022-07-10 RX ADMIN — SENNOSIDES AND DOCUSATE SODIUM 1 TABLET: 50; 8.6 TABLET ORAL at 21:57

## 2022-07-10 RX ADMIN — INSULIN ASPART 2 UNITS: 100 INJECTION, SOLUTION INTRAVENOUS; SUBCUTANEOUS at 05:12

## 2022-07-10 RX ADMIN — INSULIN ASPART 2 UNITS: 100 INJECTION, SOLUTION INTRAVENOUS; SUBCUTANEOUS at 01:16

## 2022-07-10 RX ADMIN — INSULIN ASPART 2 UNITS: 100 INJECTION, SOLUTION INTRAVENOUS; SUBCUTANEOUS at 18:14

## 2022-07-10 RX ADMIN — SENNOSIDES AND DOCUSATE SODIUM 1 TABLET: 50; 8.6 TABLET ORAL at 08:53

## 2022-07-10 RX ADMIN — ACETAMINOPHEN 650 MG: 325 TABLET ORAL at 05:12

## 2022-07-10 RX ADMIN — CETIRIZINE HYDROCHLORIDE 10 MG: 5 SOLUTION ORAL at 08:53

## 2022-07-10 RX ADMIN — ATORVASTATIN CALCIUM 80 MG: 80 TABLET, FILM COATED ORAL at 08:53

## 2022-07-10 RX ADMIN — CLOPIDOGREL BISULFATE 75 MG: 75 TABLET ORAL at 08:53

## 2022-07-10 RX ADMIN — ACETAMINOPHEN 650 MG: 325 TABLET ORAL at 22:09

## 2022-07-10 ASSESSMENT — ACTIVITIES OF DAILY LIVING (ADL)
ADLS_ACUITY_SCORE: 49
ADLS_ACUITY_SCORE: 52
ADLS_ACUITY_SCORE: 56
ADLS_ACUITY_SCORE: 52
ADLS_ACUITY_SCORE: 52
ADLS_ACUITY_SCORE: 49
ADLS_ACUITY_SCORE: 52
ADLS_ACUITY_SCORE: 56
ADLS_ACUITY_SCORE: 52
ADLS_ACUITY_SCORE: 52

## 2022-07-10 NOTE — PROGRESS NOTES
[facility]    Medicine Progress Note - Hospitalist Service    Date of Admission:  7/3/2022    Assessment & Plan        Josue Parisi is 52, who has had multiple strokes with some significant compliance issues, ongoing polysubstance abuse, being admitted under observation status for placement at a TCU, as he is unable to care for himself.     Multiple ischemic CVAs involving MCA, KWADWO with left-sided weakness and concern for progression  Medication noncompliance  Polysubstance abuse  Initial CVA occurred in 03/2022 with right KWADWO, large territory defect and residual left-sided weakness recommended to discharge to ARU however declined and discharged AMA. Subsequently returned 6/18 with progressive weakness, again referred to TCU however declined and returned home. Hospitalized 6/24 with worsening left-sided weakness, paresthesias, numbness with findings of new nonhemorrhagic infarction within right prefrontal gyrus and deep MCA watershed infarct within the right centrum semiovale of the right frontal and parietal lobes. Patient was evaluated by stroke neurology each admission, most recently felt symptoms were suggestive of new infarct rather than hypoperfused tissue. Recommended liberal SBP goal of 140-160 and DAPT with plavix/ for 90 days. Patient most recently left AMA on 6/30 after being recommended for TCU. At time of discharge, it appears the plavix was discontinued in an effort to limit medications and encourage compliance.   * Returned to ED 7/3/22 with reports of failing at home. It was unclear on admission if patient's left-sided symptoms had persisted or worsened due to presence of polysubstances and limited ability to participate in physical exam. Admitted to cocaine use as recently as 7/2. UTox on admission positive for cocaine, amphetamines. Admitted under observation care for presumed placement.  * 7/4 exam noted with 1/5 LUE/LLE strength, limited ability to  "communicate verbally but cognitively able to answer yes/no questions with gestures, shakes of head. RNs present at bedside had cared for patient week prior and reported change in function. Repeat CTH revealed rounded focus of hypoattenuation within right corona radiata/centrum semiovale, MRI brain 6/25 indicated restricted diffusion within this region however area of hypoattenuation is larger than region of diffusion restriction concerning for progression of infarct.  - Cardiac monitoring  - Hold PTA antihypertensive to encourage -160    -Neuro note 7/6- \"recommend SAMMPRIS trial guided DAPT (+clopidogrel 75 x90 days) in an attempt to stabilize the M1 segment and prevent inferior division infarct. \"   Antiplatelet agents could be held for a brief period to permit G tube placement, but I would prefer if he is bridged with a heparin gtt if that is necessary.   (Currently no access, so receiving ASA 300mg rectally)    - Continue statin  - PT/OT consults  - Reconsult speech therapy -appreciate recommendations.  07/07: Long discussion with sister and mother.  Mother is the decision-maker and understand patient's current condition.  Agrees for NG tube and PEG tube placement.  7/08:  G-tube placement  7/9: Continue feeding through G-tube.  Unclear if pain in PEG site or left shoulder (as per neurology).  Monitor.  Continue as needed Tylenol for now    SIRS syndrome  Pt with mild leukocytosis 12-13, procalcitonin on admission negative. Low-grade fever and sinus tachycardia persisting today. UA without evidence of infection. CXR neg. ?due to polysubstance abuse with amphetamines, cocaine in UTox.  - Repeat CBC trending down   - Low threshold to obtain blood cultures, initiate broad-spectrum abx      Type 2 diabetes mellitus.    Last HbA1c of 7.8 on 06/18. PTA regimen of metformin. Recent admission Lantus had been increased to 18u BID, carb coverage with meals of 1:10 with breakfast/lunch, 1:15 with dinner.  - " Hold metformin  Recent Labs   Lab 07/10/22  1056 07/10/22  0838 07/10/22  0456 07/10/22  0103 07/09/22  2111 07/09/22  1655   * 235* 234* 204* 202* 202*     - 7/10; increase Lantus to 15 units  -sliding scale insulin -Changed to every 4 hours sliding coverage     History of hypertension   Hold PTA meds to allow permissive hypertension  See above, goal -160 mmHg     Hidradenitis / Rt axillary wound  WOCN consult previous recs:   Right axilla wound: Daily  and PRN   Cleanse wound with NS or wound cleanser (omit this step if patient cleans wound in shower)  Dry and protect surrounding skin with no sting barrier film wipe #[id number]  Apply a small amount of iodesorb gel #[id number] to bandaid  If Band-Aid needs to be changed more than once a day. Apply iodesorb gel #[id number] to adaptic #[id number] and cover with Mepilex #[id number]     Generalized pain: PTA on gabapentin 300 mg 3 times daily.    - Holding gabapentin   - Continue as needed Tylenol as needed. Avoid opioids      Diet: Adult Formula Drip Feeding: Continuous Jevity 1.5; Other - Specify in Comment; Goal Rate: 60; mL/hr; Medication - Feeding Tube Flush Frequency: At least 15-30 mL water before and after medication administration and with tube clogging; Amount to Se...  NPO per Anesthesia Guidelines for Procedure/Surgery Except for: Meds, Other; Specify: please stop tube feedings    DVT Prophylaxis: Pneumatic Compression Devices  Cortez Catheter: PRESENT, indication: Retention  Central Lines: None  Cardiac Monitoring: None  Code Status: Full Code      Disposition Plan      Expected Discharge Date: 07/11/2022    Discharge Delays: Placement - TCU    Discharge Comments: Patient non-compliant. Placement will be difficult. TCU.        The patient's care was discussed with the Bedside Nurse and Patient.    Quinn Monge MD, MD  Hospitalist Service  [facility]  Securely message with the Vocera Web Console (learn more here)  Text page via [employer]  "Paging/Directory     Clinically Significant Risk Factors Present on Admission                   # Moderate Malnutrition: based on nutrition assessment   _________________________________________________________________    Interval History   Last 24-hour events noted.  Tolerating feeding tube.    No specific complaints.  Unclear if he has intermittent left shoulder pain.     4 point ROS done and negative unless mentioned    Data reviewed today: I reviewed all medications, new labs and imaging results over the last 24 hours. I personally reviewed no images or EKG's today.    Physical Exam   BP (!) 149/94 (BP Location: Right arm)   Pulse 102   Temp 98.6  F (37  C) (Axillary)   Resp 18   Ht 1.676 m (5' 6\")   Wt 67.3 kg (148 lb 6.4 oz)   SpO2 98%   BMI 23.95 kg/m    Gen- pleasant  lying in bed  CVS- I+II+ no m/r/g  RS- CTAB  Abdo- soft, no tenderness .  G tube in place . Abdominal binder  Ext- no edema  CNS-expressive aphasia  Left-sided hemiplegia    Data   No results found for this or any previous visit (from the past 24 hour(s)).  BMP  Recent Labs   Lab 07/10/22  1056 07/10/22  0838 07/10/22  0456 07/10/22  0103 07/08/22  1257 07/08/22  1036 07/05/22  1129 07/05/22  0902 07/04/22  1841 07/04/22  1111 07/04/22  0744 07/03/22  1856   NA  --   --   --   --   --  136  --  140  --  140  --  136   POTASSIUM  --   --   --   --   --  3.6  --  3.7  --  3.8  --  3.6   CHLORIDE  --   --   --   --   --  106  --  110*  --  107  --  102   DAT  --   --   --   --   --  8.8  --  8.9  --  9.8  --  9.8   CO2  --   --   --   --   --  24  --  23  --  22  --  23   BUN  --   --   --   --   --  11  --  35*  --  30  --  23   CR  --   --   --   --   --  0.57*  --  0.69  --  0.90  --  0.64*   * 235* 234* 204*   < > 168*   < > 138*   < > 123*   < > 168*    < > = values in this interval not displayed.     CBC  Recent Labs   Lab 07/08/22  1036 07/05/22  0902 07/04/22  1111 07/03/22  1856   WBC 10.5 11.6* 13.2* 12.0*   RBC 3.86* 3.79* " 4.22* 4.30*   HGB 11.8* 11.6* 13.1* 13.2*   HCT 36.8* 35.8* 40.5 41.6   MCV 95 95 96 97   MCH 30.6 30.6 31.0 30.7   MCHC 32.1 32.4 32.3 31.7   RDW 12.0 12.7 12.7 12.5    388 426 443     INRNo lab results found in last 7 days.  LFTs  Recent Labs   Lab 07/03/22  1856   ALKPHOS 65   AST 46*   ALT 51   BILITOTAL 0.6   PROTTOTAL 8.2   ALBUMIN 4.0

## 2022-07-10 NOTE — PLAN OF CARE
"BP (!) 149/94 (BP Location: Right arm)   Pulse 102   Temp 98.6  F (37  C) (Axillary)   Resp 18   Ht 1.676 m (5' 6\")   Wt 67.3 kg (148 lb 6.4 oz)   SpO2 98%   BMI 23.95 kg/m      Patient is currently here with a right-sided cerebrovascular accident. The patient's level of alertness and orientation as well as his neurological status are extremely difficult to assess as the patient is primarily nonverbal and noncompliant, however, dysarthria, aphasia, lethargy, a flat affect, left-sided neglect, left-sided hemiplegia and a left visual field cut are all quite apparent. VSS on RA with intermittent hypertension. Patient is currently NPO with continuous enteral tube feedings running through the patient's PEG Tube at a rate of 40 mL/hr with 60 mL free water flushes every four hours as ordered. Patient transfers and ambulates with an assist of two plus the utilization of a lift device. Patient is incontinent of both bowel and bladder, currently has an indwelling fair catheter in position for urinary retention that has been displaying adequate urinary output. Patient's skin is intact and in good condition aside from scattered bruising as well as blanchable redness on the patient's right axillary area. Denies pain. Patient is currently scoring green on the Aggression Stop Light Tool. Plan to continue to assess for any potential neurological changes. Discharge plans pending eventual placement in a transitional care unit.    "

## 2022-07-10 NOTE — PROVIDER NOTIFICATION
"0420 To Hospitalist:     \"    Pt LBM 7/5/2022. No Bowel Promotion medications available. Please provide medication options to promote stools.     Thank you, Ani HALL RN \"  "

## 2022-07-10 NOTE — PLAN OF CARE
[id number] Pt here with R CVA.  Alert.  SEAN orientation.  NV.  Incomprehensible sounds.  L neglect.  R gaze preference.  LUE 0/5.  LLE 1/5.  Follows most commands.  Vitals stable.  LS diminished.  Infrequent cough.  Oral suction per pt.  NPO.  TF through PEG at goal rate.  FWF programmed.  Diego patent.  Adequate UO this shift.  Turn and repo q 2 h.  No IV access.  Green on aggression stoplight.  Waiting for TCU placement.

## 2022-07-10 NOTE — PLAN OF CARE
4 hour shift summary:  Neuro: No exam changes. Left sided Hemiplegia. Severe right gaze preference.  Developing increased Rigidity. Consider adding muscle relaxant. ROM provided. Nonverbal at most times intermittent response and vocalization with known apraxia.  Follow eye contact.   CV:  VSS  Pulm: Aspiration risk. Frequent oral cares and suction near right hand. Patient can self suction at times.    GI/: Cortez catheter secure. No BM today last BM 7/8. Will need bowel regimen initiated. Jevity increased to 40 ml/hr and due to change to goal rate 60 ml/hr tomorrow at 1600.   PEG tube site secure with mild drainage with ABD binder on for protection.        Goal Outcome Evaluation:    Plan of Care Reviewed With: patient     Overall Patient Progress: no change

## 2022-07-10 NOTE — PLAN OF CARE
Goal Outcome Evaluation:               Reason for Admission: R- CVA, worsening Left-sided weakness.   Cognition/Mentation: unable to assess orientation d/t aphasia. Does point and nod yes/no to communicate.   Neuros/CMS: Stable with LUE and LLE hemiplegia.  Rigidity noted in LLE. Torticollis noted, pillows placed to reposition. Pt nonverbal. Does follow commands, but inconsistently.   VS: VSS on RA, slightly HTN.   GI: BS audible, no BM this shift. LBM 7/5, Senna given.   : Cortez in place for retention. Pulmonary: LS diminished.    Pain: PEG tube placement site pain, managed with prn Tylenol.   Drains/Lines:   PEG TF running at 40 mL/hr with q4hr 60 mL flushes.   Skin: Intact, T/Rq2  Activity: Assist x 2 with lift device.  Diet: NPO diet. Takes pills crushed in PEG tube.   Therapies recs: TCU  Discharge: Pending  Aggression Stoplight Tool: Green  End of shift summary:  Corrective BG Insulin given X 2.

## 2022-07-11 ENCOUNTER — APPOINTMENT (OUTPATIENT)
Dept: PHYSICAL THERAPY | Facility: CLINIC | Age: 52
DRG: 065 | End: 2022-07-11
Payer: COMMERCIAL

## 2022-07-11 ENCOUNTER — APPOINTMENT (OUTPATIENT)
Dept: SPEECH THERAPY | Facility: CLINIC | Age: 52
DRG: 065 | End: 2022-07-11
Payer: COMMERCIAL

## 2022-07-11 ENCOUNTER — APPOINTMENT (OUTPATIENT)
Dept: OCCUPATIONAL THERAPY | Facility: CLINIC | Age: 52
DRG: 065 | End: 2022-07-11
Payer: COMMERCIAL

## 2022-07-11 LAB
ANION GAP SERPL CALCULATED.3IONS-SCNC: 5 MMOL/L (ref 3–14)
BUN SERPL-MCNC: 17 MG/DL (ref 7–30)
CALCIUM SERPL-MCNC: 9.2 MG/DL (ref 8.5–10.1)
CHLORIDE BLD-SCNC: 104 MMOL/L (ref 94–109)
CO2 SERPL-SCNC: 28 MMOL/L (ref 20–32)
CREAT SERPL-MCNC: 0.64 MG/DL (ref 0.66–1.25)
GFR SERPL CREATININE-BSD FRML MDRD: >90 ML/MIN/1.73M2
GLUCOSE BLD-MCNC: 295 MG/DL (ref 70–99)
GLUCOSE BLDC GLUCOMTR-MCNC: 219 MG/DL (ref 70–99)
GLUCOSE BLDC GLUCOMTR-MCNC: 235 MG/DL (ref 70–99)
GLUCOSE BLDC GLUCOMTR-MCNC: 236 MG/DL (ref 70–99)
GLUCOSE BLDC GLUCOMTR-MCNC: 241 MG/DL (ref 70–99)
GLUCOSE BLDC GLUCOMTR-MCNC: 287 MG/DL (ref 70–99)
GLUCOSE BLDC GLUCOMTR-MCNC: 287 MG/DL (ref 70–99)
MAGNESIUM SERPL-MCNC: 2.1 MG/DL (ref 1.6–2.3)
PHOSPHATE SERPL-MCNC: 2.8 MG/DL (ref 2.5–4.5)
POTASSIUM BLD-SCNC: 3.4 MMOL/L (ref 3.4–5.3)
SARS-COV-2 RNA RESP QL NAA+PROBE: NEGATIVE
SODIUM SERPL-SCNC: 137 MMOL/L (ref 133–144)

## 2022-07-11 PROCEDURE — 36415 COLL VENOUS BLD VENIPUNCTURE: CPT | Performed by: INTERNAL MEDICINE

## 2022-07-11 PROCEDURE — 250N000012 HC RX MED GY IP 250 OP 636 PS 637: Performed by: INTERNAL MEDICINE

## 2022-07-11 PROCEDURE — 99233 SBSQ HOSP IP/OBS HIGH 50: CPT | Performed by: INTERNAL MEDICINE

## 2022-07-11 PROCEDURE — 92526 ORAL FUNCTION THERAPY: CPT | Mod: GN | Performed by: SPEECH-LANGUAGE PATHOLOGIST

## 2022-07-11 PROCEDURE — 97530 THERAPEUTIC ACTIVITIES: CPT | Mod: GP | Performed by: PHYSICAL THERAPIST

## 2022-07-11 PROCEDURE — 97112 NEUROMUSCULAR REEDUCATION: CPT | Mod: GO | Performed by: OCCUPATIONAL THERAPIST

## 2022-07-11 PROCEDURE — 83735 ASSAY OF MAGNESIUM: CPT | Performed by: INTERNAL MEDICINE

## 2022-07-11 PROCEDURE — 250N000013 HC RX MED GY IP 250 OP 250 PS 637: Performed by: INTERNAL MEDICINE

## 2022-07-11 PROCEDURE — 0DH63UZ INSERTION OF FEEDING DEVICE INTO STOMACH, PERCUTANEOUS APPROACH: ICD-10-PCS | Performed by: RADIOLOGY

## 2022-07-11 PROCEDURE — 80048 BASIC METABOLIC PNL TOTAL CA: CPT | Performed by: INTERNAL MEDICINE

## 2022-07-11 PROCEDURE — 120N000001 HC R&B MED SURG/OB

## 2022-07-11 PROCEDURE — 84100 ASSAY OF PHOSPHORUS: CPT | Performed by: INTERNAL MEDICINE

## 2022-07-11 PROCEDURE — 97535 SELF CARE MNGMENT TRAINING: CPT | Mod: GO | Performed by: OCCUPATIONAL THERAPIST

## 2022-07-11 PROCEDURE — U0005 INFEC AGEN DETEC AMPLI PROBE: HCPCS | Performed by: INTERNAL MEDICINE

## 2022-07-11 PROCEDURE — 92507 TX SP LANG VOICE COMM INDIV: CPT | Mod: GN | Performed by: SPEECH-LANGUAGE PATHOLOGIST

## 2022-07-11 RX ORDER — POLYETHYLENE GLYCOL 3350 17 G/17G
17 POWDER, FOR SOLUTION ORAL 2 TIMES DAILY PRN
Status: DISCONTINUED | OUTPATIENT
Start: 2022-07-11 | End: 2022-08-02 | Stop reason: HOSPADM

## 2022-07-11 RX ORDER — ACETAMINOPHEN 325 MG/1
650 TABLET ORAL EVERY 4 HOURS PRN
Status: DISCONTINUED | OUTPATIENT
Start: 2022-07-11 | End: 2022-07-16

## 2022-07-11 RX ORDER — ACETAMINOPHEN 650 MG/1
650 SUPPOSITORY RECTAL EVERY 4 HOURS PRN
Status: DISCONTINUED | OUTPATIENT
Start: 2022-07-11 | End: 2022-07-16

## 2022-07-11 RX ORDER — AMOXICILLIN 250 MG
1 CAPSULE ORAL 2 TIMES DAILY
Status: DISCONTINUED | OUTPATIENT
Start: 2022-07-11 | End: 2022-07-19

## 2022-07-11 RX ADMIN — INSULIN GLARGINE 15 UNITS: 100 INJECTION, SOLUTION SUBCUTANEOUS at 00:31

## 2022-07-11 RX ADMIN — INSULIN ASPART 2 UNITS: 100 INJECTION, SOLUTION INTRAVENOUS; SUBCUTANEOUS at 01:52

## 2022-07-11 RX ADMIN — CETIRIZINE HYDROCHLORIDE 10 MG: 5 SOLUTION ORAL at 08:04

## 2022-07-11 RX ADMIN — ASPIRIN 325 MG ORAL TABLET 325 MG: 325 PILL ORAL at 08:04

## 2022-07-11 RX ADMIN — INSULIN ASPART 3 UNITS: 100 INJECTION, SOLUTION INTRAVENOUS; SUBCUTANEOUS at 08:04

## 2022-07-11 RX ADMIN — ACETAMINOPHEN 650 MG: 325 TABLET ORAL at 20:52

## 2022-07-11 RX ADMIN — SENNOSIDES AND DOCUSATE SODIUM 1 TABLET: 50; 8.6 TABLET ORAL at 20:52

## 2022-07-11 RX ADMIN — INSULIN ASPART 2 UNITS: 100 INJECTION, SOLUTION INTRAVENOUS; SUBCUTANEOUS at 21:20

## 2022-07-11 RX ADMIN — INSULIN ASPART 2 UNITS: 100 INJECTION, SOLUTION INTRAVENOUS; SUBCUTANEOUS at 05:59

## 2022-07-11 RX ADMIN — SENNOSIDES AND DOCUSATE SODIUM 1 TABLET: 50; 8.6 TABLET ORAL at 08:04

## 2022-07-11 RX ADMIN — Medication 1 MG: at 20:52

## 2022-07-11 RX ADMIN — INSULIN ASPART 3 UNITS: 100 INJECTION, SOLUTION INTRAVENOUS; SUBCUTANEOUS at 16:34

## 2022-07-11 RX ADMIN — INSULIN ASPART 3 UNITS: 100 INJECTION, SOLUTION INTRAVENOUS; SUBCUTANEOUS at 12:33

## 2022-07-11 RX ADMIN — CLOPIDOGREL BISULFATE 75 MG: 75 TABLET ORAL at 08:04

## 2022-07-11 RX ADMIN — ATORVASTATIN CALCIUM 80 MG: 80 TABLET, FILM COATED ORAL at 08:04

## 2022-07-11 RX ADMIN — MIRTAZAPINE 15 MG: 15 TABLET, FILM COATED ORAL at 20:57

## 2022-07-11 ASSESSMENT — ACTIVITIES OF DAILY LIVING (ADL)
ADLS_ACUITY_SCORE: 49
ADLS_ACUITY_SCORE: 49
ADLS_ACUITY_SCORE: 52
ADLS_ACUITY_SCORE: 54
ADLS_ACUITY_SCORE: 52
ADLS_ACUITY_SCORE: 49
ADLS_ACUITY_SCORE: 52
ADLS_ACUITY_SCORE: 49
ADLS_ACUITY_SCORE: 52
ADLS_ACUITY_SCORE: 56

## 2022-07-11 NOTE — PROGRESS NOTES
CLINICAL NUTRITION SERVICES - REASSESSMENT NOTE      Recommendations Ordered by Registered Dietitian (RD):     With IVF off, will increase free water flushes to 120 mL every 4 hrs (720 mL).  TF + flushes = 1814 mL/day.     Malnutrition: (7/11)  % Weight Loss:  Up to 10% in 6 months (moderate malnutrition)  % Intake:  </= 50% for >/= 5 days (severe malnutrition) - PTA, has been on EN past few days  Subcutaneous Fat Loss:  None observed  Muscle Loss:  Clavicle bone region - mild depletion, prominent cheekbones  Fluid Retention:  None noted    Malnutrition Diagnosis: Moderate malnutrition  In Context of:  Acute illness or injury on Chronic illness or disease       EVALUATION OF PROGRESS TOWARD GOALS   Diet:    NPO    Nutrition Support:    Nutrition Support Enteral:  Type of Feeding Tube: (7/8) 18 FR YADIRA G-tube placed  Enteral Frequency:  Continuous  Enteral Regimen: Jevity 1.5 @ 60 mL/hr (goal rate)  Total Enteral Provisions: 2160 cals (32 cals/kg), 92 gm pro (1.4 gm/kg), 30 gm fiber, 1094 mL free water  Free Water Flush: 60 mL every 4 hrs (for tube patency while on IVF)    Intake/Tolerance:    Chart reviewed  Pt tolerating TF at goal rate  7/8 - last noted BM per RN (Senokot BID started 7/10)  No IVF - will need increase in FWF      ASSESSED NUTRITION NEEDS:  Dosing Weight (7/11) 66.3 kg  Estimated Energy Needs: 0308-9427 kcals (25-30 Kcal/Kg)  Justification: maintenance  Estimated Protein Needs:  grams protein (1.2-1.5 g pro/Kg)  Justification: preservation of lean body mass      NEW FINDINGS:     Per RN, Patient's skin is intact and in good condition    Waiting for TCU placement    Vitals:    07/08/22 2001 07/08/22 2129 07/10/22 0300 07/11/22 0233   Weight: 64.1 kg (141 lb 4.8 oz) 64.1 kg (141 lb 4.8 oz) 67.3 kg (148 lb 6.4 oz) 66.3 kg (146 lb 3.2 oz)     06/30/22 : 71.3 kg (157 lb 1.6 oz)   06/19/22 : 66.6 kg (146 lb 12.8 oz)   03/21/22 : 68.8 kg (151 lb 9.6 oz)   02/06/22 : 71.8 kg (158 lb 6.4 oz)     Wt  overall down ~10#  (6%) from 5 months ago      Previous Goals (7/6):   Pt to receive nutrition in the next 48-72 hrs (po vs EN)  Evaluation: Met    Previous Nutrition Diagnosis (7/6):   Inadequate protein-energy intake related to NPO status as evidenced by pt not meeting nutrition needs  Evaluation: Improving      MALNUTRITION  % Weight Loss:  Up to 10% in 6 months (moderate malnutrition)  % Intake:  </= 50% for >/= 5 days (severe malnutrition) - PTA, has been on EN past few days  Subcutaneous Fat Loss:  None observed  Muscle Loss:  Clavicle bone region - mild depletion, prominent cheekbones  Fluid Retention:  None noted    Malnutrition Diagnosis: Moderate malnutrition  In Context of:  Acute illness or injury on Chronic illness or disease    CURRENT NUTRITION DIAGNOSIS  No nutrition diagnosis identified at this time as EN meeting estimated needs    INTERVENTIONS  Recommendations / Nutrition Prescription  NPO    With IVF off, will increase free water flushes to 120 mL every 4 hrs (720 mL).  TF + flushes = 1814 mL/day.      Goals  EN to meet % est needs      MONITORING AND EVALUATION:  Progress towards goals will be monitored and evaluated per protocol and Practice Guidelines

## 2022-07-11 NOTE — PROGRESS NOTES
[facility]    Medicine Progress Note - Hospitalist Service    Date of Admission:  7/3/2022    Assessment & Plan        Josue Parisi is 52, who has had multiple strokes with some significant compliance issues, ongoing polysubstance abuse, being admitted under observation status for placement at a TCU, as he is unable to care for himself.     Multiple ischemic CVAs involving MCA, KWADWO with left-sided weakness and concern for progression  Medication noncompliance  Polysubstance abuse  Initial CVA occurred in 03/2022 with right KWADWO, large territory defect and residual left-sided weakness recommended to discharge to ARU however declined and discharged AMA. Subsequently returned 6/18 with progressive weakness, again referred to TCU however declined and returned home. Hospitalized 6/24 with worsening left-sided weakness, paresthesias, numbness with findings of new nonhemorrhagic infarction within right prefrontal gyrus and deep MCA watershed infarct within the right centrum semiovale of the right frontal and parietal lobes. Patient was evaluated by stroke neurology each admission, most recently felt symptoms were suggestive of new infarct rather than hypoperfused tissue. Recommended liberal SBP goal of 140-160 and DAPT with plavix/ for 90 days. Patient most recently left AMA on 6/30 after being recommended for TCU. At time of discharge, it appears the plavix was discontinued in an effort to limit medications and encourage compliance.   * Returned to ED 7/3/22 with reports of failing at home. It was unclear on admission if patient's left-sided symptoms had persisted or worsened due to presence of polysubstances and limited ability to participate in physical exam. Admitted to cocaine use as recently as 7/2. UTox on admission positive for cocaine, amphetamines. Admitted under observation care for presumed placement.  * 7/4 exam noted with 1/5 LUE/LLE strength, limited ability to  "communicate verbally but cognitively able to answer yes/no questions with gestures, shakes of head. RNs present at bedside had cared for patient week prior and reported change in function. Repeat CTH revealed rounded focus of hypoattenuation within right corona radiata/centrum semiovale, MRI brain 6/25 indicated restricted diffusion within this region however area of hypoattenuation is larger than region of diffusion restriction concerning for progression of infarct.  - Cardiac monitoring  - Hold PTA antihypertensive to encourage -160    -Neuro note 7/6- \"recommend SAMMPRIS trial guided DAPT (+clopidogrel 75 x90 days) in an attempt to stabilize the M1 segment and prevent inferior division infarct. \"   Antiplatelet agents could be held for a brief period to permit G tube placement, but I would prefer if he is bridged with a heparin gtt if that is necessary.   (Currently no access, so receiving ASA 300mg rectally)    - Continue statin  - PT/OT consults  - Reconsult speech therapy -appreciate recommendations.  07/07: Long discussion with sister and mother.  Mother is the decision-maker and understand patient's current condition.  Agrees for NG tube and PEG tube placement.  7/08:  G-tube placement  7/9: Continue feeding through G-tube.  Continue as needed Tylenol for now    7/11: Updated SW for rehab     SIRS syndrome  Pt with mild leukocytosis 12-13, procalcitonin on admission negative. Low-grade fever and sinus tachycardia persisting today. UA without evidence of infection. CXR neg. ?due to polysubstance abuse with amphetamines, cocaine in UTox.  - Repeat CBC trending down   - Low threshold to obtain blood cultures, initiate broad-spectrum abx      Type 2 diabetes mellitus.    Last HbA1c of 7.8 on 06/18. PTA regimen of metformin. Recent admission Lantus had been increased to 18u BID, carb coverage with meals of 1:10 with breakfast/lunch, 1:15 with dinner.  - Hold metformin  Recent Labs   Lab " 07/11/22  0826 07/11/22  0738 07/11/22  0524 07/11/22  0106 07/10/22  2058 07/10/22  1712   * 241* 235* 219* 216* 208*     - 7/11; increase Lantus to 20 units  -sliding scale insulin -Changed to every 4 hours sliding coverage     History of hypertension   Hold PTA meds to allow permissive hypertension  See above, goal -160 mmHg     Hidradenitis / Rt axillary wound  WOCN consult previous recs:   Right axilla wound: Daily  and PRN   Cleanse wound with NS or wound cleanser (omit this step if patient cleans wound in shower)  Dry and protect surrounding skin with no sting barrier film wipe #[id number]  Apply a small amount of iodesorb gel #[id number] to bandaid  If Band-Aid needs to be changed more than once a day. Apply iodesorb gel #[id number] to adaptic #798115 and cover with Mepilex #[id number]     Generalized pain: PTA on gabapentin 300 mg 3 times daily.    - Holding gabapentin   - Continue as needed Tylenol as needed. Avoid opioids      Diet: Adult Formula Drip Feeding: Continuous Jevity 1.5; Other - Specify in Comment; Goal Rate: 60; mL/hr; Medication - Feeding Tube Flush Frequency: At least 15-30 mL water before and after medication administration and with tube clogging; Amount to Se...  NPO per Anesthesia Guidelines for Procedure/Surgery Except for: Meds, Other; Specify: please stop tube feedings    DVT Prophylaxis: Pneumatic Compression Devices  Cortez Catheter: PRESENT, indication: Retention  Central Lines: None  Cardiac Monitoring: None  Code Status: Full Code      Disposition Plan      Expected Discharge Date: 07/12/2022    Discharge Delays: Placement - TCU    Discharge Comments: TCU PP        The patient's care was discussed with the Bedside Nurse and Patient.    Quinn Monge MD, MD  Hospitalist Service  [facility]  Securely message with the Vocera Web Console (learn more here)  Text page via [employer] Paging/Directory     Clinically Significant Risk Factors Present on Admission          "          # Moderate Malnutrition: based on nutrition assessment   _________________________________________________________________    Interval History   Last 24-hour events noted.  Tolerating feeding tube.    No specific complaints.    4 point ROS done and negative unless mentioned    Data reviewed today: I reviewed all medications, new labs and imaging results over the last 24 hours. I personally reviewed no images or EKG's today.    Physical Exam   BP (!) 167/98 (BP Location: Right arm)   Pulse 107   Temp 97.6  F (36.4  C) (Axillary)   Resp 18   Ht 1.676 m (5' 6\")   Wt 66.3 kg (146 lb 3.2 oz)   SpO2 99%   BMI 23.60 kg/m    Gen- pleasant  lying in bed  CVS- I+II+ no m/r/g  RS- CTAB  Abdo- soft, no tenderness .  G tube in place . Abdominal binder  Ext- no edema  CNS-expressive aphasia  Left-sided hemiplegia    Data   No results found for this or any previous visit (from the past 24 hour(s)).  BMP  Recent Labs   Lab 07/11/22  0826 07/11/22  0738 07/11/22  0524 07/11/22  0106 07/08/22  1257 07/08/22  1036 07/05/22  1129 07/05/22  0902     --   --   --   --  136  --  140   POTASSIUM 3.4  --   --   --   --  3.6  --  3.7   CHLORIDE 104  --   --   --   --  106  --  110*   DAT 9.2  --   --   --   --  8.8  --  8.9   CO2 28  --   --   --   --  24  --  23   BUN 17  --   --   --   --  11  --  35*   CR 0.64*  --   --   --   --  0.57*  --  0.69   * 241* 235* 219*   < > 168*   < > 138*    < > = values in this interval not displayed.     CBC  Recent Labs   Lab 07/08/22  1036 07/05/22  0902   WBC 10.5 11.6*   RBC 3.86* 3.79*   HGB 11.8* 11.6*   HCT 36.8* 35.8*   MCV 95 95   MCH 30.6 30.6   MCHC 32.1 32.4   RDW 12.0 12.7    388     INRNo lab results found in last 7 days.  LFTs  No lab results found in last 7 days.  "

## 2022-07-11 NOTE — PLAN OF CARE
Goal Outcome Evaluation:         Reason for Admission: R- CVA, worsening Left-sided weakness.   Cognition/Mentation: unable to assess orientation d/t aphasia. Does point and nod yes/no to communicate.   Neuros/CMS: Stable with R gaze preference. L-neglect. LUE 0/5 and LLE 1/5. NV. Does follow commands.  VS: VSS on RA, slightly HTN.   GI: BS audible, no BM this shift.   : Cortez in place for retention. Pulmonary: LS diminished.    Pain: PEG tube placement site pain, managed effectively with prn Tylenol.  Drains/Lines:   PEG TF running at goal rate 60 mL/hr with q4hr 60 mL flushes.   Skin: Intact, T/Rq2  Activity: Assist x 2 with lift device.  Diet: NPO diet. Takes pills crushed in PEG tube.   Therapies recs: TCU  Discharge: Pending placement.   Aggression Stoplight Tool: Green  End of shift summary:  Corrective BG Insulin given X 3.

## 2022-07-11 NOTE — PROGRESS NOTES
[facility]    Stroke Progress Note    Interval EventsBlood sugars continue to be high needs insulin adjustement    HPI Summary  52 year old male with medical history polysubstance use (meth/cocaine), IDDM2, HTN. In addition to known history of of chronic tandem R ICA occlusion with R MCA ischemic infarct of R MCA back in March 2022. Patient also had another R MCA stroke in 2022. Patient is admitted to worsening L hemiparesis with now flaccid left sided LUE and LLE paralysis. Etiology ICAD and also known history of polysubstance use. Patient was started on DAPT  and plavix 75 mg.  Patient is stable, able to follow commands. But he has anarthria noted on exam in addition to right gaze preference.Patient is past edema watch period.  Stroke Evaluation Summarized    MRI/Head CT MRI 7/5/22:   1. Large acute infarct within the right posterior frontal lobe, right parietal lobe, right pre and postcentral gyrus, right insula, and right corpus callosum anterior body. This acute infarct is predominantly in the right MCA territory with small areas extending into the right KWADWO territory.  2.  No susceptibility on GRE to suggest microhemorrhage.     Head CT 7/5/22:  1.  Rounded focus of hypoattenuation within the right corona radiata//centrum semiovale, new since head CT June 24, 2022. On brain MRI June 25, 2022 there was restricted diffusion within this region, consistent with acute ischemia, however the region of   hypoattenuation and is larger than the region of diffusion restriction which is concerning for progression of infarct. Brain MRI could be used to better characterize.             Intracranial Vasculature HEAD CTA:  1.  Redemonstration of marked narrowing to occlusion the midportion of right M1, similar to prior.   2.  Mild asymmetry in the appearance of MCA distributions with mildly less opacified branches on the right, similar to prior.   3.  Mild atherosclerotic changes  "cavernous and supraclinoid ICA on the left.   4.  Mildly attenuated opacification of anterior cerebral artery distribution, similar to prior.  5.  Remaining intracranial circulation appears normal.   Cervical Vasculature NECK CTA:  1.  Redemonstration of occlusion of right ICA in its proximal aspect.  2.  No hemodynamically significant narrowing of the left ICA.  3.  Normal vertebral arteries.             Echocardiogram EF 60- 65%   EKG/Telemetry Sinus tachycardia   Other Testing Not Applicable     LDL  6/24/2022: 53 mg/dL   A1C  6/18/2022: 7.8 %   Troponin No lab value available in past 48 hrs       Impression   # Subacute-acute large R MCA/KWADWO territory infarcts in setting of known chronic R cervical ICA occlusion, R M1 occlusion. Interval imaging with some recanalization of R M1. Given this, most likely stroke etiology is artery to artery embolization, although hemodynamic compromise of collateral flow remains a possibility given non-compliance with medications, uncontrolled diabetes/HTN, and cocaine/amphetamine use. NIH 23 on presentation, exam with dense LUE/LLE flaccid hemiplegia, R gaze prefer ance.     # LUE shoulder pain. This may have a central etiology with traction of rotator cuff from flaccid hemiplegia as result of above stroke. No signs of spasticity, point tenderness on exam which is reassuring against fracture.   Plan  -Continue  mg plus Plavix 75 daily for 90 days  -Continue atorvastatin 80 mg  -Goals: Normotension, normoglycemia, normo natremia.   -PT/O/Speech evaluation    Patient Follow-up    - in the next 4 week(s) with PCP. Follow up with general neurology in 4 weeks    No further stroke evaluation is recommended, so we will sign off. Please contact us with any additional questions.    The Stroke Staff is Dr. Douglas.    Sheryl Jin MD  Vascular Neurology Fellow  To page me or covering stroke neurology team member, click here: AMCOM   Choose \"On Call\" tab at top, then search " "dropdown box for \"Neurology Adult\", select location, press Enter, then look for stroke/neuro ICU/telestroke.    ______________________________________________________    Clinically Significant Risk Factors Present on Admission                  Medications   Scheduled Meds    aspirin  300 mg Rectal Daily    Or     aspirin  325 mg Oral or Feeding Tube Daily     atorvastatin  80 mg Oral or Feeding Tube Daily     cetirizine  10 mg Oral or Feeding Tube Daily     clopidogrel  75 mg Oral or Feeding Tube Daily     insulin aspart  1-6 Units Subcutaneous Q4H     insulin glargine  15 Units Subcutaneous At Bedtime     [Held by provider] lisinopril-hydrochlorothiazide  1 tablet Oral Daily     mirtazapine  15 mg Oral or Feeding Tube At Bedtime     senna-docusate  1 tablet Oral BID     sodium chloride (PF)  3 mL Intracatheter Q8H       Infusion Meds    dextrose       - MEDICATION INSTRUCTIONS -       - MEDICATION INSTRUCTIONS -         PRN Meds  acetaminophen **OR** acetaminophen, bisacodyl, dextrose, glucose **OR** dextrose **OR** glucagon, lidocaine 4%, lidocaine (buffered or not buffered), - MEDICATION INSTRUCTIONS -, - MEDICATION INSTRUCTIONS -, melatonin, ondansetron **OR** ondansetron, polyethylene glycol, sodium chloride, sodium chloride (PF)       PHYSICAL EXAMINATION  Temp:  [98  F (36.7  C)-98.7  F (37.1  C)] 98.7  F (37.1  C)  Pulse:  [] 103  Resp:  [18-20] 18  BP: (128-159)/(85-99) 157/99  SpO2:  [94 %-98 %] 96 %      Mental Status: Patient is able to say monosyllable sounds every now and then. follows commands. Tries to cough and exhale many times  Cranial Nerves:  PERRLA, right gaze preference, tracks to midline occasionally, L facial droop  Motor:  strength 0/5 LUE, 2/5 LLE. RUE and RLE at least 4+/5.  Reflexes:  toes down-going  Sensory:  Diminished light touch sensation L upper and lower extremities  Coordination: Finger-to-nose R side without dysmetria  Station/Gait:  deferred       Stroke " Scales    NIHSS  1a. Level of Consciousness 0-->Alert, keenly responsive   1b. LOC Questions 2-->Answers neither question correctly   1c. LOC Commands 0-->Performs both tasks correctly   2.   Best Gaze 1-->Partial gaze palsy, gaze is abnormal in one or both eyes, but forced deviation or total gaze paresis is not present   3.   Visual 1-->Partial hemianopia   4.   Facial Palsy 1-->Minor paralysis (flattened nasolabial fold, asymmetry on smiling)   5a. Motor Arm, Left 2-->Some effort against gravity, limb cannot get to or maintain (if cued) 90 (or 45) degrees, drifts down to bed, but has some effort against gravity   5b. Motor Arm, Right 2-->Some effort against gravity, limb cannot get to or maintain (if cued) 90 (or 45) degrees, drifts down to bed, but has some effort against gravity   6a. Motor Leg, Left 2-->Some effort against gravity, leg falls to bed by 5 secs, but has some effort against gravity   6b. Motor Leg, right 2-->Some effort against gravity, leg falls to bed by 5 secs, but has some effort against gravity   7.   Limb Ataxia 0-->Absent   8.   Sensory 1-->Mild-to-moderate sensory loss, patient feels pinprick is less sharp or is dull on the affected side, or there is a loss of superficial pain with pinprick, but patient is aware of being touched   9.   Best Language 2-->Severe aphasia, all communication is through fragmentary expression, great need for inference, questioning, and guessing by the listener. Range of information that can be exchanged is limited, listener carries burden of. . . (see row details)   10. Dysarthria 2-->Severe dysarthria, patients speech is so slurred as to be unintelligible in the absence of or out of proportion to any dysphasia, or is mute/anarthric   11. Extinction and Inattention  0-->No abnormality   Total 18 (07/10/22 1928)           Imaging  I personally reviewed all imaging; relevant findings per HPI.     Lab Results Data   CBC  Recent Labs   Lab 07/08/22  8396  07/05/22  0902 07/04/22  1111   WBC 10.5 11.6* 13.2*   RBC 3.86* 3.79* 4.22*   HGB 11.8* 11.6* 13.1*   HCT 36.8* 35.8* 40.5    388 426     Basic Metabolic Panel    Recent Labs   Lab 07/10/22  1712 07/10/22  1056 07/10/22  0838 07/08/22  1257 07/08/22  1036 07/05/22  1129 07/05/22  0902 07/04/22  1841 07/04/22  1111   NA  --   --   --   --  136  --  140  --  140   POTASSIUM  --   --   --   --  3.6  --  3.7  --  3.8   CHLORIDE  --   --   --   --  106  --  110*  --  107   CO2  --   --   --   --  24  --  23  --  22   BUN  --   --   --   --  11  --  35*  --  30   CR  --   --   --   --  0.57*  --  0.69  --  0.90   * 215* 235*   < > 168*   < > 138*   < > 123*   DTA  --   --   --   --  8.8  --  8.9  --  9.8    < > = values in this interval not displayed.     Liver Panel  No results for input(s): PROTTOTAL, ALBUMIN, BILITOTAL, ALKPHOS, AST, ALT, BILIDIRECT in the last 168 hours.  INR    Recent Labs   Lab Test 06/24/22 1805 02/05/22  1919 04/18/21  1553   INR 1.08 0.97 1.05      Lipid Profile    Recent Labs   Lab Test 06/24/22 1805 03/20/22  0558 02/06/22  0617   CHOL 102 93 193   HDL 30* 34* 38*   LDL 53 46 126*   TRIG 95 66 144     A1C    Recent Labs   Lab Test 06/18/22 2150 03/20/22  0558 02/06/22  0617   A1C 7.8* 8.8* 10.5*     Troponin I  No results for input(s): TROPONINIS, TROPONINI, GHTROP in the last 168 hours.

## 2022-07-11 NOTE — CARE PLAN
SEAN orientation. Able to make needs known by yes/no and writing on paper. Rt gaze preference. Left neglect. Lt weakness. VSS on RA. TF at goal through PEG tube. Up in chair this afternoon. Awaiting placement.

## 2022-07-11 NOTE — PLAN OF CARE
Goal Outcome Evaluation:          Overall Patient Progress: improving    Outcome Evaluation: Pt tolerating TF at goal rate (Jevity 1.5 @ 60 mL/hr via G-tube).  Last noted BM 7/8 (per RN) - pt started Senokot BID yesterday. Free water flushes increased to 120 mL every 4 hrs.

## 2022-07-11 NOTE — CONSULTS
Stroke Education Note    The following information has been reviewed with the family:    1. Warning signs of stroke    2. Calling 911 if having warning signs of stroke    3. All modifiable risk factors: hypertension, CAD, atrial fib, diabetes, hypercholesterolemia, smoking, substance abuse, diet, physical inactivity, obesity, sleep apnea.    4. Patient's risk factors for stroke which include: multiple strokes, polysubstance abuse, DM2, HTN    5. Follow-up plan for after discharge    6. Discharge medications which include: aspirin, plavix, lipitor, insulin, lisinopril, HCTZ    In addition, the above information was given to the family in writing as a part of the [facility] Stroke Class Handout.- emailed to mom Flory    Learner's response to risk factors / lifestyle modification education: NA - Precontemplative     Marilin Muniz RN     Spoke with patient's mom, Flory, for [facility] stroke education.

## 2022-07-11 NOTE — PROGRESS NOTES
Care Management Follow Up    Length of Stay (days): 7    Expected Discharge Date: 07/12/2022     Concerns to be Addressed:       Patient plan of care discussed at interdisciplinary rounds: Yes    Anticipated Discharge Disposition: Home     Anticipated Discharge Services:    Anticipated Discharge DME:      Patient/family educated on Medicare website which has current facility and service quality ratings:    Education Provided on the Discharge Plan:    Patient/Family in Agreement with the Plan:      Referrals Placed by CM/SW:    Private pay costs discussed: Not applicable    Additional Information:    Per DOD, the following TCU referrals remain pending:    [facility]:  Left [facility]:  Left [facility]:  Left     Sw called pt's mom, Flory, to provide an update.  Sw explained that there is not an accepting facility yet but once one is found pt will be ready for discharge.  Sw to continue to follow for discharge planning needs.    Update:  [facility] admissions did not receive first referral; has TCU beds available.  Sw resent referral and admissions will review.          Leda Nielson, MARKSW

## 2022-07-12 ENCOUNTER — APPOINTMENT (OUTPATIENT)
Dept: PHYSICAL THERAPY | Facility: CLINIC | Age: 52
DRG: 065 | End: 2022-07-12
Payer: COMMERCIAL

## 2022-07-12 ENCOUNTER — APPOINTMENT (OUTPATIENT)
Dept: SPEECH THERAPY | Facility: CLINIC | Age: 52
DRG: 065 | End: 2022-07-12
Payer: COMMERCIAL

## 2022-07-12 ENCOUNTER — APPOINTMENT (OUTPATIENT)
Dept: OCCUPATIONAL THERAPY | Facility: CLINIC | Age: 52
DRG: 065 | End: 2022-07-12
Payer: COMMERCIAL

## 2022-07-12 LAB
GLUCOSE BLDC GLUCOMTR-MCNC: 219 MG/DL (ref 70–99)
GLUCOSE BLDC GLUCOMTR-MCNC: 242 MG/DL (ref 70–99)
GLUCOSE BLDC GLUCOMTR-MCNC: 270 MG/DL (ref 70–99)
GLUCOSE BLDC GLUCOMTR-MCNC: 271 MG/DL (ref 70–99)
GLUCOSE BLDC GLUCOMTR-MCNC: 283 MG/DL (ref 70–99)
GLUCOSE BLDC GLUCOMTR-MCNC: 292 MG/DL (ref 70–99)
LACTATE SERPL-SCNC: 1 MMOL/L (ref 0.7–2)
PHOSPHATE SERPL-MCNC: 2.9 MG/DL (ref 2.5–4.5)

## 2022-07-12 PROCEDURE — 92526 ORAL FUNCTION THERAPY: CPT | Mod: GN | Performed by: SPEECH-LANGUAGE PATHOLOGIST

## 2022-07-12 PROCEDURE — 99232 SBSQ HOSP IP/OBS MODERATE 35: CPT | Performed by: HOSPITALIST

## 2022-07-12 PROCEDURE — 250N000013 HC RX MED GY IP 250 OP 250 PS 637: Performed by: INTERNAL MEDICINE

## 2022-07-12 PROCEDURE — 84100 ASSAY OF PHOSPHORUS: CPT | Performed by: HOSPITALIST

## 2022-07-12 PROCEDURE — 97112 NEUROMUSCULAR REEDUCATION: CPT | Mod: GP | Performed by: PHYSICAL THERAPIST

## 2022-07-12 PROCEDURE — 250N000013 HC RX MED GY IP 250 OP 250 PS 637: Performed by: HOSPITALIST

## 2022-07-12 PROCEDURE — 83605 ASSAY OF LACTIC ACID: CPT | Performed by: HOSPITALIST

## 2022-07-12 PROCEDURE — 97530 THERAPEUTIC ACTIVITIES: CPT | Mod: GO | Performed by: OCCUPATIONAL THERAPIST

## 2022-07-12 PROCEDURE — 92507 TX SP LANG VOICE COMM INDIV: CPT | Mod: GN | Performed by: SPEECH-LANGUAGE PATHOLOGIST

## 2022-07-12 PROCEDURE — 120N000001 HC R&B MED SURG/OB

## 2022-07-12 PROCEDURE — 36415 COLL VENOUS BLD VENIPUNCTURE: CPT | Performed by: HOSPITALIST

## 2022-07-12 RX ORDER — LISINOPRIL AND HYDROCHLOROTHIAZIDE 20; 25 MG/1; MG/1
1 TABLET ORAL DAILY
Status: DISCONTINUED | OUTPATIENT
Start: 2022-07-12 | End: 2022-07-24

## 2022-07-12 RX ADMIN — INSULIN ASPART 2 UNITS: 100 INJECTION, SOLUTION INTRAVENOUS; SUBCUTANEOUS at 17:24

## 2022-07-12 RX ADMIN — ACETAMINOPHEN 650 MG: 325 TABLET ORAL at 17:24

## 2022-07-12 RX ADMIN — INSULIN ASPART 3 UNITS: 100 INJECTION, SOLUTION INTRAVENOUS; SUBCUTANEOUS at 13:23

## 2022-07-12 RX ADMIN — INSULIN ASPART 3 UNITS: 100 INJECTION, SOLUTION INTRAVENOUS; SUBCUTANEOUS at 05:27

## 2022-07-12 RX ADMIN — LISINOPRIL AND HYDROCHLOROTHIAZIDE 1 TABLET: 25; 20 TABLET ORAL at 11:26

## 2022-07-12 RX ADMIN — INSULIN ASPART 4 UNITS: 100 INJECTION, SOLUTION INTRAVENOUS; SUBCUTANEOUS at 21:37

## 2022-07-12 RX ADMIN — ATORVASTATIN CALCIUM 80 MG: 80 TABLET, FILM COATED ORAL at 09:06

## 2022-07-12 RX ADMIN — SENNOSIDES AND DOCUSATE SODIUM 1 TABLET: 50; 8.6 TABLET ORAL at 21:27

## 2022-07-12 RX ADMIN — INSULIN ASPART 3 UNITS: 100 INJECTION, SOLUTION INTRAVENOUS; SUBCUTANEOUS at 01:26

## 2022-07-12 RX ADMIN — POLYETHYLENE GLYCOL 3350 17 G: 17 POWDER, FOR SOLUTION ORAL at 16:32

## 2022-07-12 RX ADMIN — MIRTAZAPINE 15 MG: 15 TABLET, FILM COATED ORAL at 21:27

## 2022-07-12 RX ADMIN — INSULIN ASPART 3 UNITS: 100 INJECTION, SOLUTION INTRAVENOUS; SUBCUTANEOUS at 09:05

## 2022-07-12 RX ADMIN — CETIRIZINE HYDROCHLORIDE 10 MG: 5 SOLUTION ORAL at 09:05

## 2022-07-12 RX ADMIN — SENNOSIDES AND DOCUSATE SODIUM 1 TABLET: 50; 8.6 TABLET ORAL at 09:06

## 2022-07-12 RX ADMIN — CLOPIDOGREL BISULFATE 75 MG: 75 TABLET ORAL at 09:06

## 2022-07-12 RX ADMIN — ASPIRIN 325 MG ORAL TABLET 325 MG: 325 PILL ORAL at 09:05

## 2022-07-12 ASSESSMENT — ACTIVITIES OF DAILY LIVING (ADL)
ADLS_ACUITY_SCORE: 54

## 2022-07-12 NOTE — PROGRESS NOTES
[facility]    Medicine Progress Note - Hospitalist Service    Date of Admission:  7/3/2022    Assessment & Plan        Josue Parisi is 52, who has had multiple strokes with some significant compliance issues, ongoing polysubstance abuse, being admitted under observation status for placement at a TCU, as he is unable to care for himself.     Multiple ischemic CVAs involving MCA, KWADWO with left-sided weakness and concern for progression  Medication noncompliance  Polysubstance abuse  Initial CVA occurred in 03/2022 with right KWADWO, large territory defect and residual left-sided weakness recommended to discharge to ARU however declined and discharged AMA. Subsequently returned 6/18 with progressive weakness, again referred to TCU however declined and returned home. Hospitalized 6/24 with worsening left-sided weakness, paresthesias, numbness with findings of new nonhemorrhagic infarction within right prefrontal gyrus and deep MCA watershed infarct within the right centrum semiovale of the right frontal and parietal lobes. Patient was evaluated by stroke neurology each admission, most recently felt symptoms were suggestive of new infarct rather than hypoperfused tissue. Recommended liberal SBP goal of 140-160 and DAPT with plavix/ for 90 days. Patient most recently left AMA on 6/30 after being recommended for TCU. At time of discharge, it appears the plavix was discontinued in an effort to limit medications and encourage compliance.   * Returned to ED 7/3/22 with reports of failing at home. It was unclear on admission if patient's left-sided symptoms had persisted or worsened due to presence of polysubstances and limited ability to participate in physical exam. Admitted to cocaine use as recently as 7/2. UTox on admission positive for cocaine, amphetamines. Admitted under observation care for presumed placement.  * 7/4 exam noted with 1/5 LUE/LLE strength, limited ability to  "communicate verbally but cognitively able to answer yes/no questions with gestures, shakes of head. RNs present at bedside had cared for patient week prior and reported change in function. Repeat CTH revealed rounded focus of hypoattenuation within right corona radiata/centrum semiovale, MRI brain 6/25 indicated restricted diffusion within this region however area of hypoattenuation is larger than region of diffusion restriction concerning for progression of infarct.  - Admitted to inpatient.  - Cardiac monitoring.  - Managed BP as noted below.  - Neuro note 7/6- \"recommend SAMMPRIS trial guided DAPT (+clopidogrel 75 x90 days) in an attempt to stabilize the M1 segment and prevent inferior division infarct. \"   Antiplatelet agents could be held for a brief period to permit G tube placement, but I would prefer if he is bridged with a heparin gtt if that is necessary. (Currently no access, so receiving ASA 300mg rectally)  - Continue statin.  - PT/OT consults.  - Reconsult speech therapy -appreciate recommendations.  - 7/7: Long discussion with sister and mother.  Mother is the decision-maker and understand patient's current condition.  Agrees for NG tube and PEG tube placement.  - G-tube placed on 7/08.  - Continue tube feedings.  - Social work following and assisting with discharge planning, appreciate their assistance. Awaiting bed availability.    SIRS syndrome  Pt with mild leukocytosis 12-13, procalcitonin on admission negative. Low-grade fever and sinus tachycardia persisting today. UA without evidence of infection. CXR neg. ?due to polysubstance abuse with amphetamines, cocaine in UTox.  - Repeat CBC trending down   - Low threshold to obtain blood cultures, initiate broad-spectrum abx      Type 2 diabetes mellitus  Last HbA1c of 7.8 on 06/18. PTA regimen of metformin. Recent admission Lantus had been increased to 18u BID, carb coverage with meals of 1:10 with breakfast/lunch, 1:15 with dinner.  - Hold " PTA metformin.  - Blood sugars not well controlled.  - Increase Lantus from 20 units to 30 units at bedtime.  - Continue sliding scale NovoLog.    History of hypertension   Held PTA meds initially to allow permissive hypertension.  Resume PTA lisinopril-hydrochlorothiazide today.  Goal is normotension per Neurology.     Hidradenitis / Right axillary wound  WOCN consult previous recs:   Right axilla wound: Daily  and PRN.  Cleanse wound with NS or wound cleanser (omit this step if patient cleans wound in shower).  Dry and protect surrounding skin with no sting barrier film wipe #[id number].  Apply a small amount of iodesorb gel #[id number] to bandaid.  If Band-Aid needs to be changed more than once a day. Apply iodesorb gel #[id number] to adaptic #[id number] and cover with Mepilex #[id number].     Generalized pain  PTA on gabapentin 300 mg 3 times daily.    - Holding gabapentin.   - Continue as needed Tylenol as needed. Avoid opioids.       Diet: Adult Formula Drip Feeding: Continuous Jevity 1.5; Other - Specify in Comment; Goal Rate: 60; mL/hr; Medication - Feeding Tube Flush Frequency: At least 15-30 mL water before and after medication administration and with tube clogging; Amount to Se...  NPO per Anesthesia Guidelines for Procedure/Surgery Except for: Meds, Other; Specify: please stop tube feedings    DVT Prophylaxis: Pneumatic Compression Devices  Cortez Catheter: PRESENT, indication: Retention  Central Lines: None  Cardiac Monitoring: None  Code Status: Full Code      Disposition Plan      Expected Discharge Date: 07/12/2022    Discharge Delays: Placement - TCU    Discharge Comments: TCU PP        The patient's care was discussed with the Bedside Nurse and Patient.    Randal Reddy MD  Hospitalist Service  [facility]  Securely message with the Vocera Web Console (learn more here)  Text page via [employer] Paging/Directory         Clinically Significant Risk Factors Present on Admission                       ______________________________________________________________________    Interval History   Josue Parisi was seen this morning. Aphasic, unable to get any history from him. Appears to be comfortable. Followed a few commands. Discussed with nursing.    Data reviewed today: I reviewed all medications, new labs and imaging results over the last 24 hours. I personally reviewed no images or EKG's today.    Physical Exam   Vital Signs: Temp: 98.4  F (36.9  C) Temp src: Axillary BP: (!) 156/100 Pulse: 103   Resp: 16 SpO2: 96 % O2 Device: None (Room air)    Weight: 146 lbs 3.2 oz  Constitutional: awake, alert, cooperative, no apparent distress, laying in the hospital bed  Respiratory: clear to auscultation bilaterally, no crackles or wheezing  Cardiovascular: regular rate and rhythm, normal S1 and S2, no murmur noted  GI: normal bowel sounds, soft, non-distended, non-tender, PEG tube in place, abdominal binder in place  Skin: warm, dry  Musculoskeletal: no lower extremity pitting edema present  Neurologic: awake, alert, expressive aphasia, did not see any movement on the left side, moves his right side to command    Data   Recent Labs   Lab 07/12/22  0750 07/12/22  0515 07/12/22  0105 07/11/22  1207 07/11/22  0826 07/08/22  1257 07/08/22  1036   WBC  --   --   --   --   --   --  10.5   HGB  --   --   --   --   --   --  11.8*   MCV  --   --   --   --   --   --  95   PLT  --   --   --   --   --   --  350   NA  --   --   --   --  137  --  136   POTASSIUM  --   --   --   --  3.4  --  3.6   CHLORIDE  --   --   --   --  104  --  106   CO2  --   --   --   --  28  --  24   BUN  --   --   --   --  17  --  11   CR  --   --   --   --  0.64*  --  0.57*   ANIONGAP  --   --   --   --  5  --  6   DAT  --   --   --   --  9.2  --  8.8   * 242* 283*   < > 295*   < > 168*    < > = values in this interval not displayed.     Medications     dextrose       - MEDICATION INSTRUCTIONS -       - MEDICATION INSTRUCTIONS -          aspirin  300 mg Rectal Daily    Or     aspirin  325 mg Oral or Feeding Tube Daily     atorvastatin  80 mg Oral or Feeding Tube Daily     cetirizine  10 mg Oral or Feeding Tube Daily     clopidogrel  75 mg Oral or Feeding Tube Daily     insulin aspart  1-6 Units Subcutaneous Q4H     insulin glargine  30 Units Subcutaneous At Bedtime     lisinopril-hydrochlorothiazide  1 tablet Oral or Feeding Tube Daily     mirtazapine  15 mg Oral or Feeding Tube At Bedtime     senna-docusate  1 tablet Oral or Feeding Tube BID     sodium chloride (PF)  3 mL Intracatheter Q8H

## 2022-07-12 NOTE — PLAN OF CARE
Pt here with R CVA. A&O SEAN. Neuros, doesn't follow commands, L neglect, LUE 0/5 LLE 1/5, . VSS. NPO diet, TF running goal @ 60ml/hr q4 120 flush. Takes pills crushed in PEG. Up with A2 lift. Denies pain. Pt scoring green on the Aggression Stop Light Tool. Plan t/r q2, fair in place. Discharge pending TCU placement.

## 2022-07-12 NOTE — PROGRESS NOTES
Care Management Follow Up    Length of Stay (days): 8    Expected Discharge Date: 07/13/2022     Concerns to be Addressed:       Patient plan of care discussed at interdisciplinary rounds: Yes    Anticipated Discharge Disposition: Home     Anticipated Discharge Services:    Anticipated Discharge DME:      Patient/family educated on Medicare website which has current facility and service quality ratings:    Education Provided on the Discharge Plan:    Patient/Family in Agreement with the Plan:      Referrals Placed by CM/SW:    Private pay costs discussed: Not applicable    Additional Information:    Macie called [facility] TCU to check on pending referral; left vm and waiting on return call back.    Update:  [facility] left  vm stating that pt has been clinically declined.  Macie sent referral to [facility].      MARK JeanSW

## 2022-07-12 NOTE — PLAN OF CARE
Reason for Admission: R CVA    Cognitive/Mentation: A/Ox SEAN. Alert but nonverbal  Neuros/CMS: Intact ex inconsistently follows commands, L neglect, LUE 0/5, LLE 1/5  VS: stable.  GI: BS active, + flatus. Continent.   : Cortez. Adequate output  Pulmonary: LS clear.  Pain: no signs of pain.     Drains/Lines: PIV  Skin: intact  Activity: Assist x 2 with lift.  Diet: NPO. TF @ 60 mL/hr.  mL q 4 hours    Therapies recs: TCU  Discharge: pending placement    Aggression Stoplight Tool: green    End of shift summary: Patient stable throughout shift. Patient feels constipated. PRNs given.

## 2022-07-13 ENCOUNTER — APPOINTMENT (OUTPATIENT)
Dept: SPEECH THERAPY | Facility: CLINIC | Age: 52
DRG: 065 | End: 2022-07-13
Payer: COMMERCIAL

## 2022-07-13 ENCOUNTER — APPOINTMENT (OUTPATIENT)
Dept: PHYSICAL THERAPY | Facility: CLINIC | Age: 52
DRG: 065 | End: 2022-07-13
Payer: COMMERCIAL

## 2022-07-13 ENCOUNTER — APPOINTMENT (OUTPATIENT)
Dept: OCCUPATIONAL THERAPY | Facility: CLINIC | Age: 52
DRG: 065 | End: 2022-07-13
Payer: COMMERCIAL

## 2022-07-13 LAB
GLUCOSE BLDC GLUCOMTR-MCNC: 230 MG/DL (ref 70–99)
GLUCOSE BLDC GLUCOMTR-MCNC: 234 MG/DL (ref 70–99)
GLUCOSE BLDC GLUCOMTR-MCNC: 239 MG/DL (ref 70–99)
GLUCOSE BLDC GLUCOMTR-MCNC: 240 MG/DL (ref 70–99)
GLUCOSE BLDC GLUCOMTR-MCNC: 255 MG/DL (ref 70–99)
GLUCOSE BLDC GLUCOMTR-MCNC: 278 MG/DL (ref 70–99)
GLUCOSE BLDC GLUCOMTR-MCNC: 304 MG/DL (ref 70–99)
PHOSPHATE SERPL-MCNC: 3 MG/DL (ref 2.5–4.5)

## 2022-07-13 PROCEDURE — 97116 GAIT TRAINING THERAPY: CPT | Mod: GP

## 2022-07-13 PROCEDURE — 250N000013 HC RX MED GY IP 250 OP 250 PS 637: Performed by: INTERNAL MEDICINE

## 2022-07-13 PROCEDURE — 99232 SBSQ HOSP IP/OBS MODERATE 35: CPT | Performed by: HOSPITALIST

## 2022-07-13 PROCEDURE — 250N000013 HC RX MED GY IP 250 OP 250 PS 637: Performed by: HOSPITALIST

## 2022-07-13 PROCEDURE — 97530 THERAPEUTIC ACTIVITIES: CPT | Mod: GO

## 2022-07-13 PROCEDURE — 97530 THERAPEUTIC ACTIVITIES: CPT | Mod: GP

## 2022-07-13 PROCEDURE — 36415 COLL VENOUS BLD VENIPUNCTURE: CPT | Performed by: HOSPITALIST

## 2022-07-13 PROCEDURE — 92526 ORAL FUNCTION THERAPY: CPT | Mod: GN | Performed by: SPEECH-LANGUAGE PATHOLOGIST

## 2022-07-13 PROCEDURE — 92507 TX SP LANG VOICE COMM INDIV: CPT | Mod: GN | Performed by: SPEECH-LANGUAGE PATHOLOGIST

## 2022-07-13 PROCEDURE — 97112 NEUROMUSCULAR REEDUCATION: CPT | Mod: GO

## 2022-07-13 PROCEDURE — 120N000001 HC R&B MED SURG/OB

## 2022-07-13 PROCEDURE — 84100 ASSAY OF PHOSPHORUS: CPT | Performed by: HOSPITALIST

## 2022-07-13 RX ADMIN — ASPIRIN 325 MG ORAL TABLET 325 MG: 325 PILL ORAL at 08:26

## 2022-07-13 RX ADMIN — INSULIN ASPART 3 UNITS: 100 INJECTION, SOLUTION INTRAVENOUS; SUBCUTANEOUS at 08:29

## 2022-07-13 RX ADMIN — ATORVASTATIN CALCIUM 80 MG: 80 TABLET, FILM COATED ORAL at 08:26

## 2022-07-13 RX ADMIN — ACETAMINOPHEN 650 MG: 325 TABLET ORAL at 20:47

## 2022-07-13 RX ADMIN — LISINOPRIL AND HYDROCHLOROTHIAZIDE 1 TABLET: 25; 20 TABLET ORAL at 08:26

## 2022-07-13 RX ADMIN — INSULIN ASPART 5 UNITS: 100 INJECTION, SOLUTION INTRAVENOUS; SUBCUTANEOUS at 13:18

## 2022-07-13 RX ADMIN — INSULIN ASPART 2 UNITS: 100 INJECTION, SOLUTION INTRAVENOUS; SUBCUTANEOUS at 00:45

## 2022-07-13 RX ADMIN — INSULIN ASPART 3 UNITS: 100 INJECTION, SOLUTION INTRAVENOUS; SUBCUTANEOUS at 10:31

## 2022-07-13 RX ADMIN — SENNOSIDES AND DOCUSATE SODIUM 1 TABLET: 50; 8.6 TABLET ORAL at 20:48

## 2022-07-13 RX ADMIN — INSULIN ASPART 5 UNITS: 100 INJECTION, SOLUTION INTRAVENOUS; SUBCUTANEOUS at 17:45

## 2022-07-13 RX ADMIN — ACETAMINOPHEN 650 MG: 325 TABLET ORAL at 08:26

## 2022-07-13 RX ADMIN — POLYETHYLENE GLYCOL 3350 17 G: 17 POWDER, FOR SOLUTION ORAL at 08:26

## 2022-07-13 RX ADMIN — INSULIN ASPART 4 UNITS: 100 INJECTION, SOLUTION INTRAVENOUS; SUBCUTANEOUS at 05:55

## 2022-07-13 RX ADMIN — INSULIN ASPART 4 UNITS: 100 INJECTION, SOLUTION INTRAVENOUS; SUBCUTANEOUS at 20:51

## 2022-07-13 RX ADMIN — CETIRIZINE HYDROCHLORIDE 10 MG: 5 SOLUTION ORAL at 08:28

## 2022-07-13 RX ADMIN — MIRTAZAPINE 15 MG: 15 TABLET, FILM COATED ORAL at 22:30

## 2022-07-13 RX ADMIN — CLOPIDOGREL BISULFATE 75 MG: 75 TABLET ORAL at 08:26

## 2022-07-13 RX ADMIN — ACETAMINOPHEN 650 MG: 325 TABLET ORAL at 17:46

## 2022-07-13 RX ADMIN — SENNOSIDES AND DOCUSATE SODIUM 1 TABLET: 50; 8.6 TABLET ORAL at 08:26

## 2022-07-13 ASSESSMENT — ACTIVITIES OF DAILY LIVING (ADL)
ADLS_ACUITY_SCORE: 54
ADLS_ACUITY_SCORE: 48
ADLS_ACUITY_SCORE: 54
ADLS_ACUITY_SCORE: 54
ADLS_ACUITY_SCORE: 48
ADLS_ACUITY_SCORE: 54
ADLS_ACUITY_SCORE: 54
ADLS_ACUITY_SCORE: 48
ADLS_ACUITY_SCORE: 45
ADLS_ACUITY_SCORE: 48
ADLS_ACUITY_SCORE: 48
ADLS_ACUITY_SCORE: 45

## 2022-07-13 NOTE — PLAN OF CARE
Reason for Admission: R CVA     Cognitive/Mentation: A/Ox SEAN. Alert but nonverbal  Neuros/CMS: Intact ex inconsistently follows commands, L neglect, LUE 0/5, LLE 1/5  VS: stable.  GI: BS active, + flatus. Last BM 7/12/22. Continent.   : Cortez. Adequate output  Pulmonary: LS clear.  Pain: no signs of pain.      Drains/Lines: PIV  Skin: intact  Activity: Assist x 2 with lift.  Diet: NPO. TF @ 60 mL/hr.  mL q 4 hours     Therapies recs: TCU  Discharge: pending placement     Aggression Stoplight Tool: green     End of shift summary: Patient stable throughout shift.

## 2022-07-13 NOTE — PROGRESS NOTES
[facility]    Medicine Progress Note - Hospitalist Service    Date of Admission:  7/3/2022    Assessment & Plan        Josue Parisi is 52, who has had multiple strokes with some significant compliance issues, ongoing polysubstance abuse, being admitted under observation status for placement at a TCU, as he is unable to care for himself.     Multiple ischemic CVAs involving MCA, KWADWO with left-sided weakness and concern for progression  Medication noncompliance  Polysubstance abuse  Initial CVA occurred in 03/2022 with right KWADWO, large territory defect and residual left-sided weakness recommended to discharge to ARU however declined and discharged AMA. Subsequently returned 6/18 with progressive weakness, again referred to TCU however declined and returned home. Hospitalized 6/24 with worsening left-sided weakness, paresthesias, numbness with findings of new nonhemorrhagic infarction within right prefrontal gyrus and deep MCA watershed infarct within the right centrum semiovale of the right frontal and parietal lobes. Patient was evaluated by stroke neurology each admission, most recently felt symptoms were suggestive of new infarct rather than hypoperfused tissue. Recommended liberal SBP goal of 140-160 and DAPT with plavix/ for 90 days. Patient most recently left AMA on 6/30 after being recommended for TCU. At time of discharge, it appears the plavix was discontinued in an effort to limit medications and encourage compliance.   * Returned to ED 7/3/22 with reports of failing at home. It was unclear on admission if patient's left-sided symptoms had persisted or worsened due to presence of polysubstances and limited ability to participate in physical exam. Admitted to cocaine use as recently as 7/2. UTox on admission positive for cocaine, amphetamines. Admitted under observation care for presumed placement.  * 7/4 exam noted with 1/5 LUE/LLE strength, limited ability to  "communicate verbally but cognitively able to answer yes/no questions with gestures, shakes of head. RNs present at bedside had cared for patient week prior and reported change in function. Repeat CTH revealed rounded focus of hypoattenuation within right corona radiata/centrum semiovale, MRI brain 6/25 indicated restricted diffusion within this region however area of hypoattenuation is larger than region of diffusion restriction concerning for progression of infarct.  - Admitted to inpatient.  - Cardiac monitoring.  - Managed BP as noted below.  - Neuro note 7/6- \"recommend SAMMPRIS trial guided DAPT (+clopidogrel 75 x90 days) in an attempt to stabilize the M1 segment and prevent inferior division infarct. \"   Antiplatelet agents could be held for a brief period to permit G tube placement, but I would prefer if he is bridged with a heparin gtt if that is necessary. (Currently no access, so receiving ASA 300mg rectally)  - Continue statin.  - PT/OT consults.  - Reconsult speech therapy -appreciate recommendations.  - 7/7: Long discussion with sister and mother.  Mother is the decision-maker and understand patient's current condition.  Agrees for NG tube and PEG tube placement.  - G-tube placed on 7/08.  - Continue tube feedings.  - Social work following and assisting with discharge planning, appreciate their assistance. Awaiting bed availability.    SIRS syndrome  Pt with mild leukocytosis 12-13, procalcitonin on admission negative. Low-grade fever and sinus tachycardia persisting today. UA without evidence of infection. CXR neg. ?due to polysubstance abuse with amphetamines, cocaine in UTox.  - Repeat CBC trending down   - Low threshold to obtain blood cultures, initiate broad-spectrum abx      Type 2 diabetes mellitus  Last HbA1c of 7.8 on 06/18. PTA regimen of metformin. Recent admission Lantus had been increased to 18u BID, carb coverage with meals of 1:10 with breakfast/lunch, 1:15 with dinner.  - Hold " PTA metformin.  - Blood sugars not well controlled.  - Increased Lantus from 20 units to 30 units at bedtime on 7/12/22.  - Increase to high dose sliding scale NovoLog.    History of hypertension   Held PTA meds initially to allow permissive hypertension.  Resumed PTA lisinopril-hydrochlorothiazide 7/12.  Goal is normotension per Neurology.  Blood pressure improved overall, but somewhat labile.     Hidradenitis / Right axillary wound  WOCN consult previous recs:   Right axilla wound: Daily  and PRN.  Cleanse wound with NS or wound cleanser (omit this step if patient cleans wound in shower).  Dry and protect surrounding skin with no sting barrier film wipe #[id number].  Apply a small amount of iodesorb gel #[id number] to bandaid.  If Band-Aid needs to be changed more than once a day. Apply iodesorb gel #[id number] to adaptic #[id number] and cover with Mepilex #[id number].     Generalized pain  PTA on gabapentin 300 mg 3 times daily.    - Holding gabapentin.   - Continue as needed Tylenol as needed. Avoid opioids.       Diet: Adult Formula Drip Feeding: Continuous Jevity 1.5; Other - Specify in Comment; Goal Rate: 60; mL/hr; Medication - Feeding Tube Flush Frequency: At least 15-30 mL water before and after medication administration and with tube clogging; Amount to Se...  NPO per Anesthesia Guidelines for Procedure/Surgery Except for: Meds, Other; Specify: please stop tube feedings    DVT Prophylaxis: Pneumatic Compression Devices  Cortez Catheter: PRESENT, indication: Retention  Central Lines: None  Cardiac Monitoring: None  Code Status: Full Code      Disposition Plan      Expected Discharge Date: 07/14/2022    Discharge Delays: Placement - TCU    Discharge Comments: TCU PP        The patient's care was discussed with the Bedside Nurse and Patient.    Randal Reddy MD  Hospitalist Service  [facility]  Securely message with the Vocera Web Console (learn more here)  Text page via [employer] Paging/Directory          Clinically Significant Risk Factors Present on Admission                      ______________________________________________________________________    Interval History   Josue Parisi was seen this morning. Expressive aphasia, unable to get any history from him. Did not appear to be in distress. Discussed with nursing.    Data reviewed today: I reviewed all medications, new labs and imaging results over the last 24 hours. I personally reviewed no images or EKG's today.    Physical Exam   Vital Signs: Temp: 97.8  F (36.6  C) Temp src: Axillary BP: (!) 158/82 Pulse: 98   Resp: 20 SpO2: 96 % O2 Device: None (Room air)    Weight: 146 lbs 9.6 oz  Constitutional: awake, alert, cooperative, no apparent distress, laying in the hospital bed  Respiratory: clear to auscultation bilaterally, no crackles or wheezing  Cardiovascular: regular rate and rhythm, normal S1 and S2, no murmur noted  GI: normal bowel sounds, soft, non-distended, non-tender, PEG tube in place, abdominal binder in place  Skin: warm, dry  Musculoskeletal: no lower extremity pitting edema present  Neurologic: awake, alert, expressive aphasia, follows commands, moves right side, did not see any movement on the left    Data   Recent Labs   Lab 07/13/22  0807 07/13/22  0519 07/13/22  0031 07/11/22  1207 07/11/22  0826 07/08/22  1257 07/08/22  1036   WBC  --   --   --   --   --   --  10.5   HGB  --   --   --   --   --   --  11.8*   MCV  --   --   --   --   --   --  95   PLT  --   --   --   --   --   --  350   NA  --   --   --   --  137  --  136   POTASSIUM  --   --   --   --  3.4  --  3.6   CHLORIDE  --   --   --   --  104  --  106   CO2  --   --   --   --  28  --  24   BUN  --   --   --   --  17  --  11   CR  --   --   --   --  0.64*  --  0.57*   ANIONGAP  --   --   --   --  5  --  6   DAT  --   --   --   --  9.2  --  8.8   * 304* 239*   < > 295*   < > 168*    < > = values in this interval not displayed.     Medications     dextrose        - MEDICATION INSTRUCTIONS -       - MEDICATION INSTRUCTIONS -         aspirin  300 mg Rectal Daily    Or     aspirin  325 mg Oral or Feeding Tube Daily     atorvastatin  80 mg Oral or Feeding Tube Daily     cetirizine  10 mg Oral or Feeding Tube Daily     clopidogrel  75 mg Oral or Feeding Tube Daily     insulin aspart  1-12 Units Subcutaneous Q4H     insulin glargine  30 Units Subcutaneous At Bedtime     lisinopril-hydrochlorothiazide  1 tablet Oral or Feeding Tube Daily     mirtazapine  15 mg Oral or Feeding Tube At Bedtime     senna-docusate  1 tablet Oral or Feeding Tube BID     sodium chloride (PF)  3 mL Intracatheter Q8H

## 2022-07-14 ENCOUNTER — APPOINTMENT (OUTPATIENT)
Dept: OCCUPATIONAL THERAPY | Facility: CLINIC | Age: 52
DRG: 065 | End: 2022-07-14
Payer: COMMERCIAL

## 2022-07-14 ENCOUNTER — APPOINTMENT (OUTPATIENT)
Dept: SPEECH THERAPY | Facility: CLINIC | Age: 52
DRG: 065 | End: 2022-07-14
Payer: COMMERCIAL

## 2022-07-14 LAB
GLUCOSE BLDC GLUCOMTR-MCNC: 188 MG/DL (ref 70–99)
GLUCOSE BLDC GLUCOMTR-MCNC: 208 MG/DL (ref 70–99)
GLUCOSE BLDC GLUCOMTR-MCNC: 212 MG/DL (ref 70–99)
GLUCOSE BLDC GLUCOMTR-MCNC: 217 MG/DL (ref 70–99)
GLUCOSE BLDC GLUCOMTR-MCNC: 233 MG/DL (ref 70–99)
GLUCOSE BLDC GLUCOMTR-MCNC: 267 MG/DL (ref 70–99)

## 2022-07-14 PROCEDURE — 120N000001 HC R&B MED SURG/OB

## 2022-07-14 PROCEDURE — 92507 TX SP LANG VOICE COMM INDIV: CPT | Mod: GN | Performed by: SPEECH-LANGUAGE PATHOLOGIST

## 2022-07-14 PROCEDURE — G0463 HOSPITAL OUTPT CLINIC VISIT: HCPCS

## 2022-07-14 PROCEDURE — 250N000013 HC RX MED GY IP 250 OP 250 PS 637: Performed by: HOSPITALIST

## 2022-07-14 PROCEDURE — 92526 ORAL FUNCTION THERAPY: CPT | Mod: GN | Performed by: SPEECH-LANGUAGE PATHOLOGIST

## 2022-07-14 PROCEDURE — 250N000013 HC RX MED GY IP 250 OP 250 PS 637: Performed by: INTERNAL MEDICINE

## 2022-07-14 PROCEDURE — 99232 SBSQ HOSP IP/OBS MODERATE 35: CPT | Performed by: HOSPITALIST

## 2022-07-14 PROCEDURE — 97535 SELF CARE MNGMENT TRAINING: CPT | Mod: GO

## 2022-07-14 RX ADMIN — ACETAMINOPHEN 650 MG: 325 TABLET ORAL at 08:47

## 2022-07-14 RX ADMIN — ASPIRIN 325 MG ORAL TABLET 325 MG: 325 PILL ORAL at 08:48

## 2022-07-14 RX ADMIN — INSULIN ASPART 4 UNITS: 100 INJECTION, SOLUTION INTRAVENOUS; SUBCUTANEOUS at 18:50

## 2022-07-14 RX ADMIN — ATORVASTATIN CALCIUM 80 MG: 80 TABLET, FILM COATED ORAL at 08:48

## 2022-07-14 RX ADMIN — MIRTAZAPINE 15 MG: 15 TABLET, FILM COATED ORAL at 21:31

## 2022-07-14 RX ADMIN — INSULIN ASPART 6 UNITS: 100 INJECTION, SOLUTION INTRAVENOUS; SUBCUTANEOUS at 10:44

## 2022-07-14 RX ADMIN — LISINOPRIL AND HYDROCHLOROTHIAZIDE 1 TABLET: 25; 20 TABLET ORAL at 08:48

## 2022-07-14 RX ADMIN — INSULIN ASPART 2 UNITS: 100 INJECTION, SOLUTION INTRAVENOUS; SUBCUTANEOUS at 21:42

## 2022-07-14 RX ADMIN — ACETAMINOPHEN 650 MG: 325 TABLET ORAL at 13:18

## 2022-07-14 RX ADMIN — INSULIN ASPART 3 UNITS: 100 INJECTION, SOLUTION INTRAVENOUS; SUBCUTANEOUS at 15:03

## 2022-07-14 RX ADMIN — INSULIN ASPART 4 UNITS: 100 INJECTION, SOLUTION INTRAVENOUS; SUBCUTANEOUS at 01:34

## 2022-07-14 RX ADMIN — SENNOSIDES AND DOCUSATE SODIUM 1 TABLET: 50; 8.6 TABLET ORAL at 08:48

## 2022-07-14 RX ADMIN — CETIRIZINE HYDROCHLORIDE 10 MG: 5 SOLUTION ORAL at 08:48

## 2022-07-14 RX ADMIN — INSULIN ASPART 3 UNITS: 100 INJECTION, SOLUTION INTRAVENOUS; SUBCUTANEOUS at 06:09

## 2022-07-14 RX ADMIN — CLOPIDOGREL BISULFATE 75 MG: 75 TABLET ORAL at 08:48

## 2022-07-14 RX ADMIN — SENNOSIDES AND DOCUSATE SODIUM 1 TABLET: 50; 8.6 TABLET ORAL at 21:31

## 2022-07-14 RX ADMIN — ACETAMINOPHEN 650 MG: 325 TABLET ORAL at 21:31

## 2022-07-14 ASSESSMENT — ACTIVITIES OF DAILY LIVING (ADL)
ADLS_ACUITY_SCORE: 48
ADLS_ACUITY_SCORE: 45
ADLS_ACUITY_SCORE: 48

## 2022-07-14 NOTE — PROGRESS NOTES
[facility]    Medicine Progress Note - Hospitalist Service    Date of Admission:  7/3/2022    Assessment & Plan        Josue Parisi is 52, who has had multiple strokes with some significant compliance issues, ongoing polysubstance abuse, being admitted under observation status for placement at a TCU, as he is unable to care for himself.     Multiple ischemic CVAs involving MCA, KWADWO with left-sided weakness and concern for progression  Medication noncompliance  Polysubstance abuse  Initial CVA occurred in 03/2022 with right KWADWO, large territory defect and residual left-sided weakness recommended to discharge to ARU however declined and discharged AMA. Subsequently returned 6/18 with progressive weakness, again referred to TCU however declined and returned home. Hospitalized 6/24 with worsening left-sided weakness, paresthesias, numbness with findings of new nonhemorrhagic infarction within right prefrontal gyrus and deep MCA watershed infarct within the right centrum semiovale of the right frontal and parietal lobes. Patient was evaluated by stroke neurology each admission, most recently felt symptoms were suggestive of new infarct rather than hypoperfused tissue. Recommended liberal SBP goal of 140-160 and DAPT with plavix/ for 90 days. Patient most recently left AMA on 6/30 after being recommended for TCU. At time of discharge, it appears the plavix was discontinued in an effort to limit medications and encourage compliance.   * Returned to ED 7/3/22 with reports of failing at home. It was unclear on admission if patient's left-sided symptoms had persisted or worsened due to presence of polysubstances and limited ability to participate in physical exam. Admitted to cocaine use as recently as 7/2. UTox on admission positive for cocaine, amphetamines. Admitted under observation care for presumed placement.  * 7/4 exam noted with 1/5 LUE/LLE strength, limited ability to  "communicate verbally but cognitively able to answer yes/no questions with gestures, shakes of head. RNs present at bedside had cared for patient week prior and reported change in function. Repeat CTH revealed rounded focus of hypoattenuation within right corona radiata/centrum semiovale, MRI brain 6/25 indicated restricted diffusion within this region however area of hypoattenuation is larger than region of diffusion restriction concerning for progression of infarct.  - Admitted to inpatient.  - Cardiac monitoring.  - Managed BP as noted below.  - Neuro note 7/6- \"recommend SAMMPRIS trial guided DAPT (+clopidogrel 75 x90 days) in an attempt to stabilize the M1 segment and prevent inferior division infarct. \"   Antiplatelet agents could be held for a brief period to permit G tube placement, but I would prefer if he is bridged with a heparin gtt if that is necessary. (Currently no access, so receiving ASA 300mg rectally)  - Continue statin.  - PT/OT consults.  - Reconsult speech therapy -appreciate recommendations.  - 7/7: Long discussion with sister and mother.  Mother is the decision-maker and understand patient's current condition.  Agrees for NG tube and PEG tube placement.  - G-tube placed on 7/08.  - Continue tube feedings.  - Social work following and assisting with discharge planning, appreciate their assistance. Still awaiting bed availability.    SIRS syndrome  Pt with mild leukocytosis 12-13, procalcitonin on admission negative. Low-grade fever and sinus tachycardia persisting today. UA without evidence of infection. CXR neg. ?due to polysubstance abuse with amphetamines, cocaine in UTox.  - Repeat CBC trending down   - Low threshold to obtain blood cultures, initiate broad-spectrum abx      Type 2 diabetes mellitus  Last HbA1c of 7.8 on 06/18. PTA regimen of metformin. Recent admission Lantus had been increased to 18u BID, carb coverage with meals of 1:10 with breakfast/lunch, 1:15 with dinner.  - " Hold PTA metformin.  - Blood sugars not well controlled.  - Increased Lantus from 30 units to 35 units at bedtime on 7/12/22.  - Continue high dose sliding scale NovoLog.    History of hypertension   Held PTA meds initially to allow permissive hypertension.  - Resumed PTA lisinopril-hydrochlorothiazide 7/12.  - Goal is normotension per Neurology.  - Blood pressure improved overall, but somewhat labile.     Hidradenitis / Right axillary wound  WOCN consult previous recs:   Right axilla wound: Daily  and PRN.  - Cleanse wound with NS or wound cleanser (omit this step if patient cleans wound in shower).  - Dry and protect surrounding skin with no sting barrier film wipe #[id number].  - Apply a small amount of iodesorb gel #[id number] to bandaid.  - If Band-Aid needs to be changed more than once a day. Apply iodesorb gel #[id number] to adaptic #[id number] and cover with Mepilex #[id number].     Generalized pain  PTA on gabapentin 300 mg 3 times daily.    - Holding gabapentin.   - Continue as needed Tylenol as needed. Avoid opioids.       Diet: Adult Formula Drip Feeding: Continuous Jevity 1.5; Other - Specify in Comment; Goal Rate: 60; mL/hr; Medication - Feeding Tube Flush Frequency: At least 15-30 mL water before and after medication administration and with tube clogging; Amount to Se...  NPO per Anesthesia Guidelines for Procedure/Surgery Except for: Meds, Other; Specify: please stop tube feedings    DVT Prophylaxis: Pneumatic Compression Devices  Cortez Catheter: PRESENT, indication: Retention  Central Lines: None  Cardiac Monitoring: None  Code Status: Full Code      Disposition Plan      Expected Discharge Date: 07/15/2022    Discharge Delays: Placement - TCU  *Medically Ready for Discharge    Discharge Comments: TCU PP        The patient's care was discussed with the Bedside Nurse and Patient.    Randal Reddy MD  Hospitalist Service  [facility]  Securely message with the Vocera Web Console (learn more  here)  Text page via [facility] Paging/Directory         Clinically Significant Risk Factors Present on Admission                      ______________________________________________________________________    Interval History   Josue Parisi was seen this afternoon. Seems to feel OK. Responds to questions by nodding his head and giving thumbs up/thumbs down. Overall seems to feel OK. Has some discomfort in his left shoulder. Could not elicit any other concerns. Discussed with nursing.    Data reviewed today: I reviewed all medications, new labs and imaging results over the last 24 hours. I personally reviewed no images or EKG's today.    Physical Exam   Vital Signs: Temp: 98.3  F (36.8  C) Temp src: Axillary BP: 120/85 Pulse: 94   Resp: 16 SpO2: 97 % O2 Device: None (Room air)    Weight: 146 lbs 9.6 oz  Constitutional: awake, alert, cooperative, no apparent distress, laying in the hospital bed  Respiratory: clear to auscultation bilaterally, no crackles or wheezing  Cardiovascular: regular rate and rhythm, normal S1 and S2, no murmur noted  GI: normal bowel sounds, soft, non-distended, non-tender, PEG tube in place, abdominal binder on  Skin: warm, dry  Musculoskeletal: no lower extremity pitting edema present  Neurologic: awake, alert, expressive aphasia, follows commands, left hemiparesis    Data   Recent Labs   Lab 07/14/22  1012 07/14/22  0527 07/14/22  0054 07/11/22  1207 07/11/22  0826 07/08/22  1257 07/08/22  1036   WBC  --   --   --   --   --   --  10.5   HGB  --   --   --   --   --   --  11.8*   MCV  --   --   --   --   --   --  95   PLT  --   --   --   --   --   --  350   NA  --   --   --   --  137  --  136   POTASSIUM  --   --   --   --  3.4  --  3.6   CHLORIDE  --   --   --   --  104  --  106   CO2  --   --   --   --  28  --  24   BUN  --   --   --   --  17  --  11   CR  --   --   --   --  0.64*  --  0.57*   ANIONGAP  --   --   --   --  5  --  6   DAT  --   --   --   --  9.2  --  8.8   * 208*  217*   < > 295*   < > 168*    < > = values in this interval not displayed.     Medications     dextrose       - MEDICATION INSTRUCTIONS -       - MEDICATION INSTRUCTIONS -         aspirin  300 mg Rectal Daily    Or     aspirin  325 mg Oral or Feeding Tube Daily     atorvastatin  80 mg Oral or Feeding Tube Daily     cetirizine  10 mg Oral or Feeding Tube Daily     clopidogrel  75 mg Oral or Feeding Tube Daily     insulin aspart  1-12 Units Subcutaneous Q4H     insulin glargine  35 Units Subcutaneous At Bedtime     lisinopril-hydrochlorothiazide  1 tablet Oral or Feeding Tube Daily     mirtazapine  15 mg Oral or Feeding Tube At Bedtime     senna-docusate  1 tablet Oral or Feeding Tube BID     sodium chloride (PF)  3 mL Intracatheter Q8H

## 2022-07-14 NOTE — PROGRESS NOTES
Care Management Follow Up    Length of Stay (days): 10    Expected Discharge Date: 07/15/2022     Concerns to be Addressed:       Patient plan of care discussed at interdisciplinary rounds: Yes    Anticipated Discharge Disposition: TCU  Anticipated Discharge Services:    Anticipated Discharge DME:      Patient/family educated on Medicare website which has current facility and service quality ratings:    Education Provided on the Discharge Plan:    Patient/Family in Agreement with the Plan:      Referrals Placed by CM/ELMA:  ELMA sent referral to Mike BETTENCOURT at [facility] for review ( phone # [phone] fax [phone])  Referral also [facility] to [facility].  Private pay costs discussed:     Additional Information:    MAYANK Morris  [facility]  Care Transitions  [phone]

## 2022-07-14 NOTE — PROVIDER NOTIFICATION
"0552:     To Hospitalist, Dr. Gee    \"    Mr. Parisi has an open wound in his right axilla. Can you provide a wound consult for assessment and effective treatment plan.     Thank you,     Ani HALL, RN  \"    Response: Dr Gee ordered a WOC.   "

## 2022-07-14 NOTE — PLAN OF CARE
Goal Outcome Evaluation:        Reason for Admission: R CVA     Cognitive/Mentation: A/Ox ESAN. Alert but nonverbal  Neuros/CMS: Intact ex inconsistently follows commands, L neglect, LUE 0/5, LLE 1/5.  VS: Stabl on RA.  GI: BS audible.    : Cortez with Adequate UOP.   Pulmonary: LS clear  Pain: Pain indicated in left Shoulder. Tylenol given X 1 with improvement.   Skin: Intact  Activity: Assist x 2 with lift.  Diet: NPO. TF @ 60 mL/hr.  mL q 4 hours  Therapies recs: TCU  Discharge: OPending placement  Aggression Stoplight Tool: Green  End of shift summary:

## 2022-07-14 NOTE — PLAN OF CARE
Goal Outcome Evaluation:    Plan of Care Reviewed With: patient     Overall Patient Progress: no change    Pt here with R CVA. A&Ox2-3, difficult to assess as pt nonverbal but does answer yes/no questions appropriately, however indicated it is June. Neuros stable with LUE 0/5, LLE 1/5, L neglect.  NPO. Takes pills via PEG. Jevity 1.5 @60mL/hr with q4 120mL free water flushes. Tubing and feed changed out this shift. Up with A2 lift. Pt pointing to indicate pain at L shoulder. Pt scoring green on the Aggression Stop Light Tool. Plan TCU. Discharge pending placement.

## 2022-07-14 NOTE — PROGRESS NOTES
CLINICAL NUTRITION SERVICES - REASSESSMENT NOTE      Malnutrition: (7/11)  % Weight Loss:  Up to 10% in 6 months (moderate malnutrition)  % Intake:  </= 50% for >/= 5 days (severe malnutrition) - PTA; has been on EN since 7/7  Subcutaneous Fat Loss:  None observed  Muscle Loss:  Clavicle bone region - mild depletion, prominent cheekbones  Fluid Retention:  None noted     Malnutrition Diagnosis: Moderate malnutrition  In Context of:  Acute illness or injury on Chronic illness or disease       EVALUATION OF PROGRESS TOWARD GOALS   Diet:    NPO    Nutrition Support:    Nutrition Support Enteral:  Type of Feeding Tube: (7/8) 18 FR YADIRA G-tube placed  Enteral Frequency:  Continuous  Enteral Regimen: Jevity 1.5 @ 60 mL/hr (goal rate)  Total Enteral Provisions: 2160 cals (32 cals/kg), 92 gm pro (1.4 gm/kg), 30 gm fiber, 1094 mL free water  Free Water Flush: 120 mL every 4 hrs    Intake/Tolerance:    Chart reviewed  7/12: Last BM (senokot BID started 7/11)  7/14: Gluc 217, 208, 267      ASSESSED NUTRITION NEEDS:  Dosing Weight (7/11) 66.3 kg  Estimated Energy Needs: 9233-3439 kcals (25-30 Kcal/Kg)  Justification: maintenance  Estimated Protein Needs:  grams protein (1.2-1.5 g pro/Kg)  Justification: preservation of lean body mass      NEW FINDINGS:     Wt stable    07/13/22 0542 66.5 kg (146 lb 9.6 oz) Bed scale   07/11/22 0233 66.3 kg (146 lb 3.2 oz) Bed scale   07/10/22 0300 67.3 kg (148 lb 6.4 oz) Bed scale   07/08/22 2129 64.1 kg (141 lb 4.8 oz) --   07/08/22 2001 64.1 kg (141 lb 4.8 oz)      TCU pending bed availability      Previous Goals (7/11):   EN to meet % est needs  Evaluation: Met     Previous Nutrition Diagnosis (7/11):   No nutrition diagnosis identified at this time as EN meeting estimated needs  Evaluation: No change          CURRENT NUTRITION DIAGNOSIS  No nutrition diagnosis identified at this time as EN meeting estimated needs    INTERVENTIONS  Recommendations / Nutrition  Prescription  NPO    Continue current TF regimen      Goals  EN to meet % estimated needs      MONITORING AND EVALUATION:  Progress towards goals will be monitored and evaluated per protocol and Practice Guidelines
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
## 2022-07-14 NOTE — PLAN OF CARE
Goal Outcome Evaluation:          Overall Patient Progress: no change    Outcome Evaluation: TF running at goal --> Jevity 1.5 @ 60 mL/hr via G-tube.  Last noted BM 7/12 (on senokot BID).

## 2022-07-15 ENCOUNTER — APPOINTMENT (OUTPATIENT)
Dept: PHYSICAL THERAPY | Facility: CLINIC | Age: 52
DRG: 065 | End: 2022-07-15
Payer: COMMERCIAL

## 2022-07-15 ENCOUNTER — APPOINTMENT (OUTPATIENT)
Dept: OCCUPATIONAL THERAPY | Facility: CLINIC | Age: 52
DRG: 065 | End: 2022-07-15
Payer: COMMERCIAL

## 2022-07-15 ENCOUNTER — APPOINTMENT (OUTPATIENT)
Dept: SPEECH THERAPY | Facility: CLINIC | Age: 52
DRG: 065 | End: 2022-07-15
Payer: COMMERCIAL

## 2022-07-15 LAB
ANION GAP SERPL CALCULATED.3IONS-SCNC: 5 MMOL/L (ref 3–14)
BUN SERPL-MCNC: 22 MG/DL (ref 7–30)
CALCIUM SERPL-MCNC: 9.6 MG/DL (ref 8.5–10.1)
CHLORIDE BLD-SCNC: 98 MMOL/L (ref 94–109)
CO2 SERPL-SCNC: 29 MMOL/L (ref 20–32)
CREAT SERPL-MCNC: 0.74 MG/DL (ref 0.66–1.25)
ERYTHROCYTE [DISTWIDTH] IN BLOOD BY AUTOMATED COUNT: 11.9 % (ref 10–15)
GFR SERPL CREATININE-BSD FRML MDRD: >90 ML/MIN/1.73M2
GLUCOSE BLD-MCNC: 250 MG/DL (ref 70–99)
GLUCOSE BLDC GLUCOMTR-MCNC: 210 MG/DL (ref 70–99)
GLUCOSE BLDC GLUCOMTR-MCNC: 214 MG/DL (ref 70–99)
GLUCOSE BLDC GLUCOMTR-MCNC: 235 MG/DL (ref 70–99)
GLUCOSE BLDC GLUCOMTR-MCNC: 237 MG/DL (ref 70–99)
GLUCOSE BLDC GLUCOMTR-MCNC: 239 MG/DL (ref 70–99)
GLUCOSE BLDC GLUCOMTR-MCNC: 271 MG/DL (ref 70–99)
HCT VFR BLD AUTO: 36.3 % (ref 40–53)
HGB BLD-MCNC: 11.6 G/DL (ref 13.3–17.7)
MCH RBC QN AUTO: 29.8 PG (ref 26.5–33)
MCHC RBC AUTO-ENTMCNC: 32 G/DL (ref 31.5–36.5)
MCV RBC AUTO: 93 FL (ref 78–100)
PLATELET # BLD AUTO: 443 10E3/UL (ref 150–450)
POTASSIUM BLD-SCNC: 3.8 MMOL/L (ref 3.4–5.3)
RBC # BLD AUTO: 3.89 10E6/UL (ref 4.4–5.9)
SODIUM SERPL-SCNC: 132 MMOL/L (ref 133–144)
WBC # BLD AUTO: 12.1 10E3/UL (ref 4–11)

## 2022-07-15 PROCEDURE — 99232 SBSQ HOSP IP/OBS MODERATE 35: CPT | Performed by: HOSPITALIST

## 2022-07-15 PROCEDURE — 92526 ORAL FUNCTION THERAPY: CPT | Mod: GN | Performed by: SPEECH-LANGUAGE PATHOLOGIST

## 2022-07-15 PROCEDURE — 97530 THERAPEUTIC ACTIVITIES: CPT | Mod: GO

## 2022-07-15 PROCEDURE — 97530 THERAPEUTIC ACTIVITIES: CPT | Mod: GP

## 2022-07-15 PROCEDURE — 92507 TX SP LANG VOICE COMM INDIV: CPT | Mod: GN | Performed by: SPEECH-LANGUAGE PATHOLOGIST

## 2022-07-15 PROCEDURE — 250N000013 HC RX MED GY IP 250 OP 250 PS 637: Performed by: HOSPITALIST

## 2022-07-15 PROCEDURE — 120N000001 HC R&B MED SURG/OB

## 2022-07-15 PROCEDURE — 36415 COLL VENOUS BLD VENIPUNCTURE: CPT | Performed by: HOSPITALIST

## 2022-07-15 PROCEDURE — 85027 COMPLETE CBC AUTOMATED: CPT | Performed by: HOSPITALIST

## 2022-07-15 PROCEDURE — 250N000013 HC RX MED GY IP 250 OP 250 PS 637: Performed by: INTERNAL MEDICINE

## 2022-07-15 PROCEDURE — 82310 ASSAY OF CALCIUM: CPT | Performed by: HOSPITALIST

## 2022-07-15 RX ORDER — NALOXONE HYDROCHLORIDE 0.4 MG/ML
0.2 INJECTION, SOLUTION INTRAMUSCULAR; INTRAVENOUS; SUBCUTANEOUS
Status: DISCONTINUED | OUTPATIENT
Start: 2022-07-15 | End: 2022-08-02 | Stop reason: HOSPADM

## 2022-07-15 RX ORDER — IBUPROFEN 100 MG/5ML
400 SUSPENSION, ORAL (FINAL DOSE FORM) ORAL EVERY 6 HOURS PRN
Status: DISCONTINUED | OUTPATIENT
Start: 2022-07-15 | End: 2022-08-02 | Stop reason: HOSPADM

## 2022-07-15 RX ORDER — NALOXONE HYDROCHLORIDE 0.4 MG/ML
0.4 INJECTION, SOLUTION INTRAMUSCULAR; INTRAVENOUS; SUBCUTANEOUS
Status: DISCONTINUED | OUTPATIENT
Start: 2022-07-15 | End: 2022-08-02 | Stop reason: HOSPADM

## 2022-07-15 RX ORDER — HYDROMORPHONE HYDROCHLORIDE 1 MG/ML
0.5 INJECTION, SOLUTION INTRAMUSCULAR; INTRAVENOUS; SUBCUTANEOUS EVERY 4 HOURS PRN
Status: DISCONTINUED | OUTPATIENT
Start: 2022-07-15 | End: 2022-08-02 | Stop reason: HOSPADM

## 2022-07-15 RX ORDER — OXYCODONE HYDROCHLORIDE 5 MG/1
5 TABLET ORAL EVERY 6 HOURS PRN
Status: DISCONTINUED | OUTPATIENT
Start: 2022-07-15 | End: 2022-07-29

## 2022-07-15 RX ADMIN — INSULIN ASPART 4 UNITS: 100 INJECTION, SOLUTION INTRAVENOUS; SUBCUTANEOUS at 04:43

## 2022-07-15 RX ADMIN — INSULIN ASPART 4 UNITS: 100 INJECTION, SOLUTION INTRAVENOUS; SUBCUTANEOUS at 07:53

## 2022-07-15 RX ADMIN — ACETAMINOPHEN 650 MG: 325 TABLET ORAL at 16:28

## 2022-07-15 RX ADMIN — CLOPIDOGREL BISULFATE 75 MG: 75 TABLET ORAL at 07:35

## 2022-07-15 RX ADMIN — OXYCODONE HYDROCHLORIDE 5 MG: 5 TABLET ORAL at 18:43

## 2022-07-15 RX ADMIN — ASPIRIN 325 MG ORAL TABLET 325 MG: 325 PILL ORAL at 07:35

## 2022-07-15 RX ADMIN — INSULIN ASPART 3 UNITS: 100 INJECTION, SOLUTION INTRAVENOUS; SUBCUTANEOUS at 01:51

## 2022-07-15 RX ADMIN — SENNOSIDES AND DOCUSATE SODIUM 1 TABLET: 50; 8.6 TABLET ORAL at 07:36

## 2022-07-15 RX ADMIN — ATORVASTATIN CALCIUM 80 MG: 80 TABLET, FILM COATED ORAL at 07:35

## 2022-07-15 RX ADMIN — MIRTAZAPINE 15 MG: 15 TABLET, FILM COATED ORAL at 21:19

## 2022-07-15 RX ADMIN — ACETAMINOPHEN 650 MG: 325 TABLET ORAL at 07:36

## 2022-07-15 RX ADMIN — ACETAMINOPHEN 650 MG: 325 TABLET ORAL at 21:18

## 2022-07-15 RX ADMIN — INSULIN ASPART 6 UNITS: 100 INJECTION, SOLUTION INTRAVENOUS; SUBCUTANEOUS at 13:08

## 2022-07-15 RX ADMIN — LISINOPRIL AND HYDROCHLOROTHIAZIDE 1 TABLET: 25; 20 TABLET ORAL at 07:36

## 2022-07-15 RX ADMIN — CETIRIZINE HYDROCHLORIDE 10 MG: 5 SOLUTION ORAL at 07:37

## 2022-07-15 RX ADMIN — SENNOSIDES AND DOCUSATE SODIUM 1 TABLET: 50; 8.6 TABLET ORAL at 21:19

## 2022-07-15 RX ADMIN — IBUPROFEN 400 MG: 200 SUSPENSION ORAL at 13:07

## 2022-07-15 RX ADMIN — INSULIN ASPART 3 UNITS: 100 INJECTION, SOLUTION INTRAVENOUS; SUBCUTANEOUS at 18:38

## 2022-07-15 RX ADMIN — OXYCODONE HYDROCHLORIDE 5 MG: 5 TABLET ORAL at 09:57

## 2022-07-15 RX ADMIN — INSULIN ASPART 4 UNITS: 100 INJECTION, SOLUTION INTRAVENOUS; SUBCUTANEOUS at 21:42

## 2022-07-15 ASSESSMENT — ACTIVITIES OF DAILY LIVING (ADL)
ADLS_ACUITY_SCORE: 48
ADLS_ACUITY_SCORE: 52
ADLS_ACUITY_SCORE: 48
ADLS_ACUITY_SCORE: 48
ADLS_ACUITY_SCORE: 52

## 2022-07-15 NOTE — PROGRESS NOTES
Pt here with multiple R. MCA/KWADWO CVA's. Alert, SEAN orientation as pt is mostly nonverbal, answers Y/N questions. Neuros with L. Sided hemiparesis (LUE 0/5, LLE 1/5 and withdraws), L. Sided neglect/R. Gaze preference. VSS on RA. TF infusing at goal rate of 60 mL/hour with Q4H free water flushes of 120 mL, takes medications via PEG. Up with A2, lift, T/R Q2H. Skin with wound to R. Axilla, WOC orders in place, dressing in place. Minimal pain in the L. Shoulder managed well with PRN tylenol and ice. Pt scoring green on the Aggression Stop Light Tool. Discharge to TCU pending placement.

## 2022-07-15 NOTE — PLAN OF CARE
Non-verbal, can nod and shake head slightly. Appears oriented when asked to point to correct answers. Neuros: L sided hemiparesis, LUE 0/5, LLE 1/5, mild R weakness, RLE 3/5; R gaze preference, L side neglect. Unable to shrug L shoulder. Endorses L shoulder pain, improved with APAP/ibuprofen, oxycodone. Up with veronika steady or lift. T&R maintained. Cortez catheter patent and draining. Pt awaiting TCU placement. Continue to monitor.

## 2022-07-15 NOTE — PROGRESS NOTES
[facility]    Medicine Progress Note - Hospitalist Service    Date of Admission:  7/3/2022    Assessment & Plan        Josue Parisi is 52, who has had multiple strokes with some significant compliance issues, ongoing polysubstance abuse, being admitted under observation status for placement at a TCU, as he is unable to care for himself.     Multiple ischemic CVAs involving MCA, KWADWO with left-sided weakness and concern for progression  Medication noncompliance  Polysubstance abuse  Initial CVA occurred in 03/2022 with right KWADWO, large territory defect and residual left-sided weakness recommended to discharge to ARU however declined and discharged AMA. Subsequently returned 6/18 with progressive weakness, again referred to TCU however declined and returned home. Hospitalized 6/24 with worsening left-sided weakness, paresthesias, numbness with findings of new nonhemorrhagic infarction within right prefrontal gyrus and deep MCA watershed infarct within the right centrum semiovale of the right frontal and parietal lobes. Patient was evaluated by stroke neurology each admission, most recently felt symptoms were suggestive of new infarct rather than hypoperfused tissue. Recommended liberal SBP goal of 140-160 and DAPT with plavix/ for 90 days. Patient most recently left AMA on 6/30 after being recommended for TCU. At time of discharge, it appears the plavix was discontinued in an effort to limit medications and encourage compliance.   * Returned to ED 7/3/22 with reports of failing at home. It was unclear on admission if patient's left-sided symptoms had persisted or worsened due to presence of polysubstances and limited ability to participate in physical exam. Admitted to cocaine use as recently as 7/2. UTox on admission positive for cocaine, amphetamines. Admitted under observation care for presumed placement.  * 7/4 exam noted with 1/5 LUE/LLE strength, limited ability to  "communicate verbally but cognitively able to answer yes/no questions with gestures, shakes of head. RNs present at bedside had cared for patient week prior and reported change in function. Repeat CTH revealed rounded focus of hypoattenuation within right corona radiata/centrum semiovale, MRI brain 6/25 indicated restricted diffusion within this region however area of hypoattenuation is larger than region of diffusion restriction concerning for progression of infarct.  - Admitted to inpatient.  - Cardiac monitoring.  - Managed BP as noted below.  - Neuro note 7/6- \"recommend SAMMPRIS trial guided DAPT (+clopidogrel 75 x90 days) in an attempt to stabilize the M1 segment and prevent inferior division infarct. \"   Antiplatelet agents could be held for a brief period to permit G tube placement, but I would prefer if he is bridged with a heparin gtt if that is necessary. (Currently no access, so receiving ASA 300mg rectally)  - Continue statin.  - PT/OT consults.  - Reconsult speech therapy -appreciate recommendations.  - 7/7: Long discussion with sister and mother.  Mother is the decision-maker and understand patient's current condition.  Agrees for NG tube and PEG tube placement.  - G-tube placed on 7/08.  - Continue tube feedings.  - Social work following and assisting with discharge planning, appreciate their assistance. Still awaiting bed availability.    SIRS syndrome  Pt with mild leukocytosis 12-13, procalcitonin on admission negative. Low-grade fever and sinus tachycardia persisting today. UA without evidence of infection. CXR neg. ?due to polysubstance abuse with amphetamines, cocaine in UTox.  - Repeat CBC trending down   - Low threshold to obtain blood cultures, initiate broad-spectrum abx      Type 2 diabetes mellitus  Last HbA1c of 7.8 on 06/18. PTA regimen of metformin. Recent admission Lantus had been increased to 18u BID, carb coverage with meals of 1:10 with breakfast/lunch, 1:15 with dinner.  - " Hold PTA metformin.  - Blood sugars still not well controlled.  - Increased Lantus from 30 units to 35 units at bedtime on 7/14/22.  - Add AM dose of Lantus 10 units on 7/15/22.  - Continue high dose sliding scale NovoLog.    History of hypertension   Held PTA meds initially to allow permissive hypertension.  - Resumed PTA lisinopril-hydrochlorothiazide 7/12.  - Goal is normotension per Neurology.  - Blood pressure well controlled.     Hidradenitis / Right axillary wound  WOCN consult previous recs:   Right axilla wound: Daily  and PRN.  - Cleanse wound with NS or wound cleanser (omit this step if patient cleans wound in shower).  - Dry and protect surrounding skin with no sting barrier film wipe #[id number].  - Apply a small amount of iodesorb gel #[id number] to bandaid.  - If Band-Aid needs to be changed more than once a day. Apply iodesorb gel #[id number] to adaptic #[id number] and cover with Mepilex #[id number].     Generalized pain  PTA on gabapentin 300 mg 3 times daily.    - Holding gabapentin.   - Continue as needed Tylenol as needed. Avoid opioids.  - Will add PRN ibuprofen.       Diet: Adult Formula Drip Feeding: Continuous Jevity 1.5; Other - Specify in Comment; Goal Rate: 60; mL/hr; Medication - Feeding Tube Flush Frequency: At least 15-30 mL water before and after medication administration and with tube clogging; Amount to Se...  NPO per Anesthesia Guidelines for Procedure/Surgery Except for: Meds, Other; Specify: please stop tube feedings    DVT Prophylaxis: Pneumatic Compression Devices  Coretz Catheter: PRESENT, indication: Retention  Central Lines: None  Cardiac Monitoring: None  Code Status: Full Code      Disposition Plan      Expected Discharge Date: 07/15/2022    Discharge Delays: Placement - TCU  *Medically Ready for Discharge    Discharge Comments: TCU PP        The patient's care was discussed with the Bedside Nurse and Patient.    Randal Reddy MD  Hospitalist Service  [facility]  Hospital  Securely message with the Vocera Web Console (learn more here)  Text page via [facility] Paging/Directory         Clinically Significant Risk Factors Present on Admission                      ______________________________________________________________________    Interval History   Josue Parisi was seen this morning.  Endorses pain, points to his left shoulder.  Received Tylenol about an hour and a half ago.  Does not seem to have any other complaints.  Denies shortness of breath and nausea.  Discussed with nursing.    Data reviewed today: I reviewed all medications, new labs and imaging results over the last 24 hours. I personally reviewed no images or EKG's today.    Physical Exam   Vital Signs: Temp: 99  F (37.2  C) Temp src: Axillary BP: 117/81 Pulse: 92   Resp: 16 SpO2: 97 % O2 Device: None (Room air)    Weight: 146 lbs 9.6 oz  Constitutional: awake, alert, cooperative, no apparent distress, laying in the hospital bed  Respiratory: clear to auscultation bilaterally, no crackles or wheezing  Cardiovascular: regular rate and rhythm, normal S1 and S2, no murmur noted  GI: normal bowel sounds, soft, non-distended, non-tender, PEG tube in place, abdominal binder on  Skin: warm, dry  Musculoskeletal: no lower extremity pitting edema present  Neurologic: awake, alert, shakes head yes/no appropriately to questions, moves right side, no movement on the left side    Data   Recent Labs   Lab 07/15/22  0754 07/15/22  0439 07/15/22  0130 07/11/22  1207 07/11/22  0826 07/08/22  1257 07/08/22  1036   WBC  --   --   --   --   --   --  10.5   HGB  --   --   --   --   --   --  11.8*   MCV  --   --   --   --   --   --  95   PLT  --   --   --   --   --   --  350   NA  --   --   --   --  137  --  136   POTASSIUM  --   --   --   --  3.4  --  3.6   CHLORIDE  --   --   --   --  104  --  106   CO2  --   --   --   --  28  --  24   BUN  --   --   --   --  17  --  11   CR  --   --   --   --  0.64*  --  0.57*   ANIONGAP  --    --   --   --  5  --  6   DAT  --   --   --   --  9.2  --  8.8   * 235* 210*   < > 295*   < > 168*    < > = values in this interval not displayed.     Medications     dextrose       - MEDICATION INSTRUCTIONS -       - MEDICATION INSTRUCTIONS -         aspirin  300 mg Rectal Daily    Or     aspirin  325 mg Oral or Feeding Tube Daily     atorvastatin  80 mg Oral or Feeding Tube Daily     cetirizine  10 mg Oral or Feeding Tube Daily     clopidogrel  75 mg Oral or Feeding Tube Daily     insulin aspart  1-12 Units Subcutaneous Q4H     insulin glargine  10 Units Subcutaneous QAM AC     insulin glargine  35 Units Subcutaneous At Bedtime     lisinopril-hydrochlorothiazide  1 tablet Oral or Feeding Tube Daily     mirtazapine  15 mg Oral or Feeding Tube At Bedtime     senna-docusate  1 tablet Oral or Feeding Tube BID     sodium chloride (PF)  3 mL Intracatheter Q8H

## 2022-07-15 NOTE — PROVIDER NOTIFICATION
MD Notification    Notified Person: MD    Notified Person Name:  Barry    Notification Date/Time: 7/15/22 @ 1453    Notification Interaction: Jose G    Purpose of Notification: Pt reporting abd pain 10/10. No IV access earlier and pt declined new one. Now agreeing to IV for pain meds but abd pain new.  Orders Received:    Comments:

## 2022-07-15 NOTE — PROGRESS NOTES
Care Management Follow Up      Length of Stay (days): 11    Expected Discharge Date: 07/15/2022     Concerns to be Addressed:       Patient plan of care discussed at interdisciplinary rounds: Yes    Anticipated Discharge Disposition: Home     Anticipated Discharge Services:    Anticipated Discharge DME:      Patient/family educated on Medicare website which has current facility and service quality ratings:    Education Provided on the Discharge Plan:    Patient/Family in Agreement with the Plan:      Referrals Placed by CM/SW:    Private pay costs discussed:     Additional Information:  Nmayo returned call from [facility] and declined pt due to complex medical issues and felt pt required more skilled care than they are able to manage in  the home.    MAYANK Morris  [facility]  Care Transitions  [phone]

## 2022-07-16 ENCOUNTER — APPOINTMENT (OUTPATIENT)
Dept: SPEECH THERAPY | Facility: CLINIC | Age: 52
DRG: 065 | End: 2022-07-16
Payer: COMMERCIAL

## 2022-07-16 ENCOUNTER — APPOINTMENT (OUTPATIENT)
Dept: PHYSICAL THERAPY | Facility: CLINIC | Age: 52
DRG: 065 | End: 2022-07-16
Payer: COMMERCIAL

## 2022-07-16 LAB
GLUCOSE BLDC GLUCOMTR-MCNC: 196 MG/DL (ref 70–99)
GLUCOSE BLDC GLUCOMTR-MCNC: 198 MG/DL (ref 70–99)
GLUCOSE BLDC GLUCOMTR-MCNC: 201 MG/DL (ref 70–99)
GLUCOSE BLDC GLUCOMTR-MCNC: 227 MG/DL (ref 70–99)
GLUCOSE BLDC GLUCOMTR-MCNC: 239 MG/DL (ref 70–99)
GLUCOSE BLDC GLUCOMTR-MCNC: 248 MG/DL (ref 70–99)
GLUCOSE BLDC GLUCOMTR-MCNC: 250 MG/DL (ref 70–99)

## 2022-07-16 PROCEDURE — 120N000001 HC R&B MED SURG/OB

## 2022-07-16 PROCEDURE — 250N000013 HC RX MED GY IP 250 OP 250 PS 637: Performed by: HOSPITALIST

## 2022-07-16 PROCEDURE — 92526 ORAL FUNCTION THERAPY: CPT | Mod: GN | Performed by: REHABILITATION PRACTITIONER

## 2022-07-16 PROCEDURE — 97112 NEUROMUSCULAR REEDUCATION: CPT | Mod: GP

## 2022-07-16 PROCEDURE — 99233 SBSQ HOSP IP/OBS HIGH 50: CPT | Performed by: HOSPITALIST

## 2022-07-16 PROCEDURE — 250N000013 HC RX MED GY IP 250 OP 250 PS 637: Performed by: INTERNAL MEDICINE

## 2022-07-16 PROCEDURE — 97530 THERAPEUTIC ACTIVITIES: CPT | Mod: GP

## 2022-07-16 RX ORDER — ACETAMINOPHEN 325 MG/10.15ML
975 LIQUID ORAL EVERY 8 HOURS
Status: DISCONTINUED | OUTPATIENT
Start: 2022-07-16 | End: 2022-07-16

## 2022-07-16 RX ORDER — BACLOFEN 10 MG/1
10 TABLET ORAL 3 TIMES DAILY
Status: DISCONTINUED | OUTPATIENT
Start: 2022-07-16 | End: 2022-07-29

## 2022-07-16 RX ORDER — ACETAMINOPHEN 325 MG/10.15ML
975 LIQUID ORAL EVERY 8 HOURS
Status: DISCONTINUED | OUTPATIENT
Start: 2022-07-16 | End: 2022-08-02 | Stop reason: HOSPADM

## 2022-07-16 RX ADMIN — ATORVASTATIN CALCIUM 80 MG: 80 TABLET, FILM COATED ORAL at 08:17

## 2022-07-16 RX ADMIN — LISINOPRIL AND HYDROCHLOROTHIAZIDE 1 TABLET: 25; 20 TABLET ORAL at 08:17

## 2022-07-16 RX ADMIN — INSULIN ASPART 3 UNITS: 100 INJECTION, SOLUTION INTRAVENOUS; SUBCUTANEOUS at 01:13

## 2022-07-16 RX ADMIN — ACETAMINOPHEN 650 MG: 325 TABLET ORAL at 12:53

## 2022-07-16 RX ADMIN — SENNOSIDES AND DOCUSATE SODIUM 1 TABLET: 50; 8.6 TABLET ORAL at 21:46

## 2022-07-16 RX ADMIN — INSULIN ASPART 5 UNITS: 100 INJECTION, SOLUTION INTRAVENOUS; SUBCUTANEOUS at 21:51

## 2022-07-16 RX ADMIN — INSULIN ASPART 4 UNITS: 100 INJECTION, SOLUTION INTRAVENOUS; SUBCUTANEOUS at 17:39

## 2022-07-16 RX ADMIN — BACLOFEN 10 MG: 10 TABLET ORAL at 17:23

## 2022-07-16 RX ADMIN — CLOPIDOGREL BISULFATE 75 MG: 75 TABLET ORAL at 08:17

## 2022-07-16 RX ADMIN — BACLOFEN 10 MG: 10 TABLET ORAL at 21:46

## 2022-07-16 RX ADMIN — SENNOSIDES AND DOCUSATE SODIUM 1 TABLET: 50; 8.6 TABLET ORAL at 08:17

## 2022-07-16 RX ADMIN — MIRTAZAPINE 15 MG: 15 TABLET, FILM COATED ORAL at 21:46

## 2022-07-16 RX ADMIN — CETIRIZINE HYDROCHLORIDE 10 MG: 5 SOLUTION ORAL at 08:36

## 2022-07-16 RX ADMIN — ACETAMINOPHEN 975 MG: 325 SUSPENSION ORAL at 17:24

## 2022-07-16 RX ADMIN — POLYETHYLENE GLYCOL 3350 17 G: 17 POWDER, FOR SOLUTION ORAL at 17:25

## 2022-07-16 RX ADMIN — INSULIN ASPART 5 UNITS: 100 INJECTION, SOLUTION INTRAVENOUS; SUBCUTANEOUS at 08:33

## 2022-07-16 RX ADMIN — OXYCODONE HYDROCHLORIDE 5 MG: 5 TABLET ORAL at 08:18

## 2022-07-16 RX ADMIN — INSULIN ASPART 3 UNITS: 100 INJECTION, SOLUTION INTRAVENOUS; SUBCUTANEOUS at 13:46

## 2022-07-16 RX ADMIN — ASPIRIN 325 MG ORAL TABLET 325 MG: 325 PILL ORAL at 08:17

## 2022-07-16 RX ADMIN — INSULIN ASPART 3 UNITS: 100 INJECTION, SOLUTION INTRAVENOUS; SUBCUTANEOUS at 06:29

## 2022-07-16 ASSESSMENT — ACTIVITIES OF DAILY LIVING (ADL)
ADLS_ACUITY_SCORE: 48
ADLS_ACUITY_SCORE: 50
ADLS_ACUITY_SCORE: 50
ADLS_ACUITY_SCORE: 48
ADLS_ACUITY_SCORE: 50
ADLS_ACUITY_SCORE: 50
ADLS_ACUITY_SCORE: 48
ADLS_ACUITY_SCORE: 50

## 2022-07-16 NOTE — PLAN OF CARE
Goal Outcome Evaluation:    Plan of Care Reviewed With: patient     Overall Patient Progress: no change    Pt here with R CVA. Alert, difficult to assess as pt nonverbal but does answer yes/no questions. Neuros stable with LUE 0/5, LLE 1/5, L neglect.  NPO. Takes pills via PEG. Jevity 1.5 @60mL/hr with q4 120mL free water flushes. Tubing and feed changed out this shift. Up with A2 lift. Pt pointing to indicate pain at L shoulder, PRN pain medications given. Pt scoring green on the Aggression Stop Light Tool. Plan TCU. Discharge pending placement.

## 2022-07-16 NOTE — PROGRESS NOTES
[facility]    Medicine Progress Note - Hospitalist Service    Date of Admission:  7/3/2022    Assessment & Plan        Josue Parisi is 52, who has had multiple strokes with some significant compliance issues, ongoing polysubstance abuse, being admitted under observation status for placement at a TCU, as he is unable to care for himself.     Multiple ischemic CVAs involving MCA, KWADWO with left-sided weakness and concern for progression  Medication noncompliance  Polysubstance abuse  Initial CVA occurred in 03/2022 with right KWADWO, large territory defect and residual left-sided weakness recommended to discharge to ARU however declined and discharged AMA. Subsequently returned 6/18 with progressive weakness, again referred to TCU however declined and returned home. Hospitalized 6/24 with worsening left-sided weakness, paresthesias, numbness with findings of new nonhemorrhagic infarction within right prefrontal gyrus and deep MCA watershed infarct within the right centrum semiovale of the right frontal and parietal lobes. Patient was evaluated by stroke neurology each admission, most recently felt symptoms were suggestive of new infarct rather than hypoperfused tissue. Recommended liberal SBP goal of 140-160 and DAPT with plavix/ for 90 days. Patient most recently left AMA on 6/30 after being recommended for TCU. At time of discharge, it appears the plavix was discontinued in an effort to limit medications and encourage compliance.   * Returned to ED 7/3/22 with reports of failing at home. It was unclear on admission if patient's left-sided symptoms had persisted or worsened due to presence of polysubstances and limited ability to participate in physical exam. Admitted to cocaine use as recently as 7/2. UTox on admission positive for cocaine, amphetamines. Admitted under observation care for presumed placement.  * 7/4 exam noted with 1/5 LUE/LLE strength, limited ability to  "communicate verbally but cognitively able to answer yes/no questions with gestures, shakes of head. RNs present at bedside had cared for patient week prior and reported change in function. Repeat CTH revealed rounded focus of hypoattenuation within right corona radiata/centrum semiovale, MRI brain 6/25 indicated restricted diffusion within this region however area of hypoattenuation is larger than region of diffusion restriction concerning for progression of infarct.  - Admitted to inpatient.  - Cardiac monitoring.  - Managed BP as noted below.  - Neuro note 7/6- \"recommend SAMMPRIS trial guided DAPT (+clopidogrel 75 x90 days) in an attempt to stabilize the M1 segment and prevent inferior division infarct. \"   Antiplatelet agents could be held for a brief period to permit G tube placement, but I would prefer if he is bridged with a heparin gtt if that is necessary. (Currently no access, so receiving ASA 300mg rectally)  - Continue statin.  - PT/OT consults.  - Reconsult speech therapy -appreciate recommendations.  - 7/7: Long discussion with sister and mother.  Mother is the decision-maker and understand patient's current condition.  Agrees for NG tube and PEG tube placement.  - G-tube placed on 7/08.  - Continue tube feedings.  - Social work following and assisting with discharge planning, appreciate their assistance. Still awaiting bed availability.    SIRS syndrome  Pt with mild leukocytosis 12-13, procalcitonin on admission negative. Low-grade fever and sinus tachycardia persisting today. UA without evidence of infection. CXR neg. ?due to polysubstance abuse with amphetamines, cocaine in UTox.  - Repeat CBC trending down   - Low threshold to obtain blood cultures, initiate broad-spectrum abx      Type 2 diabetes mellitus  Last HbA1c of 7.8 on 06/18. PTA regimen of metformin. Recent admission Lantus had been increased to 18u BID, carb coverage with meals of 1:10 with breakfast/lunch, 1:15 with dinner.  - " Hold PTA metformin.  - Blood sugars still not well controlled.  - continue 35 units lantus qpm  - Increase am lantus to 12 units  - Continue high dose sliding scale NovoLog.    History of hypertension   Held PTA meds initially to allow permissive hypertension.  - Resumed PTA lisinopril-hydrochlorothiazide 7/12.  - Goal is normotension per Neurology.  - Blood pressure well controlled.     Hidradenitis / Right axillary wound  WOCN consult previous recs:   Right axilla wound: Daily  and PRN.  - Cleanse wound with NS or wound cleanser (omit this step if patient cleans wound in shower).  - Dry and protect surrounding skin with no sting barrier film wipe #[id number].  - Apply a small amount of iodesorb gel #[id number] to bandaid.  - If Band-Aid needs to be changed more than once a day. Apply iodesorb gel #[id number] to adaptic #[id number] and cover with Mepilex #[id number].     Generalized pain  PTA on gabapentin 300 mg 3 times daily.    - Holding gabapentin.   - Continue as needed Tylenol as needed. Avoid opioids.  - Will add PRN ibuprofen.       Diet: Adult Formula Drip Feeding: Continuous Jevity 1.5; Other - Specify in Comment; Goal Rate: 60; mL/hr; Medication - Feeding Tube Flush Frequency: At least 15-30 mL water before and after medication administration and with tube clogging; Amount to Se...  NPO per Anesthesia Guidelines for Procedure/Surgery Except for: Meds, Other; Specify: please stop tube feedings    DVT Prophylaxis: Pneumatic Compression Devices  Cortez Catheter: PRESENT, indication: Retention  Central Lines: None  Cardiac Monitoring: None  Code Status: Full Code      Disposition Plan     Expected Discharge Date: 07/16/2022    Discharge Delays: Placement - TCU  *Medically Ready for Discharge    Discharge Comments: TCU PP        The patient's care was discussed with the Bedside Nurse and Patient.    Li Goyal MD  Hospitalist Service  [facility]  Securely message with the Vocera Web Console (learn  more here)  Text page via [facility] Paging/Directory         ______________________________________________________________________    Interval History   Patient non verbal but waved hello when I introduced myself. Indicated no acute complaints. Bedside RN says things stable.     Data reviewed today: I reviewed all medications, new labs and imaging results over the last 24 hours. I personally reviewed no images or EKG's today.    Physical Exam   Vital Signs: Temp: 98.4  F (36.9  C) Temp src: Axillary BP: 124/82 Pulse: 101   Resp: 18 SpO2: 96 % O2 Device: None (Room air)    Weight: 146 lbs 9.6 oz  Constitutional: awake, alert, cooperative, no apparent distress, laying in the hospital bed  Respiratory: clear to auscultation bilaterally, no crackles or wheezing  Cardiovascular: regular rate and rhythm, normal S1 and S2, no murmur noted  GI: normal bowel sounds, soft, non-distended, non-tender, PEG tube in place, abdominal binder on  Skin: warm, dry  Musculoskeletal: no lower extremity pitting edema present  Neurologic: awake, alert, shakes head yes/no appropriately to questions, moves right side, no movement on the left side    Data   Recent Labs   Lab 07/16/22  0807 07/16/22  0738 07/16/22  0620 07/15/22  1142 07/15/22  0940 07/11/22  1207 07/11/22  0826   WBC  --   --   --   --  12.1*  --   --    HGB  --   --   --   --  11.6*  --   --    MCV  --   --   --   --  93  --   --    PLT  --   --   --   --  443  --   --    NA  --   --   --   --  132*  --  137   POTASSIUM  --   --   --   --  3.8  --  3.4   CHLORIDE  --   --   --   --  98  --  104   CO2  --   --   --   --  29  --  28   BUN  --   --   --   --  22  --  17   CR  --   --   --   --  0.74  --  0.64*   ANIONGAP  --   --   --   --  5  --  5   DAT  --   --   --   --  9.6  --  9.2   * 239* 196*   < > 250*   < > 295*    < > = values in this interval not displayed.     Medications     dextrose       - MEDICATION INSTRUCTIONS -       - MEDICATION INSTRUCTIONS -          aspirin  300 mg Rectal Daily    Or     aspirin  325 mg Oral or Feeding Tube Daily     atorvastatin  80 mg Oral or Feeding Tube Daily     cetirizine  10 mg Oral or Feeding Tube Daily     clopidogrel  75 mg Oral or Feeding Tube Daily     insulin aspart  1-12 Units Subcutaneous Q4H     insulin glargine  10 Units Subcutaneous QAM AC     insulin glargine  35 Units Subcutaneous At Bedtime     lisinopril-hydrochlorothiazide  1 tablet Oral or Feeding Tube Daily     mirtazapine  15 mg Oral or Feeding Tube At Bedtime     senna-docusate  1 tablet Oral or Feeding Tube BID     sodium chloride (PF)  3 mL Intracatheter Q8H

## 2022-07-16 NOTE — PROGRESS NOTES
Pt here with right MCA/KWADWO strokes. Alert to self, able to nod to the right place when given options. Neuros L sided weakness, L droop, L neglect, L hemiparesis. VSS. NPO diet, Tube feed running at goal rate of 60ml/hr with q4 120ml free water flushes. Tube feed changed this shift. Takes pills via PEG tube. Cortez catheter for retention. Up with two assist and lift. Denies pain. Pt scoring green on the Aggression Stop Light Tool. Discharge pending.

## 2022-07-16 NOTE — PROGRESS NOTES
Date: July 16, 2022  Shift: [id number]  Name: Josue Parisi  Age: 52 year old  YOB: 1970    Reason for Admission: Generalized muscle weakness [M62.81]  Cerebrovascular accident (CVA), unspecified mechanism (H) [I63.9]     Cognitive/Mentation: Unable to assess orientation, patient non-verbal. Withdrawn and non-interactive during shift. Calm/cooperative. Able to follow minimal direction.   Neuros/CMS: L sided weakness, neglect, droop, hemiparesis. CMS intact. Assessment unable to complete due to patient being unable to follow directions.     VS: VSS on RA  Cardiac: WNL  GI: Constipated, bowel sounds normoactive - scheduled senna and prn miralax given  : fair in place, adequate output  Pulmonary: Course lung sounds; suction needed at times.  Pain: Patient right shoulder in pain, communicates 10/10 by holding up fingers. PRN oxy and tylenol given - tylenol now scheduled Q8.      Drains: PEG TF @ 60 mL/hr with 120mL flushes Q4  Skin: R axillary wound, dressing change completed  Activity: Bedbound, assist of 2 with lift. T/R Q2    Diet: Strict NPO - TF via PEG     Therapies recs: PT/OT/Speech  Discharge: Pending placement

## 2022-07-17 ENCOUNTER — APPOINTMENT (OUTPATIENT)
Dept: PHYSICAL THERAPY | Facility: CLINIC | Age: 52
DRG: 065 | End: 2022-07-17
Payer: COMMERCIAL

## 2022-07-17 ENCOUNTER — APPOINTMENT (OUTPATIENT)
Dept: SPEECH THERAPY | Facility: CLINIC | Age: 52
DRG: 065 | End: 2022-07-17
Payer: COMMERCIAL

## 2022-07-17 LAB
ANION GAP SERPL CALCULATED.3IONS-SCNC: 8 MMOL/L (ref 3–14)
BASOPHILS # BLD AUTO: 0.1 10E3/UL (ref 0–0.2)
BASOPHILS NFR BLD AUTO: 1 %
BUN SERPL-MCNC: 24 MG/DL (ref 7–30)
CALCIUM SERPL-MCNC: 9.5 MG/DL (ref 8.5–10.1)
CHLORIDE BLD-SCNC: 100 MMOL/L (ref 94–109)
CO2 SERPL-SCNC: 27 MMOL/L (ref 20–32)
CREAT SERPL-MCNC: 0.64 MG/DL (ref 0.66–1.25)
EOSINOPHIL # BLD AUTO: 0.4 10E3/UL (ref 0–0.7)
EOSINOPHIL NFR BLD AUTO: 3 %
ERYTHROCYTE [DISTWIDTH] IN BLOOD BY AUTOMATED COUNT: 12 % (ref 10–15)
GFR SERPL CREATININE-BSD FRML MDRD: >90 ML/MIN/1.73M2
GLUCOSE BLD-MCNC: 255 MG/DL (ref 70–99)
GLUCOSE BLDC GLUCOMTR-MCNC: 191 MG/DL (ref 70–99)
GLUCOSE BLDC GLUCOMTR-MCNC: 226 MG/DL (ref 70–99)
GLUCOSE BLDC GLUCOMTR-MCNC: 226 MG/DL (ref 70–99)
GLUCOSE BLDC GLUCOMTR-MCNC: 250 MG/DL (ref 70–99)
GLUCOSE BLDC GLUCOMTR-MCNC: 260 MG/DL (ref 70–99)
GLUCOSE BLDC GLUCOMTR-MCNC: 290 MG/DL (ref 70–99)
HCT VFR BLD AUTO: 36.8 % (ref 40–53)
HGB BLD-MCNC: 12 G/DL (ref 13.3–17.7)
IMM GRANULOCYTES # BLD: 0.1 10E3/UL
IMM GRANULOCYTES NFR BLD: 0 %
LYMPHOCYTES # BLD AUTO: 2.7 10E3/UL (ref 0.8–5.3)
LYMPHOCYTES NFR BLD AUTO: 21 %
MCH RBC QN AUTO: 30.7 PG (ref 26.5–33)
MCHC RBC AUTO-ENTMCNC: 32.6 G/DL (ref 31.5–36.5)
MCV RBC AUTO: 94 FL (ref 78–100)
MONOCYTES # BLD AUTO: 1.2 10E3/UL (ref 0–1.3)
MONOCYTES NFR BLD AUTO: 9 %
NEUTROPHILS # BLD AUTO: 8.7 10E3/UL (ref 1.6–8.3)
NEUTROPHILS NFR BLD AUTO: 66 %
NRBC # BLD AUTO: 0 10E3/UL
NRBC BLD AUTO-RTO: 0 /100
PLATELET # BLD AUTO: 435 10E3/UL (ref 150–450)
POTASSIUM BLD-SCNC: 3.9 MMOL/L (ref 3.4–5.3)
RBC # BLD AUTO: 3.91 10E6/UL (ref 4.4–5.9)
SODIUM SERPL-SCNC: 135 MMOL/L (ref 133–144)
WBC # BLD AUTO: 13.1 10E3/UL (ref 4–11)

## 2022-07-17 PROCEDURE — 97530 THERAPEUTIC ACTIVITIES: CPT | Mod: GP

## 2022-07-17 PROCEDURE — 250N000013 HC RX MED GY IP 250 OP 250 PS 637: Performed by: INTERNAL MEDICINE

## 2022-07-17 PROCEDURE — 36415 COLL VENOUS BLD VENIPUNCTURE: CPT | Performed by: HOSPITALIST

## 2022-07-17 PROCEDURE — 120N000001 HC R&B MED SURG/OB

## 2022-07-17 PROCEDURE — 82310 ASSAY OF CALCIUM: CPT | Performed by: HOSPITALIST

## 2022-07-17 PROCEDURE — 92507 TX SP LANG VOICE COMM INDIV: CPT | Mod: GN | Performed by: REHABILITATION PRACTITIONER

## 2022-07-17 PROCEDURE — 250N000013 HC RX MED GY IP 250 OP 250 PS 637: Performed by: HOSPITALIST

## 2022-07-17 PROCEDURE — 99232 SBSQ HOSP IP/OBS MODERATE 35: CPT | Performed by: HOSPITALIST

## 2022-07-17 PROCEDURE — 85025 COMPLETE CBC W/AUTO DIFF WBC: CPT | Performed by: HOSPITALIST

## 2022-07-17 RX ADMIN — ASPIRIN 325 MG ORAL TABLET 325 MG: 325 PILL ORAL at 08:09

## 2022-07-17 RX ADMIN — INSULIN ASPART 7 UNITS: 100 INJECTION, SOLUTION INTRAVENOUS; SUBCUTANEOUS at 05:43

## 2022-07-17 RX ADMIN — BACLOFEN 10 MG: 10 TABLET ORAL at 21:26

## 2022-07-17 RX ADMIN — ACETAMINOPHEN 975 MG: 325 SUSPENSION ORAL at 08:09

## 2022-07-17 RX ADMIN — INSULIN GLARGINE 12 UNITS: 100 INJECTION, SOLUTION SUBCUTANEOUS at 07:58

## 2022-07-17 RX ADMIN — SENNOSIDES AND DOCUSATE SODIUM 1 TABLET: 50; 8.6 TABLET ORAL at 08:09

## 2022-07-17 RX ADMIN — INSULIN ASPART 5 UNITS: 100 INJECTION, SOLUTION INTRAVENOUS; SUBCUTANEOUS at 21:25

## 2022-07-17 RX ADMIN — CLOPIDOGREL BISULFATE 75 MG: 75 TABLET ORAL at 08:09

## 2022-07-17 RX ADMIN — BACLOFEN 10 MG: 10 TABLET ORAL at 08:09

## 2022-07-17 RX ADMIN — OXYCODONE HYDROCHLORIDE 5 MG: 5 TABLET ORAL at 10:33

## 2022-07-17 RX ADMIN — SENNOSIDES AND DOCUSATE SODIUM 1 TABLET: 50; 8.6 TABLET ORAL at 21:26

## 2022-07-17 RX ADMIN — CETIRIZINE HYDROCHLORIDE 10 MG: 5 SOLUTION ORAL at 08:09

## 2022-07-17 RX ADMIN — INSULIN ASPART 4 UNITS: 100 INJECTION, SOLUTION INTRAVENOUS; SUBCUTANEOUS at 13:03

## 2022-07-17 RX ADMIN — INSULIN ASPART 3 UNITS: 100 INJECTION, SOLUTION INTRAVENOUS; SUBCUTANEOUS at 01:48

## 2022-07-17 RX ADMIN — LISINOPRIL AND HYDROCHLOROTHIAZIDE 1 TABLET: 25; 20 TABLET ORAL at 08:09

## 2022-07-17 RX ADMIN — ACETAMINOPHEN 975 MG: 325 SUSPENSION ORAL at 01:45

## 2022-07-17 RX ADMIN — ACETAMINOPHEN 975 MG: 325 SUSPENSION ORAL at 16:42

## 2022-07-17 RX ADMIN — OXYCODONE HYDROCHLORIDE 5 MG: 5 TABLET ORAL at 18:29

## 2022-07-17 RX ADMIN — MIRTAZAPINE 15 MG: 15 TABLET, FILM COATED ORAL at 21:26

## 2022-07-17 RX ADMIN — BACLOFEN 10 MG: 10 TABLET ORAL at 16:42

## 2022-07-17 RX ADMIN — ATORVASTATIN CALCIUM 80 MG: 80 TABLET, FILM COATED ORAL at 08:09

## 2022-07-17 RX ADMIN — INSULIN ASPART 5 UNITS: 100 INJECTION, SOLUTION INTRAVENOUS; SUBCUTANEOUS at 08:08

## 2022-07-17 RX ADMIN — INSULIN ASPART 4 UNITS: 100 INJECTION, SOLUTION INTRAVENOUS; SUBCUTANEOUS at 16:42

## 2022-07-17 ASSESSMENT — ACTIVITIES OF DAILY LIVING (ADL)
ADLS_ACUITY_SCORE: 50

## 2022-07-17 NOTE — PLAN OF CARE
Pt here with multiple stroke. Unable to speak, can not fully assess, arouses to voice and spontaneously. Neuros are L sided weakness. LUE 0/5 and RLE 1/5. VSS. NPO, PEG tube in place, patent and intact running at goal, 60 ml/hr with 120 free water flushes g4hrs. Up with assist A2/lift, T&repo Q2hrs. Cortez cath in placed for retention. R armpit wound dressing CDI. Pt scoring yellow on the Aggression Stop Light Tool. Discharge pending.

## 2022-07-17 NOTE — PLAN OF CARE
Hx multiple strokes. Pt nonverbal, occasionally nods head and motions with left hand.  Neuros intact x left side hemiplegia. VSS on RA. Up w/ Ax2, lift. Denied pain. Cortez in place for retention. TF running continuous. Maintained NPO status. Alarms on.

## 2022-07-17 NOTE — PROGRESS NOTES
[facility]    Medicine Progress Note - Hospitalist Service    Date of Admission:  7/3/2022    Assessment & Plan          Josue Parisi is 52, who has had multiple strokes with some significant compliance issues, ongoing polysubstance abuse, being admitted under observation status for placement at a TCU, as he is unable to care for himself.     Multiple ischemic CVAs involving MCA, KWADWO with left-sided weakness and concern for progression  Medication noncompliance  Polysubstance abuse  Initial CVA occurred in 03/2022 with right KWADWO, large territory defect and residual left-sided weakness recommended to discharge to ARU however declined and discharged AMA. Subsequently returned 6/18 with progressive weakness, again referred to TCU however declined and returned home. Hospitalized 6/24 with worsening left-sided weakness, paresthesias, numbness with findings of new nonhemorrhagic infarction within right prefrontal gyrus and deep MCA watershed infarct within the right centrum semiovale of the right frontal and parietal lobes. Patient was evaluated by stroke neurology each admission, most recently felt symptoms were suggestive of new infarct rather than hypoperfused tissue. Recommended liberal SBP goal of 140-160 and DAPT with plavix/ for 90 days. Patient most recently left AMA on 6/30 after being recommended for TCU. At time of discharge, it appears the plavix was discontinued in an effort to limit medications and encourage compliance.   * Returned to ED 7/3/22 with reports of failing at home. It was unclear on admission if patient's left-sided symptoms had persisted or worsened due to presence of polysubstances and limited ability to participate in physical exam. Admitted to cocaine use as recently as 7/2. UTox on admission positive for cocaine, amphetamines. Admitted under observation care for presumed placement.  * 7/4 exam noted with 1/5 LUE/LLE strength, limited ability to  "communicate verbally but cognitively able to answer yes/no questions with gestures, shakes of head. RNs present at bedside had cared for patient week prior and reported change in function. Repeat CTH revealed rounded focus of hypoattenuation within right corona radiata/centrum semiovale, MRI brain 6/25 indicated restricted diffusion within this region however area of hypoattenuation is larger than region of diffusion restriction concerning for progression of infarct.  - Manage BP closely  - Neuro note 7/6- \"recommend SAMMPRIS trial guided DAPT (+clopidogrel 75 x90 days) in an attempt to stabilize the M1 segment and prevent inferior division infarct. \"   Antiplatelet agents could be held for a brief period to permit G tube placement, but I would prefer if he is bridged with a heparin gtt if that is necessary. (Currently no access, so receiving ASA 300mg rectally)  - Continue statin.  - PT/OT consults.  - Reconsult speech therapy -appreciate recommendations.  - G-tube placed on 7/08.  - Continue tube feedings.  - Social work following and assisting with discharge planning, appreciate their assistance. Still awaiting bed availability.    SIRS syndrome  Pt with mild leukocytosis 12-13, procalcitonin on admission negative. Low-grade fever and sinus tachycardia persisting today. UA without evidence of infection. CXR neg. ?due to polysubstance abuse with amphetamines, cocaine in UTox.  - Repeat CBC trending down   - Low threshold to obtain blood cultures, initiate broad-spectrum abx      Type 2 diabetes mellitus  Last HbA1c of 7.8 on 06/18. PTA regimen of metformin. Recent admission Lantus had been increased to 18u BID, carb coverage with meals of 1:10 with breakfast/lunch, 1:15 with dinner.  - Hold PTA metformin.  - Blood sugars still not well controlled.  - increase to 40 units lantus qpm  - Continue am lantus at 12 units  - Continue high dose sliding scale NovoLog.    History of hypertension   Held PTA meds " initially to allow permissive hypertension.  - Resumed PTA lisinopril-hydrochlorothiazide 7/12.  - Goal is normotension per Neurology.  - Blood pressure well controlled.     Hidradenitis / Right axillary wound  WOCN consult previous recs:   Right axilla wound: Daily  and PRN.  - Cleanse wound with NS or wound cleanser (omit this step if patient cleans wound in shower).  - Dry and protect surrounding skin with no sting barrier film wipe #[id number].  - Apply a small amount of iodesorb gel #[id number] to bandaid.  - If Band-Aid needs to be changed more than once a day. Apply iodesorb gel #[id number] to adaptic #[id number] and cover with Mepilex #[id number].     Generalized pain  PTA on gabapentin 300 mg 3 times daily.    - Holding gabapentin.   - Continue as needed Tylenol as needed. Avoid opioids.  - Will add PRN ibuprofen.       Diet: Adult Formula Drip Feeding: Continuous Jevity 1.5; Other - Specify in Comment; Goal Rate: 60; mL/hr; Medication - Feeding Tube Flush Frequency: At least 15-30 mL water before and after medication administration and with tube clogging; Amount to Se...  NPO per Anesthesia Guidelines for Procedure/Surgery Except for: Meds, Other; Specify: please stop tube feedings    DVT Prophylaxis: Pneumatic Compression Devices  Cortez Catheter: PRESENT, indication: Retention  Central Lines: None  Cardiac Monitoring: None  Code Status: Full Code      Disposition Plan      Expected Discharge Date: 07/18/2022    Discharge Delays: Placement - TCU  *Medically Ready for Discharge    Discharge Comments: TCU PP        The patient's care was discussed with the Bedside Nurse and Patient.    Li Goyal MD  Hospitalist Service  [facility]  Securely message with the Vocera Web Console (learn more here)  Text page via [employer] Paging/Directory         ______________________________________________________________________    Interval History   No acute events overnight. No complaints. Patient nonverbal but  can gesture responses.    Data reviewed today: I reviewed all medications, new labs and imaging results over the last 24 hours. I personally reviewed no images or EKG's today.    Physical Exam   Vital Signs: Temp: 99.5  F (37.5  C) Temp src: Axillary BP: 111/77 Pulse: 106   Resp: 18 SpO2: 99 % O2 Device: None (Room air)    Weight: 146 lbs 9.6 oz  Constitutional: awake, alert, cooperative, no apparent distress, laying in the hospital bed  Respiratory: clear to auscultation bilaterally, no crackles or wheezing  Cardiovascular: regular rate and rhythm, normal S1 and S2, no murmur noted  GI: normal bowel sounds, soft, non-distended, non-tender, PEG tube in place, abdominal binder on  Skin: warm, dry  Musculoskeletal: no lower extremity pitting edema present  Neurologic: awake, alert, shakes head yes/no appropriately to questions, moves right side, no movement on the left side    Data   Recent Labs   Lab 07/17/22  1148 07/17/22  0745 07/17/22  0658 07/15/22  1142 07/15/22  0940 07/11/22  1207 07/11/22  0826   WBC  --   --  13.1*  --  12.1*  --   --    HGB  --   --  12.0*  --  11.6*  --   --    MCV  --   --  94  --  93  --   --    PLT  --   --  435  --  443  --   --    NA  --   --  135  --  132*  --  137   POTASSIUM  --   --  3.9  --  3.8  --  3.4   CHLORIDE  --   --  100  --  98  --  104   CO2  --   --  27  --  29  --  28   BUN  --   --  24  --  22  --  17   CR  --   --  0.64*  --  0.74  --  0.64*   ANIONGAP  --   --  8  --  5  --  5   DAT  --   --  9.5  --  9.6  --  9.2   * 260* 255*   < > 250*   < > 295*    < > = values in this interval not displayed.     Medications     dextrose       - MEDICATION INSTRUCTIONS -       - MEDICATION INSTRUCTIONS -         acetaminophen  975 mg Per Feeding Tube Q8H     aspirin  300 mg Rectal Daily    Or     aspirin  325 mg Oral or Feeding Tube Daily     atorvastatin  80 mg Oral or Feeding Tube Daily     Baclofen  10 mg Per Feeding Tube TID     cetirizine  10 mg Oral or Feeding  Tube Daily     clopidogrel  75 mg Oral or Feeding Tube Daily     insulin aspart  1-12 Units Subcutaneous Q4H     insulin glargine  12 Units Subcutaneous QAM AC     insulin glargine  40 Units Subcutaneous At Bedtime     lisinopril-hydrochlorothiazide  1 tablet Oral or Feeding Tube Daily     mirtazapine  15 mg Oral or Feeding Tube At Bedtime     senna-docusate  1 tablet Oral or Feeding Tube BID     sodium chloride (PF)  3 mL Intracatheter Q8H

## 2022-07-17 NOTE — PLAN OF CARE
Unable to speak does use gestures to try to communicate Neuros are L sided weakness. LUE 0/5 and RLE 1/5. VSS. NPO, PEG tube in place @ goal of 60 ml/hr with 120 free water flushes q4hrs. Up with assist A2/lift, T&R. Cortez. R armpit wound dressing changed. Discharge to ARU when bed avaiable.

## 2022-07-18 ENCOUNTER — APPOINTMENT (OUTPATIENT)
Dept: OCCUPATIONAL THERAPY | Facility: CLINIC | Age: 52
DRG: 065 | End: 2022-07-18
Payer: COMMERCIAL

## 2022-07-18 ENCOUNTER — APPOINTMENT (OUTPATIENT)
Dept: PHYSICAL THERAPY | Facility: CLINIC | Age: 52
DRG: 065 | End: 2022-07-18
Payer: COMMERCIAL

## 2022-07-18 LAB
ANION GAP SERPL CALCULATED.3IONS-SCNC: 6 MMOL/L (ref 3–14)
BUN SERPL-MCNC: 25 MG/DL (ref 7–30)
CALCIUM SERPL-MCNC: 9.4 MG/DL (ref 8.5–10.1)
CHLORIDE BLD-SCNC: 100 MMOL/L (ref 94–109)
CO2 SERPL-SCNC: 30 MMOL/L (ref 20–32)
CREAT SERPL-MCNC: 0.76 MG/DL (ref 0.66–1.25)
GFR SERPL CREATININE-BSD FRML MDRD: >90 ML/MIN/1.73M2
GLUCOSE BLD-MCNC: 149 MG/DL (ref 70–99)
GLUCOSE BLDC GLUCOMTR-MCNC: 168 MG/DL (ref 70–99)
GLUCOSE BLDC GLUCOMTR-MCNC: 180 MG/DL (ref 70–99)
GLUCOSE BLDC GLUCOMTR-MCNC: 201 MG/DL (ref 70–99)
GLUCOSE BLDC GLUCOMTR-MCNC: 234 MG/DL (ref 70–99)
GLUCOSE BLDC GLUCOMTR-MCNC: 249 MG/DL (ref 70–99)
GLUCOSE BLDC GLUCOMTR-MCNC: 273 MG/DL (ref 70–99)
MAGNESIUM SERPL-MCNC: 2.2 MG/DL (ref 1.6–2.3)
PHOSPHATE SERPL-MCNC: 3.2 MG/DL (ref 2.5–4.5)
POTASSIUM BLD-SCNC: 3.6 MMOL/L (ref 3.4–5.3)
SODIUM SERPL-SCNC: 136 MMOL/L (ref 133–144)

## 2022-07-18 PROCEDURE — 250N000013 HC RX MED GY IP 250 OP 250 PS 637: Performed by: INTERNAL MEDICINE

## 2022-07-18 PROCEDURE — 84100 ASSAY OF PHOSPHORUS: CPT | Performed by: INTERNAL MEDICINE

## 2022-07-18 PROCEDURE — 82310 ASSAY OF CALCIUM: CPT | Performed by: INTERNAL MEDICINE

## 2022-07-18 PROCEDURE — 97530 THERAPEUTIC ACTIVITIES: CPT | Mod: GP

## 2022-07-18 PROCEDURE — 83735 ASSAY OF MAGNESIUM: CPT | Performed by: INTERNAL MEDICINE

## 2022-07-18 PROCEDURE — 36415 COLL VENOUS BLD VENIPUNCTURE: CPT | Performed by: INTERNAL MEDICINE

## 2022-07-18 PROCEDURE — 120N000001 HC R&B MED SURG/OB

## 2022-07-18 PROCEDURE — 99232 SBSQ HOSP IP/OBS MODERATE 35: CPT | Performed by: HOSPITALIST

## 2022-07-18 PROCEDURE — 97112 NEUROMUSCULAR REEDUCATION: CPT | Mod: GO | Performed by: OCCUPATIONAL THERAPIST

## 2022-07-18 PROCEDURE — 250N000013 HC RX MED GY IP 250 OP 250 PS 637: Performed by: HOSPITALIST

## 2022-07-18 PROCEDURE — 97535 SELF CARE MNGMENT TRAINING: CPT | Mod: GO | Performed by: OCCUPATIONAL THERAPIST

## 2022-07-18 RX ADMIN — BACLOFEN 10 MG: 10 TABLET ORAL at 09:23

## 2022-07-18 RX ADMIN — SENNOSIDES AND DOCUSATE SODIUM 1 TABLET: 50; 8.6 TABLET ORAL at 21:12

## 2022-07-18 RX ADMIN — INSULIN ASPART 6 UNITS: 100 INJECTION, SOLUTION INTRAVENOUS; SUBCUTANEOUS at 01:52

## 2022-07-18 RX ADMIN — INSULIN ASPART 2 UNITS: 100 INJECTION, SOLUTION INTRAVENOUS; SUBCUTANEOUS at 09:31

## 2022-07-18 RX ADMIN — ATORVASTATIN CALCIUM 80 MG: 80 TABLET, FILM COATED ORAL at 09:23

## 2022-07-18 RX ADMIN — SENNOSIDES AND DOCUSATE SODIUM 1 TABLET: 50; 8.6 TABLET ORAL at 09:22

## 2022-07-18 RX ADMIN — INSULIN ASPART 3 UNITS: 100 INJECTION, SOLUTION INTRAVENOUS; SUBCUTANEOUS at 13:23

## 2022-07-18 RX ADMIN — CLOPIDOGREL BISULFATE 75 MG: 75 TABLET ORAL at 09:22

## 2022-07-18 RX ADMIN — OXYCODONE HYDROCHLORIDE 5 MG: 5 TABLET ORAL at 16:58

## 2022-07-18 RX ADMIN — BACLOFEN 10 MG: 10 TABLET ORAL at 16:58

## 2022-07-18 RX ADMIN — INSULIN ASPART 4 UNITS: 100 INJECTION, SOLUTION INTRAVENOUS; SUBCUTANEOUS at 05:28

## 2022-07-18 RX ADMIN — ACETAMINOPHEN 975 MG: 325 SUSPENSION ORAL at 16:58

## 2022-07-18 RX ADMIN — IBUPROFEN 400 MG: 200 SUSPENSION ORAL at 16:58

## 2022-07-18 RX ADMIN — INSULIN ASPART 2 UNITS: 100 INJECTION, SOLUTION INTRAVENOUS; SUBCUTANEOUS at 21:09

## 2022-07-18 RX ADMIN — INSULIN GLARGINE 12 UNITS: 100 INJECTION, SOLUTION SUBCUTANEOUS at 09:30

## 2022-07-18 RX ADMIN — ASPIRIN 325 MG ORAL TABLET 325 MG: 325 PILL ORAL at 09:22

## 2022-07-18 RX ADMIN — MIRTAZAPINE 15 MG: 15 TABLET, FILM COATED ORAL at 21:12

## 2022-07-18 RX ADMIN — LISINOPRIL AND HYDROCHLOROTHIAZIDE 1 TABLET: 25; 20 TABLET ORAL at 09:23

## 2022-07-18 RX ADMIN — ACETAMINOPHEN 975 MG: 325 SUSPENSION ORAL at 01:52

## 2022-07-18 RX ADMIN — INSULIN ASPART 5 UNITS: 100 INJECTION, SOLUTION INTRAVENOUS; SUBCUTANEOUS at 17:51

## 2022-07-18 RX ADMIN — BACLOFEN 10 MG: 10 TABLET ORAL at 21:12

## 2022-07-18 RX ADMIN — CETIRIZINE HYDROCHLORIDE 10 MG: 5 SOLUTION ORAL at 09:29

## 2022-07-18 RX ADMIN — ACETAMINOPHEN 975 MG: 325 SUSPENSION ORAL at 09:23

## 2022-07-18 ASSESSMENT — ACTIVITIES OF DAILY LIVING (ADL)
ADLS_ACUITY_SCORE: 54
ADLS_ACUITY_SCORE: 50
ADLS_ACUITY_SCORE: 54
ADLS_ACUITY_SCORE: 54
ADLS_ACUITY_SCORE: 50
ADLS_ACUITY_SCORE: 54
ADLS_ACUITY_SCORE: 50
ADLS_ACUITY_SCORE: 54
ADLS_ACUITY_SCORE: 50
ADLS_ACUITY_SCORE: 54

## 2022-07-18 NOTE — PLAN OF CARE
Reason for Admission: multiple CVA    Cognitive/Mentation: A/Ox SEAN. Severe aphasia.  Neuros/CMS: Intact ex left hemiplegia. Q8 neuro  VS: stable.   GI: BS active, last BM 7/18/2022. Incontinent.  : Cortez catheter.  Pulmonary: LS clear.  Pain: denies.   Skin: intact  Activity: Assist x 2 with lift.  Diet: NPO. PEG feed at 60mL/hour. Takes pills crushed in PEG tube.     Therapies recs: TCU  Discharge: pending    Aggression Stoplight Tool: green

## 2022-07-18 NOTE — PLAN OF CARE
Pt here with CVA.  Severe aphasia.  Communicates mostly with nods yes/no.  Follows most commands.  LUE 0/5.  LLE 1/5.  Vitals stable.  On RA.  Uses oral suction.  TF at goal.  NPO.  Has PEG.  Cortez for retention with adequate UO.  Wound to R axilla cdi.  No IV access, refuses.  Waiting for placement.    Addendum: TF stopped d/t pump malfunction.  Replacement pump ordered.

## 2022-07-18 NOTE — PLAN OF CARE
Goal Outcome Evaluation:    Plan of Care Reviewed With: patient     Overall Patient Progress: improving    Pt here with multiple stroke.VSS . Neuros intact except aphasia, SEAN orientation ,arouse to voice not following all commands Lt  sided weakness. LUE 0/5 and LLE 1/5.  NPO, PEG tube in place, patent and intact running at goal, 60 ml/hr with 120 free water flushes g4hrs. Up with assist A2/lift, T&repo Q2hrs. Cortez cath in placed for retention. R armpit wound dressing CDI. No IV access pt refused new IV placement .Pt scoring yellow on the Aggression Stop Light Tool. Discharge pending to TCU

## 2022-07-18 NOTE — PROGRESS NOTES
[facility]    Medicine Progress Note - Hospitalist Service    Date of Admission:  7/3/2022    Assessment & Plan          Josue Parisi is 52, who has had multiple strokes with some significant compliance issues, ongoing polysubstance abuse, being admitted under observation status for placement at a TCU, as he is unable to care for himself.     Multiple ischemic CVAs involving MCA, KWADWO with left-sided weakness and concern for progression  Medication noncompliance  Polysubstance abuse  Initial CVA occurred in 03/2022 with right KWADWO, large territory defect and residual left-sided weakness recommended to discharge to ARU however declined and discharged AMA. Subsequently returned 6/18 with progressive weakness, again referred to TCU however declined and returned home. Hospitalized 6/24 with worsening left-sided weakness, paresthesias, numbness with findings of new nonhemorrhagic infarction within right prefrontal gyrus and deep MCA watershed infarct within the right centrum semiovale of the right frontal and parietal lobes. Patient was evaluated by stroke neurology each admission, most recently felt symptoms were suggestive of new infarct rather than hypoperfused tissue. Recommended liberal SBP goal of 140-160 and DAPT with plavix/ for 90 days. Patient most recently left AMA on 6/30 after being recommended for TCU. At time of discharge, it appears the plavix was discontinued in an effort to limit medications and encourage compliance.   * Returned to ED 7/3/22 with reports of failing at home. It was unclear on admission if patient's left-sided symptoms had persisted or worsened due to presence of polysubstances and limited ability to participate in physical exam. Admitted to cocaine use as recently as 7/2. UTox on admission positive for cocaine, amphetamines. Admitted under observation care for presumed placement.  * 7/4 exam noted with 1/5 LUE/LLE strength, limited ability to  "communicate verbally but cognitively able to answer yes/no questions with gestures, shakes of head. RNs present at bedside had cared for patient week prior and reported change in function. Repeat CTH revealed rounded focus of hypoattenuation within right corona radiata/centrum semiovale, MRI brain 6/25 indicated restricted diffusion within this region however area of hypoattenuation is larger than region of diffusion restriction concerning for progression of infarct.  - Manage BP closely  - Neuro note 7/6- \"recommend SAMMPRIS trial guided DAPT (+clopidogrel 75 x90 days) in an attempt to stabilize the M1 segment and prevent inferior division infarct. \"   Antiplatelet agents could be held for a brief period to permit G tube placement, but I would prefer if he is bridged with a heparin gtt if that is necessary. (Currently no access, so receiving ASA 300mg rectally)  - Continue statin.  - PT/OT consults.  - Reconsult speech therapy -appreciate recommendations.  - G-tube placed on 7/08.  - Continue tube feedings.  - Social work following and assisting with discharge planning, appreciate their assistance. Still awaiting bed availability.    SIRS syndrome  Pt with mild leukocytosis 12-13, procalcitonin on admission negative. Low-grade fever and sinus tachycardia persisting today. UA without evidence of infection. CXR neg. ?due to polysubstance abuse with amphetamines, cocaine in UTox.  - Repeat CBC trending down   - Low threshold to obtain blood cultures, initiate broad-spectrum abx      Type 2 diabetes mellitus  Last HbA1c of 7.8 on 06/18. PTA regimen of metformin. Recent admission Lantus had been increased to 18u BID, carb coverage with meals of 1:10 with breakfast/lunch, 1:15 with dinner.  - Hold PTA metformin.  - Blood sugars still not well controlled.  - increase to 40 units lantus qpm  - Continue am lantus at 12 units  - Continue high dose sliding scale NovoLog.    History of hypertension   Held PTA meds " initially to allow permissive hypertension.  - Resumed PTA lisinopril-hydrochlorothiazide 7/12.  - Goal is normotension per Neurology.  - Blood pressure well controlled.     Hidradenitis / Right axillary wound  WOCN consult previous recs:   Right axilla wound: Daily  and PRN.  - Cleanse wound with NS or wound cleanser (omit this step if patient cleans wound in shower).  - Dry and protect surrounding skin with no sting barrier film wipe #[id number].  - Apply a small amount of iodesorb gel #[id number] to bandaid.  - If Band-Aid needs to be changed more than once a day. Apply iodesorb gel #[id number] to adaptic #[id number] and cover with Mepilex #[id number].     Generalized pain  PTA on gabapentin 300 mg 3 times daily.    - Holding gabapentin.   - Continue as needed Tylenol as needed. Avoid opioids.  - Will add PRN ibuprofen.       Diet: Adult Formula Drip Feeding: Continuous Jevity 1.5; Other - Specify in Comment; Goal Rate: 60; mL/hr; Medication - Feeding Tube Flush Frequency: At least 15-30 mL water before and after medication administration and with tube clogging; Amount to Se...  NPO per Anesthesia Guidelines for Procedure/Surgery Except for: Meds, Other; Specify: please stop tube feedings    DVT Prophylaxis: Pneumatic Compression Devices  Cortez Catheter: PRESENT, indication: Retention  Central Lines: None  Cardiac Monitoring: None  Code Status: Full Code      Disposition Plan      Expected Discharge Date: 07/19/2022    Discharge Delays: Placement - TCU  *Medically Ready for Discharge    Discharge Comments: TCU PP        The patient's care was discussed with the Bedside Nurse and Patient.    Li Goyal MD  Hospitalist Service  [facility]  Securely message with the Vocera Web Console (learn more here)  Text page via [employer] Paging/Directory         ______________________________________________________________________    Interval History   No acute events overnight. No complaints. Patient nonverbal but  can gesture responses.    Data reviewed today: I reviewed all medications, new labs and imaging results over the last 24 hours. I personally reviewed no images or EKG's today.    Physical Exam   Vital Signs: Temp: 98.9  F (37.2  C) Temp src: Oral BP: 119/77 Pulse: 102   Resp: 16 SpO2: 98 % O2 Device: None (Room air)    Weight: 146 lbs 9.6 oz  Constitutional: awake, alert, cooperative, no apparent distress, laying in the hospital bed  Respiratory: clear to auscultation bilaterally, no crackles or wheezing  Cardiovascular: regular rate and rhythm, normal S1 and S2, no murmur noted  GI: normal bowel sounds, soft, non-distended, non-tender, PEG tube in place, abdominal binder on  Skin: warm, dry  Musculoskeletal: no lower extremity pitting edema present  Neurologic: awake, alert, shakes head yes/no appropriately to questions, moves right side, no movement on the left side    Data   Recent Labs   Lab 07/18/22  0805 07/18/22  0709 07/18/22  0510 07/17/22  0745 07/17/22  0658 07/15/22  1142 07/15/22  0940   WBC  --   --   --   --  13.1*  --  12.1*   HGB  --   --   --   --  12.0*  --  11.6*   MCV  --   --   --   --  94  --  93   PLT  --   --   --   --  435  --  443   NA  --  136  --   --  135  --  132*   POTASSIUM  --  3.6  --   --  3.9  --  3.8   CHLORIDE  --  100  --   --  100  --  98   CO2  --  30  --   --  27  --  29   BUN  --  25  --   --  24  --  22   CR  --  0.76  --   --  0.64*  --  0.74   ANIONGAP  --  6  --   --  8  --  5   DAT  --  9.4  --   --  9.5  --  9.6   * 149* 234*   < > 255*   < > 250*    < > = values in this interval not displayed.     Medications     dextrose       - MEDICATION INSTRUCTIONS -       - MEDICATION INSTRUCTIONS -         acetaminophen  975 mg Per Feeding Tube Q8H     aspirin  300 mg Rectal Daily    Or     aspirin  325 mg Oral or Feeding Tube Daily     atorvastatin  80 mg Oral or Feeding Tube Daily     Baclofen  10 mg Per Feeding Tube TID     cetirizine  10 mg Oral or Feeding Tube  Daily     clopidogrel  75 mg Oral or Feeding Tube Daily     insulin aspart  1-12 Units Subcutaneous Q4H     insulin glargine  12 Units Subcutaneous QAM AC     insulin glargine  40 Units Subcutaneous At Bedtime     lisinopril-hydrochlorothiazide  1 tablet Oral or Feeding Tube Daily     mirtazapine  15 mg Oral or Feeding Tube At Bedtime     senna-docusate  1 tablet Oral or Feeding Tube BID

## 2022-07-19 ENCOUNTER — APPOINTMENT (OUTPATIENT)
Dept: PHYSICAL THERAPY | Facility: CLINIC | Age: 52
DRG: 065 | End: 2022-07-19
Payer: COMMERCIAL

## 2022-07-19 ENCOUNTER — APPOINTMENT (OUTPATIENT)
Dept: SPEECH THERAPY | Facility: CLINIC | Age: 52
DRG: 065 | End: 2022-07-19
Payer: COMMERCIAL

## 2022-07-19 LAB
ALBUMIN SERPL-MCNC: 3.3 G/DL (ref 3.4–5)
ALP SERPL-CCNC: 58 U/L (ref 40–150)
ALT SERPL W P-5'-P-CCNC: 46 U/L (ref 0–70)
ANION GAP SERPL CALCULATED.3IONS-SCNC: 8 MMOL/L (ref 3–14)
AST SERPL W P-5'-P-CCNC: 30 U/L (ref 0–45)
BASOPHILS # BLD AUTO: 0 10E3/UL (ref 0–0.2)
BASOPHILS NFR BLD AUTO: 0 %
BILIRUB SERPL-MCNC: 0.5 MG/DL (ref 0.2–1.3)
BUN SERPL-MCNC: 24 MG/DL (ref 7–30)
CALCIUM SERPL-MCNC: 9.5 MG/DL (ref 8.5–10.1)
CHLORIDE BLD-SCNC: 99 MMOL/L (ref 94–109)
CO2 SERPL-SCNC: 27 MMOL/L (ref 20–32)
CREAT SERPL-MCNC: 0.62 MG/DL (ref 0.66–1.25)
EOSINOPHIL # BLD AUTO: 0.4 10E3/UL (ref 0–0.7)
EOSINOPHIL NFR BLD AUTO: 3 %
ERYTHROCYTE [DISTWIDTH] IN BLOOD BY AUTOMATED COUNT: 11.9 % (ref 10–15)
GFR SERPL CREATININE-BSD FRML MDRD: >90 ML/MIN/1.73M2
GLUCOSE BLD-MCNC: 263 MG/DL (ref 70–99)
GLUCOSE BLDC GLUCOMTR-MCNC: 107 MG/DL (ref 70–99)
GLUCOSE BLDC GLUCOMTR-MCNC: 133 MG/DL (ref 70–99)
GLUCOSE BLDC GLUCOMTR-MCNC: 135 MG/DL (ref 70–99)
GLUCOSE BLDC GLUCOMTR-MCNC: 165 MG/DL (ref 70–99)
GLUCOSE BLDC GLUCOMTR-MCNC: 187 MG/DL (ref 70–99)
GLUCOSE BLDC GLUCOMTR-MCNC: 229 MG/DL (ref 70–99)
GLUCOSE BLDC GLUCOMTR-MCNC: 283 MG/DL (ref 70–99)
HCT VFR BLD AUTO: 34.6 % (ref 40–53)
HGB BLD-MCNC: 11.4 G/DL (ref 13.3–17.7)
IMM GRANULOCYTES # BLD: 0.1 10E3/UL
IMM GRANULOCYTES NFR BLD: 1 %
INR PPP: 1.04 (ref 0.85–1.15)
LYMPHOCYTES # BLD AUTO: 2.4 10E3/UL (ref 0.8–5.3)
LYMPHOCYTES NFR BLD AUTO: 21 %
MCH RBC QN AUTO: 30.4 PG (ref 26.5–33)
MCHC RBC AUTO-ENTMCNC: 32.9 G/DL (ref 31.5–36.5)
MCV RBC AUTO: 92 FL (ref 78–100)
MONOCYTES # BLD AUTO: 0.9 10E3/UL (ref 0–1.3)
MONOCYTES NFR BLD AUTO: 8 %
NEUTROPHILS # BLD AUTO: 8.1 10E3/UL (ref 1.6–8.3)
NEUTROPHILS NFR BLD AUTO: 67 %
NRBC # BLD AUTO: 0 10E3/UL
NRBC BLD AUTO-RTO: 0 /100
PLATELET # BLD AUTO: 396 10E3/UL (ref 150–450)
POTASSIUM BLD-SCNC: 3.7 MMOL/L (ref 3.4–5.3)
PROT SERPL-MCNC: 7.4 G/DL (ref 6.8–8.8)
RBC # BLD AUTO: 3.75 10E6/UL (ref 4.4–5.9)
SODIUM SERPL-SCNC: 134 MMOL/L (ref 133–144)
WBC # BLD AUTO: 11.9 10E3/UL (ref 4–11)

## 2022-07-19 PROCEDURE — 250N000013 HC RX MED GY IP 250 OP 250 PS 637: Performed by: INTERNAL MEDICINE

## 2022-07-19 PROCEDURE — 92507 TX SP LANG VOICE COMM INDIV: CPT | Mod: GN | Performed by: SPEECH-LANGUAGE PATHOLOGIST

## 2022-07-19 PROCEDURE — 250N000012 HC RX MED GY IP 250 OP 636 PS 637: Performed by: HOSPITALIST

## 2022-07-19 PROCEDURE — 36415 COLL VENOUS BLD VENIPUNCTURE: CPT | Performed by: HOSPITALIST

## 2022-07-19 PROCEDURE — 999N000040 HC STATISTIC CONSULT NO CHARGE VASC ACCESS

## 2022-07-19 PROCEDURE — 250N000013 HC RX MED GY IP 250 OP 250 PS 637: Performed by: HOSPITALIST

## 2022-07-19 PROCEDURE — 85610 PROTHROMBIN TIME: CPT | Performed by: HOSPITALIST

## 2022-07-19 PROCEDURE — 80053 COMPREHEN METABOLIC PANEL: CPT | Performed by: HOSPITALIST

## 2022-07-19 PROCEDURE — 999N000128 HC STATISTIC PERIPHERAL IV START W/O US GUIDANCE

## 2022-07-19 PROCEDURE — 85025 COMPLETE CBC W/AUTO DIFF WBC: CPT | Performed by: HOSPITALIST

## 2022-07-19 PROCEDURE — 97530 THERAPEUTIC ACTIVITIES: CPT | Mod: GP | Performed by: PHYSICAL THERAPIST

## 2022-07-19 PROCEDURE — 258N000003 HC RX IP 258 OP 636: Performed by: HOSPITALIST

## 2022-07-19 PROCEDURE — 92526 ORAL FUNCTION THERAPY: CPT | Mod: GN | Performed by: SPEECH-LANGUAGE PATHOLOGIST

## 2022-07-19 PROCEDURE — 120N000001 HC R&B MED SURG/OB

## 2022-07-19 PROCEDURE — 99232 SBSQ HOSP IP/OBS MODERATE 35: CPT | Performed by: HOSPITALIST

## 2022-07-19 RX ORDER — BISACODYL 5 MG
5 TABLET, DELAYED RELEASE (ENTERIC COATED) ORAL DAILY PRN
Status: DISCONTINUED | OUTPATIENT
Start: 2022-07-19 | End: 2022-07-19

## 2022-07-19 RX ORDER — AMOXICILLIN 250 MG
2 CAPSULE ORAL 2 TIMES DAILY
Status: DISCONTINUED | OUTPATIENT
Start: 2022-07-19 | End: 2022-08-01

## 2022-07-19 RX ADMIN — BACLOFEN 10 MG: 10 TABLET ORAL at 16:41

## 2022-07-19 RX ADMIN — ACETAMINOPHEN 975 MG: 325 SUSPENSION ORAL at 00:38

## 2022-07-19 RX ADMIN — INSULIN ASPART 4 UNITS: 100 INJECTION, SOLUTION INTRAVENOUS; SUBCUTANEOUS at 06:02

## 2022-07-19 RX ADMIN — INSULIN ASPART 2 UNITS: 100 INJECTION, SOLUTION INTRAVENOUS; SUBCUTANEOUS at 08:51

## 2022-07-19 RX ADMIN — BACLOFEN 10 MG: 10 TABLET ORAL at 08:37

## 2022-07-19 RX ADMIN — DEXTROSE AND SODIUM CHLORIDE: 5; 900 INJECTION, SOLUTION INTRAVENOUS at 16:32

## 2022-07-19 RX ADMIN — BACLOFEN 10 MG: 10 TABLET ORAL at 21:37

## 2022-07-19 RX ADMIN — LISINOPRIL AND HYDROCHLOROTHIAZIDE 1 TABLET: 25; 20 TABLET ORAL at 08:37

## 2022-07-19 RX ADMIN — IBUPROFEN 400 MG: 200 SUSPENSION ORAL at 12:20

## 2022-07-19 RX ADMIN — ACETAMINOPHEN 975 MG: 325 SUSPENSION ORAL at 16:41

## 2022-07-19 RX ADMIN — Medication 40 MG: at 15:05

## 2022-07-19 RX ADMIN — SENNOSIDES AND DOCUSATE SODIUM 1 TABLET: 50; 8.6 TABLET ORAL at 08:38

## 2022-07-19 RX ADMIN — SENNOSIDES AND DOCUSATE SODIUM 2 TABLET: 50; 8.6 TABLET ORAL at 21:37

## 2022-07-19 RX ADMIN — MAGNESIUM HYDROXIDE 30 ML: 400 SUSPENSION ORAL at 13:02

## 2022-07-19 RX ADMIN — ASPIRIN 325 MG ORAL TABLET 325 MG: 325 PILL ORAL at 08:37

## 2022-07-19 RX ADMIN — MIRTAZAPINE 15 MG: 15 TABLET, FILM COATED ORAL at 21:37

## 2022-07-19 RX ADMIN — ACETAMINOPHEN 975 MG: 325 SUSPENSION ORAL at 08:34

## 2022-07-19 RX ADMIN — OXYCODONE HYDROCHLORIDE 5 MG: 5 TABLET ORAL at 12:20

## 2022-07-19 RX ADMIN — INSULIN ASPART 2 UNITS: 100 INJECTION, SOLUTION INTRAVENOUS; SUBCUTANEOUS at 02:16

## 2022-07-19 RX ADMIN — OXYCODONE HYDROCHLORIDE 5 MG: 5 TABLET ORAL at 21:37

## 2022-07-19 RX ADMIN — INSULIN GLARGINE 12 UNITS: 100 INJECTION, SOLUTION SUBCUTANEOUS at 08:51

## 2022-07-19 RX ADMIN — CLOPIDOGREL BISULFATE 75 MG: 75 TABLET ORAL at 08:38

## 2022-07-19 RX ADMIN — INSULIN ASPART 6 UNITS: 100 INJECTION, SOLUTION INTRAVENOUS; SUBCUTANEOUS at 11:52

## 2022-07-19 RX ADMIN — ATORVASTATIN CALCIUM 80 MG: 80 TABLET, FILM COATED ORAL at 08:36

## 2022-07-19 RX ADMIN — SODIUM CHLORIDE 1000 ML: 9 INJECTION, SOLUTION INTRAVENOUS at 14:53

## 2022-07-19 RX ADMIN — CETIRIZINE HYDROCHLORIDE 10 MG: 5 SOLUTION ORAL at 08:40

## 2022-07-19 ASSESSMENT — ACTIVITIES OF DAILY LIVING (ADL)
ADLS_ACUITY_SCORE: 56
ADLS_ACUITY_SCORE: 54
ADLS_ACUITY_SCORE: 56
ADLS_ACUITY_SCORE: 52
ADLS_ACUITY_SCORE: 56
ADLS_ACUITY_SCORE: 52
ADLS_ACUITY_SCORE: 54
ADLS_ACUITY_SCORE: 56
ADLS_ACUITY_SCORE: 52

## 2022-07-19 NOTE — PLAN OF CARE
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  NPO.  BOWEL/BLADDER: Cortez  DRAINS/IV: Has PEG.  No IV access.  MD aware.  PAIN MANAGEMENT: Scheduled Tylenol given during shift  DISCHARGE PLAN: Pending placement  OTHER IMPORTANT INFORMATION:  Pt nonverbal but moves head for answers.  Has PEG tube.  TF at goal rate.  Has axillary wound, C/D/I.  Uses oral suctioning himself.  T/R conducted.

## 2022-07-19 NOTE — PLAN OF CARE
"SLP Update (see flowsheet for daily notes/details):    Swallow: RN reported blood in oral cavity this am. Oral cavity clear and moist during SLP session. Noted halitosis and paged MD. Modest improvements in oral acceptance, bolus manipulation and timing. S/sx of aspiration noted with improved cough strength. Pt wrote, \"When can I have some food?\" Education provided and plan for continued trials with SLP only and VFSS as oral apraxia/timing improves.     Speech: Pt not responsive to cues for verbalizations/speech at this time. Noted that he can verbalize few sounds \"ma, no.\" Ongoing significant oral apraxia.     **Please keep clipboard/paper/pen on pt's right side of bed/lap as he can participate in full/complex conversation by writing. Continue thumbs up/down for yes/no questions.**    "

## 2022-07-19 NOTE — PROGRESS NOTES
[facility]    Medicine Progress Note - Hospitalist Service    Date of Admission:  7/3/2022    Assessment & Plan          Josue Parisi is 52, who has had multiple strokes with some significant compliance issues, ongoing polysubstance abuse, being admitted under observation status for placement at a TCU, as he is unable to care for himself.     Multiple ischemic CVAs involving MCA, KWADWO with left-sided weakness and concern for progression  Medication noncompliance  Polysubstance abuse  Initial CVA occurred in 03/2022 with right KWADWO, large territory defect and residual left-sided weakness recommended to discharge to ARU however declined and discharged AMA. Subsequently returned 6/18 with progressive weakness, again referred to TCU however declined and returned home. Hospitalized 6/24 with worsening left-sided weakness, paresthesias, numbness with findings of new nonhemorrhagic infarction within right prefrontal gyrus and deep MCA watershed infarct within the right centrum semiovale of the right frontal and parietal lobes. Patient was evaluated by stroke neurology each admission, most recently felt symptoms were suggestive of new infarct rather than hypoperfused tissue. Recommended liberal SBP goal of 140-160 and DAPT with plavix/ for 90 days. Patient most recently left AMA on 6/30 after being recommended for TCU. At time of discharge, it appears the plavix was discontinued in an effort to limit medications and encourage compliance.   * Returned to ED 7/3/22 with reports of failing at home. It was unclear on admission if patient's left-sided symptoms had persisted or worsened due to presence of polysubstances and limited ability to participate in physical exam. Admitted to cocaine use as recently as 7/2. UTox on admission positive for cocaine, amphetamines. Admitted under observation care for presumed placement.  * 7/4 exam noted with 1/5 LUE/LLE strength, limited ability to  "communicate verbally but cognitively able to answer yes/no questions with gestures, shakes of head. RNs present at bedside had cared for patient week prior and reported change in function. Repeat CTH revealed rounded focus of hypoattenuation within right corona radiata/centrum semiovale, MRI brain 6/25 indicated restricted diffusion within this region however area of hypoattenuation is larger than region of diffusion restriction concerning for progression of infarct.  - Manage BP closely  - Neuro note 7/6- \"recommend SAMMPRIS trial guided DAPT (+clopidogrel 75 x90 days) in an attempt to stabilize the M1 segment and prevent inferior division infarct. \"   Antiplatelet agents could be held for a brief period to permit G tube placement, but I would prefer if he is bridged with a heparin gtt if that is necessary. (Currently no access, so receiving ASA 300mg rectally)  - Continue statin.  - PT/OT consults.  - Reconsult speech therapy -appreciate recommendations.  - G-tube placed on 7/08.  - Holding tube feeds today given constipation and discomfort at g tube site  - Social work following and assisting with discharge planning, appreciate their assistance. Still awaiting bed availability.    Abdominal pain  Constipation  Patient reports abdominal pain, needing to have a BM. He did have large formed BM this morning but abdomen is hard. He also reports pain at site of g tube but this looks benign on exam.  - hold tube feeds. Will start some IVF while TF on hold  - increase senna-colace to 2 tabs BID  - milk of mag via feeding tube  - start protonix IV daily  - patient was adamant he does not want suppository  - will also add prn bisacodyl tablet  - if no stool or improvement, will get KUB    Bleeding gums, halitosis  - suspect reflux given degree of constipation. Checked plts, INR - both okay. Will consult OMFS if symptoms continue but pt denies tooth pain.    SIRS syndrome  Pt with mild leukocytosis 12-13, " procalcitonin on admission negative. Low-grade fever and sinus tachycardia persisting today. UA without evidence of infection. CXR neg. ?due to polysubstance abuse with amphetamines, cocaine in UTox.  - Repeat CBC trending down   - Low threshold to obtain blood cultures, initiate broad-spectrum abx      Type 2 diabetes mellitus  Last HbA1c of 7.8 on 06/18. PTA regimen of metformin. Recent admission Lantus had been increased to 18u BID, carb coverage with meals of 1:10 with breakfast/lunch, 1:15 with dinner.  - Hold PTA metformin.  - Blood sugars still not well controlled.  - increase to 42 units lantus qpm  - Increase am lantus to 14 units  - Continue high dose sliding scale NovoLog.    History of hypertension   Held PTA meds initially to allow permissive hypertension.  - Resumed PTA lisinopril-hydrochlorothiazide 7/12.  - Goal is normotension per Neurology.  - Blood pressure well controlled.     Hidradenitis / Right axillary wound  WOCN consult previous recs:   Right axilla wound: Daily  and PRN.  - Cleanse wound with NS or wound cleanser (omit this step if patient cleans wound in shower).  - Dry and protect surrounding skin with no sting barrier film wipe #[id number].  - Apply a small amount of iodesorb gel #[id number] to bandaid.  - If Band-Aid needs to be changed more than once a day. Apply iodesorb gel #[id number] to adaptic #[id number] and cover with Mepilex #[id number].     Generalized pain  PTA on gabapentin 300 mg 3 times daily.    - Holding gabapentin.   - Continue as needed Tylenol as needed. Avoid opioids.  - Will add PRN ibuprofen.       Diet: Adult Formula Drip Feeding: Continuous Jevity 1.5; Other - Specify in Comment; Goal Rate: 60; mL/hr; Medication - Feeding Tube Flush Frequency: At least 15-30 mL water before and after medication administration and with tube clogging; Amount to Se...  NPO per Anesthesia Guidelines for Procedure/Surgery Except for: Meds, Other; Specify: please stop tube feedings    DVT Prophylaxis:  "Pneumatic Compression Devices  Cortez Catheter: PRESENT, indication: Retention  Central Lines: None  Cardiac Monitoring: None  Code Status: Full Code      Disposition Plan      Expected Discharge Date: 07/20/2022    Discharge Delays: Placement - TCU  *Medically Ready for Discharge    Discharge Comments: TCU PP        The patient's care was discussed with the Bedside Nurse, Patient and SLP.    Li Goyal MD  Hospitalist Service  [facility]  Securely message with the Vocera Web Console (learn more here)  Text page via [employer] Paging/Directory         ______________________________________________________________________    Interval History   Received report this morning that patient was asking to speak with me. Pt is non verbal but uses pen and pad to communicate. First stated he \"needs to take a sh*t\" then indicated abdominal pain at the site of his g-tube. RN said patient had large formed stool this morning. Abd firm c/w constipation. There was also a request from RN and SLP this morning for OMFS consult due to bleeding gums and hallitosis. I think this is related to reflux from TF and constipation but will consider consult if there is indication of dental abscess or such. No indication of that now. Patient claims pain is abdominal.    Data reviewed today: I reviewed all medications, new labs and imaging results over the last 24 hours. I personally reviewed no images or EKG's today.    Physical Exam   Vital Signs: Temp: 97.6  F (36.4  C) Temp src: Axillary BP: 134/83 Pulse: 101   Resp: 16 SpO2: 98 % O2 Device: None (Room air)    Weight: 155 lbs 3.2 oz  Constitutional: awake, alert, cooperative, no apparent distress, laying in the hospital bed  Respiratory: clear to auscultation bilaterally, no crackles or wheezing  Cardiovascular: regular rate and rhythm, normal S1 and S2, no murmur noted  GI: normal bowel sounds, soft, non-distended, non-tender, PEG tube in place, abdominal binder " on  Skin: warm, dry  Musculoskeletal: no lower extremity pitting edema present  Neurologic: awake, alert, shakes head yes/no appropriately to questions, moves right side, no movement on the left side    Data   Recent Labs   Lab 07/19/22  0807 07/19/22  0550 07/19/22  0153 07/18/22  0805 07/18/22  0709 07/17/22  0745 07/17/22  0658 07/15/22  1142 07/15/22  0940   WBC  --   --   --   --   --   --  13.1*  --  12.1*   HGB  --   --   --   --   --   --  12.0*  --  11.6*   MCV  --   --   --   --   --   --  94  --  93   PLT  --   --   --   --   --   --  435  --  443   NA  --   --   --   --  136  --  135  --  132*   POTASSIUM  --   --   --   --  3.6  --  3.9  --  3.8   CHLORIDE  --   --   --   --  100  --  100  --  98   CO2  --   --   --   --  30  --  27  --  29   BUN  --   --   --   --  25  --  24  --  22   CR  --   --   --   --  0.76  --  0.64*  --  0.74   ANIONGAP  --   --   --   --  6  --  8  --  5   DAT  --   --   --   --  9.4  --  9.5  --  9.6   * 229* 187*   < > 149*   < > 255*   < > 250*    < > = values in this interval not displayed.     Medications     dextrose       - MEDICATION INSTRUCTIONS -       - MEDICATION INSTRUCTIONS -         acetaminophen  975 mg Per Feeding Tube Q8H     aspirin  300 mg Rectal Daily    Or     aspirin  325 mg Oral or Feeding Tube Daily     atorvastatin  80 mg Oral or Feeding Tube Daily     Baclofen  10 mg Per Feeding Tube TID     cetirizine  10 mg Oral or Feeding Tube Daily     clopidogrel  75 mg Oral or Feeding Tube Daily     insulin aspart  1-12 Units Subcutaneous Q4H     [START ON 7/20/2022] insulin glargine  14 Units Subcutaneous QAM AC     insulin glargine  42 Units Subcutaneous At Bedtime     lisinopril-hydrochlorothiazide  1 tablet Oral or Feeding Tube Daily     mirtazapine  15 mg Oral or Feeding Tube At Bedtime     senna-docusate  1 tablet Oral or Feeding Tube BID

## 2022-07-19 NOTE — PROVIDER NOTIFICATION
MD Notification    Notified Person: MD    Notified Person Name: Jyotsna    Notification Date/Time: 7/19/22 1815    Notification Interaction: Vocera    Purpose of Notification: FYI pt had another large BM.    Orders Received:    Comments:

## 2022-07-19 NOTE — PLAN OF CARE
Nonverbal. A/Ox4 with y/n questions. Pt uncooperative this evening. Neuros difficult to assess- L sided hemiplegia and severe aphagia. VSS on RA. Up with lift. Cortez. Large incontinent BM. TF on hold. NPO. R armpit wound with mepilex. Discharge to TCU pending.

## 2022-07-19 NOTE — PLAN OF CARE
Reason for Admission: right CVA with history of multiple CVA    Cognitive/Mentation: A/Ox 4 nodding yes/no  Neuros/CMS: Intact ex left sided hemiplegia and severe aphasia    VS: stable.   GI: BS active, passing flatus, last BM 7/19/2022. Incontinent.  : fair catheter.   Pulmonary: LS clear.  Pain: patient was given oxycodone and ibuprofen for pain.   Skin: intact except right armpit  Activity: Assist x 2 with lift.  Diet: NPO with PEG tube feedings. Takes pills crushed in PEG.     Therapies recs: TCU  Discharge: pending placement     Aggression Stoplight Tool: green    End of shift summary: Patient is able to write on clipboard his requests. He had a large BM this morning (7/19/20220 but the provider is still concerned that he may be constipated. Milk of mag was given at 1300 but patient has not has another BM yet. The provider would like to be notified when patient has a BM. Tube feedings are being held until tomorrow morning due to patient being constipated.

## 2022-07-20 ENCOUNTER — APPOINTMENT (OUTPATIENT)
Dept: CT IMAGING | Facility: CLINIC | Age: 52
DRG: 065 | End: 2022-07-20
Attending: HOSPITALIST
Payer: COMMERCIAL

## 2022-07-20 ENCOUNTER — APPOINTMENT (OUTPATIENT)
Dept: OCCUPATIONAL THERAPY | Facility: CLINIC | Age: 52
DRG: 065 | End: 2022-07-20
Payer: COMMERCIAL

## 2022-07-20 ENCOUNTER — APPOINTMENT (OUTPATIENT)
Dept: PHYSICAL THERAPY | Facility: CLINIC | Age: 52
DRG: 065 | End: 2022-07-20
Payer: COMMERCIAL

## 2022-07-20 ENCOUNTER — APPOINTMENT (OUTPATIENT)
Dept: SPEECH THERAPY | Facility: CLINIC | Age: 52
DRG: 065 | End: 2022-07-20
Payer: COMMERCIAL

## 2022-07-20 LAB
ANION GAP SERPL CALCULATED.3IONS-SCNC: 3 MMOL/L (ref 3–14)
BASOPHILS # BLD AUTO: 0.1 10E3/UL (ref 0–0.2)
BASOPHILS NFR BLD AUTO: 0 %
BUN SERPL-MCNC: 20 MG/DL (ref 7–30)
CALCIUM SERPL-MCNC: 9.1 MG/DL (ref 8.5–10.1)
CHLORIDE BLD-SCNC: 104 MMOL/L (ref 94–109)
CO2 SERPL-SCNC: 30 MMOL/L (ref 20–32)
CREAT SERPL-MCNC: 0.72 MG/DL (ref 0.66–1.25)
EOSINOPHIL # BLD AUTO: 0.5 10E3/UL (ref 0–0.7)
EOSINOPHIL NFR BLD AUTO: 4 %
ERYTHROCYTE [DISTWIDTH] IN BLOOD BY AUTOMATED COUNT: 12.1 % (ref 10–15)
GFR SERPL CREATININE-BSD FRML MDRD: >90 ML/MIN/1.73M2
GLUCOSE BLD-MCNC: 69 MG/DL (ref 70–99)
GLUCOSE BLDC GLUCOMTR-MCNC: 109 MG/DL (ref 70–99)
GLUCOSE BLDC GLUCOMTR-MCNC: 111 MG/DL (ref 70–99)
GLUCOSE BLDC GLUCOMTR-MCNC: 147 MG/DL (ref 70–99)
GLUCOSE BLDC GLUCOMTR-MCNC: 151 MG/DL (ref 70–99)
GLUCOSE BLDC GLUCOMTR-MCNC: 153 MG/DL (ref 70–99)
GLUCOSE BLDC GLUCOMTR-MCNC: 66 MG/DL (ref 70–99)
GLUCOSE BLDC GLUCOMTR-MCNC: 91 MG/DL (ref 70–99)
HCT VFR BLD AUTO: 36.3 % (ref 40–53)
HGB BLD-MCNC: 11.7 G/DL (ref 13.3–17.7)
IMM GRANULOCYTES # BLD: 0 10E3/UL
IMM GRANULOCYTES NFR BLD: 0 %
LYMPHOCYTES # BLD AUTO: 3.4 10E3/UL (ref 0.8–5.3)
LYMPHOCYTES NFR BLD AUTO: 28 %
MCH RBC QN AUTO: 30.9 PG (ref 26.5–33)
MCHC RBC AUTO-ENTMCNC: 32.2 G/DL (ref 31.5–36.5)
MCV RBC AUTO: 96 FL (ref 78–100)
MONOCYTES # BLD AUTO: 1.1 10E3/UL (ref 0–1.3)
MONOCYTES NFR BLD AUTO: 9 %
NEUTROPHILS # BLD AUTO: 7.2 10E3/UL (ref 1.6–8.3)
NEUTROPHILS NFR BLD AUTO: 59 %
NRBC # BLD AUTO: 0 10E3/UL
NRBC BLD AUTO-RTO: 0 /100
PLATELET # BLD AUTO: 397 10E3/UL (ref 150–450)
POTASSIUM BLD-SCNC: 3.8 MMOL/L (ref 3.4–5.3)
RBC # BLD AUTO: 3.79 10E6/UL (ref 4.4–5.9)
SODIUM SERPL-SCNC: 137 MMOL/L (ref 133–144)
WBC # BLD AUTO: 12.3 10E3/UL (ref 4–11)

## 2022-07-20 PROCEDURE — 250N000013 HC RX MED GY IP 250 OP 250 PS 637: Performed by: HOSPITALIST

## 2022-07-20 PROCEDURE — 250N000013 HC RX MED GY IP 250 OP 250 PS 637: Performed by: INTERNAL MEDICINE

## 2022-07-20 PROCEDURE — 258N000001 HC RX 258: Performed by: INTERNAL MEDICINE

## 2022-07-20 PROCEDURE — 97530 THERAPEUTIC ACTIVITIES: CPT | Mod: GP

## 2022-07-20 PROCEDURE — 250N000011 HC RX IP 250 OP 636: Performed by: HOSPITALIST

## 2022-07-20 PROCEDURE — 258N000003 HC RX IP 258 OP 636: Performed by: HOSPITALIST

## 2022-07-20 PROCEDURE — 97535 SELF CARE MNGMENT TRAINING: CPT | Mod: GO

## 2022-07-20 PROCEDURE — 92507 TX SP LANG VOICE COMM INDIV: CPT | Mod: GN | Performed by: SPEECH-LANGUAGE PATHOLOGIST

## 2022-07-20 PROCEDURE — 36415 COLL VENOUS BLD VENIPUNCTURE: CPT | Performed by: HOSPITALIST

## 2022-07-20 PROCEDURE — 97116 GAIT TRAINING THERAPY: CPT | Mod: GP

## 2022-07-20 PROCEDURE — 92526 ORAL FUNCTION THERAPY: CPT | Mod: GN | Performed by: SPEECH-LANGUAGE PATHOLOGIST

## 2022-07-20 PROCEDURE — 80048 BASIC METABOLIC PNL TOTAL CA: CPT | Performed by: HOSPITALIST

## 2022-07-20 PROCEDURE — 99232 SBSQ HOSP IP/OBS MODERATE 35: CPT | Performed by: HOSPITALIST

## 2022-07-20 PROCEDURE — 250N000009 HC RX 250: Performed by: HOSPITALIST

## 2022-07-20 PROCEDURE — 74177 CT ABD & PELVIS W/CONTRAST: CPT

## 2022-07-20 PROCEDURE — 85025 COMPLETE CBC W/AUTO DIFF WBC: CPT | Performed by: HOSPITALIST

## 2022-07-20 PROCEDURE — 120N000001 HC R&B MED SURG/OB

## 2022-07-20 RX ORDER — IOPAMIDOL 755 MG/ML
78 INJECTION, SOLUTION INTRAVASCULAR ONCE
Status: COMPLETED | OUTPATIENT
Start: 2022-07-20 | End: 2022-07-20

## 2022-07-20 RX ADMIN — DEXTROSE AND SODIUM CHLORIDE: 5; 900 INJECTION, SOLUTION INTRAVENOUS at 11:31

## 2022-07-20 RX ADMIN — IOPAMIDOL 78 ML: 755 INJECTION, SOLUTION INTRAVENOUS at 09:11

## 2022-07-20 RX ADMIN — SENNOSIDES AND DOCUSATE SODIUM 2 TABLET: 50; 8.6 TABLET ORAL at 08:18

## 2022-07-20 RX ADMIN — ACETAMINOPHEN 975 MG: 325 SUSPENSION ORAL at 08:17

## 2022-07-20 RX ADMIN — MIRTAZAPINE 15 MG: 15 TABLET, FILM COATED ORAL at 21:42

## 2022-07-20 RX ADMIN — DEXTROSE MONOHYDRATE 25 ML: 25 INJECTION, SOLUTION INTRAVENOUS at 08:37

## 2022-07-20 RX ADMIN — CETIRIZINE HYDROCHLORIDE 10 MG: 5 SOLUTION ORAL at 08:45

## 2022-07-20 RX ADMIN — SODIUM CHLORIDE 62 ML: 900 INJECTION INTRAVENOUS at 09:11

## 2022-07-20 RX ADMIN — ACETAMINOPHEN 975 MG: 325 SUSPENSION ORAL at 01:59

## 2022-07-20 RX ADMIN — ACETAMINOPHEN 975 MG: 325 SUSPENSION ORAL at 16:14

## 2022-07-20 RX ADMIN — Medication 40 MG: at 06:30

## 2022-07-20 RX ADMIN — BACLOFEN 10 MG: 10 TABLET ORAL at 16:13

## 2022-07-20 RX ADMIN — MAGNESIUM HYDROXIDE 30 ML: 400 SUSPENSION ORAL at 11:05

## 2022-07-20 RX ADMIN — OXYCODONE HYDROCHLORIDE 5 MG: 5 TABLET ORAL at 13:40

## 2022-07-20 RX ADMIN — BACLOFEN 10 MG: 10 TABLET ORAL at 08:18

## 2022-07-20 RX ADMIN — DEXTROSE AND SODIUM CHLORIDE: 5; 900 INJECTION, SOLUTION INTRAVENOUS at 00:35

## 2022-07-20 RX ADMIN — SENNOSIDES AND DOCUSATE SODIUM 2 TABLET: 50; 8.6 TABLET ORAL at 21:41

## 2022-07-20 RX ADMIN — INSULIN ASPART 1 UNITS: 100 INJECTION, SOLUTION INTRAVENOUS; SUBCUTANEOUS at 16:14

## 2022-07-20 RX ADMIN — CLOPIDOGREL BISULFATE 75 MG: 75 TABLET ORAL at 08:18

## 2022-07-20 RX ADMIN — LISINOPRIL AND HYDROCHLOROTHIAZIDE 1 TABLET: 25; 20 TABLET ORAL at 08:17

## 2022-07-20 RX ADMIN — ASPIRIN 325 MG ORAL TABLET 325 MG: 325 PILL ORAL at 08:17

## 2022-07-20 RX ADMIN — BACLOFEN 10 MG: 10 TABLET ORAL at 21:41

## 2022-07-20 RX ADMIN — INSULIN ASPART 1 UNITS: 100 INJECTION, SOLUTION INTRAVENOUS; SUBCUTANEOUS at 21:43

## 2022-07-20 RX ADMIN — ATORVASTATIN CALCIUM 80 MG: 80 TABLET, FILM COATED ORAL at 08:18

## 2022-07-20 ASSESSMENT — ACTIVITIES OF DAILY LIVING (ADL)
ADLS_ACUITY_SCORE: 52

## 2022-07-20 NOTE — PROVIDER NOTIFICATION
Dr. Goyal paged an FYI: Noticed urine is pink tinged with some small sediment. Possible that it got tugged with PT.

## 2022-07-20 NOTE — PLAN OF CARE
Goal Outcome Evaluation:    DATE: 7/19/2022 [id number]   SUMMARY:  Admit 7/3/2022 for right-sided CVA  Cognitive/Mentation: A&OX4, calm, cooperative, nonverbal but nods head for answers and can write on paper to make needs known   Neuros: Pt. follow commands but has severe aphasia  VS: VSS on R/A, exc elevated BP  ACTIVITY:  A2 lift  DIET: TF is currently on hold due to abdominal discomfort.  Abdominal CT scan ordered due to pt.'s complaints of abdominal discomfort.  NPO.  BOWEL/BLADDER: Cortez with AUO  DRAINS/IV: Has PEG.  MD aware.  PAIN MANAGEMENT: Scheduled Tylenol given during shift and Oxy given one time during shift for abdominal discomfort.  Protonix given.  DISCHARGE PLAN: Pending placement  OTHER IMPORTANT INFORMATION:  Pt nonverbal but moves head for answers and has notebook at bedside to communicate with.  Has PEG tube. TF on hold.  Has axillary wound, C/D/I and covered.  Uses oral suctioning himself.  T/R Q2H conducted.

## 2022-07-20 NOTE — PROVIDER NOTIFICATION
"Paged Dr. Goyal \"held morning insulin for hypoglycemia (66), corrected to 153. Do you want us to give the lantus now that his blood sugar is up? Also CT is done, can we start tube feed?    Response: Give lantus and begin D5 at 100ml/hr. Start tube feed at rate of 10ml/hr.  "

## 2022-07-20 NOTE — PROGRESS NOTES
[facility]    Medicine Progress Note - Hospitalist Service    Date of Admission:  7/3/2022    Assessment & Plan          Josue Parisi is 52, who has had multiple strokes with some significant compliance issues, ongoing polysubstance abuse, being admitted under observation status for placement at a TCU, as he is unable to care for himself.     Multiple ischemic CVAs involving MCA, KWADWO with left-sided weakness and concern for progression  Medication noncompliance  Polysubstance abuse  Initial CVA occurred in 03/2022 with right KWADWO, large territory defect and residual left-sided weakness recommended to discharge to ARU however declined and discharged AMA. Subsequently returned 6/18 with progressive weakness, again referred to TCU however declined and returned home. Hospitalized 6/24 with worsening left-sided weakness, paresthesias, numbness with findings of new nonhemorrhagic infarction within right prefrontal gyrus and deep MCA watershed infarct within the right centrum semiovale of the right frontal and parietal lobes. Patient was evaluated by stroke neurology each admission, most recently felt symptoms were suggestive of new infarct rather than hypoperfused tissue. Recommended liberal SBP goal of 140-160 and DAPT with plavix/ for 90 days. Patient most recently left AMA on 6/30 after being recommended for TCU. At time of discharge, it appears the plavix was discontinued in an effort to limit medications and encourage compliance.   * Returned to ED 7/3/22 with reports of failing at home. It was unclear on admission if patient's left-sided symptoms had persisted or worsened due to presence of polysubstances and limited ability to participate in physical exam. Admitted to cocaine use as recently as 7/2. UTox on admission positive for cocaine, amphetamines. Admitted under observation care for presumed placement.  * 7/4 exam noted with 1/5 LUE/LLE strength, limited ability to  "communicate verbally but cognitively able to answer yes/no questions with gestures, shakes of head. RNs present at bedside had cared for patient week prior and reported change in function. Repeat CTH revealed rounded focus of hypoattenuation within right corona radiata/centrum semiovale, MRI brain 6/25 indicated restricted diffusion within this region however area of hypoattenuation is larger than region of diffusion restriction concerning for progression of infarct.  - Manage BP closely  - Neuro note 7/6- \"recommend SAMMPRIS trial guided DAPT (+clopidogrel 75 x90 days) in an attempt to stabilize the M1 segment and prevent inferior division infarct. \"   Antiplatelet agents could be held for a brief period to permit G tube placement, but I would prefer if he is bridged with a heparin gtt if that is necessary. (Currently no access, so receiving ASA 300mg rectally)  - Continue statin.  - PT/OT consults.  - Reconsult speech therapy -appreciate recommendations.  - G-tube placed on 7/08.  - Holding tube feeds today given constipation and discomfort at g tube site  - Social work following and assisting with discharge planning, appreciate their assistance. Still awaiting bed availability.    Abdominal pain  Constipation  Patient reports abdominal pain, needing to have a BM. He did have large formed BM yesterday morning but abdomen is hard. He also reports pain at site of g tube but this looks benign on exam. Pt had another BM yesterday evening but pain did not improve. CT a/p did confirm no acute issues other than large amt of stool in colon.  - restarting tube feeds at just 10ml/hr. Discussed with RN that rate can be increased if  - increase senna-colace to 2 tabs BID  - milk of mag via feeding tube  - start protonix IV daily  - patient was adamant he does not want suppository. Encouraged him to consider as it may relieve symptoms.  - IVF    Bleeding gums, halitosis  - suspect reflux given degree of constipation. " Checked plts, INR - both okay. Will consult OMFS if symptoms continue but pt denies tooth pain.    SIRS syndrome  Pt with mild leukocytosis 12-13, procalcitonin on admission negative. Low-grade fever and sinus tachycardia persisting today. UA without evidence of infection. CXR neg. ?due to polysubstance abuse with amphetamines, cocaine in UTox.  - Repeat CBC trending down   - Low threshold to obtain blood cultures, initiate broad-spectrum abx      Type 2 diabetes mellitus  Last HbA1c of 7.8 on 06/18. PTA regimen of metformin. Recent admission Lantus had been increased to 18u BID, carb coverage with meals of 1:10 with breakfast/lunch, 1:15 with dinner.  - Hold PTA metformin.  - Blood sugars still not well controlled.  - increase to 42 units lantus qpm  - Increase am lantus to 14 units  - Continue high dose sliding scale NovoLog.    History of hypertension   Held PTA meds initially to allow permissive hypertension.  - Resumed PTA lisinopril-hydrochlorothiazide 7/12.  - Goal is normotension per Neurology.  - Blood pressure well controlled.     Hidradenitis / Right axillary wound  WOCN consult previous recs:   Right axilla wound: Daily  and PRN.  - Cleanse wound with NS or wound cleanser (omit this step if patient cleans wound in shower).  - Dry and protect surrounding skin with no sting barrier film wipe #[id number].  - Apply a small amount of iodesorb gel #[id number] to bandaid.  - If Band-Aid needs to be changed more than once a day. Apply iodesorb gel #[id number] to adaptic #[id number] and cover with Mepilex #[id number].     Generalized pain  PTA on gabapentin 300 mg 3 times daily.    - Holding gabapentin.   - Continue as needed Tylenol as needed. Avoid opioids.  - Will add PRN ibuprofen.       Diet: NPO per Anesthesia Guidelines for Procedure/Surgery Except for: Meds, Other; Specify: please stop tube feedings  Adult Formula Drip Feeding: Continuous Jevity 1.5; Other - Specify in Comment; Goal Rate: 60; mL/hr; Medication - Feeding Tube  Flush Frequency: At least 15-30 mL water before and after medication administration and with tube clogging; Amount to Se...    DVT Prophylaxis: Pneumatic Compression Devices  Cortez Catheter: PRESENT, indication: Retention  Central Lines: None  Cardiac Monitoring: None  Code Status: Full Code      Disposition Plan      Expected Discharge Date: 07/22/2022    Discharge Delays: Placement - TCU  *Medically Ready for Discharge    Discharge Comments: TCU PP        The patient's care was discussed with the Bedside Nurse, Patient and PT Team.    Li Goyal MD  Hospitalist Service  [facility]  Securely message with the Vocera Web Console (learn more here)  Text page via [employer] Paging/Directory         ______________________________________________________________________    Interval History   Patient still having abdominal pain. I discussed with him the CT results and the fact that constipation is the most likely cause of his discomfort. He still would prefer not to use a suppository. I discussed plan with nursing. Patient was getting up with PT. Hopefully getting out of bed a bit may help. Still no nausea and vomiting. Pt requesting po diet but SLP has not cleared him for that yet.    Data reviewed today: I reviewed all medications, new labs and imaging results over the last 24 hours. I personally reviewed no images or EKG's today.    Physical Exam   Vital Signs: Temp: 97  F (36.1  C) Temp src: Axillary BP: 125/73 Pulse: 89   Resp: 16 SpO2: 97 % O2 Device: None (Room air)    Weight: 154 lbs 15.66 oz  Constitutional: awake, alert, cooperative, no apparent distress, laying in the hospital bed  Respiratory: clear to auscultation bilaterally, no crackles or wheezing  Cardiovascular: regular rate and rhythm, normal S1 and S2, no murmur noted  GI: normal bowel sounds, soft, non-distended, non-tender, PEG tube in place, abdominal binder on  Skin: warm, dry  Musculoskeletal: no lower extremity pitting  edema present  Neurologic: awake, alert, shakes head yes/no appropriately to questions, moves right side, no movement on the left side    Data   Recent Labs   Lab 07/20/22  1119 07/20/22  0858 07/20/22  0835 07/19/22  1450 07/19/22  1152 07/18/22  0805 07/18/22  0709 07/17/22  0745 07/17/22  0658   WBC  --   --  12.3*  --  11.9*  --   --   --  13.1*   HGB  --   --  11.7*  --  11.4*  --   --   --  12.0*   MCV  --   --  96  --  92  --   --   --  94   PLT  --   --  397  --  396  --   --   --  435   INR  --   --   --   --  1.04  --   --   --   --    NA  --   --  137  --  134  --  136  --  135   POTASSIUM  --   --  3.8  --  3.7  --  3.6  --  3.9   CHLORIDE  --   --  104  --  99  --  100  --  100   CO2  --   --  30  --  27  --  30  --  27   BUN  --   --  20  --  24  --  25  --  24   CR  --   --  0.72  --  0.62*  --  0.76  --  0.64*   ANIONGAP  --   --  3  --  8  --  6  --  8   DAT  --   --  9.1  --  9.5  --  9.4  --  9.5   * 153* 69*   < > 263*   < > 149*   < > 255*   ALBUMIN  --   --   --   --  3.3*  --   --   --   --    PROTTOTAL  --   --   --   --  7.4  --   --   --   --    BILITOTAL  --   --   --   --  0.5  --   --   --   --    ALKPHOS  --   --   --   --  58  --   --   --   --    ALT  --   --   --   --  46  --   --   --   --    AST  --   --   --   --  30  --   --   --   --     < > = values in this interval not displayed.     Medications     dextrose       dextrose 5% and 0.9% NaCl 100 mL/hr at 07/20/22 1131     - MEDICATION INSTRUCTIONS -       - MEDICATION INSTRUCTIONS -         acetaminophen  975 mg Per Feeding Tube Q8H     aspirin  300 mg Rectal Daily    Or     aspirin  325 mg Oral or Feeding Tube Daily     atorvastatin  80 mg Oral or Feeding Tube Daily     Baclofen  10 mg Per Feeding Tube TID     cetirizine  10 mg Oral or Feeding Tube Daily     clopidogrel  75 mg Oral or Feeding Tube Daily     insulin aspart  1-12 Units Subcutaneous Q4H     insulin glargine  14 Units Subcutaneous QAM      insulin glargine   42 Units Subcutaneous At Bedtime     lisinopril-hydrochlorothiazide  1 tablet Oral or Feeding Tube Daily     mirtazapine  15 mg Oral or Feeding Tube At Bedtime     pantoprazole  40 mg Oral or Feeding Tube QAM AC     senna-docusate  2 tablet Oral or Feeding Tube BID

## 2022-07-20 NOTE — PLAN OF CARE
"Goal Outcome Evaluation:  Plan of Care Reviewed With: patient   Overall Patient Progress: no change    /77 (BP Location: Right arm, Patient Position: Semi-Brizuela's, Cuff Size: Adult Regular)   Pulse 87   Temp 97.8  F (36.6  C) (Oral)   Resp 16   Ht 1.676 m (5' 6\")   Wt 70.3 kg (154 lb 15.7 oz)   SpO2 98%   BMI 25.01 kg/m   .    Assessment: Patient is alert and oriented x4, severe aphasia. Only able to communicate via writing. Vital signs stable. Neuro check shows left sided hemiplegia, and left sided droop. Suctioning secretions with younker, speech was unable to advance diet, still NPO. Tube feed started at 10ml/hr per MD order due to patient's constipation. Next TF increase at 1100 tomorrow, only if pt has had bowel movement. No bowel movement this shift. Adequate fair output, light pink tinged. Right axillary wound cares completed.     Patient communicated via writing how sad and hard his situation is. Writer was able to take time to show emotional support and empathetic listening. Mother was called to see if his sister could bring in an Ipad but no response.     Pain management: x1 dose PRN oxy per MAR. Scheduled Tylenol given per MAR.     Interventions this shift: emotional support, wound cares, tube feed, cath cares, reposition q2, oral care.     Goals for next shift: manage pain, monitor urine output, monitor for bowel movement, tube feed, blood sugar checks.      Leta Gan RN on 7/20/2022 at 6:27 PM        "

## 2022-07-21 ENCOUNTER — APPOINTMENT (OUTPATIENT)
Dept: SPEECH THERAPY | Facility: CLINIC | Age: 52
DRG: 065 | End: 2022-07-21
Payer: COMMERCIAL

## 2022-07-21 ENCOUNTER — APPOINTMENT (OUTPATIENT)
Dept: PHYSICAL THERAPY | Facility: CLINIC | Age: 52
DRG: 065 | End: 2022-07-21
Payer: COMMERCIAL

## 2022-07-21 LAB
GLUCOSE BLDC GLUCOMTR-MCNC: 109 MG/DL (ref 70–99)
GLUCOSE BLDC GLUCOMTR-MCNC: 111 MG/DL (ref 70–99)
GLUCOSE BLDC GLUCOMTR-MCNC: 63 MG/DL (ref 70–99)
GLUCOSE BLDC GLUCOMTR-MCNC: 71 MG/DL (ref 70–99)
GLUCOSE BLDC GLUCOMTR-MCNC: 93 MG/DL (ref 70–99)
GLUCOSE BLDC GLUCOMTR-MCNC: 94 MG/DL (ref 70–99)

## 2022-07-21 PROCEDURE — 258N000003 HC RX IP 258 OP 636: Performed by: HOSPITALIST

## 2022-07-21 PROCEDURE — 250N000013 HC RX MED GY IP 250 OP 250 PS 637: Performed by: INTERNAL MEDICINE

## 2022-07-21 PROCEDURE — 250N000013 HC RX MED GY IP 250 OP 250 PS 637: Performed by: HOSPITALIST

## 2022-07-21 PROCEDURE — 92526 ORAL FUNCTION THERAPY: CPT | Mod: GN | Performed by: SPEECH-LANGUAGE PATHOLOGIST

## 2022-07-21 PROCEDURE — 120N000001 HC R&B MED SURG/OB

## 2022-07-21 PROCEDURE — 97530 THERAPEUTIC ACTIVITIES: CPT | Mod: GP

## 2022-07-21 PROCEDURE — 97116 GAIT TRAINING THERAPY: CPT | Mod: GP

## 2022-07-21 PROCEDURE — 92507 TX SP LANG VOICE COMM INDIV: CPT | Mod: GN | Performed by: SPEECH-LANGUAGE PATHOLOGIST

## 2022-07-21 PROCEDURE — 99233 SBSQ HOSP IP/OBS HIGH 50: CPT | Performed by: HOSPITALIST

## 2022-07-21 PROCEDURE — 258N000001 HC RX 258: Performed by: INTERNAL MEDICINE

## 2022-07-21 RX ORDER — DIPHENHYDRAMINE HYDROCHLORIDE AND LIDOCAINE HYDROCHLORIDE AND ALUMINUM HYDROXIDE AND MAGNESIUM HYDRO
10 KIT EVERY 6 HOURS PRN
Status: DISCONTINUED | OUTPATIENT
Start: 2022-07-21 | End: 2022-08-02 | Stop reason: HOSPADM

## 2022-07-21 RX ADMIN — SENNOSIDES AND DOCUSATE SODIUM 2 TABLET: 50; 8.6 TABLET ORAL at 21:15

## 2022-07-21 RX ADMIN — Medication 40 MG: at 06:43

## 2022-07-21 RX ADMIN — BACLOFEN 10 MG: 10 TABLET ORAL at 16:00

## 2022-07-21 RX ADMIN — MIRTAZAPINE 15 MG: 15 TABLET, FILM COATED ORAL at 21:15

## 2022-07-21 RX ADMIN — DIPHENHYDRAMINE HYDROCHLORIDE AND LIDOCAINE HYDROCHLORIDE AND ALUMINUM HYDROXIDE AND MAGNESIUM HYDRO 10 ML: KIT at 14:23

## 2022-07-21 RX ADMIN — POLYETHYLENE GLYCOL 3350 17 G: 17 POWDER, FOR SOLUTION ORAL at 08:15

## 2022-07-21 RX ADMIN — ATORVASTATIN CALCIUM 80 MG: 80 TABLET, FILM COATED ORAL at 08:15

## 2022-07-21 RX ADMIN — ACETAMINOPHEN 975 MG: 325 SUSPENSION ORAL at 16:42

## 2022-07-21 RX ADMIN — BACLOFEN 10 MG: 10 TABLET ORAL at 21:15

## 2022-07-21 RX ADMIN — LISINOPRIL AND HYDROCHLOROTHIAZIDE 1 TABLET: 25; 20 TABLET ORAL at 08:15

## 2022-07-21 RX ADMIN — Medication 1 MG: at 21:15

## 2022-07-21 RX ADMIN — DEXTROSE AND SODIUM CHLORIDE: 5; 900 INJECTION, SOLUTION INTRAVENOUS at 12:44

## 2022-07-21 RX ADMIN — BACLOFEN 10 MG: 10 TABLET ORAL at 08:15

## 2022-07-21 RX ADMIN — MAGNESIUM HYDROXIDE 30 ML: 400 SUSPENSION ORAL at 13:00

## 2022-07-21 RX ADMIN — ACETAMINOPHEN 975 MG: 325 SUSPENSION ORAL at 08:14

## 2022-07-21 RX ADMIN — CETIRIZINE HYDROCHLORIDE 10 MG: 5 SOLUTION ORAL at 08:31

## 2022-07-21 RX ADMIN — ASPIRIN 325 MG ORAL TABLET 325 MG: 325 PILL ORAL at 08:15

## 2022-07-21 RX ADMIN — DEXTROSE MONOHYDRATE 25 ML: 25 INJECTION, SOLUTION INTRAVENOUS at 01:47

## 2022-07-21 RX ADMIN — CLOPIDOGREL BISULFATE 75 MG: 75 TABLET ORAL at 08:15

## 2022-07-21 RX ADMIN — SENNOSIDES AND DOCUSATE SODIUM 2 TABLET: 50; 8.6 TABLET ORAL at 08:15

## 2022-07-21 RX ADMIN — ACETAMINOPHEN 975 MG: 325 SUSPENSION ORAL at 01:46

## 2022-07-21 ASSESSMENT — ACTIVITIES OF DAILY LIVING (ADL)
ADLS_ACUITY_SCORE: 50
ADLS_ACUITY_SCORE: 48
ADLS_ACUITY_SCORE: 47
ADLS_ACUITY_SCORE: 52
ADLS_ACUITY_SCORE: 52
ADLS_ACUITY_SCORE: 50
ADLS_ACUITY_SCORE: 50
ADLS_ACUITY_SCORE: 47
ADLS_ACUITY_SCORE: 48
ADLS_ACUITY_SCORE: 47
ADLS_ACUITY_SCORE: 48
ADLS_ACUITY_SCORE: 47

## 2022-07-21 NOTE — PROVIDER NOTIFICATION
"MD Notification    Notified Person: MD    Notified Person Name:  Vikasleonchristel    Notification Date/Time: 7/21/22 9461    Notification Interaction: Vocera message     Purpose of Notification: Patient has been complaining of abdominal pain since turning up rate of tube feed. Administered MOM and decreased rate back down to 20 mL/hr. Please advise if you would like to pause TF.    Orders Received: \"Pause TF and freewater per g tube; monitor fingerstick glucoses. Continues on D5 NS IVF\"         Comments:    "

## 2022-07-21 NOTE — PROGRESS NOTES
CLINICAL NUTRITION SERVICES - REASSESSMENT NOTE      RECOMMENDATIONS FOR MD/PROVIDER TO ORDER:   - consider increasing TF back to goal as tolerated, may trial non-fiber feeing if pt still unable to tolerate (osmolite 1.5)   Future/Additional Recommendations:   - monitor re-start of TF and tolerance, may adjust TF if needed  - monitor BM trends   Malnutrition:   % Weight Loss:  Up to 10% in 6 months (moderate malnutrition) - per RD note 7/11   % Intake:  Decreased intake does not meet criteria for malnutrition - recent interruptions to TF  Subcutaneous Fat Loss:  None observed - per RD note 7/11  Muscle Loss:  Clavicle bone region - mild depletion, prominent cheekbones - per RD note 7/11  Fluid Retention:  None noted    Malnutrition Diagnosis: Moderate malnutrition  In Context of:  Acute illness or injury       EVALUATION OF PROGRESS TOWARD GOALS   Diet: NPO    Nutrition Support: Enteral --> on hold  Type of Feeding Tube: (7/8) 18 FR YADIRA G-tube placed  Enteral Frequency:  Continuous  Enteral Regimen: Jevity 1.5 @ 60 mL/hr (goal rate)  Total Enteral Provisions: 2160 cals (32 cals/kg), 92 gm pro (1.4 gm/kg), 30 gm fiber, 1094 mL free water  Free Water Flush: 120 mL every 4 hrs     - 7/21: TF paused d/t abdominal pain --> D5 IVF  - 7/20: TF on hold d/t constipation, abdominal pain  - 7/19: TF stopped d/t pump malfunction    Intake/Tolerance:   - per discussion with pt, pt gestured that he does not want TF anymore. He reported no N/V and no improvement with constipation.     Barriers: swallowing difficulty, abdominal pain    ASSESSED NUTRITION NEEDS:  Dosing Weight (7/11) 66.3 kg  Estimated Energy Needs: 9284-4092 kcals (25-30 Kcal/Kg)  Justification: maintenance  Estimated Protein Needs:  grams protein (1.2-1.5 g pro/Kg)  Justification: preservation of lean body mass      NEW FINDINGS:   General:   - pt becoming agitated with LOS and NPO status    Weight:  - overall weight gain of +6.2 kg since admit    Labs:  -  pt declined labs this AM  - BGM   - 7/20: all WNL    Medications:  - insulin aspart (high sliding scale insulin), insulin lantus 12 units in morning and 38 units HS, remeron, protonix, senna-docusate, D5 IVF @ 100 mL/hr    GI:  - last BM x1 today, 4x BM on 7/19  - per SLP, 7/19: Provided mildly thick water by cup and straw to pt to self fed. Majority of bolus spilled anteriorly and pt unable to control bolus. Delayed swallow ~1 minute with cough. Given this performance and further education on deficits, pt nodded head in agreement that he is not ready to eat/drink yet.  - MOM started today    Previous Goals:   EN to meet % estimated needs  Evaluation: Not met- various interruptions    Previous Nutrition Diagnosis:   No nutrition diagnosis identified at this time as EN meeting estimated needs  Evaluation: Declining      MALNUTRITION  % Weight Loss:  Up to 10% in 6 months (moderate malnutrition) - per RD note 7/11   % Intake:  Decreased intake does not meet criteria for malnutrition - recent interruptions to TF  Subcutaneous Fat Loss:  None observed - per RD note 7/11  Muscle Loss:  Clavicle bone region - mild depletion, prominent cheekbones - per RD note 7/11  Fluid Retention:  None noted    Malnutrition Diagnosis: Moderate malnutrition  In Context of:  Acute illness or injury    CURRENT NUTRITION DIAGNOSIS  Inadequate protein-energy intake related to interruptions with TF secondary to abdominal pain and constipation, swallowing difficulty as evidenced by NPO status and TF interruptions over past 3 days and currently on hold    INTERVENTIONS  Recommendations / Nutrition Prescription  - none new at this time    Implementation  - none new at this time    Goals  1. TF re-start and advancement towards goal within 1-2 days.  2. TF to meet % estimated needs.    MONITORING AND EVALUATION:  Progress towards goals will be monitored and evaluated per protocol and Practice Guidelines      Opal Casas,  RDN

## 2022-07-21 NOTE — PROGRESS NOTES
[facility]    Medicine Progress Note - Hospitalist Service    Date of Admission:  7/3/2022    Assessment & Plan      Josue Parisi is 52, who has had multiple strokes with some significant compliance issues, ongoing polysubstance abuse, being admitted under observation status for placement at a TCU, as he is unable to care for himself.     Multiple ischemic CVAs involving MCA, KWADWO with left-sided weakness and concern for progression  Hx Medication noncompliance  Polysubstance abuse  Initial CVA occurred in 03/2022 with right KWADWO, large territory defect and residual left-sided weakness recommended to discharge to ARU however declined and discharged AMA. Subsequently returned 6/18 with progressive weakness, again referred to TCU however declined and returned home. Hospitalized 6/24 with worsening left-sided weakness, paresthesias, numbness with findings of new nonhemorrhagic infarction within right prefrontal gyrus and deep MCA watershed infarct within the right centrum semiovale of the right frontal and parietal lobes. Patient was evaluated by stroke neurology each admission, most recently felt symptoms were suggestive of new infarct rather than hypoperfused tissue. Recommended liberal SBP goal of 140-160 and DAPT with plavix/ for 90 days. Patient most recently left AMA on 6/30 after being recommended for TCU. At time of discharge, it appears the plavix was discontinued in an effort to limit medications and encourage compliance.   * Returned to ED 7/3/22 with reports of failing at home. It was unclear on admission if patient's left-sided symptoms had persisted or worsened due to presence of polysubstances and limited ability to participate in physical exam. Admitted to cocaine use as recently as 7/2. UTox on admission positive for cocaine, amphetamines.   * 7/4 exam noted with 1/5 LUE/LLE strength, limited ability to communicate verbally but cognitively able to answer yes/no  "questions with gestures, shakes of head. reported change in function. Repeat CTH revealed rounded focus of hypoattenuation within right corona radiata/centrum semiovale, MRI brain 6/25 indicated restricted diffusion within this region however area of hypoattenuation is larger than region of diffusion restriction concerning for progression of infarct.  - Manage BP closely  - Neuro note 7/6- \"recommend SAMMPRIS trial guided DAPT (+clopidogrel 75 x90 days) in an attempt to stabilize the M1 segment and prevent inferior division infarct. \"   - Continue statin.  - PT/OT consults.  - Reconsult speech therapy; G-tube placed on 7/08, TF per [place] otherwise NPO  - Holding tube feeds  given constipation and discomfort at g tube site, 7/20/2022, initiated bowel program, IVF NS D5 while decrease TF.      Abdominal pain  Constipation  Patient reports abdominal pain, needing to have a BM.  TF per G-tube held due to complaints and assessment of significant constipation.. CT a/p did confirm no acute issues other than large amt of stool in colon.  -- increase senna-colace to 2 tabs BID, MiraLAX  - start protonix IV daily  - patient was adamant he does not want suppository.  Small BM yesterday, MOM started today.  IVFs above while TF, free water decreased secondary to symptoms of above.  Lantus decreased secondary to relative low glucoses while TF decreased, on SSI.  TF rate increase every 24 hours.  When back to baseline, DC IVF.  Monitor glucoses.    Bleeding gums, halitosis  - suspect reflux given degree of constipation. Checked plts, INR - both okay. Will consult OMFS if symptoms continue but pt denies tooth pain.  -Nursing reports extremely dry mouth, hold Zyrtec, Magic mouthwash, monitor    SIRS syndrome  Pt with mild leukocytosis 12-13, procalcitonin on admission negative. Low-grade fever and sinus tachycardia persisting today. UA without evidence of infection. CXR neg. ?due to polysubstance abuse with amphetamines, cocaine " in UTox.  - Repeat CBC trending down   - Low threshold to obtain blood cultures, initiate broad-spectrum abx      Type 2 diabetes mellitus  Last HbA1c of 7.8 on 06/18. PTA regimen of metformin. Recent admission Lantus had been increased to 18u BID, carb coverage with meals of 1:10 with breakfast/lunch, 1:15 with dinner.  - Hold PTA metformin.  - Blood sugars still not well controlled.  -Decrease Lantus a.m. and p.m. due to decreased TF rate, continue IVF D5 NS while TF rate decreased.  Monitor glucoses.  Continues on SSI..    History of hypertension   Held PTA meds initially to allow permissive hypertension.  - Resumed PTA lisinopril-hydrochlorothiazide 7/12.  - Goal is normotension per Neurology.  - Blood pressure well controlled.     Hidradenitis / Right axillary wound  WOCN consult previous recs:   Right axilla wound: Daily  and PRN.  - Cleanse wound with NS or wound cleanser (omit this step if patient cleans wound in shower).  - Dry and protect surrounding skin with no sting barrier film wipe #[id number].  - Apply a small amount of iodesorb gel #[id number] to bandaid.  - If Band-Aid needs to be changed more than once a day. Apply iodesorb gel #[id number] to adaptic #[id number] and cover with Mepilex #[id number].     Generalized pain  PTA on gabapentin 300 mg 3 times daily.    - Holding gabapentin.   - Continue as needed Tylenol as needed. Avoid opioids.  - Will add PRN ibuprofen.       Diet: NPO per Anesthesia Guidelines for Procedure/Surgery Except for: Meds, Other; Specify: please stop tube feedings  Adult Formula Drip Feeding: Continuous Jevity 1.5; Other - Specify in Comment; Goal Rate: 60; mL/hr; Medication - Feeding Tube Flush Frequency: At least 15-30 mL water before and after medication administration and with tube clogging; Amount to Se...    DVT Prophylaxis: Pneumatic Compression Devices  Cortez Catheter: PRESENT, indication: Retention  Central Lines: None  Cardiac Monitoring: None  Code Status: Full Code      Disposition  Plan      Expected Discharge Date: 07/22/2022    Discharge Delays: Placement - TCU  *Medically Ready for Discharge    Discharge Comments: TCU PP        Total unit/floor time 35 minutes:  time consisted of the following assuming Patient care in complex Patient, examination of patient, review of records including labs, imaging results, medications, interdisciplinary notes and completing documentation; > 50% Coordination of Care time with Nursing re: constipation, TF and fluid/IV management care plan, management and surveillance and discharge planning.        Keith Smith MD  Hospitalist Service  [facility]  Securely message with the Vocera Web Console (learn more here)  Text page via [facility] Paging/Directory         ______________________________________________________________________    Interval History   Limited history from patient due to expressive aphasia, he appears comfortable.  Nurse reports small BM, added MOM today.  Gradual progressive increase in TF rate and free water after abdominal symptoms resolved with BM, continue IVF D5 NS until TF back up to base rate.  Patient NPO.  Decrease Lantus a.m. and p.m. while TF at decreased rate.     Data reviewed today: I reviewed all medications, new labs and imaging results over the last 24 hours.     Physical Exam   Vital Signs: Temp: 98  F (36.7  C) Temp src: Axillary BP: (!) 140/78 Pulse: 86   Resp: 16 SpO2: 100 % O2 Device: None (Room air)    Weight: 154 lbs 15.66 oz  Constitutional:  NAD, awake, calm, cooperative.  Expressive aphasia.  Respiratory: Respirations nonlabored room air  Cardiovascular: Regular, no murmur  GI: Soft, nontender.  No rebound, guarding or peritoneal signs.  PEG tube in place  Musculoskeletal: no lower extremity pitting edema present  Neuro.  Gross motor tested, left-sided hemiparesis.  Expressive aphasia.    Psych orientation cannot be tested, affect calm     Data   Recent Labs   Lab 07/21/22  0848  07/21/22  0502 07/21/22  0056 07/20/22  0858 07/20/22  0835 07/19/22  1450 07/19/22  1152 07/18/22  0805 07/18/22  0709 07/17/22  0745 07/17/22  0658   WBC  --   --   --   --  12.3*  --  11.9*  --   --   --  13.1*   HGB  --   --   --   --  11.7*  --  11.4*  --   --   --  12.0*   MCV  --   --   --   --  96  --  92  --   --   --  94   PLT  --   --   --   --  397  --  396  --   --   --  435   INR  --   --   --   --   --   --  1.04  --   --   --   --    NA  --   --   --   --  137  --  134  --  136  --  135   POTASSIUM  --   --   --   --  3.8  --  3.7  --  3.6  --  3.9   CHLORIDE  --   --   --   --  104  --  99  --  100  --  100   CO2  --   --   --   --  30  --  27  --  30  --  27   BUN  --   --   --   --  20  --  24  --  25  --  24   CR  --   --   --   --  0.72  --  0.62*  --  0.76  --  0.64*   ANIONGAP  --   --   --   --  3  --  8  --  6  --  8   DAT  --   --   --   --  9.1  --  9.5  --  9.4  --  9.5   GLC 71 109* 63*   < > 69*   < > 263*   < > 149*   < > 255*   ALBUMIN  --   --   --   --   --   --  3.3*  --   --   --   --    PROTTOTAL  --   --   --   --   --   --  7.4  --   --   --   --    BILITOTAL  --   --   --   --   --   --  0.5  --   --   --   --    ALKPHOS  --   --   --   --   --   --  58  --   --   --   --    ALT  --   --   --   --   --   --  46  --   --   --   --    AST  --   --   --   --   --   --  30  --   --   --   --     < > = values in this interval not displayed.     Medications     dextrose       dextrose 5% and 0.9% NaCl       - MEDICATION INSTRUCTIONS -       - MEDICATION INSTRUCTIONS -         acetaminophen  975 mg Per Feeding Tube Q8H     aspirin  300 mg Rectal Daily    Or     aspirin  325 mg Oral or Feeding Tube Daily     atorvastatin  80 mg Oral or Feeding Tube Daily     Baclofen  10 mg Per Feeding Tube TID     [Held by provider] cetirizine  10 mg Oral or Feeding Tube Daily     clopidogrel  75 mg Oral or Feeding Tube Daily     insulin aspart  1-12 Units Subcutaneous Q4H     [START ON 7/22/2022]  insulin glargine  12 Units Subcutaneous QAM AC     insulin glargine  38 Units Subcutaneous At Bedtime     lisinopril-hydrochlorothiazide  1 tablet Oral or Feeding Tube Daily     mirtazapine  15 mg Oral or Feeding Tube At Bedtime     pantoprazole  40 mg Oral or Feeding Tube QAM AC     senna-docusate  2 tablet Oral or Feeding Tube BID

## 2022-07-21 NOTE — PROGRESS NOTES
Care Management Follow Up    Length of Stay (days): 17    Expected Discharge Date: 07/22/2022     Concerns to be Addressed:       Patient plan of care discussed at interdisciplinary rounds: Yes    Anticipated Discharge Disposition: Home     Anticipated Discharge Services:    Anticipated Discharge DME:      Patient/family educated on Medicare website which has current facility and service quality ratings:    Education Provided on the Discharge Plan:    Patient/Family in Agreement with the Plan:      Referrals Placed by CM/SW:    Private pay costs discussed: Not applicable    Additional Information:  SW followed up on referrals placed through DOD and awaiting calls back from admissions.     [facility] will continue to follow.      MAYANK Gibbs

## 2022-07-21 NOTE — PLAN OF CARE
Goal Outcome Evaluation:    Plan of Care Reviewed With: patient     Overall Patient Progress: declining    Outcome Evaluation: interruptions to TF since 7/19. now on hold d/t constipation and abdominal pain. will montior for BM and restart of TF. pt very unhappy about continued NPO status and continued need for TF    Opal Casas RDN

## 2022-07-21 NOTE — PLAN OF CARE
Pt here with multiple stroke. Unable to speak but makes incomprehensive sounds. Eye open spontaneously. Neuros are L sided weakness. LUE 0/5 and RLE 2/5. VSS. NPO, PEG tube in place, patent and intact,  running at 10 ml/hr with 120 free water flushes g4hrs, will be increase to 20 ml/hr at 11 AM. Up with assist A2/lift, T&repo Q2hrs. Cortez cath in placed for retention. R armpit wound dressing CDI. Pt scoring yellow on the Aggression Stop Light Tool. Discharge pending.

## 2022-07-21 NOTE — PROGRESS NOTES
Pt here with multiple strokes. Alert to self, provided options of the place and pt was able to nod correctly. Neuros L sided weakness, L neglect, L droop, L hemiparesis. VSS.  NPO diet. Tried to advance tube feeds this shift, but pt did not tolerate. Currently PEG tube is clamped and holding tube feeds and water flushes. D5 with NS running at 100mL/hr. Takes pills crushed via PEG. Cortez catheter for retention, noted small clots were present this shift, left note for hospitalist. Pt had large bowel movmemnt this shift. Frequent bleeding from mouth, provided frequent oral care, suction, and magic mouth. Up with two assist and lift. Patient has had shoulder pain, which we resolved with scheduled Tylenol and repositioning. Pt scoring green on the Aggression Stop Light Tool. Plan to advance back on the tube feeding at a goal rate of 60mL/hr. Discharge pending.

## 2022-07-21 NOTE — PROGRESS NOTES
Josue declined a duration of stay  visit today.  oriented Josue to  services and remains available for future visits as requested by pt.

## 2022-07-21 NOTE — PROVIDER NOTIFICATION
MD Notification    Notified Person: MD    Notified Person Name: Hiwot Beanleonchristel    Notification Date/Time: 7/21/22 7752    Notification Interaction: Vocera message     Purpose of Notification: Just FYI, patients mouth continues to bleed. Its difficult to see where it is coming from, possibly a tooth on the L upper side. Can you visualize this when you round tomorrow? Thanks!    Orders Received: Hold pressure with gauze if able.     Comments:

## 2022-07-21 NOTE — PLAN OF CARE
"Pt alert and appeared more agitated today with hand gestures/facial expressions and writing profanities. Via writing, pt asking for a wheelchair to leave asap. Pt asking for food/water. Reviewed current deficits and risk of aspiration. Pt wrote, \"let me try.\" Provided mildly thick water by cup and straw to pt to self fed. Majority of bolus spilled anteriorly and pt unable to control bolus. Delayed swallow ~1 minute with cough. Given this performance and further education on deficits, pt nodded head in agreement that he is not ready to eat/drink yet. Encouraged continued exercises and practice throughout the day.     Pt asking about discharge. Reviewed TCU option and pt firmly shook head no and appeared to become agitated. Pt requesting to meet with his family and doctor in person to discuss discharge.     *Note: pt does not appear to have receptive or expressive aphasia. Suspect significant apraxia impacting motor function for speech and swallow. Pt can communicate in a complex conversation via writing. ?if family can bring in laptop/tablet/electronic device for pt to text or type. Pt may be appropriate for AAC devices with evaluation at the next level of care. Would like to continue working on verbal speech as pt is stimulable for CV sounds when agreeable/participating.     "

## 2022-07-22 ENCOUNTER — APPOINTMENT (OUTPATIENT)
Dept: PHYSICAL THERAPY | Facility: CLINIC | Age: 52
DRG: 065 | End: 2022-07-22
Payer: COMMERCIAL

## 2022-07-22 ENCOUNTER — APPOINTMENT (OUTPATIENT)
Dept: SPEECH THERAPY | Facility: CLINIC | Age: 52
DRG: 065 | End: 2022-07-22
Payer: COMMERCIAL

## 2022-07-22 LAB
ALBUMIN UR-MCNC: 30 MG/DL
ANION GAP SERPL CALCULATED.3IONS-SCNC: 7 MMOL/L (ref 3–14)
APPEARANCE UR: ABNORMAL
BACTERIA #/AREA URNS HPF: ABNORMAL /HPF
BILIRUB UR QL STRIP: NEGATIVE
BUN SERPL-MCNC: 10 MG/DL (ref 7–30)
CALCIUM SERPL-MCNC: 9.3 MG/DL (ref 8.5–10.1)
CHLORIDE BLD-SCNC: 109 MMOL/L (ref 94–109)
CO2 SERPL-SCNC: 26 MMOL/L (ref 20–32)
COLOR UR AUTO: ABNORMAL
CREAT SERPL-MCNC: 0.68 MG/DL (ref 0.66–1.25)
ERYTHROCYTE [DISTWIDTH] IN BLOOD BY AUTOMATED COUNT: 12.2 % (ref 10–15)
GFR SERPL CREATININE-BSD FRML MDRD: >90 ML/MIN/1.73M2
GLUCOSE BLD-MCNC: 66 MG/DL (ref 70–99)
GLUCOSE BLDC GLUCOMTR-MCNC: 134 MG/DL (ref 70–99)
GLUCOSE BLDC GLUCOMTR-MCNC: 185 MG/DL (ref 70–99)
GLUCOSE BLDC GLUCOMTR-MCNC: 219 MG/DL (ref 70–99)
GLUCOSE BLDC GLUCOMTR-MCNC: 59 MG/DL (ref 70–99)
GLUCOSE BLDC GLUCOMTR-MCNC: 70 MG/DL (ref 70–99)
GLUCOSE BLDC GLUCOMTR-MCNC: 71 MG/DL (ref 70–99)
GLUCOSE UR STRIP-MCNC: NEGATIVE MG/DL
HCT VFR BLD AUTO: 33.3 % (ref 40–53)
HGB BLD-MCNC: 10.5 G/DL (ref 13.3–17.7)
HGB UR QL STRIP: ABNORMAL
INR PPP: 1.18 (ref 0.85–1.15)
KETONES UR STRIP-MCNC: NEGATIVE MG/DL
LEUKOCYTE ESTERASE UR QL STRIP: ABNORMAL
MCH RBC QN AUTO: 30.3 PG (ref 26.5–33)
MCHC RBC AUTO-ENTMCNC: 31.5 G/DL (ref 31.5–36.5)
MCV RBC AUTO: 96 FL (ref 78–100)
MUCOUS THREADS #/AREA URNS LPF: PRESENT /LPF
NITRATE UR QL: NEGATIVE
PH UR STRIP: 6.5 [PH] (ref 5–7)
PLATELET # BLD AUTO: 365 10E3/UL (ref 150–450)
POTASSIUM BLD-SCNC: 3.5 MMOL/L (ref 3.4–5.3)
PROCALCITONIN SERPL-MCNC: <0.05 NG/ML
RBC # BLD AUTO: 3.46 10E6/UL (ref 4.4–5.9)
RBC URINE: >182 /HPF
SODIUM SERPL-SCNC: 142 MMOL/L (ref 133–144)
SP GR UR STRIP: 1.02 (ref 1–1.03)
UROBILINOGEN UR STRIP-MCNC: NORMAL MG/DL
WBC # BLD AUTO: 20.3 10E3/UL (ref 4–11)
WBC CLUMPS #/AREA URNS HPF: PRESENT /HPF
WBC URINE: >182 /HPF

## 2022-07-22 PROCEDURE — 81001 URINALYSIS AUTO W/SCOPE: CPT | Performed by: HOSPITALIST

## 2022-07-22 PROCEDURE — 250N000009 HC RX 250: Performed by: HOSPITALIST

## 2022-07-22 PROCEDURE — 36415 COLL VENOUS BLD VENIPUNCTURE: CPT | Performed by: HOSPITALIST

## 2022-07-22 PROCEDURE — 120N000001 HC R&B MED SURG/OB

## 2022-07-22 PROCEDURE — 258N000003 HC RX IP 258 OP 636: Performed by: HOSPITALIST

## 2022-07-22 PROCEDURE — 80048 BASIC METABOLIC PNL TOTAL CA: CPT | Performed by: HOSPITALIST

## 2022-07-22 PROCEDURE — 250N000013 HC RX MED GY IP 250 OP 250 PS 637: Performed by: HOSPITALIST

## 2022-07-22 PROCEDURE — 84145 PROCALCITONIN (PCT): CPT | Performed by: HOSPITALIST

## 2022-07-22 PROCEDURE — 92507 TX SP LANG VOICE COMM INDIV: CPT | Mod: GN

## 2022-07-22 PROCEDURE — 85610 PROTHROMBIN TIME: CPT | Performed by: HOSPITALIST

## 2022-07-22 PROCEDURE — 250N000013 HC RX MED GY IP 250 OP 250 PS 637: Performed by: INTERNAL MEDICINE

## 2022-07-22 PROCEDURE — 92526 ORAL FUNCTION THERAPY: CPT | Mod: GN

## 2022-07-22 PROCEDURE — 85027 COMPLETE CBC AUTOMATED: CPT | Performed by: HOSPITALIST

## 2022-07-22 PROCEDURE — 97530 THERAPEUTIC ACTIVITIES: CPT | Mod: GP

## 2022-07-22 PROCEDURE — 99233 SBSQ HOSP IP/OBS HIGH 50: CPT | Performed by: HOSPITALIST

## 2022-07-22 PROCEDURE — 87186 SC STD MICRODIL/AGAR DIL: CPT | Performed by: HOSPITALIST

## 2022-07-22 RX ORDER — LIDOCAINE HYDROCHLORIDE 20 MG/ML
JELLY TOPICAL ONCE
Status: COMPLETED | OUTPATIENT
Start: 2022-07-22 | End: 2022-07-22

## 2022-07-22 RX ADMIN — LIDOCAINE HYDROCHLORIDE: 20 JELLY TOPICAL at 12:46

## 2022-07-22 RX ADMIN — Medication 40 MG: at 06:51

## 2022-07-22 RX ADMIN — CLOPIDOGREL BISULFATE 75 MG: 75 TABLET ORAL at 09:59

## 2022-07-22 RX ADMIN — BACLOFEN 10 MG: 10 TABLET ORAL at 17:15

## 2022-07-22 RX ADMIN — ATORVASTATIN CALCIUM 80 MG: 80 TABLET, FILM COATED ORAL at 09:59

## 2022-07-22 RX ADMIN — BACLOFEN 10 MG: 10 TABLET ORAL at 09:59

## 2022-07-22 RX ADMIN — DEXTROSE AND SODIUM CHLORIDE: 5; 900 INJECTION, SOLUTION INTRAVENOUS at 09:53

## 2022-07-22 RX ADMIN — LISINOPRIL AND HYDROCHLOROTHIAZIDE 1 TABLET: 25; 20 TABLET ORAL at 09:58

## 2022-07-22 RX ADMIN — INSULIN ASPART 7 UNITS: 100 INJECTION, SOLUTION INTRAVENOUS; SUBCUTANEOUS at 21:01

## 2022-07-22 RX ADMIN — ACETAMINOPHEN 975 MG: 325 SUSPENSION ORAL at 09:58

## 2022-07-22 RX ADMIN — BACLOFEN 10 MG: 10 TABLET ORAL at 21:01

## 2022-07-22 RX ADMIN — ACETAMINOPHEN 975 MG: 325 SUSPENSION ORAL at 17:15

## 2022-07-22 RX ADMIN — DEXTROSE AND SODIUM CHLORIDE: 5; 900 INJECTION, SOLUTION INTRAVENOUS at 20:06

## 2022-07-22 RX ADMIN — ASPIRIN 325 MG ORAL TABLET 325 MG: 325 PILL ORAL at 09:58

## 2022-07-22 RX ADMIN — ACETAMINOPHEN 975 MG: 325 SUSPENSION ORAL at 02:03

## 2022-07-22 RX ADMIN — INSULIN ASPART 2 UNITS: 100 INJECTION, SOLUTION INTRAVENOUS; SUBCUTANEOUS at 17:16

## 2022-07-22 RX ADMIN — SENNOSIDES AND DOCUSATE SODIUM 2 TABLET: 50; 8.6 TABLET ORAL at 21:02

## 2022-07-22 RX ADMIN — SENNOSIDES AND DOCUSATE SODIUM 2 TABLET: 50; 8.6 TABLET ORAL at 09:59

## 2022-07-22 RX ADMIN — MIRTAZAPINE 15 MG: 15 TABLET, FILM COATED ORAL at 21:01

## 2022-07-22 ASSESSMENT — ACTIVITIES OF DAILY LIVING (ADL)
ADLS_ACUITY_SCORE: 47
ADLS_ACUITY_SCORE: 52
ADLS_ACUITY_SCORE: 47
ADLS_ACUITY_SCORE: 47

## 2022-07-22 NOTE — PROGRESS NOTES
[facility]    Medicine Progress Note - Hospitalist Service    Date of Admission:  7/3/2022    Assessment & Plan      Josue Parisi is 52, who has had multiple strokes with some significant compliance issues, ongoing polysubstance abuse, being admitted under observation status for placement at a TCU, as he is unable to care for himself.     Multiple ischemic CVAs involving MCA, KWADWO with left-sided weakness and concern for progression  Hx Medication noncompliance  Polysubstance abuse  Initial CVA occurred in 03/2022 with right KWADWO, large territory defect and residual left-sided weakness recommended to discharge to ARU however declined and discharged AMA. Subsequently returned 6/18 with progressive weakness, again referred to TCU however declined and returned home. Hospitalized 6/24 with worsening left-sided weakness, paresthesias, numbness with findings of new nonhemorrhagic infarction within right prefrontal gyrus and deep MCA watershed infarct within the right centrum semiovale of the right frontal and parietal lobes. Patient was evaluated by stroke neurology each admission, most recently felt symptoms were suggestive of new infarct rather than hypoperfused tissue. Recommended liberal SBP goal of 140-160 and DAPT with plavix/ for 90 days. Patient most recently left AMA on 6/30 after being recommended for TCU. At time of discharge, it appears the plavix was discontinued in an effort to limit medications and encourage compliance.   * Returned to ED 7/3/22 with reports of failing at home. It was unclear on admission if patient's left-sided symptoms had persisted or worsened due to presence of polysubstances and limited ability to participate in physical exam. Admitted to cocaine use as recently as 7/2. UTox on admission positive for cocaine, amphetamines.   * 7/4 exam noted with 1/5 LUE/LLE strength, limited ability to communicate verbally but cognitively able to answer yes/no  "questions with gestures, shakes of head. reported change in function. Repeat CTH revealed rounded focus of hypoattenuation within right corona radiata/centrum semiovale, MRI brain 6/25 indicated restricted diffusion within this region however area of hypoattenuation is larger than region of diffusion restriction concerning for progression of infarct.  - Manage BP closely  - Neuro note 7/6- \"recommend SAMMPRIS trial guided DAPT (+clopidogrel 75 x90 days) in an attempt to stabilize the M1 segment and prevent inferior division infarct. \"   - Continue statin.  - PT/OT consults.  - Reconsult speech therapy; G-tube placed on 7/08, TF per [place] otherwise NPO  - Holding tube feeds  given constipation and discomfort at g tube site, 7/20/2022, initiated bowel program, IVF NS D5 while TF on hold  -7/22/2022 denies abdominal pain, nausea, emesis, restart TF slowly at 20 cc an hour with goal at 60 cc an hour [increased by 20 cc an hour/24 hours to goal rate], continue free water per NG.  While on decreased TF glucoses decreased, unfortunately NS hung yesterday instead of D5/NS resulting in relative hypoglycemia which continues, therefore hold Lantus, resume D5 NS which will be DC'd once glucoses WNL or TF rate increases to baseline.  In the meantime continue SSI if needed..      Abdominal pain  Constipation  Patient reports abdominal pain, needing to have a BM.  TF per G-tube held due to complaints and assessment of significant constipation.. CT a/p did confirm no acute issues other than large amt of stool in colon.  -- increase senna-colace to 2 tabs BID, MiraLAX  - start protonix IV daily  - patient was adamant he does not want suppository.  BM with bowel program, however noted increased abdominal pain 7/21/2022, TF and free water held, see above, restarted 7/22/2022.      Bleeding gums, halitosis  - suspect reflux given degree of constipation. Checked plts, INR - both okay. Will consult OMFS if symptoms continue but pt " denies tooth pain.  -Nursing reports extremely dry mouth, hold Zyrtec, Magic mouthwash, monitor  -7/22/2022 on exam 1 area on left upper incisor old nonactive bleeding consistent with gingivitis, continue wearing wrist oral cares.  Hemoglobin and INR stable.  Monitor    SIRS syndrome  Pt with mild leukocytosis 12-13, procalcitonin on admission negative. Low-grade fever and sinus tachycardia persisting today. UA without evidence of infection. CXR neg. ?due to polysubstance abuse with amphetamines, cocaine in UTox.  - Repeat CBC trending down   - Low threshold to obtain blood cultures, initiate broad-spectrum abx   -7/22/2022 WBC increased to 20.3k, afebrile, no signs of localizing infection except for focal gingivitis, procalcitonin negative.  UA with mild pyuria, microhematuria however after Cortez exchanged, given negative procalcitonin will await final UC/S.  Recheck WBC in a.m.     Type 2 diabetes mellitus  Last HbA1c of 7.8 on 06/18. PTA regimen of metformin. Recent admission Lantus had been increased to 18u BID, carb coverage with meals of 1:10 with breakfast/lunch, 1:15 with dinner.  - Hold PTA metformin.  -While TF held relative hypoglycemia, see above; 7/22/2022 TF to restart however slowly therefore continue IVF D5 NS, given relative hypoglycemia hold Lantus, has SSI if needed.  Once glucoses rise or restart TF at baseline then resume Lantus.    History of hypertension   Held PTA meds initially to allow permissive hypertension.  - Resumed PTA lisinopril-hydrochlorothiazide 7/12.  - Goal is normotension per Neurology.  - Blood pressure well controlled.     Hidradenitis / Right axillary wound  WOCN consult previous recs:   Right axilla wound: Daily  and PRN.  - Cleanse wound with NS or wound cleanser (omit this step if patient cleans wound in shower).  - Dry and protect surrounding skin with no sting barrier film wipe #[id number].  - Apply a small amount of iodesorb gel #[id number] to bandaid.  - If Band-Aid needs to  be changed more than once a day. Apply iodesorb gel #[id number] to adaptic #[id number] and cover with Mepilex #[id number].     Generalized pain  PTA on gabapentin 300 mg 3 times daily.    - Holding gabapentin.   - Continue as needed Tylenol as needed. Avoid opioids.  - Will add PRN ibuprofen.  -No pain complaints.       Diet: NPO per Anesthesia Guidelines for Procedure/Surgery Except for: Meds, Other; Specify: please stop tube feedings  Adult Formula Drip Feeding: Continuous Jevity 1.5; Other - Specify in Comment; Goal Rate: 60; mL/hr; Medication - Feeding Tube Flush Frequency: At least 15-30 mL water before and after medication administration and with tube clogging; Amount to Se...    DVT Prophylaxis: Pneumatic Compression Devices  Cortez Catheter: Not present  Central Lines: None  Cardiac Monitoring: None  Code Status: Full Code      Disposition Plan      Expected Discharge Date: 07/23/2022    Discharge Delays: Placement - TCU  *Medically Ready for Discharge    Discharge Comments: TCU PP        Total unit/floor time 35 minutes:  time consisted of the following, examination of patient, review of records including labs, imaging results, medications, interdisciplinary notes and completing documentation; > 50% Counseling/discussion with Patient regarding current condition including mouth bleeding, abdominal pain and TF and Coordination of Care time with Nursing regarding leukocytosis, gum bleeding, TF/fluid/glucose care plan, management and surveillance.      Keith Smith MD  Hospitalist Service  [facility]  Securely message with the Vocera Web Console (learn more here)  Text page via [employer] Paging/Directory     ______________________________________________________________________    Interval History   Limited history from patient due to expressive aphasia however he is able to nod yes or no.  Denies abdominal pain, nausea, emesis.  Cooperative.  No further mouth/gum bleeding.      Data reviewed  today: I reviewed all medications, new labs and imaging results over the last 24 hours.     Physical Exam   Vital Signs: Temp: 98.6  F (37  C) Temp src: Axillary BP: (!) 155/106 Pulse: 107   Resp: 16 SpO2: 98 % O2 Device: None (Room air)    Weight: 154 lbs 15.66 oz  Constitutional:  NAD, awake, calm, cooperative    Expressive aphasia.  HEENT; area of old, nonactive bleeding left upper incisor, no active bleeding,  Respiratory: Respirations nonlabored room air  Cardiovascular: Regular, no murmur  GI: Soft, nontender.  No rebound, guarding or peritoneal signs.  PEG tube in place  Musculoskeletal: no lower extremity pitting edema present  Neuro.  Gross motor tested, left-sided hemiparesis.  Expressive aphasia, able to nod yes or no.  Psych orientation cannot be tested, affect calm.    Data   Recent Labs   Lab 07/22/22  1133 07/22/22  0813 07/22/22  0751 07/20/22  0858 07/20/22  0835 07/19/22  1450 07/19/22  1152   WBC  --  20.3*  --   --  12.3*  --  11.9*   HGB  --  10.5*  --   --  11.7*  --  11.4*   MCV  --  96  --   --  96  --  92   PLT  --  365  --   --  397  --  396   INR  --  1.18*  --   --   --   --  1.04   NA  --  142  --   --  137  --  134   POTASSIUM  --  3.5  --   --  3.8  --  3.7   CHLORIDE  --  109  --   --  104  --  99   CO2  --  26  --   --  30  --  27   BUN  --  10  --   --  20  --  24   CR  --  0.68  --   --  0.72  --  0.62*   ANIONGAP  --  7  --   --  3  --  8   DAT  --  9.3  --   --  9.1  --  9.5   * 66* 59*   < > 69*   < > 263*   ALBUMIN  --   --   --   --   --   --  3.3*   PROTTOTAL  --   --   --   --   --   --  7.4   BILITOTAL  --   --   --   --   --   --  0.5   ALKPHOS  --   --   --   --   --   --  58   ALT  --   --   --   --   --   --  46   AST  --   --   --   --   --   --  30    < > = values in this interval not displayed.     Medications     dextrose       dextrose 5% and 0.9% NaCl 100 mL/hr at 07/22/22 1112     - MEDICATION INSTRUCTIONS -       - MEDICATION INSTRUCTIONS -          acetaminophen  975 mg Per Feeding Tube Q8H     aspirin  300 mg Rectal Daily    Or     aspirin  325 mg Oral or Feeding Tube Daily     atorvastatin  80 mg Oral or Feeding Tube Daily     Baclofen  10 mg Per Feeding Tube TID     [Held by provider] cetirizine  10 mg Oral or Feeding Tube Daily     clopidogrel  75 mg Oral or Feeding Tube Daily     insulin aspart  1-12 Units Subcutaneous Q4H     [Held by provider] insulin glargine  12 Units Subcutaneous QAM AC     [Held by provider] insulin glargine  38 Units Subcutaneous At Bedtime     lisinopril-hydrochlorothiazide  1 tablet Oral or Feeding Tube Daily     mirtazapine  15 mg Oral or Feeding Tube At Bedtime     pantoprazole  40 mg Oral or Feeding Tube QAM AC     senna-docusate  2 tablet Oral or Feeding Tube BID

## 2022-07-22 NOTE — PLAN OF CARE
Goal Outcome Evaluation:  VSS. Unable to speak but able to make needs known. NPO tube feeding continues to run. Turned and repo. Dressing changed in right armpit. Cortez patent.

## 2022-07-22 NOTE — PLAN OF CARE
"/81 (BP Location: Right arm)   Pulse 99   Temp 98.5  F (36.9  C) (Axillary)   Resp 16   Ht 1.676 m (5' 6\")   Wt 70.3 kg (154 lb 15.7 oz)   SpO2 99%   BMI 25.01 kg/m      Patient is currently here with a right-sided cerebrovascular accident. The patient's level of alertness and orientation as well as his neurological status are extremely difficult to assess as the patient is primarily nonverbal and noncompliant, however, dysarthria, aphasia, lethargy, a flat affect, left-sided neglect, left-sided hemiplegia and a left visual field cut are all quite apparent. VSS on RA with intermittent hypertension. Patient is currently NPO with continuous enteral tube feedings running through the patient's PEG Tube at a rate of 20 mL/hr with 60 mL free water flushes every four hours as ordered. Patient transfers and ambulates with an assist of two plus the utilization of a lift device. Patient is incontinent of both bowel and bladder, currently has an indwelling fair catheter in position for urinary retention that has been displaying adequate urinary output. Patient's skin is intact and in good condition aside from scattered bruising as well as blanchable redness on the patient's right axillary area. Patient currently has 5% Dextrose with 0.9% Normal Saline infusing at a rate of 100 mL/hr through one of the patient's peripheral intravenous lines. Denies pain. Patient is currently scoring green on the Aggression Stop Light Tool. Plan to continue to assess for any potential neurological changes. Discharge plans pending eventual placement in a transitional care unit.  "

## 2022-07-22 NOTE — PROGRESS NOTES
MD Notification    Notified Person: MD    Notified Person Name: Judah Millard    /Notification Date/Time: 7/21/22 at 1145  /  Notification Interaction: [employer]    Purpose of Notification: TF on HOLD, SHOULD scheduled Lantus be on hold as well    Orders Received: Give 15 unit of Lantus x1    Comments:

## 2022-07-22 NOTE — PROGRESS NOTES
X-cover    Tube feeds on hold, scheduled to get Lantus 38 units tonight and 12 units in the am. BS tonight 94.   - hold pm and am lantus for now until TFs restarted  - give lantus 15 units x 1 tonight    Judah Millard MD

## 2022-07-22 NOTE — PROGRESS NOTES
Care Management Follow Up    Length of Stay (days): 18    Expected Discharge Date: 07/23/2022     Concerns to be Addressed:       Patient plan of care discussed at interdisciplinary rounds: Yes    Anticipated Discharge Disposition: Home     Anticipated Discharge Services:    Anticipated Discharge DME:      Patient/family educated on Medicare website which has current facility and service quality ratings:    Education Provided on the Discharge Plan:    Patient/Family in Agreement with the Plan:      Referrals Placed by CM/SW:    Private pay costs discussed: Not applicable    Additional Information:  SW hand faxed referral to [facility] as they have not been receiving faxes through [facility].      MAYANK Gibbs

## 2022-07-22 NOTE — PLAN OF CARE
Pt here with multiple stroke. Unable to speak but makes incomprehensive sounds. Neuros are L sided weakness. VSS. NPO, PEG on HOLD. Up with assist A2/lift, T&repo Q2hrs. Cortez cath in placed for retention. R armpit wound dressing CDI. Pt scoring yellow on the Aggression Stop Light Tool.  Discharge pending.

## 2022-07-23 ENCOUNTER — APPOINTMENT (OUTPATIENT)
Dept: SPEECH THERAPY | Facility: CLINIC | Age: 52
DRG: 065 | End: 2022-07-23
Payer: COMMERCIAL

## 2022-07-23 LAB
ANION GAP SERPL CALCULATED.3IONS-SCNC: 5 MMOL/L (ref 3–14)
BUN SERPL-MCNC: 10 MG/DL (ref 7–30)
CALCIUM SERPL-MCNC: 9.1 MG/DL (ref 8.5–10.1)
CHLORIDE BLD-SCNC: 108 MMOL/L (ref 94–109)
CO2 SERPL-SCNC: 25 MMOL/L (ref 20–32)
CREAT SERPL-MCNC: 0.62 MG/DL (ref 0.66–1.25)
ERYTHROCYTE [DISTWIDTH] IN BLOOD BY AUTOMATED COUNT: 12 % (ref 10–15)
GFR SERPL CREATININE-BSD FRML MDRD: >90 ML/MIN/1.73M2
GLUCOSE BLD-MCNC: 141 MG/DL (ref 70–99)
GLUCOSE BLDC GLUCOMTR-MCNC: 139 MG/DL (ref 70–99)
GLUCOSE BLDC GLUCOMTR-MCNC: 152 MG/DL (ref 70–99)
GLUCOSE BLDC GLUCOMTR-MCNC: 158 MG/DL (ref 70–99)
GLUCOSE BLDC GLUCOMTR-MCNC: 172 MG/DL (ref 70–99)
GLUCOSE BLDC GLUCOMTR-MCNC: 180 MG/DL (ref 70–99)
GLUCOSE BLDC GLUCOMTR-MCNC: 218 MG/DL (ref 70–99)
HCT VFR BLD AUTO: 29.5 % (ref 40–53)
HGB BLD-MCNC: 9.5 G/DL (ref 13.3–17.7)
MCH RBC QN AUTO: 30.1 PG (ref 26.5–33)
MCHC RBC AUTO-ENTMCNC: 32.2 G/DL (ref 31.5–36.5)
MCV RBC AUTO: 93 FL (ref 78–100)
PLATELET # BLD AUTO: 346 10E3/UL (ref 150–450)
POTASSIUM BLD-SCNC: 3.4 MMOL/L (ref 3.4–5.3)
RBC # BLD AUTO: 3.16 10E6/UL (ref 4.4–5.9)
SODIUM SERPL-SCNC: 138 MMOL/L (ref 133–144)
WBC # BLD AUTO: 15.3 10E3/UL (ref 4–11)

## 2022-07-23 PROCEDURE — 250N000013 HC RX MED GY IP 250 OP 250 PS 637: Performed by: INTERNAL MEDICINE

## 2022-07-23 PROCEDURE — 250N000013 HC RX MED GY IP 250 OP 250 PS 637: Performed by: HOSPITALIST

## 2022-07-23 PROCEDURE — 85027 COMPLETE CBC AUTOMATED: CPT | Performed by: HOSPITALIST

## 2022-07-23 PROCEDURE — 92526 ORAL FUNCTION THERAPY: CPT | Mod: GN

## 2022-07-23 PROCEDURE — 99233 SBSQ HOSP IP/OBS HIGH 50: CPT | Performed by: HOSPITALIST

## 2022-07-23 PROCEDURE — 120N000001 HC R&B MED SURG/OB

## 2022-07-23 PROCEDURE — 36415 COLL VENOUS BLD VENIPUNCTURE: CPT | Performed by: HOSPITALIST

## 2022-07-23 PROCEDURE — 82310 ASSAY OF CALCIUM: CPT | Performed by: HOSPITALIST

## 2022-07-23 PROCEDURE — 92507 TX SP LANG VOICE COMM INDIV: CPT | Mod: GN

## 2022-07-23 PROCEDURE — 250N000012 HC RX MED GY IP 250 OP 636 PS 637: Performed by: HOSPITALIST

## 2022-07-23 PROCEDURE — 258N000003 HC RX IP 258 OP 636: Performed by: HOSPITALIST

## 2022-07-23 RX ORDER — POLYETHYLENE GLYCOL 3350 17 G/17G
17 POWDER, FOR SOLUTION ORAL DAILY
Status: DISCONTINUED | OUTPATIENT
Start: 2022-07-23 | End: 2022-07-31

## 2022-07-23 RX ADMIN — BACLOFEN 10 MG: 10 TABLET ORAL at 08:05

## 2022-07-23 RX ADMIN — INSULIN ASPART 1 UNITS: 100 INJECTION, SOLUTION INTRAVENOUS; SUBCUTANEOUS at 10:39

## 2022-07-23 RX ADMIN — IBUPROFEN 400 MG: 200 SUSPENSION ORAL at 20:14

## 2022-07-23 RX ADMIN — DEXTROSE AND SODIUM CHLORIDE: 5; 900 INJECTION, SOLUTION INTRAVENOUS at 16:56

## 2022-07-23 RX ADMIN — ACETAMINOPHEN 975 MG: 325 SUSPENSION ORAL at 01:26

## 2022-07-23 RX ADMIN — ASPIRIN 325 MG ORAL TABLET 325 MG: 325 PILL ORAL at 08:05

## 2022-07-23 RX ADMIN — Medication 40 MG: at 06:55

## 2022-07-23 RX ADMIN — ACETAMINOPHEN 975 MG: 325 SUSPENSION ORAL at 08:06

## 2022-07-23 RX ADMIN — INSULIN GLARGINE 14 UNITS: 100 INJECTION, SOLUTION SUBCUTANEOUS at 21:53

## 2022-07-23 RX ADMIN — LISINOPRIL AND HYDROCHLOROTHIAZIDE 1 TABLET: 25; 20 TABLET ORAL at 08:05

## 2022-07-23 RX ADMIN — OXYCODONE HYDROCHLORIDE 5 MG: 5 TABLET ORAL at 08:14

## 2022-07-23 RX ADMIN — BACLOFEN 10 MG: 10 TABLET ORAL at 16:40

## 2022-07-23 RX ADMIN — OXYCODONE HYDROCHLORIDE 5 MG: 5 TABLET ORAL at 21:52

## 2022-07-23 RX ADMIN — INSULIN ASPART 4 UNITS: 100 INJECTION, SOLUTION INTRAVENOUS; SUBCUTANEOUS at 21:52

## 2022-07-23 RX ADMIN — SENNOSIDES AND DOCUSATE SODIUM 2 TABLET: 50; 8.6 TABLET ORAL at 08:05

## 2022-07-23 RX ADMIN — ATORVASTATIN CALCIUM 80 MG: 80 TABLET, FILM COATED ORAL at 08:06

## 2022-07-23 RX ADMIN — INSULIN ASPART 2 UNITS: 100 INJECTION, SOLUTION INTRAVENOUS; SUBCUTANEOUS at 06:03

## 2022-07-23 RX ADMIN — CLOPIDOGREL BISULFATE 75 MG: 75 TABLET ORAL at 08:05

## 2022-07-23 RX ADMIN — BACLOFEN 10 MG: 10 TABLET ORAL at 21:52

## 2022-07-23 RX ADMIN — INSULIN ASPART 1 UNITS: 100 INJECTION, SOLUTION INTRAVENOUS; SUBCUTANEOUS at 14:36

## 2022-07-23 RX ADMIN — MIRTAZAPINE 15 MG: 15 TABLET, FILM COATED ORAL at 21:52

## 2022-07-23 RX ADMIN — DEXTROSE AND SODIUM CHLORIDE: 5; 900 INJECTION, SOLUTION INTRAVENOUS at 13:20

## 2022-07-23 RX ADMIN — POLYETHYLENE GLYCOL 3350 17 G: 17 POWDER, FOR SOLUTION ORAL at 13:24

## 2022-07-23 RX ADMIN — INSULIN ASPART 2 UNITS: 100 INJECTION, SOLUTION INTRAVENOUS; SUBCUTANEOUS at 17:33

## 2022-07-23 RX ADMIN — SENNOSIDES AND DOCUSATE SODIUM 2 TABLET: 50; 8.6 TABLET ORAL at 20:14

## 2022-07-23 RX ADMIN — ACETAMINOPHEN 975 MG: 325 SUSPENSION ORAL at 16:40

## 2022-07-23 ASSESSMENT — ACTIVITIES OF DAILY LIVING (ADL)
ADLS_ACUITY_SCORE: 52
ADLS_ACUITY_SCORE: 56
ADLS_ACUITY_SCORE: 52
ADLS_ACUITY_SCORE: 52
ADLS_ACUITY_SCORE: 56
ADLS_ACUITY_SCORE: 52
ADLS_ACUITY_SCORE: 52
ADLS_ACUITY_SCORE: 56
ADLS_ACUITY_SCORE: 52
ADLS_ACUITY_SCORE: 54

## 2022-07-23 NOTE — PROGRESS NOTES
"Alert. Unable to express needs. Difficulty following commands. Still having L weakness, L field cuts, L neglect and L droop. Sister visiting. IVF changed. TF @ 20 ml/hr currently. Should be advanced to 40 ml/hr this evening. Tolerating well so far. PRN oxy x 1 for left shoulder pain. No fever. VSS. Latest Vitals: Blood pressure (!) 157/99, pulse 116, temperature 98.3  F (36.8  C), temperature source Axillary, resp. rate 18, height 1.676 m (5' 6\"), weight 70.3 kg (154 lb 15.7 oz), SpO2 99 %.         "

## 2022-07-23 NOTE — PLAN OF CARE
Pt here with multiple stroke. Unable to speak but makes incomprehensive sounds. Neuros are L sided weakness, slight L field cut and neglect. VSS. NPO, PEG running at 20 ml/hr. Up with assist A2/lift, T&repo Q2hrs. Cortez cath in placed for retention. R armpit wound dressing CDI. Pt scoring yellow on the Aggression Stop Light Tool. Possible discharge today.

## 2022-07-23 NOTE — PLAN OF CARE
[phone]     Unable to verbally communicate,using note pad at bedside. No acute neuro changes. NPO, continous tube feedings.Turn/repo. Indwelling fair in place. Insulin given per orders. Dressing to armpit CDI.

## 2022-07-23 NOTE — PROGRESS NOTES
[facility]    Medicine Progress Note - Hospitalist Service    Date of Admission:  7/3/2022    Assessment & Plan      Josue Parisi is 52, who has had multiple strokes with some significant compliance issues, ongoing polysubstance abuse, being admitted under observation status for placement at a TCU, as he is unable to care for himself.     Multiple ischemic CVAs involving MCA, KWADWO with left-sided weakness and concern for progression  Hx Medication noncompliance  Polysubstance abuse  Initial CVA occurred in 03/2022 with right KWADWO, large territory defect and residual left-sided weakness recommended to discharge to ARU however declined and discharged AMA. Subsequently returned 6/18 with progressive weakness, again referred to TCU however declined and returned home. Hospitalized 6/24 with worsening left-sided weakness, paresthesias, numbness with findings of new nonhemorrhagic infarction within right prefrontal gyrus and deep MCA watershed infarct within the right centrum semiovale of the right frontal and parietal lobes. Patient was evaluated by stroke neurology each admission, most recently felt symptoms were suggestive of new infarct rather than hypoperfused tissue. Recommended liberal SBP goal of 140-160 and DAPT with plavix/ for 90 days. Patient most recently left AMA on 6/30 after being recommended for TCU. At time of discharge, it appears the plavix was discontinued in an effort to limit medications and encourage compliance.   * Returned to ED 7/3/22 with reports of failing at home. It was unclear on admission if patient's left-sided symptoms had persisted or worsened due to presence of polysubstances and limited ability to participate in physical exam. Admitted to cocaine use as recently as 7/2. UTox on admission positive for cocaine, amphetamines.   * 7/4 exam noted with 1/5 LUE/LLE strength, limited ability to communicate verbally but cognitively able to answer yes/no  "questions with gestures, shakes of head. reported change in function. Repeat CTH revealed rounded focus of hypoattenuation within right corona radiata/centrum semiovale, MRI brain 6/25 indicated restricted diffusion within this region however area of hypoattenuation is larger than region of diffusion restriction concerning for progression of infarct.y  - Neuro note 7/6- \"recommend SAMMPRIS trial guided DAPT (+clopidogrel 75 x90 days) in an attempt to stabilize the M1 segment and prevent inferior division infarct. \"   - Continue statin.  - Reconsult speech therapy; G-tube placed on 7/08, TF per [place] otherwise NPO  - Holding tube feeds  given constipation and discomfort at g tube site, 7/20/2022, initiated bowel program, IVF NS D5 while TF on hold  -7/22/2022 denies abdominal pain, nausea, emesis, restart TF slowly at 20 cc an hour with goal at 60 cc an hour [increased by 20 cc an hour/24 hours to goal rate], continue free water per NG.  I will decrease TF on D5 NS.  -7/23/2022, discussed with nursing scheduled to increase TF to 40 cc an hour at 1600 with goal rate at 60 cc an hour to be started tomorrow.  Glucoses are rising with total SSI over the last 24 hours at 12 units.  Therefore as TF to be increased to 40 cc an hour at 1600 we will resume at bedtime Lantus however markedly decreased dose at 14 units, continue SSI, for now continue IVF D5 NS, reassess glucoses in AM.      Abdominal pain  Constipation  Patient reports abdominal pain, needing to have a BM.  TF per G-tube held due to complaints and assessment of significant constipation.. CT a/p did confirm no acute issues other than large amt of stool in colon.  -- increase senna-colace to 2 tabs BID, MiraLAX  - start protonix IV daily  -No further abdominal pain, needs adherence to bowel program to prevent constipation      Bleeding gums, halitosis  - suspect reflux given degree of constipation. Checked plts, INR - both okay. Will consult OMFS if symptoms " continue but pt denies tooth pain.  -Nursing reports extremely dry mouth, hold Zyrtec, Magic mouthwash, monitor  -7/22/2022 on exam 1 area on left upper incisor old nonactive bleeding consistent with gingivitis, continue wearing wrist oral cares.  Hemoglobin and INR stable.  Monitor    SIRS syndrome  Leukocytosis  Pt with mild leukocytosis 12-13, procalcitonin on admission negative. Low-grade fever and sinus tachycardia persisting today. UA without evidence of infection. CXR neg. ?due to polysubstance abuse with amphetamines, cocaine in UTox.  - Repeat CBC trending down   - Low threshold to obtain blood cultures, initiate broad-spectrum abx   -7/22/2022 WBC increased to 20.3k, afebrile, no signs of localizing infection except for focal gingivitis, procalcitonin negative.  UA with mild pyuria, microhematuria however after Cortez exchanged, given negative procalcitonin will await final UC/S, pending.  Recheck WBC in a.m decreasing to 15.3, likely related to gingivitis.  Remains afebrile, no further gum bleeding.  Recheck WBC in a.m.     Type 2 diabetes mellitus  Last HbA1c of 7.8 on 06/18. PTA regimen of metformin. Recent admission Lantus had been increased to 18u BID, carb coverage with meals of 1:10 with breakfast/lunch, 1:15 with dinner.  - Hold PTA metformin.  -While TF held relative hypoglycemia, see above; 7/22/2022 TF to restart however slowly therefore continue IVF D5 NS, given relative hypoglycemia hold Lantus, has SSI if needed.  Once glucoses rise or restart TF at baseline then resume Lantus.    History of hypertension   Held PTA meds initially to allow permissive hypertension.  - Resumed PTA lisinopril-hydrochlorothiazide 7/12.  - Goal is normotension per Neurology.  - Blood pressure rising, monitor.  Room to increase lisinopril, monitor.     Hidradenitis / Right axillary wound  WOCN consult previous recs:   Right axilla wound: Daily  and PRN.  - Cleanse wound with NS or wound cleanser (omit this step if  patient cleans wound in shower).  - Dry and protect surrounding skin with no sting barrier film wipe #[id number].  - Apply a small amount of iodesorb gel #[id number] to bandaid.  - If Band-Aid needs to be changed more than once a day. Apply iodesorb gel #[id number] to adaptic #[id number] and cover with Mepilex #[id number].     Generalized pain  PTA on gabapentin 300 mg 3 times daily.    - Holding gabapentin.   - Continue as needed Tylenol as needed. Avoid opioids.  - Will add PRN ibuprofen.  -No pain complaints.       Diet: NPO per Anesthesia Guidelines for Procedure/Surgery Except for: Meds, Other; Specify: please stop tube feedings  Adult Formula Drip Feeding: Continuous Jevity 1.5; Other - Specify in Comment; Goal Rate: 60; mL/hr; Medication - Feeding Tube Flush Frequency: At least 15-30 mL water before and after medication administration and with tube clogging; Amount to Se...    DVT Prophylaxis: Pneumatic Compression Devices  Cortez Catheter: PRESENT, indication: Retention,  (pt was having pain, cather had puse)  Central Lines: None  Cardiac Monitoring: None  Code Status: Full Code      Disposition Plan      Expected Discharge Date: 07/24/2022    Discharge Delays: Placement - TCU  *Medically Ready for Discharge    Discharge Comments: TCU PP        Total unit/floor time 35 minutes:  time consisted of the following, examination of patient, review of records including labs, imaging results, medications, interdisciplinary notes and completing documentation; > 50%Coordination of Care time with Nursing regarding TF, DM, fluid and pain mgmt care plan  and surveillance.      Keith Smith MD  Hospitalist Service  [facility]  Securely message with the Vocera Web Console (learn more here)  Text page via [facility] Paging/Directory     ______________________________________________________________________    Interval History   Limited history from patient due to expressive aphasia however is able to nod yes or no.   Appears comfortable.  Denies abdominal pain.  Cooperative.  No further gum/mouth bleeding.  Nursing reports no G-tube/TF issues, afebrile.    Data reviewed today: I reviewed all medications, new labs and imaging results over the last 24 hours.     Physical Exam   Vital Signs: Temp: 98.3  F (36.8  C) Temp src: Axillary BP: (!) 157/99 Pulse: 116   Resp: 18 SpO2: 99 % O2 Device: None (Room air)    Weight: 154 lbs 15.66 oz  Constitutional:  NAD, awake, calm, cooperative      Expressive aphasia.  HEENT; area of old, nonactive bleeding left upper incisor, no active bleeding,  Respiratory: Respirations nonlabored room air  Cardiovascular: Regular, no murmur  GI: Soft, nontender.  No rebound, guarding or other peritoneal signs.  PEG tube in place  Musculoskeletal: no lower extremity pitting edema present  Neuro.  Gross motor tested, left-sided hemiparesis.  Expressive aphasia, able to nod yes or no.  Psych orientation cannot be tested, affect calm.    Data   Recent Labs   Lab 07/23/22  1036 07/23/22  0959 07/23/22  0548 07/22/22  1133 07/22/22  0813 07/20/22  0858 07/20/22  0835 07/19/22  1450 07/19/22  1152   WBC  --  15.3*  --   --  20.3*  --  12.3*  --  11.9*   HGB  --  9.5*  --   --  10.5*  --  11.7*  --  11.4*   MCV  --  93  --   --  96  --  96  --  92   PLT  --  346  --   --  365  --  397  --  396   INR  --   --   --   --  1.18*  --   --   --  1.04   NA  --  138  --   --  142  --  137  --  134   POTASSIUM  --  3.4  --   --  3.5  --  3.8  --  3.7   CHLORIDE  --  108  --   --  109  --  104  --  99   CO2  --  25  --   --  26  --  30  --  27   BUN  --  10  --   --  10  --  20  --  24   CR  --  0.62*  --   --  0.68  --  0.72  --  0.62*   ANIONGAP  --  5  --   --  7  --  3  --  8   DAT  --  9.1  --   --  9.3  --  9.1  --  9.5   * 141* 180*   < > 66*   < > 69*   < > 263*   ALBUMIN  --   --   --   --   --   --   --   --  3.3*   PROTTOTAL  --   --   --   --   --   --   --   --  7.4   BILITOTAL  --   --   --   --   --    --   --   --  0.5   ALKPHOS  --   --   --   --   --   --   --   --  58   ALT  --   --   --   --   --   --   --   --  46   AST  --   --   --   --   --   --   --   --  30    < > = values in this interval not displayed.     Medications     dextrose       - MEDICATION INSTRUCTIONS -       - MEDICATION INSTRUCTIONS -         acetaminophen  975 mg Per Feeding Tube Q8H     aspirin  300 mg Rectal Daily    Or     aspirin  325 mg Oral or Feeding Tube Daily     atorvastatin  80 mg Oral or Feeding Tube Daily     Baclofen  10 mg Per Feeding Tube TID     [Held by provider] cetirizine  10 mg Oral or Feeding Tube Daily     clopidogrel  75 mg Oral or Feeding Tube Daily     insulin aspart  1-12 Units Subcutaneous Q4H     [Held by provider] insulin glargine  12 Units Subcutaneous QAM AC     insulin glargine  14 Units Subcutaneous At Bedtime     lisinopril-hydrochlorothiazide  1 tablet Oral or Feeding Tube Daily     mirtazapine  15 mg Oral or Feeding Tube At Bedtime     pantoprazole  40 mg Oral or Feeding Tube QAM AC     senna-docusate  2 tablet Oral or Feeding Tube BID

## 2022-07-24 ENCOUNTER — APPOINTMENT (OUTPATIENT)
Dept: SPEECH THERAPY | Facility: CLINIC | Age: 52
DRG: 065 | End: 2022-07-24
Payer: COMMERCIAL

## 2022-07-24 LAB
BACTERIA UR CULT: ABNORMAL
ERYTHROCYTE [DISTWIDTH] IN BLOOD BY AUTOMATED COUNT: 11.9 % (ref 10–15)
GLUCOSE BLDC GLUCOMTR-MCNC: 144 MG/DL (ref 70–99)
GLUCOSE BLDC GLUCOMTR-MCNC: 181 MG/DL (ref 70–99)
GLUCOSE BLDC GLUCOMTR-MCNC: 182 MG/DL (ref 70–99)
GLUCOSE BLDC GLUCOMTR-MCNC: 187 MG/DL (ref 70–99)
GLUCOSE BLDC GLUCOMTR-MCNC: 216 MG/DL (ref 70–99)
GLUCOSE BLDC GLUCOMTR-MCNC: 221 MG/DL (ref 70–99)
HCT VFR BLD AUTO: 30.2 % (ref 40–53)
HGB BLD-MCNC: 9.7 G/DL (ref 13.3–17.7)
MCH RBC QN AUTO: 30.7 PG (ref 26.5–33)
MCHC RBC AUTO-ENTMCNC: 32.1 G/DL (ref 31.5–36.5)
MCV RBC AUTO: 96 FL (ref 78–100)
PLATELET # BLD AUTO: 353 10E3/UL (ref 150–450)
RBC # BLD AUTO: 3.16 10E6/UL (ref 4.4–5.9)
WBC # BLD AUTO: 12.7 10E3/UL (ref 4–11)

## 2022-07-24 PROCEDURE — 85027 COMPLETE CBC AUTOMATED: CPT | Performed by: HOSPITALIST

## 2022-07-24 PROCEDURE — 250N000013 HC RX MED GY IP 250 OP 250 PS 637: Performed by: HOSPITALIST

## 2022-07-24 PROCEDURE — 250N000011 HC RX IP 250 OP 636: Performed by: HOSPITALIST

## 2022-07-24 PROCEDURE — 250N000013 HC RX MED GY IP 250 OP 250 PS 637: Performed by: INTERNAL MEDICINE

## 2022-07-24 PROCEDURE — 92526 ORAL FUNCTION THERAPY: CPT | Mod: GN

## 2022-07-24 PROCEDURE — 99232 SBSQ HOSP IP/OBS MODERATE 35: CPT | Performed by: HOSPITALIST

## 2022-07-24 PROCEDURE — 36415 COLL VENOUS BLD VENIPUNCTURE: CPT | Performed by: HOSPITALIST

## 2022-07-24 PROCEDURE — 92507 TX SP LANG VOICE COMM INDIV: CPT | Mod: GN

## 2022-07-24 PROCEDURE — 120N000001 HC R&B MED SURG/OB

## 2022-07-24 PROCEDURE — 258N000003 HC RX IP 258 OP 636: Performed by: HOSPITALIST

## 2022-07-24 RX ORDER — LISINOPRIL 20 MG/1
20 TABLET ORAL ONCE
Status: COMPLETED | OUTPATIENT
Start: 2022-07-24 | End: 2022-07-24

## 2022-07-24 RX ORDER — CEFTRIAXONE 1 G/1
1 INJECTION, POWDER, FOR SOLUTION INTRAMUSCULAR; INTRAVENOUS EVERY 24 HOURS
Status: COMPLETED | OUTPATIENT
Start: 2022-07-24 | End: 2022-07-28

## 2022-07-24 RX ORDER — LISINOPRIL 40 MG/1
40 TABLET ORAL DAILY
Status: DISCONTINUED | OUTPATIENT
Start: 2022-07-25 | End: 2022-08-02 | Stop reason: HOSPADM

## 2022-07-24 RX ADMIN — Medication 40 MG: at 08:15

## 2022-07-24 RX ADMIN — INSULIN ASPART 2 UNITS: 100 INJECTION, SOLUTION INTRAVENOUS; SUBCUTANEOUS at 12:51

## 2022-07-24 RX ADMIN — ACETAMINOPHEN 975 MG: 325 SUSPENSION ORAL at 01:32

## 2022-07-24 RX ADMIN — SENNOSIDES AND DOCUSATE SODIUM 2 TABLET: 50; 8.6 TABLET ORAL at 22:24

## 2022-07-24 RX ADMIN — CLOPIDOGREL BISULFATE 75 MG: 75 TABLET ORAL at 08:16

## 2022-07-24 RX ADMIN — INSULIN GLARGINE 14 UNITS: 100 INJECTION, SOLUTION SUBCUTANEOUS at 22:24

## 2022-07-24 RX ADMIN — POLYETHYLENE GLYCOL 3350 17 G: 17 POWDER, FOR SOLUTION ORAL at 08:15

## 2022-07-24 RX ADMIN — BACLOFEN 10 MG: 10 TABLET ORAL at 08:16

## 2022-07-24 RX ADMIN — ASPIRIN 325 MG ORAL TABLET 325 MG: 325 PILL ORAL at 08:16

## 2022-07-24 RX ADMIN — INSULIN ASPART 4 UNITS: 100 INJECTION, SOLUTION INTRAVENOUS; SUBCUTANEOUS at 01:29

## 2022-07-24 RX ADMIN — INSULIN ASPART 2 UNITS: 100 INJECTION, SOLUTION INTRAVENOUS; SUBCUTANEOUS at 18:34

## 2022-07-24 RX ADMIN — DEXTROSE AND SODIUM CHLORIDE: 5; 900 INJECTION, SOLUTION INTRAVENOUS at 12:45

## 2022-07-24 RX ADMIN — CEFTRIAXONE SODIUM 1 G: 1 INJECTION, POWDER, FOR SOLUTION INTRAMUSCULAR; INTRAVENOUS at 14:27

## 2022-07-24 RX ADMIN — ATORVASTATIN CALCIUM 80 MG: 80 TABLET, FILM COATED ORAL at 08:16

## 2022-07-24 RX ADMIN — ACETAMINOPHEN 975 MG: 325 SUSPENSION ORAL at 08:15

## 2022-07-24 RX ADMIN — MIRTAZAPINE 15 MG: 15 TABLET, FILM COATED ORAL at 22:24

## 2022-07-24 RX ADMIN — BACLOFEN 10 MG: 10 TABLET ORAL at 22:24

## 2022-07-24 RX ADMIN — SENNOSIDES AND DOCUSATE SODIUM 2 TABLET: 50; 8.6 TABLET ORAL at 08:16

## 2022-07-24 RX ADMIN — INSULIN ASPART 4 UNITS: 100 INJECTION, SOLUTION INTRAVENOUS; SUBCUTANEOUS at 06:14

## 2022-07-24 RX ADMIN — LISINOPRIL 20 MG: 20 TABLET ORAL at 14:26

## 2022-07-24 RX ADMIN — INSULIN ASPART 2 UNITS: 100 INJECTION, SOLUTION INTRAVENOUS; SUBCUTANEOUS at 22:24

## 2022-07-24 RX ADMIN — LISINOPRIL AND HYDROCHLOROTHIAZIDE 1 TABLET: 25; 20 TABLET ORAL at 08:16

## 2022-07-24 RX ADMIN — DEXTROSE AND SODIUM CHLORIDE: 5; 900 INJECTION, SOLUTION INTRAVENOUS at 01:40

## 2022-07-24 RX ADMIN — ACETAMINOPHEN 975 MG: 325 SUSPENSION ORAL at 16:17

## 2022-07-24 RX ADMIN — INSULIN ASPART 1 UNITS: 100 INJECTION, SOLUTION INTRAVENOUS; SUBCUTANEOUS at 08:30

## 2022-07-24 RX ADMIN — BACLOFEN 10 MG: 10 TABLET ORAL at 16:17

## 2022-07-24 ASSESSMENT — ACTIVITIES OF DAILY LIVING (ADL)
ADLS_ACUITY_SCORE: 54
ADLS_ACUITY_SCORE: 47
ADLS_ACUITY_SCORE: 54
ADLS_ACUITY_SCORE: 47
ADLS_ACUITY_SCORE: 54

## 2022-07-24 NOTE — PROGRESS NOTES
Alert and oriented to self, expressive aphasia. Left sided hemiparesis, left field cut. Active range of motion on the right side.NPO.  TF now running at 40ml with a goal of 60 ml.  Denied pain in left shoulder during shift. Turn and repo q 2. Indwelling Cortez with optimal output.   Plan;   Awaiting TCU placement

## 2022-07-24 NOTE — PROGRESS NOTES
[facility]    Medicine Progress Note - Hospitalist Service    Date of Admission:  7/3/2022    Assessment & Plan      Josue Parisi is 52, who has had multiple strokes with some significant compliance issues, ongoing polysubstance abuse, being admitted under observation status for placement at a TCU, as he is unable to care for himself.     Multiple ischemic CVAs involving MCA, KWADWO with left-sided weakness and concern for progression  Hx Medication noncompliance  Polysubstance abuse  Initial CVA occurred in 03/2022 with right KWADWO, large territory defect and residual left-sided weakness recommended to discharge to ARU however declined and discharged AMA. Subsequently returned 6/18 with progressive weakness, again referred to TCU however declined and returned home. Hospitalized 6/24 with worsening left-sided weakness, paresthesias, numbness with findings of new nonhemorrhagic infarction within right prefrontal gyrus and deep MCA watershed infarct within the right centrum semiovale of the right frontal and parietal lobes. Patient was evaluated by stroke neurology each admission, most recently felt symptoms were suggestive of new infarct rather than hypoperfused tissue. Recommended liberal SBP goal of 140-160 and DAPT with plavix/ for 90 days. Patient most recently left AMA on 6/30 after being recommended for TCU. At time of discharge, it appears the plavix was discontinued in an effort to limit medications and encourage compliance.   * Returned to ED 7/3/22 with reports of failing at home. It was unclear on admission if patient's left-sided symptoms had persisted or worsened due to presence of polysubstances and limited ability to participate in physical exam. Admitted to cocaine use as recently as 7/2. UTox on admission positive for cocaine, amphetamines.   * 7/4 exam noted with 1/5 LUE/LLE strength, limited ability to communicate verbally but cognitively able to answer yes/no  "questions with gestures, shakes of head. reported change in function. Repeat CTH revealed rounded focus of hypoattenuation within right corona radiata/centrum semiovale, MRI brain 6/25 indicated restricted diffusion within this region however area of hypoattenuation is larger than region of diffusion restriction concerning for progression of infarct.y  - Neuro note 7/6- \"recommend SAMMPRIS trial guided DAPT (+clopidogrel 75 x90 days) in an attempt to stabilize the M1 segment and prevent inferior division infarct. \"   - Continue statin.  - Reconsult speech therapy; G-tube placed on 7/08, TF per [place] otherwise NPO  - Holding tube feeds  given constipation and discomfort at g tube site, 7/20/2022, initiated bowel program, IVF NS D5 while TF on hold  -7/22/2022 denies abdominal pain, nausea, emesis, restart TF slowly at 20 cc an hour with goal at 60 cc an hour [increased by 20 cc an hour/24 hours to goal rate], continue free water per NG.   -7/24/2022 at 4:00 PM today we will resume prior TF goal rate at 60 cc an hour, therefore at that time we will DC D5 NS IVF, resume a.m. Lantus for tomorrow, continues on p.m. Lantus, SSI, monitor glucoses, adjust insulin accordingly.    Abdominal pain  Constipation  Patient reports abdominal pain, needing to have a BM.  TF per G-tube held due to complaints and assessment of significant constipation.. CT a/p did confirm no acute issues other than large amt of stool in colon.  -- increase senna-colace to 2 tabs BID, MiraLAX  - start protonix IV daily  -No further abdominal pain, needs adherence to bowel program to prevent constipation.  Nursing states BM on current regimen.      Bleeding gums, halitosis  - suspect reflux given degree of constipation. Checked plts, INR - both okay. Will consult OMFS if symptoms continue but pt denies tooth pain.  -Nursing reports extremely dry mouth, hold Zyrtec, Magic mouthwash, monitor  -7/22/2022 on exam 1 area on left upper incisor old " nonactive bleeding consistent with gingivitis, continue oral cares.  Hemoglobin and INR stable.  No further bleeding.    SIRS syndrome  Leukocytosis  On admission Patient with mild leukocytosis 12-13, procalcitonin on admission negative. Low-grade fever and sinus tachycardia persisting today. UA without evidence of infection. CXR neg. ?due to polysubstance abuse with amphetamines, cocaine in UTox.  - Low threshold to obtain blood cultures, initiate broad-spectrum abx   -7/22/2022 WBC increased to 20.3k, afebrile, no signs of localizing infection except for focal gingivitis, fair.    UC=  E. coli, Fair exchanged, start Rocephin 5-day.  Later this week trial VT    Type 2 diabetes mellitus  Last HbA1c of 7.8 on 06/18. PTA regimen of metformin. Recent admission Lantus had been increased to 18u BID, carb coverage with meals of 1:10 with breakfast/lunch, 1:15 with dinner.  - Hold PTA metformin.  -While TF held relative hypoglycemia, see above; 7/22/2022 TF to restart however slowly; see #1 on restart TF.        History of hypertension   Held PTA meds initially to allow permissive hypertension.  - Resumed PTA lisinopril-hydrochlorothiazide 7/12.  - Goal is normotension per Neurology.  - Blood pressure rising, 150s/90s.  Increase lisinopril; on combo ACEi/HCTZ given dry mouth start on lisinopril only, at increased dose. CR WNL, quyen in AM.   Monitor BP.     Hidradenitis / Right axillary wound  WOCN consult previous recs:   Right axilla wound: Daily  and PRN.  - Cleanse wound with NS or wound cleanser (omit this step if patient cleans wound in shower).  - Dry and protect surrounding skin with no sting barrier film wipe #[id number].  - Apply a small amount of iodesorb gel #[id number] to bandaid.  - If Band-Aid needs to be changed more than once a day. Apply iodesorb gel #[id number] to adaptic #110642 and cover with Mepilex #[id number].     Generalized pain  PTA on gabapentin 300 mg 3 times daily.    - Holding gabapentin.   - Continue as  needed Tylenol as needed. Avoid opioids.  -Prior hospitalist added PRN ibuprofen.  -No pain complaints.       Diet: NPO per Anesthesia Guidelines for Procedure/Surgery Except for: Meds, Other; Specify: please stop tube feedings  Adult Formula Drip Feeding: Continuous Jevity 1.5; Other - Specify in Comment; Goal Rate: 60; mL/hr; Medication - Feeding Tube Flush Frequency: At least 15-30 mL water before and after medication administration and with tube clogging; Amount to Se...    DVT Prophylaxis: Pneumatic Compression Devices  Cortez Catheter: PRESENT, indication: Retention,  (pt was having pain, cather had puse)  Central Lines: None  Cardiac Monitoring: None  Code Status: Full Code      Disposition Plan      Expected Discharge Date: 07/24/2022    Discharge Delays: Placement - TCU  *Medically Ready for Discharge    Discharge Comments: TCU PP        I spent 25 minutes on the unit/floor in management of care today reviewing labs, medications, interdisciplinary notes; and completing documentation of encounter and orders with over 50% of my time was spentCoordinating Care and plan with Nursing regarding TF, diabetes and HTN management and surveillance       Keith Smith MD  Hospitalist Service  [facility]  Securely message with the Vocera Web Console (learn more here)  Text page via [employer] Paging/Directory     ______________________________________________________________________    Interval History   Limited history from patient due to expressive aphasia however able to nod yes or no.  Reports he is comfortable.  No abdominal pain.  Nursing reports BM.  Resumption of TF today back to goal rate 60 cc an hour at 4:00.    Data reviewed today: I reviewed all medications, new labs and imaging results over the last 24 hours.     Physical Exam   Vital Signs: Temp: 98  F (36.7  C) Temp src: Axillary BP: (!) 151/94 Pulse: 82   Resp: 18 SpO2: 98 % O2 Device: None (Room air)    Weight: 154 lbs 15.66  oz  Constitutional:  NAD, awake, calm, cooperative       Expressive aphasia.  HEENT; area of old, nonactive bleeding left upper incisor, no active bleeding,  Respiratory: Respirations nonlabored room air  Cardiovascular: Regular, no murmur  GI: Soft nontender.  No rebound, guarding or other peritoneal signs.  PEG tube in place  Musculoskeletal: no lower extremity pitting edema present  Neuro.  Gross motor tested, left-sided hemiparesis.  Expressive aphasia, able to nod yes or no.  Psych orientation cannot be tested, affect calm.    Data   Recent Labs   Lab 07/24/22  1251 07/24/22  0741 07/24/22  0713 07/24/22  0612 07/23/22  1036 07/23/22  0959 07/22/22  1133 07/22/22  0813 07/20/22  0858 07/20/22  0835 07/19/22  1450 07/19/22  1152   WBC  --  12.7*  --   --   --  15.3*  --  20.3*  --  12.3*  --  11.9*   HGB  --  9.7*  --   --   --  9.5*  --  10.5*  --  11.7*  --  11.4*   MCV  --  96  --   --   --  93  --  96  --  96  --  92   PLT  --  353  --   --   --  346  --  365  --  397  --  396   INR  --   --   --   --   --   --   --  1.18*  --   --   --  1.04   NA  --   --   --   --   --  138  --  142  --  137  --  134   POTASSIUM  --   --   --   --   --  3.4  --  3.5  --  3.8  --  3.7   CHLORIDE  --   --   --   --   --  108  --  109  --  104  --  99   CO2  --   --   --   --   --  25  --  26  --  30  --  27   BUN  --   --   --   --   --  10  --  10  --  20  --  24   CR  --   --   --   --   --  0.62*  --  0.68  --  0.72  --  0.62*   ANIONGAP  --   --   --   --   --  5  --  7  --  3  --  8   DAT  --   --   --   --   --  9.1  --  9.3  --  9.1  --  9.5   *  --  144* 216*   < > 141*   < > 66*   < > 69*   < > 263*   ALBUMIN  --   --   --   --   --   --   --   --   --   --   --  3.3*   PROTTOTAL  --   --   --   --   --   --   --   --   --   --   --  7.4   BILITOTAL  --   --   --   --   --   --   --   --   --   --   --  0.5   ALKPHOS  --   --   --   --   --   --   --   --   --   --   --  58   ALT  --   --   --   --   --   --    --   --   --   --   --  46   AST  --   --   --   --   --   --   --   --   --   --   --  30    < > = values in this interval not displayed.     Medications     dextrose       dextrose 5% and 0.9% NaCl 100 mL/hr at 07/24/22 1245     - MEDICATION INSTRUCTIONS -       - MEDICATION INSTRUCTIONS -         acetaminophen  975 mg Per Feeding Tube Q8H     aspirin  300 mg Rectal Daily    Or     aspirin  325 mg Oral or Feeding Tube Daily     atorvastatin  80 mg Oral or Feeding Tube Daily     Baclofen  10 mg Per Feeding Tube TID     [Held by provider] cetirizine  10 mg Oral or Feeding Tube Daily     clopidogrel  75 mg Oral or Feeding Tube Daily     insulin aspart  1-12 Units Subcutaneous Q4H     insulin glargine  12 Units Subcutaneous QAM AC     insulin glargine  14 Units Subcutaneous At Bedtime     lisinopril-hydrochlorothiazide  1 tablet Oral or Feeding Tube Daily     mirtazapine  15 mg Oral or Feeding Tube At Bedtime     pantoprazole  40 mg Oral or Feeding Tube QAM AC     polyethylene glycol  17 g Per G Tube Daily     senna-docusate  2 tablet Oral or Feeding Tube BID

## 2022-07-24 NOTE — PLAN OF CARE
"BP (!) 160/85 (BP Location: Right arm)   Pulse 83   Temp 98.2  F (36.8  C) (Axillary)   Resp 18   Ht 1.676 m (5' 6\")   Wt 70.3 kg (154 lb 15.7 oz)   SpO2 99%   BMI 25.01 kg/m      Patient is currently here with a right-sided cerebrovascular accident. The patient's level of alertness and orientation as well as his neurological status are extremely difficult to assess as the patient is primarily nonverbal and noncompliant, however, dysarthria, aphasia, lethargy, a flat affect, left-sided neglect, left-sided hemiplegia and a left visual field cut are all quite apparent. VSS on RA with intermittent hypertension. Patient is currently NPO with continuous enteral tube feedings running through the patient's PEG Tube at a rate of 40 mL/hr with 120 mL free water flushes every four hours as ordered. Patient transfers and ambulates with an assist of two plus the utilization of a lift device. Patient is incontinent of both bowel and bladder, currently has an indwelling fair catheter in position for urinary retention that has been displaying adequate urinary output. Patient's skin is intact and in good condition aside from scattered bruising as well as blanchable redness on the patient's right axillary area. Patient currently has 5% Dextrose with 0.9% Normal Saline infusing at a rate of 100 mL/hr through one of the patient's peripheral intravenous lines. Denies pain. Patient is currently scoring green on the Aggression Stop Light Tool. Plan to continue to assess for any potential neurological changes. Discharge plans pending eventual placement in a transitional care unit.    " 80 yo Female as above:  1. Pelvic mass, colovesicular fistula s/p surgery - plan as per Surg/, c/w abx/antifungal per ID, OOB/PT, pain control,   s/p ir procedure, removal of uretheral stents to be addressed by   2. Congestive heart failure - c/w  meds  3. pt on lovenox for now  4. Diabetes mellitus - ISS  5. Asthma -  c/w Duoneb prn  6. Hypothyroidism, postradioiodine therapy - c/w Synthroid  7. RUE swelling - DVT on US, while pt is on anticoagulation, will continue AC (Lovenox bridging to Coumadin)

## 2022-07-25 ENCOUNTER — APPOINTMENT (OUTPATIENT)
Dept: MRI IMAGING | Facility: CLINIC | Age: 52
DRG: 065 | End: 2022-07-25
Attending: NURSE PRACTITIONER
Payer: COMMERCIAL

## 2022-07-25 ENCOUNTER — APPOINTMENT (OUTPATIENT)
Dept: SPEECH THERAPY | Facility: CLINIC | Age: 52
DRG: 065 | End: 2022-07-25
Payer: COMMERCIAL

## 2022-07-25 LAB
ANION GAP SERPL CALCULATED.3IONS-SCNC: 5 MMOL/L (ref 3–14)
BUN SERPL-MCNC: 12 MG/DL (ref 7–30)
CALCIUM SERPL-MCNC: 9.3 MG/DL (ref 8.5–10.1)
CHLORIDE BLD-SCNC: 105 MMOL/L (ref 94–109)
CO2 SERPL-SCNC: 28 MMOL/L (ref 20–32)
CREAT SERPL-MCNC: 0.51 MG/DL (ref 0.66–1.25)
ERYTHROCYTE [DISTWIDTH] IN BLOOD BY AUTOMATED COUNT: 11.9 % (ref 10–15)
GFR SERPL CREATININE-BSD FRML MDRD: >90 ML/MIN/1.73M2
GLUCOSE BLD-MCNC: 206 MG/DL (ref 70–99)
GLUCOSE BLDC GLUCOMTR-MCNC: 121 MG/DL (ref 70–99)
GLUCOSE BLDC GLUCOMTR-MCNC: 174 MG/DL (ref 70–99)
GLUCOSE BLDC GLUCOMTR-MCNC: 181 MG/DL (ref 70–99)
GLUCOSE BLDC GLUCOMTR-MCNC: 206 MG/DL (ref 70–99)
GLUCOSE BLDC GLUCOMTR-MCNC: 208 MG/DL (ref 70–99)
HCT VFR BLD AUTO: 29.6 % (ref 40–53)
HGB BLD-MCNC: 9.8 G/DL (ref 13.3–17.7)
MAGNESIUM SERPL-MCNC: 2.1 MG/DL (ref 1.6–2.3)
MCH RBC QN AUTO: 30.6 PG (ref 26.5–33)
MCHC RBC AUTO-ENTMCNC: 33.1 G/DL (ref 31.5–36.5)
MCV RBC AUTO: 93 FL (ref 78–100)
PHOSPHATE SERPL-MCNC: 3.2 MG/DL (ref 2.5–4.5)
PLATELET # BLD AUTO: 379 10E3/UL (ref 150–450)
POTASSIUM BLD-SCNC: 3.3 MMOL/L (ref 3.4–5.3)
POTASSIUM BLD-SCNC: 3.7 MMOL/L (ref 3.4–5.3)
RBC # BLD AUTO: 3.2 10E6/UL (ref 4.4–5.9)
SODIUM SERPL-SCNC: 138 MMOL/L (ref 133–144)
WBC # BLD AUTO: 12.3 10E3/UL (ref 4–11)

## 2022-07-25 PROCEDURE — 250N000013 HC RX MED GY IP 250 OP 250 PS 637: Performed by: HOSPITALIST

## 2022-07-25 PROCEDURE — A9585 GADOBUTROL INJECTION: HCPCS | Performed by: HOSPITALIST

## 2022-07-25 PROCEDURE — 85014 HEMATOCRIT: CPT | Performed by: HOSPITALIST

## 2022-07-25 PROCEDURE — 99231 SBSQ HOSP IP/OBS SF/LOW 25: CPT | Performed by: HOSPITALIST

## 2022-07-25 PROCEDURE — 92507 TX SP LANG VOICE COMM INDIV: CPT | Mod: GN

## 2022-07-25 PROCEDURE — 250N000013 HC RX MED GY IP 250 OP 250 PS 637: Performed by: INTERNAL MEDICINE

## 2022-07-25 PROCEDURE — 83735 ASSAY OF MAGNESIUM: CPT | Performed by: INTERNAL MEDICINE

## 2022-07-25 PROCEDURE — 250N000011 HC RX IP 250 OP 636: Performed by: HOSPITALIST

## 2022-07-25 PROCEDURE — 84132 ASSAY OF SERUM POTASSIUM: CPT | Performed by: HOSPITALIST

## 2022-07-25 PROCEDURE — 84100 ASSAY OF PHOSPHORUS: CPT | Performed by: INTERNAL MEDICINE

## 2022-07-25 PROCEDURE — 92526 ORAL FUNCTION THERAPY: CPT | Mod: GN

## 2022-07-25 PROCEDURE — 36415 COLL VENOUS BLD VENIPUNCTURE: CPT | Performed by: HOSPITALIST

## 2022-07-25 PROCEDURE — 70549 MR ANGIOGRAPH NECK W/O&W/DYE: CPT

## 2022-07-25 PROCEDURE — 120N000001 HC R&B MED SURG/OB

## 2022-07-25 PROCEDURE — 70553 MRI BRAIN STEM W/O & W/DYE: CPT

## 2022-07-25 PROCEDURE — 70544 MR ANGIOGRAPHY HEAD W/O DYE: CPT

## 2022-07-25 PROCEDURE — 82310 ASSAY OF CALCIUM: CPT | Performed by: INTERNAL MEDICINE

## 2022-07-25 PROCEDURE — 255N000002 HC RX 255 OP 636: Performed by: HOSPITALIST

## 2022-07-25 PROCEDURE — 250N000011 HC RX IP 250 OP 636: Performed by: INTERNAL MEDICINE

## 2022-07-25 RX ORDER — GADOBUTROL 604.72 MG/ML
10 INJECTION INTRAVENOUS ONCE
Status: DISCONTINUED | OUTPATIENT
Start: 2022-07-25 | End: 2022-07-29

## 2022-07-25 RX ORDER — GADOBUTROL 604.72 MG/ML
10 INJECTION INTRAVENOUS ONCE
Status: COMPLETED | OUTPATIENT
Start: 2022-07-25 | End: 2022-07-25

## 2022-07-25 RX ORDER — POTASSIUM CHLORIDE 1.5 G/1.58G
40 POWDER, FOR SOLUTION ORAL ONCE
Status: COMPLETED | OUTPATIENT
Start: 2022-07-25 | End: 2022-07-25

## 2022-07-25 RX ORDER — HYDRALAZINE HYDROCHLORIDE 20 MG/ML
10 INJECTION INTRAMUSCULAR; INTRAVENOUS EVERY 4 HOURS PRN
Status: DISCONTINUED | OUTPATIENT
Start: 2022-07-25 | End: 2022-07-26

## 2022-07-25 RX ORDER — LABETALOL HYDROCHLORIDE 5 MG/ML
10 INJECTION, SOLUTION INTRAVENOUS
Status: DISCONTINUED | OUTPATIENT
Start: 2022-07-25 | End: 2022-07-26

## 2022-07-25 RX ADMIN — LISINOPRIL 40 MG: 40 TABLET ORAL at 08:57

## 2022-07-25 RX ADMIN — ACETAMINOPHEN 975 MG: 325 SUSPENSION ORAL at 01:59

## 2022-07-25 RX ADMIN — BACLOFEN 10 MG: 10 TABLET ORAL at 16:42

## 2022-07-25 RX ADMIN — ACETAMINOPHEN 975 MG: 325 SUSPENSION ORAL at 16:42

## 2022-07-25 RX ADMIN — INSULIN ASPART 3 UNITS: 100 INJECTION, SOLUTION INTRAVENOUS; SUBCUTANEOUS at 09:13

## 2022-07-25 RX ADMIN — INSULIN ASPART 3 UNITS: 100 INJECTION, SOLUTION INTRAVENOUS; SUBCUTANEOUS at 06:04

## 2022-07-25 RX ADMIN — INSULIN ASPART 2 UNITS: 100 INJECTION, SOLUTION INTRAVENOUS; SUBCUTANEOUS at 13:19

## 2022-07-25 RX ADMIN — INSULIN GLARGINE 14 UNITS: 100 INJECTION, SOLUTION SUBCUTANEOUS at 22:47

## 2022-07-25 RX ADMIN — SENNOSIDES AND DOCUSATE SODIUM 2 TABLET: 50; 8.6 TABLET ORAL at 22:40

## 2022-07-25 RX ADMIN — INSULIN ASPART 3 UNITS: 100 INJECTION, SOLUTION INTRAVENOUS; SUBCUTANEOUS at 18:07

## 2022-07-25 RX ADMIN — LABETALOL HYDROCHLORIDE 10 MG: 5 INJECTION INTRAVENOUS at 06:27

## 2022-07-25 RX ADMIN — MIRTAZAPINE 15 MG: 15 TABLET, FILM COATED ORAL at 22:40

## 2022-07-25 RX ADMIN — CEFTRIAXONE SODIUM 1 G: 1 INJECTION, POWDER, FOR SOLUTION INTRAMUSCULAR; INTRAVENOUS at 13:11

## 2022-07-25 RX ADMIN — INSULIN ASPART 2 UNITS: 100 INJECTION, SOLUTION INTRAVENOUS; SUBCUTANEOUS at 01:56

## 2022-07-25 RX ADMIN — POLYETHYLENE GLYCOL 3350 17 G: 17 POWDER, FOR SOLUTION ORAL at 09:05

## 2022-07-25 RX ADMIN — Medication 40 MG: at 09:06

## 2022-07-25 RX ADMIN — OXYCODONE HYDROCHLORIDE 5 MG: 5 TABLET ORAL at 23:41

## 2022-07-25 RX ADMIN — SENNOSIDES AND DOCUSATE SODIUM 2 TABLET: 50; 8.6 TABLET ORAL at 08:57

## 2022-07-25 RX ADMIN — ASPIRIN 325 MG ORAL TABLET 325 MG: 325 PILL ORAL at 09:05

## 2022-07-25 RX ADMIN — Medication 1 MG: at 23:41

## 2022-07-25 RX ADMIN — BACLOFEN 10 MG: 10 TABLET ORAL at 08:57

## 2022-07-25 RX ADMIN — OXYCODONE HYDROCHLORIDE 5 MG: 5 TABLET ORAL at 17:01

## 2022-07-25 RX ADMIN — BACLOFEN 10 MG: 10 TABLET ORAL at 22:40

## 2022-07-25 RX ADMIN — ACETAMINOPHEN 975 MG: 325 SUSPENSION ORAL at 09:06

## 2022-07-25 RX ADMIN — GADOBUTROL 10 ML: 604.72 INJECTION INTRAVENOUS at 22:06

## 2022-07-25 RX ADMIN — CLOPIDOGREL BISULFATE 75 MG: 75 TABLET ORAL at 08:57

## 2022-07-25 RX ADMIN — OXYCODONE HYDROCHLORIDE 5 MG: 5 TABLET ORAL at 11:08

## 2022-07-25 RX ADMIN — ATORVASTATIN CALCIUM 80 MG: 80 TABLET, FILM COATED ORAL at 08:57

## 2022-07-25 RX ADMIN — POTASSIUM CHLORIDE 40 MEQ: 1.5 POWDER, FOR SOLUTION ORAL at 11:29

## 2022-07-25 ASSESSMENT — ACTIVITIES OF DAILY LIVING (ADL)
ADLS_ACUITY_SCORE: 52
ADLS_ACUITY_SCORE: 49
ADLS_ACUITY_SCORE: 54
ADLS_ACUITY_SCORE: 49
ADLS_ACUITY_SCORE: 47
ADLS_ACUITY_SCORE: 49
ADLS_ACUITY_SCORE: 52
ADLS_ACUITY_SCORE: 52

## 2022-07-25 NOTE — PROGRESS NOTES
[facility]    Medicine Progress Note - Hospitalist Service    Date of Admission:  7/3/2022    Assessment & Plan            Josue Parisi is 52, who has had multiple strokes with some significant compliance issues, ongoing polysubstance abuse, being admitted under observation status for placement at a TCU, as he is unable to care for himself.     Multiple ischemic CVAs involving MCA, KWADWO with left-sided weakness and concern for progression  Hx Medication noncompliance  Polysubstance abuse  Initial CVA occurred in 03/2022 with right KWADWO, large territory defect and residual left-sided weakness recommended to discharge to ARU however declined and discharged AMA. Subsequently returned 6/18 with progressive weakness, again referred to TCU however declined and returned home. Hospitalized 6/24 with worsening left-sided weakness, paresthesias, numbness with findings of new nonhemorrhagic infarction within right prefrontal gyrus and deep MCA watershed infarct within the right centrum semiovale of the right frontal and parietal lobes. Patient was evaluated by stroke neurology each admission, most recently felt symptoms were suggestive of new infarct rather than hypoperfused tissue. Recommended liberal SBP goal of 140-160 and DAPT with plavix/ for 90 days. Patient most recently left AMA on 6/30 after being recommended for TCU. At time of discharge, it appears the plavix was discontinued in an effort to limit medications and encourage compliance.   * Returned to ED 7/3/22 with reports of failing at home. It was unclear on admission if patient's left-sided symptoms had persisted or worsened due to presence of polysubstances and limited ability to participate in physical exam. Admitted to cocaine use as recently as 7/2. UTox on admission positive for cocaine, amphetamines.   * 7/4 exam noted with 1/5 LUE/LLE strength, limited ability to communicate verbally but cognitively able to answer yes/no  "questions with gestures, shakes of head. reported change in function. Repeat CTH revealed rounded focus of hypoattenuation within right corona radiata/centrum semiovale, MRI brain 6/25 indicated restricted diffusion within this region however area of hypoattenuation is larger than region of diffusion restriction concerning for progression of infarct.y  - Neuro note 7/6- \"recommend SAMMPRIS trial guided DAPT (+clopidogrel 75 x90 days) in an attempt to stabilize the M1 segment and prevent inferior division infarct. \"   - Continue statin.  - Reconsult speech therapy; G-tube placed on 7/08, TF per [place] otherwise NPO  - Holding tube feeds  given constipation and discomfort at g tube site, 7/20/2022, initiated bowel program, IVF NS D5 while TF on hold  -7/22/2022 denies abdominal pain, nausea, emesis, restart TF slowly at 20 cc an hour with goal at 60 cc an hour [increased by 20 cc an hour/24 hours to goal rate], continue free water per NG.   -7/24/2022 at 4:00 PM today we will resume prior TF goal rate at 60 cc an hour,   - resumed a.m. and p.m. Lantus today; adjust insulin accordingly.  - Px did have change in neuro status today being taken care of by neuro. No stroke coded needed as no change in management.      Abdominal pain  Constipation  Patient reports abdominal pain, needing to have a BM.  TF per G-tube held due to complaints and assessment of significant constipation.. CT a/p did confirm no acute issues other than large amt of stool in colon.  --increase senna-colace to 2 tabs BID, MiraLAX  - Start protonix IV daily  - No further abdominal pain, needs adherence to bowel program to prevent constipation.  Nursing states BM on current regimen.        Bleeding gums, halitosis  - suspect reflux given degree of constipation. Checked plts, INR - both okay. Will consult OMFS if symptoms continue but pt denies tooth pain.  -Nursing reports extremely dry mouth, hold Zyrtec, Magic mouthwash, monitor  -7/22/2022 on " exam 1 area on left upper incisor old nonactive bleeding consistent with gingivitis, continue oral cares.  Hemoglobin and INR stable.  No further bleeding.     SIRS syndrome  Leukocytosis  On admission Patient with mild leukocytosis 12-13, procalcitonin on admission negative. Low-grade fever and sinus tachycardia persisting today. UA without evidence of infection. CXR neg. ?due to polysubstance abuse with amphetamines, cocaine in UTox.  - Low threshold to obtain blood cultures, initiate broad-spectrum abx   -7/22/2022 WBC increased to 20.3k, afebrile, no signs of localizing infection except for focal gingivitis, fair.    UC=E. coli, Fair exchanged, start Rocephin 5-day.  Later this week trial VT     Type 2 diabetes mellitus  Last HbA1c of 7.8 on 06/18. PTA regimen of metformin. Recent admission Lantus had been increased to 18u BID, carb coverage with meals of 1:10 with breakfast/lunch, 1:15 with dinner.  -Hold PTA metformin.  -While TF held relative hypoglycemia, see above; 7/22/2022 TF to restart however slowly; see #1 on restart TF.    -Restarted on Lantus 12 units in the morning and 14 at night.     History of hypertension   Held PTA meds initially to allow permissive hypertension.  - Resumed PTA lisinopril-hydrochlorothiazide 7/12.  - Goal is normotension per Neurology.  - Blood pressure rising, 150s/90s.  Increase lisinopril; on combo ACEi/HCTZ given dry mouth start on lisinopril only, at increased dose. CR WNL, quyen in AM.   Monitor BP.  We are going with this plan today, no changes. Don't want to bring the BP down too much,.       Hidradenitis / Right axillary wound  WOCN consult previous recs:   Right axilla wound: Daily  and PRN.  - Cleanse wound with NS or wound cleanser (omit this step if patient cleans wound in shower).  - Dry and protect surrounding skin with no sting barrier film wipe #[id number].  - Apply a small amount of iodesorb gel #[id number] to bandaid.  - If Band-Aid needs to be changed more than  once a day. Apply iodesorb gel #[id number] to adaptic #[id number] and cover with Mepilex #[id number].     Generalized pain  PTA on gabapentin 300 mg 3 times daily.    - Holding gabapentin.   - Continue as needed Tylenol as needed. Avoid opioids.  -Prior hospitalist added PRN ibuprofen.  -No pain complaints.       Diet: NPO per Anesthesia Guidelines for Procedure/Surgery Except for: Meds, Other; Specify: please stop tube feedings  Adult Formula Drip Feeding: Continuous Jevity 1.5; Other - Specify in Comment; Goal Rate: 60; mL/hr; Medication - Feeding Tube Flush Frequency: At least 15-30 mL water before and after medication administration and with tube clogging; Amount to Se...    DVT Prophylaxis: No changes  Cortez Catheter: PRESENT, indication: Retention,  (pt was having pain, cather had puse)  Central Lines: None  Cardiac Monitoring: None  Code Status: Full Code      Disposition Plan      Expected Discharge Date: 07/26/2022    Discharge Delays: Placement - TCU  *Medically Ready for Discharge    Discharge Comments: TCU PP        The patient's care was discussed with the Bedside Nurse and Patient.    Boni Cool MD  Hospitalist Service  [facility]  Securely message with the Vocera Web Console (learn more here)  Text page via [employer] Paging/Directory         Clinically Significant Risk Factors Present on Admission                      ______________________________________________________________________    Interval History   Px with change in neuro status. Neuro aware. No change in management. Will get MRI tho.  BP management already in place so won't change this either.  Check labs in am and monitor.     Data reviewed today: I reviewed all medications, new labs and imaging results over the last 24 hours. I personally reviewed no images or EKG's today.    Physical Exam   Vital Signs: Temp: 97.9  F (36.6  C) Temp src: Axillary BP: (!) 148/76 Pulse: 90   Resp: 14 SpO2: 95 % O2 Device: None (Room air)     Weight: 154 lbs 15.66 oz  Constitutional:  NAD, awake, calm, cooperative       Expressive aphasia.  HEENT; area of old, nonactive bleeding left upper incisor, no active bleeding,  Respiratory: Respirations nonlabored room air  Cardiovascular: Regular, no murmur  GI: Soft nontender.  No rebound, guarding or other peritoneal signs.  PEG tube in place  Musculoskeletal: no lower extremity pitting edema present  Neuro.  Gross motor tested, left-sided hemiparesis.  Expressive aphasia, able to nod yes or no.  Psych orientation cannot be tested, affect calm.    Data   Recent Labs   Lab 07/25/22  1309 07/25/22  0832 07/25/22  0514 07/24/22  1251 07/24/22  0741 07/23/22  1036 07/23/22  0959 07/22/22  1133 07/22/22  0813 07/19/22  1450 07/19/22  1152   WBC  --  12.3*  --   --  12.7*  --  15.3*  --  20.3*   < > 11.9*   HGB  --  9.8*  --   --  9.7*  --  9.5*  --  10.5*   < > 11.4*   MCV  --  93  --   --  96  --  93  --  96   < > 92   PLT  --  379  --   --  353  --  346  --  365   < > 396   INR  --   --   --   --   --   --   --   --  1.18*  --  1.04   NA  --  138  --   --   --   --  138  --  142   < > 134   POTASSIUM  --  3.3*  --   --   --   --  3.4  --  3.5   < > 3.7   CHLORIDE  --  105  --   --   --   --  108  --  109   < > 99   CO2  --  28  --   --   --   --  25  --  26   < > 27   BUN  --  12  --   --   --   --  10  --  10   < > 24   CR  --  0.51*  --   --   --   --  0.62*  --  0.68   < > 0.62*   ANIONGAP  --  5  --   --   --   --  5  --  7   < > 8   DAT  --  9.3  --   --   --   --  9.1  --  9.3   < > 9.5   * 206* 208*   < >  --    < > 141*   < > 66*   < > 263*   ALBUMIN  --   --   --   --   --   --   --   --   --   --  3.3*   PROTTOTAL  --   --   --   --   --   --   --   --   --   --  7.4   BILITOTAL  --   --   --   --   --   --   --   --   --   --  0.5   ALKPHOS  --   --   --   --   --   --   --   --   --   --  58   ALT  --   --   --   --   --   --   --   --   --   --  46   AST  --   --   --   --   --   --   --    --   --   --  30    < > = values in this interval not displayed.

## 2022-07-25 NOTE — PROVIDER NOTIFICATION
"Paged stroke neurology PA:    stroke team is signed off, however today L side 0/5 strength & was more recently being rated 1-2/5.  Pt agrees yesterday could move \"a little\" but none today.  Mare BETTENCOURT [id number]  (My first day with pt, unsure if 0/5 should be anticipated as waxing/waning/inconsistent responses.)     Appreciate callback, PA will review with MD but anticipating no new orders, imagining, or stroke management recommendations, noting pt being treated for UTI as well and may have additional weakness.    "

## 2022-07-25 NOTE — PROGRESS NOTES
Patient was admitted for L sided weakness/flacidity found to have multiple CVA with chronic R cervical ICA occlusion, R M1 occlusions. Please see last note from stroke team 7/10/22. Had some slight improvement with strength 1-2/5. Today strength found to be 0/5 by RN so stroke team contacted. Discussed with Dr. Pena. Suspect likely stroke symptom recrudescence given E.coli UTI. Rule out new stroke. If new stroke then stroke team will review recommendations.

## 2022-07-25 NOTE — PLAN OF CARE
Goal Outcome Evaluation:    Plan of Care Reviewed With: patient     Outcome Evaluation: Pending TCU placement    Pt here with CVA. A&O, pleasant and cooperative-Unable to verbalize, but communicates well with nods and yes/no questions, points, small amounts of writing. Neuros L sided weakness, updated MD on 0/5 strength today, L sided neglect, new RUE numbness reported to Dr Pena at 1400. VSS. NPO diet, continuous tube feeding. Takes pills crushed in gtube. K+ replaced, recheck at 1530. Up with lift, turn and repo q2h. R axillary wound care complete. Denies pain. Pt scoring green on the Aggression Stop Light Tool. Plan IV antibiotic for UTI, repeat MRI for neuro change. Discharge TCU when placement found and medical stable. Extra info: gtube button can be removed 7/27.

## 2022-07-25 NOTE — PROVIDER NOTIFICATION
MD Notification    Notified Person: MD    Notified Person Name: Travon    Notification Date/Time: 07/25/22 1034    Notification Interaction: Web based pager    Purpose of Notification:     K 3.3   No protocol in place. Replace?  FYI BP was elevated at 0600. Please review BP meds. PRN was given.     Orders Received: K protocol order received.     Comments:

## 2022-07-25 NOTE — DISCHARGE INSTRUCTIONS
Your risk factors for stroke or TIA (transient ischemic attack):    Your Risk Factors Your Results Normal Ranges   High blood pressure BP Readings from Last 1 Encounters:   07/25/22 (!) 148/116    Less than 120/80   Cholesterol              Total Lab Results   Component Value Date    CHOL 102 06/24/2022      Less than 150    Triglycerides   Lab Results   Component Value Date    TRIG 95 06/24/2022    Less than 150   LDL Lab Results   Component Value Date    LDL 53 06/24/2022       Less than 70   HDL Lab Results   Component Value Date    HDL 30 06/24/2022            Greater than 40 (men)  Greater than 50 (women)   Diabetes Recent Labs   Lab 07/25/22  0832   *    Fasting blood glucose    Smoking/tobacco use Refrain from smoking. Quit smoking and tobacco   Overweight You have a tube feed for nutrition at this time, a dietician will continue to follow up and make recommendations.  Lose 1-2 pounds a week   Lack of exercise Work with staff to safely increase mobility as you get stronger. 30 minutes moderate activity each day   Other risk factors include carotid (neck) artery disease, atrial fibrillation and stress. You may be on new medicine to treat high blood pressure, cholesterol, diabetes or atrial fibrillation.    Understanding Stroke Booklet given to patient. Please refer to booklet for further information.    Stroke warning signs and symptoms - CALL 911 right away for:  - Sudden numbness or weakness in the face, arm or leg (often on one side of the body).  - Sudden confusion or trouble understanding what is going on.  - Sudden blurred or decreased vision in one or both eyes.  - Sudden trouble speaking, loss of balance, dizziness or problems with coordination.  - Sudden, severe headache for no reason.  - Fainting or seizures.  - Symptoms may go away then come back suddenly.         Please follow up with neurology in 4 weeks. You may call any of the following neurology clinics for an appointment or a  clinic of your choice. Tell them you were recently hospitalized and need follow up as recommended at discharge.    1. [facility]   [address], [address]   [place], [place] [address] [phone]  2. [facility]   [address], [address]   [place] [address]   [phone]  3. [facility] - Anabel Welch MD   [address] [place], [place] [id number] [phone]  4. [facility] - Guerita Welch MD   [address]   [place], [place] [id number] [phone]

## 2022-07-26 ENCOUNTER — APPOINTMENT (OUTPATIENT)
Dept: PHYSICAL THERAPY | Facility: CLINIC | Age: 52
DRG: 065 | End: 2022-07-26
Payer: COMMERCIAL

## 2022-07-26 LAB
ALBUMIN SERPL-MCNC: 2.7 G/DL (ref 3.4–5)
ALP SERPL-CCNC: 65 U/L (ref 40–150)
ALT SERPL W P-5'-P-CCNC: 34 U/L (ref 0–70)
ANION GAP SERPL CALCULATED.3IONS-SCNC: 9 MMOL/L (ref 3–14)
AST SERPL W P-5'-P-CCNC: 17 U/L (ref 0–45)
BILIRUB SERPL-MCNC: 0.3 MG/DL (ref 0.2–1.3)
BUN SERPL-MCNC: 17 MG/DL (ref 7–30)
CALCIUM SERPL-MCNC: 9.3 MG/DL (ref 8.5–10.1)
CHLORIDE BLD-SCNC: 106 MMOL/L (ref 94–109)
CO2 SERPL-SCNC: 26 MMOL/L (ref 20–32)
CREAT SERPL-MCNC: 0.56 MG/DL (ref 0.66–1.25)
ERYTHROCYTE [DISTWIDTH] IN BLOOD BY AUTOMATED COUNT: 11.9 % (ref 10–15)
GFR SERPL CREATININE-BSD FRML MDRD: >90 ML/MIN/1.73M2
GLUCOSE BLD-MCNC: 215 MG/DL (ref 70–99)
GLUCOSE BLDC GLUCOMTR-MCNC: 114 MG/DL (ref 70–99)
GLUCOSE BLDC GLUCOMTR-MCNC: 142 MG/DL (ref 70–99)
GLUCOSE BLDC GLUCOMTR-MCNC: 146 MG/DL (ref 70–99)
GLUCOSE BLDC GLUCOMTR-MCNC: 161 MG/DL (ref 70–99)
GLUCOSE BLDC GLUCOMTR-MCNC: 177 MG/DL (ref 70–99)
GLUCOSE BLDC GLUCOMTR-MCNC: 181 MG/DL (ref 70–99)
GLUCOSE BLDC GLUCOMTR-MCNC: 195 MG/DL (ref 70–99)
HCT VFR BLD AUTO: 31 % (ref 40–53)
HGB BLD-MCNC: 10.1 G/DL (ref 13.3–17.7)
MCH RBC QN AUTO: 30.3 PG (ref 26.5–33)
MCHC RBC AUTO-ENTMCNC: 32.6 G/DL (ref 31.5–36.5)
MCV RBC AUTO: 93 FL (ref 78–100)
PLATELET # BLD AUTO: 401 10E3/UL (ref 150–450)
POTASSIUM BLD-SCNC: 3.7 MMOL/L (ref 3.4–5.3)
PROT SERPL-MCNC: 7.3 G/DL (ref 6.8–8.8)
RBC # BLD AUTO: 3.33 10E6/UL (ref 4.4–5.9)
SODIUM SERPL-SCNC: 141 MMOL/L (ref 133–144)
WBC # BLD AUTO: 10.6 10E3/UL (ref 4–11)

## 2022-07-26 PROCEDURE — 36415 COLL VENOUS BLD VENIPUNCTURE: CPT | Performed by: HOSPITALIST

## 2022-07-26 PROCEDURE — 99207 PR SC NO CHARGE VISIT: CPT | Performed by: PSYCHIATRY & NEUROLOGY

## 2022-07-26 PROCEDURE — 99231 SBSQ HOSP IP/OBS SF/LOW 25: CPT | Performed by: HOSPITALIST

## 2022-07-26 PROCEDURE — 80053 COMPREHEN METABOLIC PANEL: CPT | Performed by: HOSPITALIST

## 2022-07-26 PROCEDURE — 85027 COMPLETE CBC AUTOMATED: CPT | Performed by: HOSPITALIST

## 2022-07-26 PROCEDURE — 99233 SBSQ HOSP IP/OBS HIGH 50: CPT | Performed by: PSYCHIATRY & NEUROLOGY

## 2022-07-26 PROCEDURE — 250N000013 HC RX MED GY IP 250 OP 250 PS 637: Performed by: HOSPITALIST

## 2022-07-26 PROCEDURE — 250N000011 HC RX IP 250 OP 636: Performed by: HOSPITALIST

## 2022-07-26 PROCEDURE — 250N000013 HC RX MED GY IP 250 OP 250 PS 637: Performed by: INTERNAL MEDICINE

## 2022-07-26 PROCEDURE — 120N000001 HC R&B MED SURG/OB

## 2022-07-26 PROCEDURE — 97535 SELF CARE MNGMENT TRAINING: CPT | Mod: GP

## 2022-07-26 PROCEDURE — 99207 PR NO BILLABLE SERVICE THIS VISIT: CPT | Performed by: PHYSICIAN ASSISTANT

## 2022-07-26 PROCEDURE — L4396 STATIC OR DYNAMI AFO PRE CST: HCPCS

## 2022-07-26 RX ORDER — LABETALOL HYDROCHLORIDE 5 MG/ML
10 INJECTION, SOLUTION INTRAVENOUS
Status: DISCONTINUED | OUTPATIENT
Start: 2022-07-26 | End: 2022-08-02 | Stop reason: HOSPADM

## 2022-07-26 RX ORDER — HYDRALAZINE HYDROCHLORIDE 20 MG/ML
10 INJECTION INTRAMUSCULAR; INTRAVENOUS EVERY 4 HOURS PRN
Status: DISCONTINUED | OUTPATIENT
Start: 2022-07-26 | End: 2022-08-02 | Stop reason: HOSPADM

## 2022-07-26 RX ADMIN — MIRTAZAPINE 15 MG: 15 TABLET, FILM COATED ORAL at 21:06

## 2022-07-26 RX ADMIN — Medication 1 MG: at 21:06

## 2022-07-26 RX ADMIN — INSULIN ASPART 2 UNITS: 100 INJECTION, SOLUTION INTRAVENOUS; SUBCUTANEOUS at 16:39

## 2022-07-26 RX ADMIN — BACLOFEN 10 MG: 10 TABLET ORAL at 21:06

## 2022-07-26 RX ADMIN — SENNOSIDES AND DOCUSATE SODIUM 2 TABLET: 50; 8.6 TABLET ORAL at 08:17

## 2022-07-26 RX ADMIN — CEFTRIAXONE SODIUM 1 G: 1 INJECTION, POWDER, FOR SOLUTION INTRAMUSCULAR; INTRAVENOUS at 13:41

## 2022-07-26 RX ADMIN — CLOPIDOGREL BISULFATE 75 MG: 75 TABLET ORAL at 08:17

## 2022-07-26 RX ADMIN — SENNOSIDES AND DOCUSATE SODIUM 2 TABLET: 50; 8.6 TABLET ORAL at 21:06

## 2022-07-26 RX ADMIN — INSULIN ASPART 3 UNITS: 100 INJECTION, SOLUTION INTRAVENOUS; SUBCUTANEOUS at 14:14

## 2022-07-26 RX ADMIN — ACETAMINOPHEN 975 MG: 325 SUSPENSION ORAL at 01:46

## 2022-07-26 RX ADMIN — LISINOPRIL 40 MG: 40 TABLET ORAL at 08:17

## 2022-07-26 RX ADMIN — ASPIRIN 325 MG ORAL TABLET 325 MG: 325 PILL ORAL at 08:17

## 2022-07-26 RX ADMIN — INSULIN ASPART 1 UNITS: 100 INJECTION, SOLUTION INTRAVENOUS; SUBCUTANEOUS at 21:05

## 2022-07-26 RX ADMIN — ACETAMINOPHEN 975 MG: 325 SUSPENSION ORAL at 08:16

## 2022-07-26 RX ADMIN — BACLOFEN 10 MG: 10 TABLET ORAL at 08:17

## 2022-07-26 RX ADMIN — BACLOFEN 10 MG: 10 TABLET ORAL at 15:54

## 2022-07-26 RX ADMIN — INSULIN ASPART 1 UNITS: 100 INJECTION, SOLUTION INTRAVENOUS; SUBCUTANEOUS at 06:38

## 2022-07-26 RX ADMIN — INSULIN GLARGINE 14 UNITS: 100 INJECTION, SOLUTION SUBCUTANEOUS at 21:05

## 2022-07-26 RX ADMIN — OXYCODONE HYDROCHLORIDE 5 MG: 5 TABLET ORAL at 10:38

## 2022-07-26 RX ADMIN — ACETAMINOPHEN 975 MG: 325 SUSPENSION ORAL at 16:01

## 2022-07-26 RX ADMIN — Medication 40 MG: at 08:16

## 2022-07-26 RX ADMIN — ATORVASTATIN CALCIUM 80 MG: 80 TABLET, FILM COATED ORAL at 08:17

## 2022-07-26 RX ADMIN — OXYCODONE HYDROCHLORIDE 5 MG: 5 TABLET ORAL at 21:06

## 2022-07-26 RX ADMIN — INSULIN ASPART 2 UNITS: 100 INJECTION, SOLUTION INTRAVENOUS; SUBCUTANEOUS at 10:38

## 2022-07-26 RX ADMIN — POLYETHYLENE GLYCOL 3350 17 G: 17 POWDER, FOR SOLUTION ORAL at 08:17

## 2022-07-26 ASSESSMENT — ACTIVITIES OF DAILY LIVING (ADL)
ADLS_ACUITY_SCORE: 49
ADLS_ACUITY_SCORE: 49
ADLS_ACUITY_SCORE: 51
ADLS_ACUITY_SCORE: 49
ADLS_ACUITY_SCORE: 51
ADLS_ACUITY_SCORE: 49

## 2022-07-26 NOTE — PROGRESS NOTES
[facility]    Medicine Progress Note - Hospitalist Service    Date of Admission:  7/3/2022    Assessment & Plan          Josue Parisi is 52, who has had multiple strokes with some significant compliance issues, ongoing polysubstance abuse, being admitted under observation status for placement at a TCU, as he is unable to care for himself.     Multiple ischemic CVAs involving MCA, KWADWO with left-sided weakness and concern for progression  Hx Medication noncompliance  Polysubstance abuse  Initial CVA occurred in 03/2022 with right KWADWO, large territory defect and residual left-sided weakness recommended to discharge to ARU however declined and discharged AMA. Subsequently returned 6/18 with progressive weakness, again referred to TCU however declined and returned home. Hospitalized 6/24 with worsening left-sided weakness, paresthesias, numbness with findings of new nonhemorrhagic infarction within right prefrontal gyrus and deep MCA watershed infarct within the right centrum semiovale of the right frontal and parietal lobes. Patient was evaluated by stroke neurology each admission, most recently felt symptoms were suggestive of new infarct rather than hypoperfused tissue. Recommended liberal SBP goal of 140-160 and DAPT with plavix/ for 90 days. Patient most recently left AMA on 6/30 after being recommended for TCU. At time of discharge, it appears the plavix was discontinued in an effort to limit medications and encourage compliance.   * Returned to ED 7/3/22 with reports of failing at home. It was unclear on admission if patient's left-sided symptoms had persisted or worsened due to presence of polysubstances and limited ability to participate in physical exam. Admitted to cocaine use as recently as 7/2. UTox on admission positive for cocaine, amphetamines.   * 7/4 exam noted with 1/5 LUE/LLE strength, limited ability to communicate verbally but cognitively able to answer yes/no  "questions with gestures, shakes of head. reported change in function. Repeat CTH revealed rounded focus of hypoattenuation within right corona radiata/centrum semiovale, MRI brain 6/25 indicated restricted diffusion within this region however area of hypoattenuation is larger than region of diffusion restriction concerning for progression of infarct.y  - Neuro note 7/6- \"recommend SAMMPRIS trial guided DAPT (+clopidogrel 75 x90 days) in an attempt to stabilize the M1 segment and prevent inferior division infarct. \"   - Continue statin: Lipitor 80 mg daily.  - Reconsult speech therapy; G-tube placed on 7/08, TF per [place] otherwise Strict NPO; There have been some issues regarding constipation, but has been better, so have restarted TF slowly at 20 cc an hour with goal at 60 cc an hour [increased by 20 cc an hour/24 hours to goal rate], continue free water per NG.   - resumed a.m. and p.m. Lantus today; adjust insulin accordingly.  - Px did have change in neuro status on 7/25 being taken care of by neuro. No stroke coded needed as no change in management, but a MRI of the brain did demonstrate new area of possible ischemia, pending Neuro re-evaluation.     Abdominal pain  Constipation  Patient reports abdominal pain, needing to have a BM.  TF per G-tube held due to complaints and assessment of significant constipation.. CT a/p did confirm no acute issues other than large amt of stool in colon.  --senna-colace to 2 tabs BID, MiraLAX  - Start protonix per PEG tube daily  - No further abdominal pain, needs adherence to bowel program to prevent constipation.  Nursing states BM on current regimen.        Bleeding gums, halitosis  - suspect reflux given degree of constipation. Checked plts, INR - both okay. Will consult OMFS if symptoms continue but pt denies tooth pain.  -Nursing reports extremely dry mouth, hold Zyrtec, Magic mouthwash, monitor  -7/22/2022 on exam 1 area on left upper incisor old nonactive bleeding " consistent with gingivitis, continue oral cares.  Hemoglobin and INR stable.  No further bleeding.     Type 2 diabetes mellitus  Last HbA1c of 7.8 on 06/18. PTA regimen of metformin. Recent admission Lantus had been increased to 18u BID, carb coverage with meals of 1:10 with breakfast/lunch, 1:15 with dinner.  -Hold PTA metformin.  -While TF held relative hypoglycemia, see above; 7/22/2022 TF to restart however slowly; see #1 on restart TF.    -Restarted    Lantus 12 units in the morning and will increase to 14 tomorrow as BS a little high during the day   Lantus 14 at night will keep the same     History of hypertension   Held PTA meds initially to allow permissive hypertension.  - But then Resumed PTA lisinopril-hydrochlorothiazide on 7/12.  - Goal is normotension per Neurology.  - So when Blood pressure rising to 150s/90s with dry mouth, the combo ACEi/HCTZ was changed to just lisinopril only, at increased dose.   - Lisinopril 40 mg (increased to this level on 7/25/2022   -  We are going with this plan today, no changes. Don't want to bring the BP down too much given new findings on MRI and await neurology       Hidradenitis / Right axillary wound  WOCN consult previous recs:   Right axilla wound: Daily  and PRN.  - Cleanse wound with NS or wound cleanser (omit this step if patient cleans wound in shower).  - Dry and protect surrounding skin with no sting barrier film wipe #[id number].  - Apply a small amount of iodesorb gel #[id number] to bandaid.  - If Band-Aid needs to be changed more than once a day. Apply iodesorb gel #[id number] to adaptic #[id number] and cover with Mepilex #[id number].     Generalized pain  PTA on gabapentin 300 mg 3 times daily.    - Holding gabapentin.   - Continue as needed Tylenol as needed. Avoid opioids.  - Nightly Remeron on board  -No pain complaints.       Diet: NPO per Anesthesia Guidelines for Procedure/Surgery Except for: Meds, Other; Specify: please stop tube feedings  Adult Formula Drip Feeding:  Continuous Jevity 1.5; Other - Specify in Comment; Goal Rate: 60; mL/hr; Medication - Feeding Tube Flush Frequency: At least 15-30 mL water before and after medication administration and with tube clogging; Amount to Se...    DVT Prophylaxis: Full dose asa + Plavix  Cortez Catheter: PRESENT, indication: Retention,  (pt was having pain, cather had puse)  Central Lines: None  Cardiac Monitoring: None  Code Status: Full Code      Disposition Plan      Expected Discharge Date: 07/27/2022    Discharge Delays: Placement - TCU  *Medically Ready for Discharge    Discharge Comments: TCU PP        The patient's care was discussed with the Patient.    Boni Cool MD  Hospitalist Service  [facility]  Securely message with the Vocera Web Console (learn more here)  Text page via [employer] Paging/Directory         Clinically Significant Risk Factors Present on Admission                      ______________________________________________________________________    Interval History   Px had progression of findings on MRI from yesterday. Will continue some permissve htn awaiting Neuro review. Increase AM Lantus by two.  Otherewise keep the same for now.     Data reviewed today: I reviewed all medications, new labs and imaging results over the last 24 hours. I personally reviewed the MRI image(s) showing Some progression noted on rad read but await neuro interpretation .    Physical Exam   Vital Signs: Temp: 97.8  F (36.6  C) Temp src: Axillary BP: (!) 163/93 Pulse: 76   Resp: 16 SpO2: 98 % O2 Device: None (Room air)    Weight: 154 lbs 15.66 oz    Constitutional:  NAD, awake, calm, cooperative       Expressive aphasia.  HEENT; area of old, nonactive bleeding left upper incisor, no active bleeding,  Respiratory: Respirations nonlabored room air  Cardiovascular: Regular, no murmur  GI: Soft nontender.  No rebound, guarding or other peritoneal signs.  PEG tube in place  Musculoskeletal: no lower extremity  pitting edema present  Neuro.  Gross motor tested, left-sided hemiparesis.  Expressive aphasia, able to nod yes or no.  Psych orientation cannot be tested, affect calm.    Data   Recent Labs   Lab 07/26/22  0837 07/26/22  0603 07/26/22  0219 07/26/22  0144 07/25/22  1706 07/25/22  1548 07/25/22  1309 07/25/22  0832 07/24/22  1251 07/24/22  0741 07/23/22  1036 07/23/22  0959 07/22/22  1133 07/22/22  0813 07/19/22  1450 07/19/22  1152   WBC 10.6  --   --   --   --   --   --  12.3*  --  12.7*  --  15.3*  --  20.3*   < > 11.9*   HGB 10.1*  --   --   --   --   --   --  9.8*  --  9.7*  --  9.5*  --  10.5*   < > 11.4*   MCV 93  --   --   --   --   --   --  93  --  96  --  93  --  96   < > 92     --   --   --   --   --   --  379  --  353  --  346  --  365   < > 396   INR  --   --   --   --   --   --   --   --   --   --   --   --   --  1.18*  --  1.04   NA  --   --   --   --   --   --   --  138  --   --   --  138  --  142   < > 134   POTASSIUM  --   --   --   --   --  3.7  --  3.3*  --   --   --  3.4  --  3.5   < > 3.7   CHLORIDE  --   --   --   --   --   --   --  105  --   --   --  108  --  109   < > 99   CO2  --   --   --   --   --   --   --  28  --   --   --  25  --  26   < > 27   BUN  --   --   --   --   --   --   --  12  --   --   --  10  --  10   < > 24   CR  --   --   --   --   --   --   --  0.51*  --   --   --  0.62*  --  0.68   < > 0.62*   ANIONGAP  --   --   --   --   --   --   --  5  --   --   --  5  --  7   < > 8   DAT  --   --   --   --   --   --   --  9.3  --   --   --  9.1  --  9.3   < > 9.5   GLC  --  161* 146* 114*   < >  --    < > 206*   < >  --    < > 141*   < > 66*   < > 263*   ALBUMIN  --   --   --   --   --   --   --   --   --   --   --   --   --   --   --  3.3*   PROTTOTAL  --   --   --   --   --   --   --   --   --   --   --   --   --   --   --  7.4   BILITOTAL  --   --   --   --   --   --   --   --   --   --   --   --   --   --   --  0.5   ALKPHOS  --   --   --   --   --   --   --   --   --    --   --   --   --   --   --  58   ALT  --   --   --   --   --   --   --   --   --   --   --   --   --   --   --  46   AST  --   --   --   --   --   --   --   --   --   --   --   --   --   --   --  30    < > = values in this interval not displayed.     Recent Results (from the past 24 hour(s))   MR Brain w/o & w Contrast    Narrative    EXAM: MR BRAIN W/O and W CONTRAST  LOCATION: [facility]  DATE/TIME: 7/25/2022 8:42 PM    INDICATION: Worsening left-sided weakness. New stroke.  COMPARISON: MRI brain dated 07/04/2022  CONTRAST: 10mL Gadavist  TECHNIQUE: Routine multiplanar multisequence head MRI without and with intravenous contrast.    FINDINGS:  INTRACRANIAL CONTENTS: Again seen is an evolving region of subacute ischemic injury involving the right frontal lobe, right parietal lobe, right corona radiata, right insular region and right anterior genu of the corpus callosum. Many of these regions   demonstrate associated cortical/subcortical enhancement compatible with subacute ischemic injury. There is a new region of increased DWI signal with subtle decreased ADC signal involving the right posterior limb of the internal capsule. Additionally   there is increased subtle DWI signal involving the right thalamus with equivocal diminished ADC signal. Chronic left thalamic lacunar type infarction. Chronic left corona radiata lacunar type infarction. Chronic region of encephalomalacia and gliosis   identified involving the right frontal lobe. Additionally there is a chronic region of encephalomalacia and gliosis identified involving the left anterior paraventricular frontal lobe. No acute hemorrhage identified. No midline shift. No abnormal   extra-axial fluid collection. Scattered nonspecific T2/FLAIR hyperintensities within the cerebral white matter most consistent with mild chronic microvascular ischemic change. Mild generalized cerebral atrophy. No hydrocephalus. Normal position of the    cerebellar tonsils.    SELLA: No abnormality accounting for technique.    OSSEOUS STRUCTURES/SOFT TISSUES: Normal marrow signal. The major intracranial vascular flow voids are maintained.     ORBITS: No abnormality accounting for technique.     SINUSES/MASTOIDS: Mucosal thickening is noted involving the left maxillary sinus. No middle ear or mastoid effusion.       Impression    IMPRESSION:  1.  New region of abnormal DWI signal within the right posterior limb of internal capsule, concerning for acute/subacute ischemic injury.  2.  Equivocal small acute/subacute infarct within the right thalamus.  3.  Evolving subacute infarct involving the right MCA distribution, as seen on MRI dated 07/04/2022.  4.  Chronic ischemic changes, as above.  5.  No acute hemorrhage.   MRA Brain ([place]) wo Contrast    Narrative    EXAM: MRA BRAIN ([place]) W/O CONTRAST  LOCATION: [facility]  DATE/TIME: 7/25/2022 8:42 PM    INDICATION: Worsening left-sided weakness. Rule out new stroke.  COMPARISON: CTA head and neck dated 7/5/2020.  TECHNIQUE: 3D time-of-flight head MRA without intravenous contrast.    FINDINGS:  ANTERIOR CIRCULATION: No significant flow-related signal identified involving the right petrous and cavernous internal carotid artery. There is minimal distal reconstitution of flow of the right supraclinoid ICA. The proximal right M1 middle cerebral   artery is patent, however there is loss of flow-related signal identified involving the mid to distal right M1 middle cerebral artery. The branching right M2 anterior middle cerebral artery which supplies the right frontal operculum appears patent. There   is reconstitution of flow of the ascending and posterior division right middle cerebral artery vasculature beyond the right MCA bifurcation. The right MCA bifurcation does not appear to demonstrate contrast flow-related signal. The distal right M3/M4   branches of the right middle  cerebral artery vasculature, particularly involving the ascending and posterior divisions are not well seen, possibly due to slow flow. Minimal flow-related signal identified involving the right A1 anterior cerebral artery,   similar to prior examination. Minimal flow-related signal is noted involving the proximal bilateral A2 anterior cerebral arteries, similar to prior CTA. The remaining segments of the right anterior cerebral artery are patent. The remaining segments of   the left anterior cerebral artery are not well visualized. The left MCA vasculature is patent. No aneurysm. No high flow vascular malformation. Standard [place] anatomy.    POSTERIOR CIRCULATION: No stenosis/occlusion, aneurysm, or high flow vascular malformation. Dominant left and smaller right vertebral artery contribute to a normal basilar artery.       Impression    IMPRESSION:  1.  Similar-appearing findings of marked narrowing versus occlusion involving the mid to distal right M1 middle cerebral artery with reconstitution of flow beyond the right MCA bifurcation. The distal right MCA vasculature demonstrates poor flow-related   signal, possibly related to slow flow versus stenosis versus occlusion.  2.  Multifocal regions of loss of flow-related signal involving the bilateral anterior cerebral arteries, left worse than right. This could be related to multifocal stenosis, multifocal occlusion with reconstitution of flow or artifact. No definite   superimposed acute KWADWO territory infarcts identified on MRI brain.   3.  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   MRA Neck (Carotids) wo & w Contrast    Narrative    EXAM: MRA NECK (CAROTIDS) W/O and W CONTRAST  LOCATION: [facility]  DATE/TIME: 7/25/2022 8:44 PM    INDICATION: Worsening left-sided weakness. Stroke.  COMPARISON: CTA head and neck dated 7/5/2022.  CONTRAST: 10 mL  Gadavist  TECHNIQUE: Neck MRA without and with IV contrast. Stenosis measurements made according to NASCET criteria unless otherwise specified.    FINDINGS:  RIGHT CAROTID: Again seen is a long segment occlusion arising from the right proximal ICA and extending throughout its course in the neck.    LEFT CAROTID: No measurable stenosis or dissection.    VERTEBRAL ARTERIES: No focal stenosis or dissection. Dominant left and smaller right vertebral arteries.    AORTIC ARCH: Classic aortic arch anatomy with no significant stenosis at the origin of the great vessels.      Impression    IMPRESSION:  1.  Redemonstrated long segment occlusion involving the right ICA.

## 2022-07-26 NOTE — PLAN OF CARE
Reason for Admission: multiple CVA's    Cognitive/Mentation: A/O  Neuros/CMS: Intact ex nonverbal, L hemiparesis, L droop, L neglect  VS: stable. SBP<180   Tele: NSR.  GI: BS active, + flatus, last BM 07/26 - smear. inContinent.  : Cortez in place.  Pulmonary: LS clear.  Pain: L shoulder pain. Oxycodone prn.     Drains/Lines: R PIV  Skin: wound R armpit - dressing in place. Preventative mepilex on coccyx. L tooth continues to bleed. Lips dry - Vaseline offered.  Activity: Assist x 2 with lift.  Diet: NPO. TF running at goal 60 ml/hr. 120 FWF q4h.     Therapies recs: TCU  Discharge: pending placement    End of shift summary: pt continues to communicate through pen and notepad - able to convey wants and needs. Q4h BG checks.

## 2022-07-26 NOTE — PROGRESS NOTES
S: We received an order for a PRAFO for the L lower extremity as ordered by Boni Cool MD.  DX:  Spastic hemiplegia    O:  I met with the patient in his room 721-34 and he is in bed not alert.    A: I donned a PRAFO onto the LLE and the fit is adequate.  The PRAFO is holding the L ankle in a 90deg position and protopectin the L heel from pressure where the relief is positioned.  I had a RN sign for the patient.    P:  The patient will wear the PRAFO following Physician guidelines.    Raghav MEZA

## 2022-07-26 NOTE — PLAN OF CARE
VSS, on room air. Oxycodone given x1 for left shoulder pain. Pt total care, NPO on tube feedings at 60mL/hr. Pt angry at times about being NPO. Pt communicated by writing. Pt has left sided weakness. Pt had a BM this afternoon was able to use bedpan also has a fair in place.

## 2022-07-26 NOTE — PLAN OF CARE
Nonverbal, using notepad to communicate but difficult to read. Nods head and using thumb up/down. LUE and LLE 0/5 strength. Pt nods to decreased sensation on the left side. VSS. Mouth is moist, pink without active source of bleeding. Oral cares done. Right armpit wound dressing changed. Pt asking to use bedpan multiple times but unable to have a BM. Given miralax, senna but declined further bowel PRN medications. Up w/ Ax2, lift. Turn/repo done. Up to chair in afternoon. Given oxy for pain once. Cortez in place with adequate UOP. TF running at goal. Alarms on. Discharge pending placement.

## 2022-07-26 NOTE — PROGRESS NOTES
[facility]    Stroke Progress Note    Interval EventsStroke team was contacted 7/25/22 as new RN was coming on shift and noticed that there was 0/5 strength to b/l upper and lower extremities. Unsure on acuity but had been documented by prior nursing team of 1-2/5 strength to LLE. Given outside of window for concern of hemorrhagic transformation and wouldn't be candidate for acute interventions, opted for repeat MRI/MRA. Showed a new R internal capsule infarct, and chronic R MCA/ICA occlusions and b/l KWADWO occlusion v stenosis.     He was seen today and we discussed his imaging and will check P2Y12/ASA function levels to ensure adequate coverage. Would likely not be a good candidate for external carotid bypass given severe intracranial vasculopathy. He is still 0/5 strength to L arm and leg. He had some mild bleeding to mouth/nose. Hgb holding steady, slightly trended up today.     HPI Summary  Josue Parisi is a 51 yo M with pertinent past medical history of polysubstance use disorder (meth/cocaine/tobacco cigarettes), DMT2, HTN, chronic R ICA and R MCA occlusion with R MCA stroke 3/22. He had still been living at home independently with use of a cane prior to this admission (10 stairs to get into home).    He is currently admitted to [facility] since 7/3/22 with worsening L hemiparesis (flaccid LUE, paralysis to LLE), anarthria, R gaze preference. Found to have recurrent R MCA territory infarct with some extension to R KWADWO and chronic R M1 and R ICA occlusions. He was started on dual-antiplatelet therapy with  mg dailyand plavix 75 mg daily for planned 90 day course. He had some mild improvement to L leg weakness 2/5 on sign off note 7/10/22 by stroke team. Plan was for PEG tube and discharge to TCU when medically appropriate.    Stroke Evaluation Summarized     MRI/Head CT MRI 7/25/22:  New region acute/subacute R posterior limb of internal capsule ischemic  infarct, evolving R MCA subacute infarct, equivocal small acute/subacute R thalamus    MRI 7/5/22:   1. Large acute infarct within the right posterior frontal lobe, right parietal lobe, right pre and postcentral gyrus, right insula, and right corpus callosum anterior body. This acute infarct is predominantly in the right MCA territory with small areas extending into the right KWADWO territory.  2.  No susceptibility on GRE to suggest microhemorrhage.     Head CT 7/5/22:  1.  Rounded focus of hypoattenuation within the right corona radiata//centrum semiovale, new since head CT June 24, 2022. On brain MRI June 25, 2022 there was restricted diffusion within this region, consistent with acute ischemia, however the region of   hypoattenuation and is larger than the region of diffusion restriction which is concerning for progression of infarct. Brain MRI could be used to better characterize     Intracranial Vasculature MRA brain 7/25/22:  Mid-distal R M1 occlusion similar to prior, multifocal b/l KWADWO L>R stenosis v occlusion, chronic R ICA occlusion  HEAD CTA:  1.  Redemonstration of marked narrowing to occlusion the midportion of right M1, similar to prior.   2.  Mild asymmetry in the appearance of MCA distributions with mildly less opacified branches on the right, similar to prior.   3.  Mild atherosclerotic changes cavernous and supraclinoid ICA on the left.   4.  Mildly attenuated opacification of anterior cerebral artery distribution, similar to prior.  5.  Remaining intracranial circulation appears normal.   Cervical Vasculature MRA neck 7/25/22:  Long segment R ICA occlusion, unchanged    NECK CTA:  1.  Redemonstration of occlusion of right ICA in its proximal aspect.  2.  No hemodynamically significant narrowing of the left ICA.  3.  Normal vertebral arteries.      Echocardiogram Normal L atrium size, EF 60-65%   EKG/Telemetry Sinus tachycardia   Otherwise normal ECG   Other Testing Not Applicable      LDL   6/24/2022: 53 mg/dL   A1C  6/18/2022: 7.8 %   Troponin No lab value available in past 48 hrs        Impression   7/25/22 worsening L weakness, new region acute/subacute R posterior limb of internal capsule ischemic infarct on MRI, MRA showed chronic R M1/ICA occlusions and b/l L>R KWADWO stenosis v occlusion without definite KWADWO territory infarct    7/3/22 Subacute-acute large R MCA/KWADWO territory infarcts in setting of known chronic R cervical ICA occlusion, R M1 occlusion. Interval imaging with some recanalization of R M1. Given this, most likely stroke etiology is artery to artery embolization, although hemodynamic compromise of collateral flow remains a possibility given non-compliance with medications, uncontrolled diabetes/HTN, and cocaine/amphetamine use. NIH 23 on presentation, exam with dense LUE/LLE flaccid hemiplegia, R gaze preferance.     LUE shoulder pain. This may have a central etiology with traction of rotator cuff from flaccid hemiplegia as result of above stroke. No signs of spasticity or point tenderness on exam which is reassuring against fracture.     Plan  -Discussed with vascular neurology attending, Dr. Pena  -continue Neuro checks and vitals every 8 hours  -SBP goal <220 to allow adequate cerebral perfusion while inpatient. If pt has worsening of L MCA/KWADWO symptoms would lay flat, bolus with 500-1000 ml of NS and see if this helps improve symptoms  - mg daily indefinitely, check ASA function assay  -Plavix 75 mg daily for planned 90 day course, check P2Y12  -monitor for signs of bleeding  -LDL 53, continue Lipitor 80 mg daily due to severe multivessel stenoses, follow-up with PCP for titration to goal LDL 40-70, <40 increases risk of Intracranial hemorrhage  -Blood glucose monitoring, Hgb A1c 7.8%, needs tighter control of diabetes (goal <7% for secondary stroke prevention), close follow-up with PCP  -Mediterranean diet can be beneficial for overall decreased cardiovascular risk,  "please print hand out for patient at discharge  -telemetry   -PT/OT/SPT  -Smoking screen: smoking cessation counseling  -Sleep Apnea screen: screen for nocturnal hypoxia, if concerns of sleep apnea or snoring then recommend follow-up with sleep medicine  -Euthermia, euglycemia, eunatremia   -Stroke Education  -Stroke Class per [facility]    Patient Follow-up    -f/u in 1-2 weeks with PCP  -f/u with neurology team in 6-8 weeks (referral ordered)    We will continue to follow.     Juhi De PA-C  Vascular Neurology  To page me or covering stroke neurology team member, click here: [facility]   Choose \"On Call\" tab at top, then search dropdown box for \"Neurology Adult\", select location, press Enter, then look for stroke/neuro ICU/telestroke.    ______________________________________________________    Clinically Significant Risk Factors Present on Admission                  Medications   Scheduled Meds    acetaminophen  975 mg Per Feeding Tube Q8H     aspirin  300 mg Rectal Daily    Or     aspirin  325 mg Oral or Feeding Tube Daily     atorvastatin  80 mg Oral or Feeding Tube Daily     Baclofen  10 mg Per Feeding Tube TID     cefTRIAXone  1 g Intravenous Q24H     [Held by provider] cetirizine  10 mg Oral or Feeding Tube Daily     clopidogrel  75 mg Oral or Feeding Tube Daily     gadobutrol  10 mL Intravenous Once     insulin aspart  1-12 Units Subcutaneous Q4H     insulin glargine  12 Units Subcutaneous QAM AC     insulin glargine  14 Units Subcutaneous At Bedtime     lisinopril  40 mg Oral Daily     mirtazapine  15 mg Oral or Feeding Tube At Bedtime     pantoprazole  40 mg Oral or Feeding Tube QAM AC     polyethylene glycol  17 g Per G Tube Daily     senna-docusate  2 tablet Oral or Feeding Tube BID       Infusion Meds    dextrose       - MEDICATION INSTRUCTIONS -       - MEDICATION INSTRUCTIONS -         PRN Meds  bisacodyl, dextrose, glucose **OR** dextrose **OR** glucagon, hydrALAZINE, " HYDROmorphone, ibuprofen, labetalol, lidocaine 4%, lidocaine (buffered or not buffered), lidocaine visc 2% & maalox/mylanta w/simethicone & diphenhydramine, magnesium hydroxide, - MEDICATION INSTRUCTIONS -, - MEDICATION INSTRUCTIONS -, melatonin, naloxone **OR** naloxone **OR** naloxone **OR** naloxone, ondansetron **OR** ondansetron, oxyCODONE, polyethylene glycol, sodium chloride, sodium chloride (PF)       PHYSICAL EXAMINATION  Temp:  [97.6  F (36.4  C)-98.6  F (37  C)] 97.8  F (36.6  C)  Pulse:  [76-93] 76  Resp:  [14-16] 16  BP: (130-163)/() 163/93  SpO2:  [97 %-98 %] 98 %      General Exam  General:  patient lying in bed without any acute distress    HEENT:  normocephalic/atraumatic  Pulmonary:  no respiratory distress    Neuro Exam  Mental Status:  alert, oriented x 3, able to answer appropriately with nodding and writing to questions, mute aphasia, follows all commands, no apparent receptive aphasia  Cranial Nerves:  visual fields intact (able to point to which hand wiggling in all quadrants, no visual neglect, PERRL, EOMI with normal smooth pursuit, facial sensation reduced on the L, facial droop of entire L face, hearing not formally tested but intact to conversation, does not protrude tongue and shakes head no but does give a thumbs up and follow other commands  Motor:  No movement to L upper or lower extremities despite noxious stimuli but does grimace, no drift to R upper or lower extremities  Reflexes:  toes down-going  Sensory:  Nods head that he has reduced sensation to Left arm and leg and face but is still able to sense being touched, no tactile or spatial neglect  Coordination:  No ataxia/dysmetria out of proportion to weakness  Station/Gait:  deferred    Stroke Scales    NIHSS  1a. Level of Consciousness 0-->Alert, keenly responsive   1b. LOC Questions (S) 0-->Answers both questions correctly (with writing and nodding)   1c. LOC Commands 0-->Performs both tasks correctly   2.   Best Gaze  0-->Normal   3.   Visual 0-->No visual loss   4.   Facial Palsy 3-->Complete paralysis of one or both sides (absence of facial movement in the upper and lower face)   5a. Motor Arm, Left 4-->No movement   5b. Motor Arm, Right 0-->No drift, limb holds 90 (or 45) degrees for full 10 secs   6a. Motor Leg, Left 4-->No movement   6b. Motor Leg, right 0-->No drift, leg holds 30 degree position for full 5 secs   7.   Limb Ataxia 0-->Absent   8.   Sensory 1-->Mild-to-moderate sensory loss, patient feels pinprick is less sharp or is dull on the affected side, or there is a loss of superficial pain with pinprick, but patient is aware of being touched   9.   Best Language (S) 3-->Mute, global aphasia, no usable speech or auditory comprehension (mute expressive aphasia but no receptiveaphasia-able to write and nod and follow all commands)   10. Dysarthria 2-->Severe dysarthria, patients speech is so slurred as to be unintelligible in the absence of or out of proportion to any dysphasia, or is mute/anarthric   11. Extinction and Inattention  0-->No abnormality   Total 17 (07/26/22 0819)       Modified Roberto Score (Pre-morbid)  2 - Slight disability.  Able to look after own affairs without assistance, but unable to carry out all previous activities.  Modified [place] Score (Discharge)  5 - Severe disability.  Requires constant nursing care and attention, bedridden.    Imaging  I personally reviewed all imaging; relevant findings per HPI.     Lab Results Data   CBC  Recent Labs   Lab 07/25/22  0832 07/24/22  0741 07/23/22  0959   WBC 12.3* 12.7* 15.3*   RBC 3.20* 3.16* 3.16*   HGB 9.8* 9.7* 9.5*   HCT 29.6* 30.2* 29.5*    353 346     Basic Metabolic Panel    Recent Labs   Lab 07/26/22  0603 07/26/22  0219 07/26/22  0144 07/25/22  1706 07/25/22  1548 07/25/22  1309 07/25/22  0832 07/23/22  1036 07/23/22  0959 07/22/22  1133 07/22/22  0813   NA  --   --   --   --   --   --  138  --  138  --  142   POTASSIUM  --   --   --   --   3.7  --  3.3*  --  3.4  --  3.5   CHLORIDE  --   --   --   --   --   --  105  --  108  --  109   CO2  --   --   --   --   --   --  28  --  25  --  26   BUN  --   --   --   --   --   --  12  --  10  --  10   CR  --   --   --   --   --   --  0.51*  --  0.62*  --  0.68   * 146* 114*   < >  --    < > 206*   < > 141*   < > 66*   DAT  --   --   --   --   --   --  9.3  --  9.1  --  9.3    < > = values in this interval not displayed.     Liver Panel  Recent Labs   Lab 07/19/22  1152   PROTTOTAL 7.4   ALBUMIN 3.3*   BILITOTAL 0.5   ALKPHOS 58   AST 30   ALT 46     INR    Recent Labs   Lab Test 07/22/22  0813 07/19/22  1152 06/24/22  1805   INR 1.18* 1.04 1.08      Lipid Profile    Recent Labs   Lab Test 06/24/22  1805 03/20/22  0558 02/06/22  0617   CHOL 102 93 193   HDL 30* 34* 38*   LDL 53 46 126*   TRIG 95 66 144     A1C    Recent Labs   Lab Test 06/18/22  2150 03/20/22  0558 02/06/22  0617   A1C 7.8* 8.8* 10.5*     Troponin I  No results for input(s): TROPONINIS, TROPONINI, GHTROP in the last 168 hours.       Billing: I have personally spent a total of 60 minutes providing care today, time spent in reviewing medical records and reviewing tests, examining the patient and obtaining history, coordination of care, and discussion with the patient and/or family regarding diagnostic results, prognosis, symptom management, risks and benefits of management options, and development of plan of care. Greater than 50% was spent in counseling and coordination of care.

## 2022-07-27 ENCOUNTER — APPOINTMENT (OUTPATIENT)
Dept: SPEECH THERAPY | Facility: CLINIC | Age: 52
DRG: 065 | End: 2022-07-27
Payer: COMMERCIAL

## 2022-07-27 ENCOUNTER — APPOINTMENT (OUTPATIENT)
Dept: PHYSICAL THERAPY | Facility: CLINIC | Age: 52
DRG: 065 | End: 2022-07-27
Payer: COMMERCIAL

## 2022-07-27 ENCOUNTER — APPOINTMENT (OUTPATIENT)
Dept: OCCUPATIONAL THERAPY | Facility: CLINIC | Age: 52
DRG: 065 | End: 2022-07-27
Payer: COMMERCIAL

## 2022-07-27 LAB
GLUCOSE BLDC GLUCOMTR-MCNC: 154 MG/DL (ref 70–99)
GLUCOSE BLDC GLUCOMTR-MCNC: 156 MG/DL (ref 70–99)
GLUCOSE BLDC GLUCOMTR-MCNC: 157 MG/DL (ref 70–99)
GLUCOSE BLDC GLUCOMTR-MCNC: 168 MG/DL (ref 70–99)
GLUCOSE BLDC GLUCOMTR-MCNC: 170 MG/DL (ref 70–99)
GLUCOSE BLDC GLUCOMTR-MCNC: 195 MG/DL (ref 70–99)
GLUCOSE BLDC GLUCOMTR-MCNC: 217 MG/DL (ref 70–99)
PA AA BLD-ACNC: 350 ARU
PA ADP BLD-ACNC: 75 PRU
POTASSIUM BLD-SCNC: 3.9 MMOL/L (ref 3.4–5.3)

## 2022-07-27 PROCEDURE — 250N000013 HC RX MED GY IP 250 OP 250 PS 637: Performed by: INTERNAL MEDICINE

## 2022-07-27 PROCEDURE — 84132 ASSAY OF SERUM POTASSIUM: CPT | Performed by: HOSPITALIST

## 2022-07-27 PROCEDURE — 99232 SBSQ HOSP IP/OBS MODERATE 35: CPT | Performed by: PSYCHIATRY & NEUROLOGY

## 2022-07-27 PROCEDURE — 250N000011 HC RX IP 250 OP 636: Performed by: HOSPITALIST

## 2022-07-27 PROCEDURE — 99233 SBSQ HOSP IP/OBS HIGH 50: CPT | Performed by: INTERNAL MEDICINE

## 2022-07-27 PROCEDURE — 85576 BLOOD PLATELET AGGREGATION: CPT

## 2022-07-27 PROCEDURE — 92526 ORAL FUNCTION THERAPY: CPT | Mod: GN | Performed by: SPEECH-LANGUAGE PATHOLOGIST

## 2022-07-27 PROCEDURE — 97530 THERAPEUTIC ACTIVITIES: CPT | Mod: GP

## 2022-07-27 PROCEDURE — 36415 COLL VENOUS BLD VENIPUNCTURE: CPT | Performed by: HOSPITALIST

## 2022-07-27 PROCEDURE — 97535 SELF CARE MNGMENT TRAINING: CPT | Mod: GO

## 2022-07-27 PROCEDURE — 120N000001 HC R&B MED SURG/OB

## 2022-07-27 PROCEDURE — 99207 PR NO BILLABLE SERVICE THIS VISIT: CPT | Performed by: NURSE PRACTITIONER

## 2022-07-27 PROCEDURE — 250N000013 HC RX MED GY IP 250 OP 250 PS 637: Performed by: HOSPITALIST

## 2022-07-27 PROCEDURE — 92507 TX SP LANG VOICE COMM INDIV: CPT | Mod: GN | Performed by: SPEECH-LANGUAGE PATHOLOGIST

## 2022-07-27 PROCEDURE — 36415 COLL VENOUS BLD VENIPUNCTURE: CPT | Performed by: NURSE PRACTITIONER

## 2022-07-27 RX ADMIN — LISINOPRIL 40 MG: 40 TABLET ORAL at 09:23

## 2022-07-27 RX ADMIN — SENNOSIDES AND DOCUSATE SODIUM 2 TABLET: 50; 8.6 TABLET ORAL at 20:45

## 2022-07-27 RX ADMIN — ACETAMINOPHEN 975 MG: 325 SUSPENSION ORAL at 18:02

## 2022-07-27 RX ADMIN — ACETAMINOPHEN 975 MG: 325 SUSPENSION ORAL at 02:04

## 2022-07-27 RX ADMIN — BACLOFEN 10 MG: 10 TABLET ORAL at 18:02

## 2022-07-27 RX ADMIN — INSULIN ASPART 1 UNITS: 100 INJECTION, SOLUTION INTRAVENOUS; SUBCUTANEOUS at 09:14

## 2022-07-27 RX ADMIN — POLYETHYLENE GLYCOL 3350 17 G: 17 POWDER, FOR SOLUTION ORAL at 09:22

## 2022-07-27 RX ADMIN — OXYCODONE HYDROCHLORIDE 5 MG: 5 TABLET ORAL at 14:16

## 2022-07-27 RX ADMIN — ASPIRIN 325 MG ORAL TABLET 325 MG: 325 PILL ORAL at 09:22

## 2022-07-27 RX ADMIN — MIRTAZAPINE 15 MG: 15 TABLET, FILM COATED ORAL at 21:22

## 2022-07-27 RX ADMIN — INSULIN ASPART 1 UNITS: 100 INJECTION, SOLUTION INTRAVENOUS; SUBCUTANEOUS at 02:05

## 2022-07-27 RX ADMIN — ATORVASTATIN CALCIUM 80 MG: 80 TABLET, FILM COATED ORAL at 09:23

## 2022-07-27 RX ADMIN — BACLOFEN 10 MG: 10 TABLET ORAL at 21:22

## 2022-07-27 RX ADMIN — CEFTRIAXONE SODIUM 1 G: 1 INJECTION, POWDER, FOR SOLUTION INTRAMUSCULAR; INTRAVENOUS at 13:13

## 2022-07-27 RX ADMIN — SENNOSIDES AND DOCUSATE SODIUM 2 TABLET: 50; 8.6 TABLET ORAL at 09:23

## 2022-07-27 RX ADMIN — INSULIN ASPART 2 UNITS: 100 INJECTION, SOLUTION INTRAVENOUS; SUBCUTANEOUS at 12:36

## 2022-07-27 RX ADMIN — INSULIN ASPART 4 UNITS: 100 INJECTION, SOLUTION INTRAVENOUS; SUBCUTANEOUS at 05:43

## 2022-07-27 RX ADMIN — BACLOFEN 10 MG: 10 TABLET ORAL at 09:23

## 2022-07-27 RX ADMIN — INSULIN GLARGINE 14 UNITS: 100 INJECTION, SOLUTION SUBCUTANEOUS at 21:33

## 2022-07-27 RX ADMIN — ACETAMINOPHEN 975 MG: 325 SUSPENSION ORAL at 09:23

## 2022-07-27 RX ADMIN — Medication 40 MG: at 09:20

## 2022-07-27 RX ADMIN — CLOPIDOGREL BISULFATE 75 MG: 75 TABLET ORAL at 09:23

## 2022-07-27 RX ADMIN — INSULIN ASPART 3 UNITS: 100 INJECTION, SOLUTION INTRAVENOUS; SUBCUTANEOUS at 21:32

## 2022-07-27 RX ADMIN — INSULIN ASPART 1 UNITS: 100 INJECTION, SOLUTION INTRAVENOUS; SUBCUTANEOUS at 18:02

## 2022-07-27 RX ADMIN — OXYCODONE HYDROCHLORIDE 5 MG: 5 TABLET ORAL at 21:23

## 2022-07-27 ASSESSMENT — ACTIVITIES OF DAILY LIVING (ADL)
ADLS_ACUITY_SCORE: 49
ADLS_ACUITY_SCORE: 50
ADLS_ACUITY_SCORE: 50
ADLS_ACUITY_SCORE: 49
ADLS_ACUITY_SCORE: 50
ADLS_ACUITY_SCORE: 50
ADLS_ACUITY_SCORE: 47
ADLS_ACUITY_SCORE: 49
ADLS_ACUITY_SCORE: 50
ADLS_ACUITY_SCORE: 49

## 2022-07-27 NOTE — PROGRESS NOTES
[facility]    Medicine Progress Note - Hospitalist Service    Date of Admission:  7/3/2022    Assessment & Plan            Multiple ischemic CVAs involving MCA, KWADWO with left-sided weakness and concern for progression  Hx Medication noncompliance  Polysubstance abuse  Initial CVA occurred in 03/2022 with right KWADWO, large territory defect and residual left-sided weakness recommended to discharge to ARU however declined and discharged AMA. Subsequently returned 6/18 with progressive weakness, again referred to TCU however declined and returned home. Hospitalized 6/24 with worsening left-sided weakness, paresthesias, numbness with findings of new nonhemorrhagic infarction within right prefrontal gyrus and deep MCA watershed infarct within the right centrum semiovale of the right frontal and parietal lobes. Patient was evaluated by stroke neurology each admission, most recently felt symptoms were suggestive of new infarct rather than hypoperfused tissue. Recommended liberal SBP goal of 140-160 and DAPT with plavix/ for 90 days. Patient most recently left AMA on 6/30 after being recommended for TCU. At time of discharge, it appears the plavix was discontinued in an effort to limit medications and encourage compliance.   * Returned to ED 7/3/22 with reports of failing at home. It was unclear on admission if patient's left-sided symptoms had persisted or worsened due to presence of polysubstances and limited ability to participate in physical exam. Admitted to cocaine use as recently as 7/2. UTox on admission positive for cocaine, amphetamines.   * 7/4 exam noted with 1/5 LUE/LLE strength, limited ability to communicate verbally but cognitively able to answer yes/no questions with gestures, shakes of head. reported change in function. Repeat CTH revealed rounded focus of hypoattenuation within right corona radiata/centrum semiovale, MRI brain 6/25 indicated restricted diffusion within this  "region however area of hypoattenuation is larger than region of diffusion restriction concerning for progression of infarct.y  - Neuro note 7/6- \"recommend SAMMPRIS trial guided DAPT (+clopidogrel 75 x90 days) in an attempt to stabilize the M1 segment and prevent inferior division infarct. \"   - Continue statin.  - Reconsult speech therapy; G-tube placed on 7/08, TF per [place] otherwise NPO  - Holding tube feeds  given constipation and discomfort at g tube site, 7/20/2022, initiated bowel program, IVF NS D5 while TF on hold  -7/22/2022 denies abdominal pain, nausea, emesis, restart TF slowly at 20 cc an hour with goal at 60 cc an hour [increased by 20 cc an hour/24 hours to goal rate], continue free water per NG.   -7/24/2022 at 4:00 PM today we will resume prior TF goal rate at 60 cc an hour,   - resumed a.m. and p.m. Lantus today; adjust insulin accordingly.  - Px did have change in neuro status 7/25:. No stroke coded needed as no change in management.      Depression: Currently on Remeron 15 mg at bedtime.  Will reconsult psychiatry 7/27 as he may benefit from adjustment of antidepressants and more family encouragement.    Abdominal pain  Constipation  --increase senna-colace to 2 tabs BID, MiraLAX  -ct protonix IV daily  - No further abdominal pain, needs adherence to bowel program to prevent constipation.  Nursing states BM on current regimen.      Bleeding gums, halitosis  - suspect reflux given degree of constipation. Checked plts, INR - both okay. Will consult OMFS if symptoms continue but pt denies tooth pain.  -Nursing reports extremely dry mouth, hold Zyrtec, Magic mouthwash, monitor  -7/22/2022 on exam 1 area on left upper incisor old nonactive bleeding consistent with gingivitis, continue oral cares.  Hemoglobin and INR stable.  No further bleeding.     SIRS syndrome  Leukocytosis  On admission Patient with mild leukocytosis 12-13, procalcitonin on admission negative. Low-grade fever and sinus " tachycardia persisting today. UA without evidence of infection. CXR neg. ?due to polysubstance abuse with amphetamines, cocaine in UTox.  - Low threshold to obtain blood cultures, initiate broad-spectrum abx   -7/22/2022 WBC increased to 20.3k, afebrile, no signs of localizing infection except for focal gingivitis, fair.    UC=E. coli, Fair exchanged, start Rocephin 5-day.  Later this week trial VT     Type 2 diabetes mellitus  Last HbA1c of 7.8 on 06/18. PTA regimen of metformin. Recent admission Lantus had been increased to 18u BID, carb coverage with meals of 1:10 with breakfast/lunch, 1:15 with dinner.  -Hold PTA metformin.  -While TF held relative hypoglycemia, see above; 7/22/2022 TF to restart however slowly; see #1 on restart TF.    -Restarted on Lantus 12 units in the morning and 14 at night.     History of hypertension   Held PTA meds initially to allow permissive hypertension.  - Resumed PTA lisinopril-hydrochlorothiazide 7/12.  - Goal is normotension per Neurology.  - Blood pressure rising, 150s/90s.  Increase lisinopril; on combo ACEi/HCTZ given dry mouth start on lisinopril only, at increased dose. CR WNL, quyen in AM.   Monitor BP.       Hidradenitis / Right axillary wound  WOCN consult previous recs:   Right axilla wound: Daily  and PRN.  - Cleanse wound with NS or wound cleanser (omit this step if patient cleans wound in shower).  - Dry and protect surrounding skin with no sting barrier film wipe #[id number].  - Apply a small amount of iodesorb gel #[id number] to bandaid.  - If Band-Aid needs to be changed more than once a day. Apply iodesorb gel #[id number] to adaptic #[id number] and cover with Mepilex #[id number].     Generalized pain  PTA on gabapentin 300 mg 3 times daily.    - Holding gabapentin.   - Continue as needed Tylenol as needed. Avoid opioids.  -Prior hospitalist added PRN ibuprofen.  -No pain complaints.       Diet: NPO per Anesthesia Guidelines for Procedure/Surgery Except for: Meds, Other; Specify:  "please stop tube feedings  Adult Formula Drip Feeding: Continuous Jevity 1.5; Other - Specify in Comment; Goal Rate: 60; mL/hr; Medication - Feeding Tube Flush Frequency: At least 15-30 mL water before and after medication administration and with tube clogging; Amount to Se...    DVT Prophylaxis: No changes  Cortez Catheter: PRESENT, indication: Retention, Retention  Central Lines: None  Cardiac Monitoring: None  Code Status: Full Code      Disposition Plan         The patient's care was discussed with the Bedside Nurse and Patient.    Quinn Monge MD, MD  Hospitalist Service  [facility]  Securely message with the Vocera Web Console (learn more here)  Text page via [employer] Paging/Directory         Clinically Significant Risk Factors Present on Admission                      ______________________________________________________________________    Interval History   Continues to be frustrated with the slow progress and inability to eat and drink.  Wrote on paper \" just let me die\"    Data reviewed today: I reviewed all medications, new labs and imaging results over the last 24 hours. I personally reviewed no images or EKG's today.    Physical Exam   BP (!) 158/96 (BP Location: Right arm)   Pulse 79   Temp 98.1  F (36.7  C) (Axillary)   Resp 14   Ht 1.676 m (5' 6\")   Wt 70.3 kg (154 lb 15.7 oz)   SpO2 97%   BMI 25.01 kg/m    Gen- pleasant lying in bed  HEENT-expressive aphasia.  Right sided gaze  Neck- supple  CVS- I+II+ no m/r/g  RS- CTAB  Abdo- soft, no tenderness . No g/r/r   Ext- no edema   CNS-left-sided hemiparesis.  Expressive aphasia.  Able to nod and write on paper with right hand      Data   Recent Labs   Lab 07/27/22  1022 07/27/22  0914 07/27/22  0817 07/27/22  0520 07/26/22  1036 07/26/22  0837 07/25/22  1706 07/25/22  1548 07/25/22  1309 07/25/22  0832 07/24/22  1251 07/24/22  0741 07/23/22  1036 07/23/22  0959 07/22/22  1133 07/22/22  0813   WBC  --   --   --   --   --  " 10.6  --   --   --  12.3*  --  12.7*  --  15.3*  --  20.3*   HGB  --   --   --   --   --  10.1*  --   --   --  9.8*  --  9.7*  --  9.5*  --  10.5*   MCV  --   --   --   --   --  93  --   --   --  93  --  96  --  93  --  96   PLT  --   --   --   --   --  401  --   --   --  379  --  353  --  346  --  365   INR  --   --   --   --   --   --   --   --   --   --   --   --   --   --   --  1.18*   NA  --   --   --   --   --  141  --   --   --  138  --   --   --  138  --  142   POTASSIUM  --   --  3.9  --   --  3.7  --  3.7  --  3.3*  --   --   --  3.4  --  3.5   CHLORIDE  --   --   --   --   --  106  --   --   --  105  --   --   --  108  --  109   CO2  --   --   --   --   --  26  --   --   --  28  --   --   --  25  --  26   BUN  --   --   --   --   --  17  --   --   --  12  --   --   --  10  --  10   CR  --   --   --   --   --  0.56*  --   --   --  0.51*  --   --   --  0.62*  --  0.68   ANIONGAP  --   --   --   --   --  9  --   --   --  5  --   --   --  5  --  7   DAT  --   --   --   --   --  9.3  --   --   --  9.3  --   --   --  9.1  --  9.3   * 157*  --  217*   < > 215*   < >  --    < > 206*   < >  --    < > 141*   < > 66*   ALBUMIN  --   --   --   --   --  2.7*  --   --   --   --   --   --   --   --   --   --    PROTTOTAL  --   --   --   --   --  7.3  --   --   --   --   --   --   --   --   --   --    BILITOTAL  --   --   --   --   --  0.3  --   --   --   --   --   --   --   --   --   --    ALKPHOS  --   --   --   --   --  65  --   --   --   --   --   --   --   --   --   --    ALT  --   --   --   --   --  34  --   --   --   --   --   --   --   --   --   --    AST  --   --   --   --   --  17  --   --   --   --   --   --   --   --   --   --     < > = values in this interval not displayed.

## 2022-07-27 NOTE — PROGRESS NOTES
[facility]    Stroke Progress Note    Interval EventsChecked platelet function ASA and platelet function P2Y12, and patient is a responder to both medications. On exam today, patient is nodding, gesturing the correct answer to the questions appropriately. 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 He had still been living at home independently with use of a cane prior to this admission (10 stairs to get into home).     He is currently admitted to [facility] since 7/3/22 with worsening L hemiparesis (flaccid LUE, paralysis to LLE), anarthria, R gaze preference. Found to have recurrent R MCA territory infarct with some extension to R KWADWO and chronic R M1 and R ICA occlusions. He was started on dual-antiplatelet therapy with  mg dailyand plavix 75 mg daily for planned 90 day course. He had some mild improvement to L leg weakness 2/5 on sign off note 7/10/22 by stroke team. Plan was for PEG tube and discharge to TCU when medically appropriate.    Stroke team was contacted 7/25/22 as new RN was coming on shift and noticed that there was 0/5 strength to b/l upper and lower extremities. Unsure on acuity but had been documented by prior nursing team of 1-2/5 strength to LLE. New repeat MRI showed a new R internal capsule infarct, and chronic R MCA/ICA occlusions and b/l KWADWO occlusion v stenosis.        Stroke Evaluation Summarized     MRI/Head CT MRI 7/25/22:  New region acute/subacute R posterior limb of internal capsule ischemic infarct, evolving R MCA subacute infarct, equivocal small acute/subacute R thalamus     MRI 7/5/22:   1. Large acute infarct within the right posterior frontal lobe, right parietal lobe, right pre and  postcentral gyrus, right insula, and right corpus callosum anterior body. This acute infarct is predominantly in the right MCA territory with small areas extending into the right KWADWO territory.  2.  No susceptibility on GRE to suggest microhemorrhage.     Head CT 7/5/22:  1.  Rounded focus of hypoattenuation within the right corona radiata//centrum semiovale, new since head CT June 24, 2022. On brain MRI June 25, 2022 there was restricted diffusion within this region, consistent with acute ischemia, however the region of   hypoattenuation and is larger than the region of diffusion restriction which is concerning for progression of infarct. Brain MRI could be used to better characterize         Intracranial Vasculature MRA brain 7/25/22:  Mid-distal R M1 occlusion similar to prior, multifocal b/l KWADWO L>R stenosis v occlusion, chronic R ICA occlusion  HEAD CTA:  1.  Redemonstration of marked narrowing to occlusion the midportion of right M1, similar to prior.   2.  Mild asymmetry in the appearance of MCA distributions with mildly less opacified branches on the right, similar to prior.   3.  Mild atherosclerotic changes cavernous and supraclinoid ICA on the left.   4.  Mildly attenuated opacification of anterior cerebral artery distribution, similar to prior.  5.  Remaining intracranial circulation appears normal.   Cervical Vasculature MRA neck 7/25/22:  Long segment R ICA occlusion, unchanged     NECK CTA:  1.  Redemonstration of occlusion of right ICA in its proximal aspect.  2.  No hemodynamically significant narrowing of the left ICA.  3.  Normal vertebral arteries.      Echocardiogram Normal L atrium size, EF 60-65%   EKG/Telemetry Sinus tachycardia   Otherwise normal ECG   Other Testing Platelet function ASA: 350   (<550 is considered therapeutic for aspirin)  Platelet function P2Y12: 75   (PRU levels less than 180 are specific evidence of an anti-platelet effect)      LDL  6/24/2022: 53 mg/dL   A1C  6/18/2022:  7.8 %   Troponin No lab value available in past 48 hrs         Impression   7/25/22 worsening L weakness, new region acute/subacute R posterior limb of internal capsule ischemic infarct on MRI, MRA showed chronic R M1/ICA occlusions and b/l L>R KWADWO stenosis v occlusion without definite KWADWO territory infarct     7/3/22 Subacute-acute large R MCA/KWADWO territory infarcts in setting of known chronic R cervical ICA occlusion, R M1 occlusion. Interval imaging with some recanalization of R M1. Given this, most likely stroke etiology is artery to artery embolization, although hemodynamic compromise of collateral flow remains a possibility given non-compliance with medications, uncontrolled diabetes/HTN, and cocaine/amphetamine use. NIH 23 on presentation, exam with dense LUE/LLE flaccid hemiplegia, R gaze preferance.    Plan  - Neuro checks and vitals every 8 hours  - SBP goal <220 to allow adequate cerebral perfusion while inpatient. If pt has worsening of L MCA/KWADWO symptoms would lay flat, bolus with 500-1000 ml of NS and see if this helps improve symptoms  - Continue aspirin 325 mg daily indefinitely  - Continue Plavix 75 mg daily for 90 days  - Continue Lipitor 80 mg daily due to severe multivessel stenoses, follow-up with PCP for titration to goal LDL 40-70, <40 increases risk of Intracranial hemorrhage  - Blood glucose monitoring, Hgb A1c goal <7% for secondary stroke prevention, follow up with PCP for tighter control  - Mediterranean diet can be beneficial for overall decreased cardiovascular risk, please print hand out for patient at discharge  - Continue telemetry   - PT/OT/SPT  - Euthermia, euglycemia, eunatremia   - Stroke Education  - Stroke Class per [facility]       Patient Follow-up    - in the next 1-2 week(s) with PCP  - in 6-8 weeks with [facility] [phone] (neurology referral order placed)    No further stroke evaluation is recommended, so we will sign off. Please  "contact us with any additional questions.    SANDY Schmitz, CNP  Vascular Neurology  To page me or covering stroke neurology team member, click here: [facility]COM   Choose \"On Call\" tab at top, then search dropdown box for \"Neurology Adult\", select location, press Enter, then look for stroke/neuro ICU/telestroke.    ______________________________________________________    Clinically Significant Risk Factors Present on Admission                  Medications   Scheduled Meds    acetaminophen  975 mg Per Feeding Tube Q8H     aspirin  300 mg Rectal Daily    Or     aspirin  325 mg Oral or Feeding Tube Daily     atorvastatin  80 mg Oral or Feeding Tube Daily     Baclofen  10 mg Per Feeding Tube TID     cefTRIAXone  1 g Intravenous Q24H     [Held by provider] cetirizine  10 mg Oral or Feeding Tube Daily     clopidogrel  75 mg Oral or Feeding Tube Daily     gadobutrol  10 mL Intravenous Once     insulin aspart  1-12 Units Subcutaneous Q4H     insulin glargine  12 Units Subcutaneous QAM AC     insulin glargine  14 Units Subcutaneous At Bedtime     lisinopril  40 mg Oral Daily     mirtazapine  15 mg Oral or Feeding Tube At Bedtime     pantoprazole  40 mg Oral or Feeding Tube QAM AC     polyethylene glycol  17 g Per G Tube Daily     senna-docusate  2 tablet Oral or Feeding Tube BID       Infusion Meds    dextrose       - MEDICATION INSTRUCTIONS -       - MEDICATION INSTRUCTIONS -         PRN Meds  bisacodyl, dextrose, glucose **OR** dextrose **OR** glucagon, hydrALAZINE, HYDROmorphone, ibuprofen, labetalol, lidocaine 4%, lidocaine (buffered or not buffered), lidocaine visc 2% & maalox/mylanta w/simethicone & diphenhydramine, magnesium hydroxide, - MEDICATION INSTRUCTIONS -, - MEDICATION INSTRUCTIONS -, melatonin, naloxone **OR** naloxone **OR** naloxone **OR** naloxone, ondansetron **OR** ondansetron, oxyCODONE, polyethylene glycol, sodium chloride, sodium chloride (PF)       PHYSICAL EXAMINATION  Temp:  [97.2  F (36.2 "  C)-98.1  F (36.7  C)] 98.1  F (36.7  C)  Pulse:  [] 79  Resp:  [14-20] 14  BP: (154-176)/(90-98) 158/96  SpO2:  [97 %-99 %] 97 %      General Exam  General:  patient lying in bed without any acute distress    HEENT:  normocephalic/atraumatic  Pulmonary:  no respiratory distress    Neuro Exam  Mental Status:  alert, oriented x 3, follows commands, able to answer questions with nodding and writing  Cranial Nerves:  visual fields intact, PERRL, EOMI with normal smooth pursuit, facial sensation intact and symmetric, hearing not formally tested but intact to conversation, only can shrug right shoulder  Motor:  no pronator drift,  LUE 0/5, LLE 1/5 (slight contraction to noxious stimuli), RUE 5/5, RLE 5/5   Reflexes:  toes down-going  Sensory:  Nods head that he has reduced sensation to Left arm and leg and face but is still able to sense being touched, no tactile or spatial neglect  Coordination:  No ataxia/dysmetria out of proportion to weakness  Station/Gait:  deferred    Stroke Scales    NIHSS  1a. Level of Consciousness 0-->Alert, keenly responsive   1b. LOC Questions 2-->Answers neither question correctly   1c. LOC Commands 0-->Performs both tasks correctly   2.   Best Gaze 0-->Normal   3.   Visual 2-->Complete hemianopia   4.   Facial Palsy 0-->Normal symmetrical movements   5a. Motor Arm, Left 4-->No movement   5b. Motor Arm, Right 0-->No drift, limb holds 90 (or 45) degrees for full 10 secs   6a. Motor Leg, Left 3-->No effort against gravity, leg falls to bed immediately   6b. Motor Leg, right 0-->No drift, leg holds 30 degree position for full 5 secs   7.   Limb Ataxia 0-->Absent   8.   Sensory 1-->Mild-to-moderate sensory loss, patient feels pinprick is less sharp or is dull on the affected side, or there is a loss of superficial pain with pinprick, but patient is aware of being touched   9.   Best Language 3-->Mute, global aphasia, no usable speech or auditory comprehension   10. Dysarthria 2-->Severe  dysarthria, patients speech is so slurred as to be unintelligible in the absence of or out of proportion to any dysphasia, or is mute/anarthric   11. Extinction and Inattention  0-->No abnormality   Total 17 (07/27/22 1456)       Modified Roberto Score (Pre-morbid)  2 - Slight disability.  Able to look after own affairs without assistance, but unable to carry out all previous activities.  Modified Roberto Score (Discharge)  5 - Severe disability.  Requires constant nursing care and attention, bedridden.       Imaging  I personally reviewed all imaging; relevant findings per HPI.     Lab Results Data   CBC  Recent Labs   Lab 07/26/22  0837 07/25/22  0832 07/24/22  0741   WBC 10.6 12.3* 12.7*   RBC 3.33* 3.20* 3.16*   HGB 10.1* 9.8* 9.7*   HCT 31.0* 29.6* 30.2*    379 353     Basic Metabolic Panel    Recent Labs   Lab 07/27/22  1411 07/27/22  1022 07/27/22  0914 07/27/22  0817 07/26/22  1036 07/26/22  0837 07/25/22  1706 07/25/22  1548 07/25/22  1309 07/25/22  0832 07/23/22  1036 07/23/22  0959   NA  --   --   --   --   --  141  --   --   --  138  --  138   POTASSIUM  --   --   --  3.9  --  3.7  --  3.7  --  3.3*  --  3.4   CHLORIDE  --   --   --   --   --  106  --   --   --  105  --  108   CO2  --   --   --   --   --  26  --   --   --  28  --  25   BUN  --   --   --   --   --  17  --   --   --  12  --  10   CR  --   --   --   --   --  0.56*  --   --   --  0.51*  --  0.62*   * 170* 157*  --    < > 215*   < >  --    < > 206*   < > 141*   DAT  --   --   --   --   --  9.3  --   --   --  9.3  --  9.1    < > = values in this interval not displayed.     Liver Panel  Recent Labs   Lab 07/26/22  0837   PROTTOTAL 7.3   ALBUMIN 2.7*   BILITOTAL 0.3   ALKPHOS 65   AST 17   ALT 34     INR    Recent Labs   Lab Test 07/22/22  0813 07/19/22  1152 06/24/22  1805   INR 1.18* 1.04 1.08      Lipid Profile    Recent Labs   Lab Test 06/24/22  1805 03/20/22  0558 02/06/22  0617   CHOL 102 93 193   HDL 30* 34* 38*   LDL 53 46  126*   TRIG 95 66 144     A1C    Recent Labs   Lab Test 06/18/22  2150 03/20/22  0558 02/06/22  0617   A1C 7.8* 8.8* 10.5*     Troponin I  No results for input(s): TROPONINIS, TROPONINI, GHTROP in the last 168 hours.     Billing: I have personally spent a total of 65 minutes providing care today, time spent in reviewing medical records and reviewing tests, examining the patient and obtaining history, coordination of care, and discussion with the patient and/or family regarding diagnostic results, prognosis, symptom management, risks and benefits of management options, and development of plan of care. Greater than 50% was spent in counseling and coordination of care.

## 2022-07-27 NOTE — PLAN OF CARE
Goal Outcome Evaluation:  Reason for Admission: Multiple R CVAs     Cognitive/Mentation: A/Ox self. Difficult to assess d/t limited cooperation.  Neuros/CMS: LUE 0-1/5. LLE 0/5. Decreased sensation to L side. R gaze pref. L neglect. Aphasia.  VS: WNL.  GI: BS active, passing flatus, last BM 7/26. Incontinent.  : Cortez for retention.   Pulmonary: LS dim/clear.  Pain: C/O 10/10 L shoulder pain. Tylenol and oxy admin.  Skin: R armpit dressing in place. Mepilex on coccyx. Mouth dry- frequent oral cares.  Activity: Assist x 2 with lift.  Diet: NPO. PEG intact. TF infusing @ goal of 60/hr.      Aggression Stoplight Score: green  Therapies recs: TCU  Discharge: pending placement.     End of shift summary: Patient stable overnight. Able to use pen/notepad and nod yes/no at times. Inconsistent with follows commands/participation in assessment. Pt calls when agreeable to be repositioned.

## 2022-07-28 ENCOUNTER — APPOINTMENT (OUTPATIENT)
Dept: SPEECH THERAPY | Facility: CLINIC | Age: 52
DRG: 065 | End: 2022-07-28
Payer: COMMERCIAL

## 2022-07-28 LAB
GLUCOSE BLDC GLUCOMTR-MCNC: 137 MG/DL (ref 70–99)
GLUCOSE BLDC GLUCOMTR-MCNC: 150 MG/DL (ref 70–99)
GLUCOSE BLDC GLUCOMTR-MCNC: 154 MG/DL (ref 70–99)
GLUCOSE BLDC GLUCOMTR-MCNC: 184 MG/DL (ref 70–99)
GLUCOSE BLDC GLUCOMTR-MCNC: 192 MG/DL (ref 70–99)
GLUCOSE BLDC GLUCOMTR-MCNC: 198 MG/DL (ref 70–99)
POTASSIUM BLD-SCNC: 4 MMOL/L (ref 3.4–5.3)

## 2022-07-28 PROCEDURE — 250N000013 HC RX MED GY IP 250 OP 250 PS 637: Performed by: HOSPITALIST

## 2022-07-28 PROCEDURE — 92526 ORAL FUNCTION THERAPY: CPT | Mod: GN | Performed by: SPEECH-LANGUAGE PATHOLOGIST

## 2022-07-28 PROCEDURE — 250N000013 HC RX MED GY IP 250 OP 250 PS 637: Performed by: INTERNAL MEDICINE

## 2022-07-28 PROCEDURE — 36415 COLL VENOUS BLD VENIPUNCTURE: CPT | Performed by: INTERNAL MEDICINE

## 2022-07-28 PROCEDURE — 250N000011 HC RX IP 250 OP 636: Performed by: HOSPITALIST

## 2022-07-28 PROCEDURE — 99233 SBSQ HOSP IP/OBS HIGH 50: CPT | Performed by: INTERNAL MEDICINE

## 2022-07-28 PROCEDURE — 84132 ASSAY OF SERUM POTASSIUM: CPT | Performed by: INTERNAL MEDICINE

## 2022-07-28 PROCEDURE — 92507 TX SP LANG VOICE COMM INDIV: CPT | Mod: GN | Performed by: SPEECH-LANGUAGE PATHOLOGIST

## 2022-07-28 PROCEDURE — 120N000001 HC R&B MED SURG/OB

## 2022-07-28 RX ADMIN — LISINOPRIL 40 MG: 40 TABLET ORAL at 09:26

## 2022-07-28 RX ADMIN — BACLOFEN 10 MG: 10 TABLET ORAL at 09:26

## 2022-07-28 RX ADMIN — INSULIN ASPART 1 UNITS: 100 INJECTION, SOLUTION INTRAVENOUS; SUBCUTANEOUS at 20:09

## 2022-07-28 RX ADMIN — ACETAMINOPHEN 975 MG: 325 SUSPENSION ORAL at 09:25

## 2022-07-28 RX ADMIN — ASPIRIN 325 MG ORAL TABLET 325 MG: 325 PILL ORAL at 09:26

## 2022-07-28 RX ADMIN — MIRTAZAPINE 15 MG: 15 TABLET, FILM COATED ORAL at 21:32

## 2022-07-28 RX ADMIN — POLYETHYLENE GLYCOL 3350 17 G: 17 POWDER, FOR SOLUTION ORAL at 09:26

## 2022-07-28 RX ADMIN — BACLOFEN 10 MG: 10 TABLET ORAL at 17:14

## 2022-07-28 RX ADMIN — INSULIN ASPART 1 UNITS: 100 INJECTION, SOLUTION INTRAVENOUS; SUBCUTANEOUS at 17:14

## 2022-07-28 RX ADMIN — INSULIN ASPART 3 UNITS: 100 INJECTION, SOLUTION INTRAVENOUS; SUBCUTANEOUS at 03:54

## 2022-07-28 RX ADMIN — SENNOSIDES AND DOCUSATE SODIUM 2 TABLET: 50; 8.6 TABLET ORAL at 09:26

## 2022-07-28 RX ADMIN — BACLOFEN 10 MG: 10 TABLET ORAL at 21:32

## 2022-07-28 RX ADMIN — INSULIN GLARGINE 14 UNITS: 100 INJECTION, SOLUTION SUBCUTANEOUS at 21:32

## 2022-07-28 RX ADMIN — ATORVASTATIN CALCIUM 80 MG: 80 TABLET, FILM COATED ORAL at 09:26

## 2022-07-28 RX ADMIN — INSULIN ASPART 3 UNITS: 100 INJECTION, SOLUTION INTRAVENOUS; SUBCUTANEOUS at 00:26

## 2022-07-28 RX ADMIN — ACETAMINOPHEN 975 MG: 325 SUSPENSION ORAL at 17:14

## 2022-07-28 RX ADMIN — OXYCODONE HYDROCHLORIDE 5 MG: 5 TABLET ORAL at 03:54

## 2022-07-28 RX ADMIN — CEFTRIAXONE SODIUM 1 G: 1 INJECTION, POWDER, FOR SOLUTION INTRAMUSCULAR; INTRAVENOUS at 14:56

## 2022-07-28 RX ADMIN — ACETAMINOPHEN 975 MG: 325 SUSPENSION ORAL at 00:22

## 2022-07-28 RX ADMIN — CLOPIDOGREL BISULFATE 75 MG: 75 TABLET ORAL at 09:26

## 2022-07-28 RX ADMIN — INSULIN ASPART 2 UNITS: 100 INJECTION, SOLUTION INTRAVENOUS; SUBCUTANEOUS at 09:36

## 2022-07-28 RX ADMIN — Medication 40 MG: at 09:36

## 2022-07-28 ASSESSMENT — ACTIVITIES OF DAILY LIVING (ADL)
ADLS_ACUITY_SCORE: 47
ADLS_ACUITY_SCORE: 50
ADLS_ACUITY_SCORE: 50
ADLS_ACUITY_SCORE: 47
ADLS_ACUITY_SCORE: 50
ADLS_ACUITY_SCORE: 47
ADLS_ACUITY_SCORE: 50

## 2022-07-28 NOTE — PROGRESS NOTES
Care Management Follow Up    Length of Stay (days): 24    Expected Discharge Date:       Concerns to be Addressed:       Patient plan of care discussed at interdisciplinary rounds: Yes    Anticipated Discharge Disposition: Home     Anticipated Discharge Services:    Anticipated Discharge DME:      Patient/family educated on Medicare website which has current facility and service quality ratings:    Education Provided on the Discharge Plan:    Patient/Family in Agreement with the Plan:      Referrals Placed by CM/SW:    Private pay costs discussed: Not applicable    Additional Information:   received a call from Heide - director of nursing at [facility] requesting some additional medical information on patient's level of cares etc. Heide stated she is having the business office run his insurance and will reach back out to . If they are able to accept patient, a bed will be available 8/2/22.    Addendum:  received call from Heide in admissions at [facility] stating they are able to accept patient into a TCU/LTC bed. Prior therapy recommendation was for TCU and now LTC and she noted patient will have both available at the facility. She noted they can accept him on 8/2/22 and requested a 10am ride be set up. Writer updated care team.     Writer to call patient's mother tomorrow to update her of this.    Will continue to follow.    ZAKIA Real, LGSW    [facility]

## 2022-07-28 NOTE — PROGRESS NOTES
[facility]    Medicine Progress Note - Hospitalist Service    Date of Admission:  7/3/2022    Assessment & Plan            Multiple ischemic CVAs involving MCA, KWADWO with left-sided weakness and concern for progression  Hx Medication noncompliance  Polysubstance abuse  Initial CVA occurred in 03/2022 with right KWADWO, large territory defect and residual left-sided weakness recommended to discharge to ARU however declined and discharged AMA. Subsequently returned 6/18 with progressive weakness, again referred to TCU however declined and returned home. Hospitalized 6/24 with worsening left-sided weakness, paresthesias, numbness with findings of new nonhemorrhagic infarction within right prefrontal gyrus and deep MCA watershed infarct within the right centrum semiovale of the right frontal and parietal lobes. Patient was evaluated by stroke neurology each admission, most recently felt symptoms were suggestive of new infarct rather than hypoperfused tissue. Recommended liberal SBP goal of 140-160 and DAPT with plavix/ for 90 days. Patient most recently left AMA on 6/30 after being recommended for TCU. At time of discharge, it appears the plavix was discontinued in an effort to limit medications and encourage compliance.   * Returned to ED 7/3/22 with reports of failing at home. It was unclear on admission if patient's left-sided symptoms had persisted or worsened due to presence of polysubstances and limited ability to participate in physical exam. Admitted to cocaine use as recently as 7/2. UTox on admission positive for cocaine, amphetamines.   * 7/4 exam noted with 1/5 LUE/LLE strength, limited ability to communicate verbally but cognitively able to answer yes/no questions with gestures, shakes of head. reported change in function. Repeat CTH revealed rounded focus of hypoattenuation within right corona radiata/centrum semiovale, MRI brain 6/25 indicated restricted diffusion within this  "region however area of hypoattenuation is larger than region of diffusion restriction concerning for progression of infarct.y  - Neuro note 7/6- \"recommend SAMMPRIS trial guided DAPT (+clopidogrel 75 x90 days) in an attempt to stabilize the M1 segment and prevent inferior division infarct. \"   - Continue statin.  - Reconsult speech therapy; G-tube placed on 7/08, TF per [place] otherwise NPO  - Holding tube feeds  given constipation and discomfort at g tube site, 7/20/2022, initiated bowel program, IVF NS D5 while TF on hold  -7/22/2022 denies abdominal pain, nausea, emesis, restart TF slowly at 20 cc an hour with goal at 60 cc an hour [increased by 20 cc an hour/24 hours to goal rate], continue free water per NG.   -7/24/2022 at 4:00 PM today we will resume prior TF goal rate at 60 cc an hour,   - resumed a.m. and p.m. Lantus today; adjust insulin accordingly.  - Px did have change in neuro status 7/25:. No stroke coded needed as no change in management.   Patient is at times not working with therapies and told the provider yesterday just to let him die with his extensive stroke and left-sided weakness and significant aphasia and dysphagia needing PEG tube    Will request palliative care consultation regarding goals of care discussion and possible from family care conference  Discussed with palliative care team with Марина Johnson today      Depression: Currently on Remeron 15 mg at bedtime.  Will reconsult psychiatry 7/27 as he may benefit from adjustment of antidepressants and more family encouragement.    Abdominal pain  Constipation  --increase senna-colace to 2 tabs BID, MiraLAX  -ct protonix IV daily  - No further abdominal pain, needs adherence to bowel program to prevent constipation.  Nursing states BM on current regimen.      Bleeding gums, halitosis  - suspect reflux given degree of constipation. Checked plts, INR - both okay. Will consult OMFS if symptoms continue but pt denies tooth pain.  -Nursing " reports extremely dry mouth, hold Zyrtec, Magic mouthwash, monitor  -7/22/2022 on exam 1 area on left upper incisor old nonactive bleeding consistent with gingivitis, continue oral cares.  Hemoglobin and INR stable.  No further bleeding.     SIRS syndrome  Leukocytosis  On admission Patient with mild leukocytosis 12-13, procalcitonin on admission negative. Low-grade fever and sinus tachycardia persisting today. UA without evidence of infection. CXR neg. ?due to polysubstance abuse with amphetamines, cocaine in UTox.  - Low threshold to obtain blood cultures, initiate broad-spectrum abx   -7/22/2022 WBC increased to 20.3k, afebrile, no signs of localizing infection except for focal gingivitis, fair.    UC=E. coli, Fair exchanged, start Rocephin 5-day.  Later this week trial VT     Type 2 diabetes mellitus  Last HbA1c of 7.8 on 06/18. PTA regimen of metformin. Recent admission Lantus had been increased to 18u BID, carb coverage with meals of 1:10 with breakfast/lunch, 1:15 with dinner.  -Hold PTA metformin.  -While TF held relative hypoglycemia, see above; 7/22/2022 TF to restart however slowly; see #1 on restart TF.    -Restarted on Lantus 12 units in the morning and 14 at night.     History of hypertension   Held PTA meds initially to allow permissive hypertension.  - Resumed PTA lisinopril-hydrochlorothiazide 7/12.  - Goal is normotension per Neurology.  - Blood pressure rising, 150s/90s.  Increase lisinopril; on combo ACEi/HCTZ given dry mouth start on lisinopril only, at increased dose. CR WNL, quyen in AM.   Monitor BP.       Hidradenitis / Right axillary wound  WOCN consult previous recs:   Right axilla wound: Daily  and PRN.  - Cleanse wound with NS or wound cleanser (omit this step if patient cleans wound in shower).  - Dry and protect surrounding skin with no sting barrier film wipe #[id number].  - Apply a small amount of iodesorb gel #[id number] to bandaid.  - If Band-Aid needs to be changed more than once a day.  Apply iodesorb gel #[id number] to adaptic #[id number] and cover with Mepilex #[id number].     Generalized pain  PTA on gabapentin 300 mg 3 times daily.    - Holding gabapentin.   - Continue as needed Tylenol as needed. Avoid opioids.  -Prior hospitalist added PRN ibuprofen.  -No pain complaints.       Diet: NPO per Anesthesia Guidelines for Procedure/Surgery Except for: Meds, Other; Specify: please stop tube feedings  Adult Formula Drip Feeding: Continuous Jevity 1.5; Other - Specify in Comment; Goal Rate: 60; mL/hr; Medication - Feeding Tube Flush Frequency: At least 15-30 mL water before and after medication administration and with tube clogging; Amount to Se...    DVT Prophylaxis: No changes  Cortez Catheter: PRESENT, indication: Retention, Retention  Central Lines: None  Cardiac Monitoring: None  Code Status: Full Code      Disposition Plan         The patient's care was discussed with the Bedside Nurse and Patient palliative care team today.    Nazia Avila MD  Hospitalist Service  [facility]  Securely message with the Vocera Web Console (learn more here)  Text page via "[employer] Paging/Directory         Clinically Significant Risk Factors Present on Admission                      ______________________________________________________________________    Interval History   Continues to be frustrated with the slow progress and inability to eat and drink.  Discussed with bedside RN at times patient is not participating with physical therapy and telling the provider start to just let him die.  He is resting comfortably in bed.  Nonverbal due to severe aphasia from the stroke.  Left-sided hemiparesis persists with left-sided neglect    Data reviewed today: I reviewed all medications, new labs and imaging results over the last 24 hours. I personally reviewed no images or EKG's today.    Physical Exam   /79 (BP Location: Right arm)   Pulse 86   Temp 97.9  F (36.6  C) (Axillary)   Resp 18   Ht 1.676  "jenifer (5' 6\")   Wt 70.3 kg (154 lb 15.7 oz)   SpO2 95%   BMI 25.01 kg/m         Gen- pleasant lying in bed, somnolent  HEENT-expressive aphasia.  Right sided gaze  Neck- supple  CVS- I+II+ no m/r/g  RS- CTAB  Abdo- soft, no tenderness . No g/r/r   Ext- no edema   CNS-left-sided hemiparesis.  Expressive aphasia.  Able to nod and write on paper with right hand      Data   Recent Labs   Lab 07/28/22  1159 07/28/22  0827 07/28/22  0801 07/28/22  0353 07/27/22  0914 07/27/22  0817 07/26/22  1036 07/26/22  0837 07/25/22  1309 07/25/22  0832 07/24/22  1251 07/24/22  0741 07/23/22  1036 07/23/22  0959 07/22/22  1133 07/22/22  0813   WBC  --   --   --   --   --   --   --  10.6  --  12.3*  --  12.7*  --  15.3*  --  20.3*   HGB  --   --   --   --   --   --   --  10.1*  --  9.8*  --  9.7*  --  9.5*  --  10.5*   MCV  --   --   --   --   --   --   --  93  --  93  --  96  --  93  --  96   PLT  --   --   --   --   --   --   --  401  --  379  --  353  --  346  --  365   INR  --   --   --   --   --   --   --   --   --   --   --   --   --   --   --  1.18*   NA  --   --   --   --   --   --   --  141  --  138  --   --   --  138  --  142   POTASSIUM  --  4.0  --   --   --  3.9  --  3.7   < > 3.3*  --   --   --  3.4  --  3.5   CHLORIDE  --   --   --   --   --   --   --  106  --  105  --   --   --  108  --  109   CO2  --   --   --   --   --   --   --  26  --  28  --   --   --  25  --  26   BUN  --   --   --   --   --   --   --  17  --  12  --   --   --  10  --  10   CR  --   --   --   --   --   --   --  0.56*  --  0.51*  --   --   --  0.62*  --  0.68   ANIONGAP  --   --   --   --   --   --   --  9  --  5  --   --   --  5  --  7   DAT  --   --   --   --   --   --   --  9.3  --  9.3  --   --   --  9.1  --  9.3   *  --  184* 198*   < >  --    < > 215*   < > 206*   < >  --    < > 141*   < > 66*   ALBUMIN  --   --   --   --   --   --   --  2.7*  --   --   --   --   --   --   --   --    PROTTOTAL  --   --   --   --   --   --   --  7.3  --  "  --   --   --   --   --   --   --    BILITOTAL  --   --   --   --   --   --   --  0.3  --   --   --   --   --   --   --   --    ALKPHOS  --   --   --   --   --   --   --  65  --   --   --   --   --   --   --   --    ALT  --   --   --   --   --   --   --  34  --   --   --   --   --   --   --   --    AST  --   --   --   --   --   --   --  17  --   --   --   --   --   --   --   --     < > = values in this interval not displayed.

## 2022-07-28 NOTE — PLAN OF CARE
Problem: Feeding Intolerance (Enteral Nutrition)  Goal: Feeding Tolerance  Outcome: Met   Goal Outcome Evaluation:          Overall Patient Progress: improving    Outcome Evaluation: TF resumed on 7/22, back to goal rate on 7/24 and has been tolerating since this time. Recommend continuing TF + FWF as currently ordered & continue bowel regimen for constipation prophylaxis.

## 2022-07-28 NOTE — PROGRESS NOTES
Care Management Follow Up    Length of Stay (days): 24    Expected Discharge Date:       Concerns to be Addressed:       Patient plan of care discussed at interdisciplinary rounds: Yes    Anticipated Discharge Disposition: Home     Anticipated Discharge Services:    Anticipated Discharge DME:      Patient/family educated on Medicare website which has current facility and service quality ratings:    Education Provided on the Discharge Plan:    Patient/Family in Agreement with the Plan:      Referrals Placed by CM/SW:    Private pay costs discussed: Not applicable    Additional Information:  Writer sent additional referrals to the following facilities: [facility], [facility], and [facility] via [facility] for bed availability.     Prior barriers to placement hx susbtance use and leaving AMA/noncompliance. Patient has had a physical decline (Ax2 with lift) and is aphasic/non verbal therefore previous barriers are not applicable to admission to facility. Writer informed facilities of this.     Will continue to follow.    ZAKIA Real, LGSW    [facility]

## 2022-07-28 NOTE — PLAN OF CARE
Pt is here for multiple R CVA's. Hard to assess orientation as pt is non verbal. Pt is Ax2/lift, turn/repo q2. NPO with TF running at goal of 60 with 160cc q4 flushes. Neuros hard to assess, since pt is nonverbal. Pt has L hemiplegia LUE 0/5 and LLE 0/5, R gaze preference, pt not following commands for heel to shin. Cortez and PEG in place, patent. C/o 10/10 shoulder pain, tylenol and baclofen given, pt appears comfortable. Mouth with bleeding gums, oral cares done. Pt scoring green on Aggression Stop Light Tool. Pt has worked with therapies today. Palliative will need to further assess pt along with family. Psych consult also placed. Plan for therapies and consults for now, Discharge pending.

## 2022-07-28 NOTE — PROGRESS NOTES
Palliative consult received for goals of care. Reviewed case with Dr. Avila and discussed with nursing staff. Pt was briefly assessed, aphasic, a bit lethargic. At this time, will recommend a psychiatry consult (order placed) to help determine medical decision making capacity, as well as to assess history of substance use disorder and to assess his mood. This consultation will certainly aid my ability to move forward with further goals of care discussions. Thanks.    SANDY Mcclellan [facility]  Contact information available via [facility] Paging/Directory

## 2022-07-28 NOTE — PLAN OF CARE
Reason for Admission: Multiple R CVA's  Cognitive/Mentation: A/Ox self. Difficult to assess d/t limited cooperation and non-verbal.  Neuros/CMS: LUE 0/5. LLE 0/5. Decreased sensation to L side. R gaze pref. L neglect. Aphasic/Non-verbal.  VS: VSS on RA  GI: BS active, passing flatus, last BM 7/26. Incontinent.  : Cortez for retention, adequate output.    Pulmonary: LS dim/clear.  Pain: C/O 10/10 L shoulder pain. PRN Tylenol and Oxy given with some relief.   Skin: R axillary dressing changes this shift, CDI. Mepilex on coccyx. Mouth dry with bleeding gums- frequent oral cares.  Activity: Assist x 2 with lift.  Diet: NPO. PEG intact. TF infusing @ goal of 60/hr with 120 mL flush q4hr.   Aggression Stoplight Score: Green  Therapies recs: TCU, 7/27 refusing therapies, hospitalist rec scheduling when family is visiting to promote participation  Discharge: pending placement.  End of shift summary: Patient stable overnight. Able to use pen/notepad and nod yes/no at times. Inconsistent with follows commands/participation in assessment. Pt calls when agreeable to be repositioned

## 2022-07-28 NOTE — PROGRESS NOTES
CLINICAL NUTRITION SERVICES - REASSESSMENT NOTE      Future/Additional Recommendations:   Continue TF @ goal  Continue bowel regimen for constipation prophylaxis    Malnutrition: (7/11)  % Weight Loss:  Up to 10% in 6 months (moderate malnutrition)  % Intake:  </= 50% for >/= 5 days (severe malnutrition) - PTA; has been on EN since 7/7  Subcutaneous Fat Loss:  None observed  Muscle Loss:  Clavicle bone region - mild depletion, prominent cheekbones  Fluid Retention:  None noted     Malnutrition Diagnosis: Moderate malnutrition  In Context of:  Acute illness or injury on Chronic illness or disease       EVALUATION OF PROGRESS TOWARD GOALS   Diet:  NPO    Nutrition Support- Enteral:  Type of Feeding Tube: G-tube (7/8)  Enteral Frequency:  Continuous  Enteral Regimen: Jevity 1.5 @ 60 mL/hr   Total Enteral Provisions: 2160 kcal (32 kcal/kg), 92 g PRO (1.4 g/kg), 310 g CHO, 30 g fiber and 1094 mL free water daily   Free Water Flush: 120 mL q 4 hours  Total Fluid (TF + FWF) = 1814 mL/day (27 mL/kg)    Intake/Tolerance:   7/21: TF held d/t constipation and abdominal pain   7/22: TF restarted   7/24: TF back to goal rate 60 mL/hr     Has been tolerating TF at goal over the past 4 days.       ASSESSED NUTRITION NEEDS:  Dosing Weight (7/11) 66.3 kg  Estimated Energy Needs: 2666-6202 kcals (25-30 Kcal/Kg)  Justification: maintenance  Estimated Protein Needs:  grams protein (1.2-1.5 g pro/Kg)  Justification: preservation of lean body mass  Estimated Fluid Needs: 1 mL/Kcal for maintenance       NEW FINDINGS:   - Labs: Reviewed.   Recent Labs   Lab 07/28/22  0353 07/28/22  0023 07/27/22  2132 07/27/22  1728 07/27/22  1411 07/27/22  1022   * 192* 195* 168* 154* 170*     - Meds: high sliding scale insulin, 12 units lantus q AM, 14 units lantus at HS, miralax daily and senna BID   - GI: BM x 1-2 daily over the past 5 days on scheduled bowel regimen   - Weight: Appears to be trending up. UBW ~ 150 lb prior to admission.      Date/Time Weight Weight Method   07/20/22 0647 70.3 kg (154 lb 15.7 oz) Bed scale   07/19/22 0616 70.4 kg (155 lb 3.2 oz) Bed scale   07/13/22 0542 66.5 kg (146 lb 9.6 oz) Bed scale   07/11/22 0233 66.3 kg (146 lb 3.2 oz) Bed scale   07/10/22 0300 67.3 kg (148 lb 6.4 oz) Bed scale   07/08/22 2129 64.1 kg (141 lb 4.8 oz) --   07/08/22 2001 64.1 kg (141 lb 4.8 oz) --       Previous Goals (7/21):   1. TF re-start and advancement towards goal within 1-2 days.  Evaluation: Met    2. TF to meet % estimated needs.  Evaluation: Met - has been meeting over the past 4 days     Previous Nutrition Diagnosis:   Inadequate protein-energy intake related to interruptions with TF secondary to abdominal pain and constipation, swallowing difficulty as evidenced by NPO status and TF interruptions over past 3 days and currently on hold  Evaluation: Improving      CURRENT NUTRITION DIAGNOSIS  No nutrition diagnosis identified at this time while meeting 100% nutrition needs at goal TF rate     INTERVENTIONS  Recommendations / Nutrition Prescription  Continue TF @ goal  Continue bowel regimen for constipation prophylaxis     Implementation  No new interventions at this time     Goals  TF to meet % estimated needs       MONITORING AND EVALUATION:  Progress towards goals will be monitored and evaluated per protocol and Practice Guidelines      Pretty Ayala RD, LD  Gen Surg, Neuro & 88 RD Pager: [phone]

## 2022-07-29 ENCOUNTER — APPOINTMENT (OUTPATIENT)
Dept: SPEECH THERAPY | Facility: CLINIC | Age: 52
DRG: 065 | End: 2022-07-29
Payer: COMMERCIAL

## 2022-07-29 LAB
GLUCOSE BLDC GLUCOMTR-MCNC: 155 MG/DL (ref 70–99)
GLUCOSE BLDC GLUCOMTR-MCNC: 169 MG/DL (ref 70–99)
GLUCOSE BLDC GLUCOMTR-MCNC: 175 MG/DL (ref 70–99)
GLUCOSE BLDC GLUCOMTR-MCNC: 192 MG/DL (ref 70–99)
GLUCOSE BLDC GLUCOMTR-MCNC: 214 MG/DL (ref 70–99)
GLUCOSE BLDC GLUCOMTR-MCNC: 219 MG/DL (ref 70–99)
GLUCOSE BLDC GLUCOMTR-MCNC: 225 MG/DL (ref 70–99)
POTASSIUM BLD-SCNC: 4 MMOL/L (ref 3.4–5.3)

## 2022-07-29 PROCEDURE — 120N000001 HC R&B MED SURG/OB

## 2022-07-29 PROCEDURE — 99232 SBSQ HOSP IP/OBS MODERATE 35: CPT | Performed by: INTERNAL MEDICINE

## 2022-07-29 PROCEDURE — 250N000013 HC RX MED GY IP 250 OP 250 PS 637: Performed by: INTERNAL MEDICINE

## 2022-07-29 PROCEDURE — 250N000013 HC RX MED GY IP 250 OP 250 PS 637: Performed by: HOSPITALIST

## 2022-07-29 PROCEDURE — 84132 ASSAY OF SERUM POTASSIUM: CPT | Performed by: INTERNAL MEDICINE

## 2022-07-29 PROCEDURE — 36415 COLL VENOUS BLD VENIPUNCTURE: CPT | Performed by: INTERNAL MEDICINE

## 2022-07-29 PROCEDURE — 92507 TX SP LANG VOICE COMM INDIV: CPT | Mod: GN | Performed by: SPEECH-LANGUAGE PATHOLOGIST

## 2022-07-29 PROCEDURE — 92526 ORAL FUNCTION THERAPY: CPT | Mod: GN | Performed by: SPEECH-LANGUAGE PATHOLOGIST

## 2022-07-29 PROCEDURE — 250N000012 HC RX MED GY IP 250 OP 636 PS 637: Performed by: HOSPITALIST

## 2022-07-29 RX ORDER — AMLODIPINE BESYLATE 2.5 MG/1
2.5 TABLET ORAL AT BEDTIME
Status: DISCONTINUED | OUTPATIENT
Start: 2022-07-29 | End: 2022-08-02 | Stop reason: HOSPADM

## 2022-07-29 RX ADMIN — MIRTAZAPINE 15 MG: 15 TABLET, FILM COATED ORAL at 22:06

## 2022-07-29 RX ADMIN — Medication 40 MG: at 08:30

## 2022-07-29 RX ADMIN — AMLODIPINE BESYLATE 2.5 MG: 2.5 TABLET ORAL at 22:06

## 2022-07-29 RX ADMIN — INSULIN GLARGINE 14 UNITS: 100 INJECTION, SOLUTION SUBCUTANEOUS at 22:49

## 2022-07-29 RX ADMIN — INSULIN ASPART 2 UNITS: 100 INJECTION, SOLUTION INTRAVENOUS; SUBCUTANEOUS at 16:34

## 2022-07-29 RX ADMIN — ATORVASTATIN CALCIUM 80 MG: 80 TABLET, FILM COATED ORAL at 08:30

## 2022-07-29 RX ADMIN — ACETAMINOPHEN 975 MG: 325 SUSPENSION ORAL at 16:36

## 2022-07-29 RX ADMIN — POLYETHYLENE GLYCOL 3350 17 G: 17 POWDER, FOR SOLUTION ORAL at 08:24

## 2022-07-29 RX ADMIN — ACETAMINOPHEN 975 MG: 325 SUSPENSION ORAL at 00:29

## 2022-07-29 RX ADMIN — ASPIRIN 325 MG ORAL TABLET 325 MG: 325 PILL ORAL at 08:20

## 2022-07-29 RX ADMIN — INSULIN ASPART 2 UNITS: 100 INJECTION, SOLUTION INTRAVENOUS; SUBCUTANEOUS at 04:48

## 2022-07-29 RX ADMIN — ACETAMINOPHEN 975 MG: 325 SUSPENSION ORAL at 08:29

## 2022-07-29 RX ADMIN — CLOPIDOGREL BISULFATE 75 MG: 75 TABLET ORAL at 08:30

## 2022-07-29 RX ADMIN — INSULIN ASPART 4 UNITS: 100 INJECTION, SOLUTION INTRAVENOUS; SUBCUTANEOUS at 00:29

## 2022-07-29 RX ADMIN — BACLOFEN 10 MG: 10 TABLET ORAL at 08:30

## 2022-07-29 RX ADMIN — INSULIN ASPART 3 UNITS: 100 INJECTION, SOLUTION INTRAVENOUS; SUBCUTANEOUS at 08:36

## 2022-07-29 RX ADMIN — INSULIN ASPART 3 UNITS: 100 INJECTION, SOLUTION INTRAVENOUS; SUBCUTANEOUS at 12:23

## 2022-07-29 RX ADMIN — LISINOPRIL 40 MG: 40 TABLET ORAL at 08:30

## 2022-07-29 RX ADMIN — SENNOSIDES AND DOCUSATE SODIUM 2 TABLET: 50; 8.6 TABLET ORAL at 08:21

## 2022-07-29 RX ADMIN — SENNOSIDES AND DOCUSATE SODIUM 2 TABLET: 50; 8.6 TABLET ORAL at 22:06

## 2022-07-29 RX ADMIN — INSULIN ASPART 4 UNITS: 100 INJECTION, SOLUTION INTRAVENOUS; SUBCUTANEOUS at 22:04

## 2022-07-29 ASSESSMENT — ACTIVITIES OF DAILY LIVING (ADL)
ADLS_ACUITY_SCORE: 58
ADLS_ACUITY_SCORE: 54
ADLS_ACUITY_SCORE: 54
ADLS_ACUITY_SCORE: 58
ADLS_ACUITY_SCORE: 54
ADLS_ACUITY_SCORE: 50
ADLS_ACUITY_SCORE: 54
ADLS_ACUITY_SCORE: 50
ADLS_ACUITY_SCORE: 50
ADLS_ACUITY_SCORE: 58
ADLS_ACUITY_SCORE: 50
ADLS_ACUITY_SCORE: 50

## 2022-07-29 NOTE — PROGRESS NOTES
Brief palliative note. Note SW documentation that placement has been procured, planning discharge on Tuesday 8/2. After further deliberation with the palliative team and chart review, my recommendations include:    1. Dr. Avila expresses concern for his wakefulness. She will look at medications and adjust accordingly (currently receiving baclofen, remeron).   2. I think it is best to move forward with planned placement. Orion has historically disliked being in the hospital, given history of leaving AMA. It is possible that his wakefulness, mood, engagement, participation in therapy will improve with simply moving to another facility and being out of the hospital.  3. Will cancel psych consult for now, but recommend further psychiatric evaluation at his facility. Would recommend further exploration of his capacity to make complex medical decisions (this may fluctuate or change as he discharges from the hospital and stabilizes/improves from his acute medical issues). He would also strongly benefit from counseling support in the setting of his known polysubstance use disorder and within his new health challenges/disability   4. Orion making statements about not wanting to live like this appears to be a sign of existential distress. This should be further explored at his facility   5. Pending further exploration of his capacity to make complex medical decisions, if unable to make these decisions for himself, guardianship may need to be explored (this cannot be done inpatient)     After further discussion with Dr. Avila, will cancel our consult. Goals of care for placement appears clear.     Thanks,  SANDY Mcclellan [facility]  Contact information available via [facility] Paging/Directory

## 2022-07-29 NOTE — PROGRESS NOTES
Reason for Admission: Multiple R CVA's  Cognitive/Mentation: A/Ox self. Difficult to assess d/t being Nonverbal  Neuros/CMS: LUE 0/5. LLE 0/5. Decreased sensation to L side. L neglect. Aphasic/Non-verbal.  VS: VSS on RA  GI: BS active, passing flatus, had  BMx1. Incontinent.  : patient had small amount of urine after Cortez was removed, continue to Bladder scan.    Pulmonary: LS diminished  Pain:  No c/o pain   Skin: R axilla dressing  CDI. Mepilex on coccyx. Mouth dry with bleeding gums- frequent oral cares.  Activity: Assist x 2 with lift. Repo Q2 hrs.  Diet: NPO. PEG intact. TF infusing @ goal of 60/hr with 120 mL flush q4hr.   Aggression Stoplight Score: Green    Discharge: Pending placement  End of shift summary:  Able to use pen/notepad and nod yes/no at times. Palliative consult. Plan : TCU possibly on Tuesday

## 2022-07-29 NOTE — CONSULTS
Triage and Transition - Consult and Liaison     Josue Parisi  July 29, 2022    Psychiatry consult acknowledged.  Note from Priscilla Johnson discontinued psych consult on 7/29/22.     Please re-assess in the future as needed. In person  psychiatric consults resume Monday 8/1/22.         UMANG BRAVO MSW, [facility]  Triage and Transition - Consult and Liaison   [phone]

## 2022-07-29 NOTE — CARE PLAN
[id number]: Seems alert but unable to get orientation questions due to being non verbal. Neuros Left side completely 0/5 strength, decreases sensation on the lest side, aphasic/non verbal, and decreased sensation on left, pt does not cooperate with all neuro check. Turn and repo q2. Diego patent. TF running at goal rate. Meds given through peg. Plan to continue to monitor tonight, discharge seems to be set for Tuesday.

## 2022-07-29 NOTE — PLAN OF CARE
Reason for Admission: Multiple R CVA's  Cognitive/Mentation: A/Ox self. Difficult to assess d/t limited cooperation.  Neuros/CMS: LUE 0/5. LLE 0/5. Decreased sensation to L side. R gaze pref. L neglect. Aphasic/Non-verbal.  VS: VSS on RA  GI: BS active, passing flatus, last BM 7/29. Incontinent.  : Cortez for retention, good output.    Pulmonary: LS dim/clear  Pain: Shoulder pain, improved with scheduled Tylenol and Baclofen.   Skin: R axilla dressing changed this shift, CDI. Mepilex on coccyx. Mouth dry with bleeding gums- frequent oral cares.  Activity: Assist x 2 with lift.  Diet: NPO. PEG intact. TF infusing @ goal of 60/hr with 120 mL flush q4hr.   Aggression Stoplight Score: Green  Therapies recs: TCU, hospitalist rec scheduling when family is visiting to promote participation.   Discharge: Pending placement  End of shift summary: Patient stable overnight. Able to use pen/notepad and nod yes/no at times. Palliative consult done, plan for psych consult to determine medical decision making capacity.

## 2022-07-29 NOTE — PROGRESS NOTES
[facility]    Medicine Progress Note - Hospitalist Service    Date of Admission:  7/3/2022    Assessment & Plan            Multiple ischemic CVAs involving MCA, KWADWO with left-sided weakness and concern for progression  Hx Medication noncompliance  Polysubstance abuse  Initial CVA occurred in 03/2022 with right KWADWO, large territory defect and residual left-sided weakness recommended to discharge to ARU however declined and discharged AMA. Subsequently returned 6/18 with progressive weakness, again referred to TCU however declined and returned home. Hospitalized 6/24 with worsening left-sided weakness, paresthesias, numbness with findings of new nonhemorrhagic infarction within right prefrontal gyrus and deep MCA watershed infarct within the right centrum semiovale of the right frontal and parietal lobes. Patient was evaluated by stroke neurology each admission, most recently felt symptoms were suggestive of new infarct rather than hypoperfused tissue. Recommended liberal SBP goal of 140-160 and DAPT with plavix/ for 90 days. Patient most recently left AMA on 6/30 after being recommended for TCU. At time of discharge, it appears the plavix was discontinued in an effort to limit medications and encourage compliance.   * Returned to ED 7/3/22 with reports of failing at home. It was unclear on admission if patient's left-sided symptoms had persisted or worsened due to presence of polysubstances and limited ability to participate in physical exam. Admitted to cocaine use as recently as 7/2. UTox on admission positive for cocaine, amphetamines.   * 7/4 exam noted with 1/5 LUE/LLE strength, limited ability to communicate verbally but cognitively able to answer yes/no questions with gestures, shakes of head. reported change in function. Repeat CTH revealed rounded focus of hypoattenuation within right corona radiata/centrum semiovale, MRI brain 6/25 indicated restricted diffusion within this  "region however area of hypoattenuation is larger than region of diffusion restriction concerning for progression of infarct.y  - Neuro note 7/6- \"recommend SAMMPRIS trial guided DAPT (+clopidogrel 75 x90 days) in an attempt to stabilize the M1 segment and prevent inferior division infarct. \"   - Continue statin.  - Reconsult speech therapy; G-tube placed on 7/08, TF per [place] otherwise NPO  - Holding tube feeds  given constipation and discomfort at g tube site, 7/20/2022, initiated bowel program, IVF NS D5 while TF on hold  -7/22/2022 denies abdominal pain, nausea, emesis, restart TF slowly at 20 cc an hour with goal at 60 cc an hour [increased by 20 cc an hour/24 hours to goal rate], continue free water per NG.   -7/24/2022 at 4:00 PM today we will resume prior TF goal rate at 60 cc an hour,   - resumed a.m. and p.m. Lantus today; adjust insulin accordingly.  Stroke team was contacted 7/25/22 as new RN was coming on shift and noticed that there was 0/5 strength to b/l upper and lower extremities. Unsure on acuity but had been documented by prior nursing team of 1-2/5 strength to LLE. New repeat MRI showed a new R internal capsule infarct, and chronic R MCA/ICA occlusions and b/l KWADWO occlusion v stenosis.     Palliative care consultation requested for possible goals of care discussion as patient was not participating with therapies at times.  And was making comments that at times please let me die.  Felt like this is most likely situational in the setting of hospitalization as he does not like staying in the hospital and left AMA multiple times before this hospitalization.  Also had extensive stroke with dense hemiplegia of the left side currently and new onset severe aphasia and dysphagia needing PEG tube contributing to these comments.  Palliative care recommended ongoing counseling therapy at his care facility after discharge about his situation and illness and for underlying polysubstance " abuse.       Depression: Currently on Remeron 15 mg at bedtime.  Will reconsult psychiatry 7/27 as he may benefit from adjustment of antidepressants and more family encouragement.    Abdominal pain  Constipation  --increase senna-colace to 2 tabs BID, MiraLAX  -ct protonix IV daily  - No further abdominal pain, needs adherence to bowel program to prevent constipation.  Nursing states BM on current regimen.      Bleeding gums, halitosis  - suspect reflux given degree of constipation. Checked plts, INR - both okay. Will consult OMFS if symptoms continue but pt denies tooth pain.  -Nursing reports extremely dry mouth, hold Zyrtec, Magic mouthwash, monitor  -7/22/2022 on exam 1 area on left upper incisor old nonactive bleeding consistent with gingivitis, continue oral cares.  Hemoglobin and INR stable.  No further bleeding.     SIRS syndrome  Leukocytosis  UTI secondary to indwelling fair   On admission Patient with mild leukocytosis 12-13, procalcitonin on admission negative. Low-grade fever and sinus tachycardia persisting today. UA without evidence of infection. CXR neg. ?due to polysubstance abuse with amphetamines, cocaine in UTox.  - Low threshold to obtain blood cultures, initiate broad-spectrum abx   -7/22/2022 WBC increased to 20.3k, afebrile, no signs of localizing infection except for focal gingivitis, afir.    UC=E. coli, Fair exchanged, start Rocephin 5-day.  Later this week trial VT     Type 2 diabetes mellitus  Last HbA1c of 7.8 on 06/18. PTA regimen of metformin. Recent admission Lantus had been increased to 18u BID, carb coverage with meals of 1:10 with breakfast/lunch, 1:15 with dinner.  -Hold PTA metformin.  -While TF held relative hypoglycemia, see above; 7/22/2022 TF to restart however slowly; see #1 on restart TF.    -Restarted on Lantus 12 units in the morning and 14 at night.     History of hypertension   Held PTA meds initially to allow permissive hypertension.  - Resumed PTA  lisinopril-hydrochlorothiazide 7/12.  - Goal is normotension per Neurology.  - Blood pressure rising, 150s/90s.  Increase lisinopril; on combo ACEi/HCTZ given dry mouth start on lisinopril only, at increased dose. CR WNL, quyen in AM.   Monitor BP.       Hidradenitis / Right axillary wound  WOCN consult previous recs:   Right axilla wound: Daily  and PRN.  - Cleanse wound with NS or wound cleanser (omit this step if patient cleans wound in shower).  - Dry and protect surrounding skin with no sting barrier film wipe #[id number].  - Apply a small amount of iodesorb gel #[id number] to bandaid.  - If Band-Aid needs to be changed more than once a day. Apply iodesorb gel #[id number] to adaptic #[id number] and cover with Mepilex #[id number].     Generalized pain  PTA on gabapentin 300 mg 3 times daily.    - Holding gabapentin.   - Continue as needed Tylenol as needed. Avoid opioids.  -Prior hospitalist added PRN ibuprofen.  -No pain complaints.       Diet: NPO per Anesthesia Guidelines for Procedure/Surgery Except for: Meds, Other; Specify: please stop tube feedings  Adult Formula Drip Feeding: Continuous Jevity 1.5; Other - Specify in Comment; Goal Rate: 60; mL/hr; Medication - Feeding Tube Flush Frequency: At least 15-30 mL water before and after medication administration and with tube clogging; Amount to Se...    DVT Prophylaxis: No changes  Cortez Catheter: PRESENT, indication: Retention, Retention  Central Lines: None  Cardiac Monitoring: None  Code Status: Full Code      Disposition Plan         The patient's care was discussed with the Bedside Nurse and Patient palliative care team today.    Planning on discharge most likely on August 2 with a TCU bed availability as per social work    Nazia Avila MD  Hospitalist Service  [facility]  Securely message with the Vocera Web Console (learn more here)  Text page via [employer] Paging/Directory         Clinically Significant Risk Factors Present on Admission                "       ______________________________________________________________________    Interval History   Continues to be frustrated with the slow progress and inability to eat and drink.  Patient is resting comfortably in bed.  No acute events in the last 24 hours    Data reviewed today: I reviewed all medications, new labs and imaging results over the last 24 hours. I personally reviewed no images or EKG's today.    Physical Exam   BP (!) 164/97 (BP Location: Right arm)   Pulse 98   Temp 97.3  F (36.3  C) (Axillary)   Resp 18   Ht 1.676 m (5' 6\")   Wt 64.4 kg (141 lb 15.6 oz)   SpO2 97%   BMI 22.92 kg/m         Gen- pleasant lying in bed, somnolent  HEENT-expressive aphasia.  Right sided gaze  Neck- supple  CVS- I+II+ no m/r/g  RS- CTAB  Abdo- soft, no tenderness . No g/r/r   Ext- no edema   CNS-left-sided hemiparesis.  Expressive aphasia.  Able to nod and write on paper with right hand      Data   Recent Labs   Lab 07/29/22  1008 07/29/22  0835 07/29/22  0425 07/29/22  0222 07/28/22  1159 07/28/22  0827 07/27/22  0914 07/27/22  0817 07/26/22  1036 07/26/22  0837 07/25/22  1309 07/25/22  0832 07/24/22  1251 07/24/22  0741 07/23/22  1036 07/23/22  0959   WBC  --   --   --   --   --   --   --   --   --  10.6  --  12.3*  --  12.7*  --  15.3*   HGB  --   --   --   --   --   --   --   --   --  10.1*  --  9.8*  --  9.7*  --  9.5*   MCV  --   --   --   --   --   --   --   --   --  93  --  93  --  96  --  93   PLT  --   --   --   --   --   --   --   --   --  401  --  379  --  353  --  346   NA  --   --   --   --   --   --   --   --   --  141  --  138  --   --   --  138   POTASSIUM 4.0  --   --   --   --  4.0  --  3.9  --  3.7   < > 3.3*  --   --   --  3.4   CHLORIDE  --   --   --   --   --   --   --   --   --  106  --  105  --   --   --  108   CO2  --   --   --   --   --   --   --   --   --  26  --  28  --   --   --  25   BUN  --   --   --   --   --   --   --   --   --  17  --  12  --   --   --  10   CR  --   --   --   " --   --   --   --   --   --  0.56*  --  0.51*  --   --   --  0.62*   ANIONGAP  --   --   --   --   --   --   --   --   --  9  --  5  --   --   --  5   DAT  --   --   --   --   --   --   --   --   --  9.3  --  9.3  --   --   --  9.1   GLC  --  192* 175* 155*   < >  --    < >  --    < > 215*   < > 206*   < >  --    < > 141*   ALBUMIN  --   --   --   --   --   --   --   --   --  2.7*  --   --   --   --   --   --    PROTTOTAL  --   --   --   --   --   --   --   --   --  7.3  --   --   --   --   --   --    BILITOTAL  --   --   --   --   --   --   --   --   --  0.3  --   --   --   --   --   --    ALKPHOS  --   --   --   --   --   --   --   --   --  65  --   --   --   --   --   --    ALT  --   --   --   --   --   --   --   --   --  34  --   --   --   --   --   --    AST  --   --   --   --   --   --   --   --   --  17  --   --   --   --   --   --     < > = values in this interval not displayed.

## 2022-07-29 NOTE — PROGRESS NOTES
"Care Management Follow Up    Length of Stay (days): 25    Expected Discharge Date:       Concerns to be Addressed:       Patient plan of care discussed at interdisciplinary rounds: Yes    Anticipated Discharge Disposition: LTC   Anticipated Discharge Services:    Anticipated Discharge DME:      Patient/family educated on Medicare website which has current facility and service quality ratings:    Education Provided on the Discharge Plan:  Yes  ELMA called pt's mother, Flory, regarding anticipated discharge of pt on 8/2/22 to [facility].  SW notified late yesterday that they have accepted pt for admission.  Address of [place] given to pt's mother who is currently out of town but anticipates being back when pt discharges.  She states that she used to live in [facility] and is familiar with it.    Patient/Family in Agreement with the Plan:yes      Referrals Placed by CM/SW:  Stretcher transportation orders through [facility] for Tuesday 8/2/22 at 10:00am  Private pay costs discussed:     Additional Information:  ELMA called Danie at [facility] and scheduled a 10:00am ride for Tuesday 8/2/22. PCS form completed and placed on pt chart.  \"Good marty estimate\" sent to this SW and placed on pt chart. This is required for any transport scheduled over 3 days out.  Pt seen by palliative and psych consult ordered.      MAYANK Morris  [facility]  [phone]    ,.  "

## 2022-07-29 NOTE — PROGRESS NOTES
"SPIRITUAL HEALTH SERVICES Progress Note  [facility]  75    Met with pt Orion for a second visit today. Per RN suggestion I brought a note pad and we communicated by me talking and Orion writing his response. After each back and forth I read what Orion wrote out loud so he could confirm I understood what he was writing.     Distress - Orion spent most of the visit describing several distresses he is experiencing. He named mortality concerns related to poor quality of life writing statements such as \"I need to get out of here\" \"[place] for to get out of here\" and \"I ask God to let me die.\"  Orion also shared \"I don't want food and drink any more\" several times and gestured to his feed tube.   I asked Orion if he had shared these feelings and wishes of wanting to die with his family and he responded \"my family can't do nothing\"    Meaning-Making - During the visit Orion shared that he grew up Yazidi but declined an offer to have a passage of Qur'an read out loud or a recording of it being recited played for him at this time.     Plan -  I offered spiritual and emotional support through reflective listening that affirmed the emotions and the wishes Orion was naming. After the visit I spoke with the charge RN and shared the distresses Orion spoke about during the visit.     NEVIN spears   Intern  Phone: [phone]     "

## 2022-07-30 ENCOUNTER — APPOINTMENT (OUTPATIENT)
Dept: SPEECH THERAPY | Facility: CLINIC | Age: 52
DRG: 065 | End: 2022-07-30
Payer: COMMERCIAL

## 2022-07-30 LAB
GLUCOSE BLDC GLUCOMTR-MCNC: 156 MG/DL (ref 70–99)
GLUCOSE BLDC GLUCOMTR-MCNC: 167 MG/DL (ref 70–99)
GLUCOSE BLDC GLUCOMTR-MCNC: 199 MG/DL (ref 70–99)
GLUCOSE BLDC GLUCOMTR-MCNC: 220 MG/DL (ref 70–99)
GLUCOSE BLDC GLUCOMTR-MCNC: 224 MG/DL (ref 70–99)
GLUCOSE BLDC GLUCOMTR-MCNC: 226 MG/DL (ref 70–99)
GLUCOSE BLDC GLUCOMTR-MCNC: 252 MG/DL (ref 70–99)

## 2022-07-30 PROCEDURE — 92507 TX SP LANG VOICE COMM INDIV: CPT | Mod: GN | Performed by: SPEECH-LANGUAGE PATHOLOGIST

## 2022-07-30 PROCEDURE — 250N000013 HC RX MED GY IP 250 OP 250 PS 637: Performed by: INTERNAL MEDICINE

## 2022-07-30 PROCEDURE — 250N000013 HC RX MED GY IP 250 OP 250 PS 637: Performed by: HOSPITALIST

## 2022-07-30 PROCEDURE — 99232 SBSQ HOSP IP/OBS MODERATE 35: CPT | Performed by: INTERNAL MEDICINE

## 2022-07-30 PROCEDURE — 120N000001 HC R&B MED SURG/OB

## 2022-07-30 PROCEDURE — 92526 ORAL FUNCTION THERAPY: CPT | Mod: GN | Performed by: SPEECH-LANGUAGE PATHOLOGIST

## 2022-07-30 RX ORDER — PAROXETINE 10 MG/5ML
10 SUSPENSION ORAL DAILY
Status: DISCONTINUED | OUTPATIENT
Start: 2022-07-30 | End: 2022-08-02 | Stop reason: HOSPADM

## 2022-07-30 RX ADMIN — Medication 40 MG: at 09:30

## 2022-07-30 RX ADMIN — INSULIN ASPART 1 UNITS: 100 INJECTION, SOLUTION INTRAVENOUS; SUBCUTANEOUS at 01:23

## 2022-07-30 RX ADMIN — LISINOPRIL 40 MG: 40 TABLET ORAL at 09:30

## 2022-07-30 RX ADMIN — ASPIRIN 325 MG ORAL TABLET 325 MG: 325 PILL ORAL at 09:30

## 2022-07-30 RX ADMIN — INSULIN ASPART 2 UNITS: 100 INJECTION, SOLUTION INTRAVENOUS; SUBCUTANEOUS at 16:27

## 2022-07-30 RX ADMIN — INSULIN ASPART 4 UNITS: 100 INJECTION, SOLUTION INTRAVENOUS; SUBCUTANEOUS at 05:03

## 2022-07-30 RX ADMIN — ACETAMINOPHEN 975 MG: 325 SUSPENSION ORAL at 01:26

## 2022-07-30 RX ADMIN — INSULIN GLARGINE 14 UNITS: 100 INJECTION, SOLUTION SUBCUTANEOUS at 21:38

## 2022-07-30 RX ADMIN — CLOPIDOGREL BISULFATE 75 MG: 75 TABLET ORAL at 09:30

## 2022-07-30 RX ADMIN — MIRTAZAPINE 15 MG: 15 TABLET, FILM COATED ORAL at 21:38

## 2022-07-30 RX ADMIN — INSULIN ASPART 4 UNITS: 100 INJECTION, SOLUTION INTRAVENOUS; SUBCUTANEOUS at 23:03

## 2022-07-30 RX ADMIN — INSULIN ASPART 5 UNITS: 100 INJECTION, SOLUTION INTRAVENOUS; SUBCUTANEOUS at 12:33

## 2022-07-30 RX ADMIN — INSULIN ASPART 3 UNITS: 100 INJECTION, SOLUTION INTRAVENOUS; SUBCUTANEOUS at 09:32

## 2022-07-30 RX ADMIN — ATORVASTATIN CALCIUM 80 MG: 80 TABLET, FILM COATED ORAL at 09:30

## 2022-07-30 RX ADMIN — PAROXETINE HYDROCHLORIDE 10 MG: 10 SUSPENSION ORAL at 15:13

## 2022-07-30 RX ADMIN — IBUPROFEN 400 MG: 200 SUSPENSION ORAL at 17:56

## 2022-07-30 RX ADMIN — INSULIN ASPART 4 UNITS: 100 INJECTION, SOLUTION INTRAVENOUS; SUBCUTANEOUS at 21:38

## 2022-07-30 RX ADMIN — ACETAMINOPHEN 975 MG: 325 SUSPENSION ORAL at 16:27

## 2022-07-30 RX ADMIN — AMLODIPINE BESYLATE 2.5 MG: 2.5 TABLET ORAL at 21:38

## 2022-07-30 RX ADMIN — ACETAMINOPHEN 975 MG: 325 SUSPENSION ORAL at 09:30

## 2022-07-30 ASSESSMENT — ACTIVITIES OF DAILY LIVING (ADL)
ADLS_ACUITY_SCORE: 58
ADLS_ACUITY_SCORE: 56
ADLS_ACUITY_SCORE: 56
ADLS_ACUITY_SCORE: 58
ADLS_ACUITY_SCORE: 56
ADLS_ACUITY_SCORE: 52
ADLS_ACUITY_SCORE: 56
ADLS_ACUITY_SCORE: 56

## 2022-07-30 NOTE — CARE PLAN
Alert but unable to assess orientation due to nonverbal. Neuros Left side completely 0/5 strength, decreases sensation on the lest side, aphasic/non verbal, and decreased sensation on left, pt does not cooperate with all neuro check. Turn and repo q2. Incontinent of bladder. TF running at goal rate. Paxil restarted today. Pain in shoulder scheduled tylenol and PRN ibuprofen given x1. Plan to continue to monitor tonight, likely discharge on Tuesday.

## 2022-07-30 NOTE — PLAN OF CARE
Goal Outcome Evaluation:      william for Admission: Multiple R CVA's  Cognitive/Mentation: A/Ox self.   Neuros/CMS: LUE 1/5. LLE 0/5. Decreased sensation to L side. R gaze preference. L-neglect. Aphasic/Non-verbal/ Nods Yes/No.  VS: VSS on RA  GI: BS active / Incontinent.  : Bladder scan at 247ml.   Pulmonary: LS diminished/clear  Pain: Shoulder pain, improved with scheduled Tylenol.   Skin: R axilla dressing changed this shift, CDI.   Activity: Assist x 2 with lift.  Diet: NPO. PEG intact. TF infusing @ goal of 60/hr with 120 mL flush q4hr.   Aggression Stoplight Score: Green  Therapies recs: TCU.

## 2022-07-30 NOTE — PROGRESS NOTES
[facility]    Medicine Progress Note - Hospitalist Service    Date of Admission:  7/3/2022    Assessment & Plan            Multiple ischemic CVAs involving MCA, KWADWO with left-sided weakness and concern for progression  Hx Medication noncompliance  Polysubstance abuse  Initial CVA occurred in 03/2022 with right KWADWO, large territory defect and residual left-sided weakness recommended to discharge to ARU however declined and discharged AMA. Subsequently returned 6/18 with progressive weakness, again referred to TCU however declined and returned home. Hospitalized 6/24 with worsening left-sided weakness, paresthesias, numbness with findings of new nonhemorrhagic infarction within right prefrontal gyrus and deep MCA watershed infarct within the right centrum semiovale of the right frontal and parietal lobes. Patient was evaluated by stroke neurology each admission, most recently felt symptoms were suggestive of new infarct rather than hypoperfused tissue. Recommended liberal SBP goal of 140-160 and DAPT with plavix/ for 90 days. Patient most recently left AMA on 6/30 after being recommended for TCU. At time of discharge, it appears the plavix was discontinued in an effort to limit medications and encourage compliance.   * Returned to ED 7/3/22 with reports of failing at home. It was unclear on admission if patient's left-sided symptoms had persisted or worsened due to presence of polysubstances and limited ability to participate in physical exam. Admitted to cocaine use as recently as 7/2. UTox on admission positive for cocaine, amphetamines.   * 7/4 exam noted with 1/5 LUE/LLE strength, limited ability to communicate verbally but cognitively able to answer yes/no questions with gestures, shakes of head. reported change in function. Repeat CTH revealed rounded focus of hypoattenuation within right corona radiata/centrum semiovale, MRI brain 6/25 indicated restricted diffusion within this  "region however area of hypoattenuation is larger than region of diffusion restriction concerning for progression of infarct.y  - Neuro note 7/6- \"recommend SAMMPRIS trial guided DAPT (+clopidogrel 75 x90 days) in an attempt to stabilize the M1 segment and prevent inferior division infarct. \"   - Continue statin.  - Reconsult speech therapy; G-tube placed on 7/08, TF per [place] otherwise NPO  - Holding tube feeds  given constipation and discomfort at g tube site, 7/20/2022, initiated bowel program, IVF NS D5 while TF on hold  -7/22/2022 denies abdominal pain, nausea, emesis, restart TF slowly at 20 cc an hour with goal at 60 cc an hour [increased by 20 cc an hour/24 hours to goal rate], continue free water per NG.   -7/24/2022 at 4:00 PM today we will resume prior TF goal rate at 60 cc an hour,   - resumed a.m. and p.m. Lantus today; adjust insulin accordingly.  Patient's blood sugar is running in the 200s we will increase the morning dose of Lantus to 16 units starting tomorrow  Stroke team was contacted 7/25/22 as new RN was coming on shift and noticed that there was 0/5 strength to b/l upper and lower extremities. Unsure on acuity but had been documented by prior nursing team of 1-2/5 strength to LLE. New repeat MRI showed a new R internal capsule infarct, and chronic R MCA/ICA occlusions and b/l KWADWO occlusion v stenosis.     Palliative care consultation requested for possible goals of care discussion as patient was not participating with therapies at times.  And was making comments that at times please let me die.  Felt like this is most likely situational in the setting of hospitalization as he does not like staying in the hospital and left AMA multiple times before this hospitalization.  Also had extensive stroke with dense hemiplegia of the left side currently and new onset severe aphasia and dysphagia needing PEG tube contributing to these comments.  Palliative care recommended ongoing counseling therapy " at his care facility after discharge about his situation and illness and for underlying polysubstance abuse.       Depression: Currently on Remeron 15 mg at bedtime.  Will reconsult psychiatry 7/27 as he may benefit from adjustment of antidepressants and more family encouragement.    Abdominal pain  Constipation  --increase senna-colace to 2 tabs BID, MiraLAX  -ct protonix IV daily  - No further abdominal pain, needs adherence to bowel program to prevent constipation.  Nursing states BM on current regimen.      Bleeding gums, halitosis  - suspect reflux given degree of constipation. Checked plts, INR - both okay. Will consult OMFS if symptoms continue but pt denies tooth pain.  -Nursing reports extremely dry mouth, hold Zyrtec, Magic mouthwash, monitor  -7/22/2022 on exam 1 area on left upper incisor old nonactive bleeding consistent with gingivitis, continue oral cares.  Hemoglobin and INR stable.  No further bleeding.     SIRS syndrome  Leukocytosis  UTI secondary to indwelling fair   On admission Patient with mild leukocytosis 12-13, procalcitonin on admission negative. Low-grade fever and sinus tachycardia persisting today. UA without evidence of infection. CXR neg. ?due to polysubstance abuse with amphetamines, cocaine in UTox.  - Low threshold to obtain blood cultures, initiate broad-spectrum abx   -7/22/2022 WBC increased to 20.3k, afebrile, no signs of localizing infection except for focal gingivitis, fair.    UC=E. coli, Fair exchanged, start Rocephin 5-day.  Later this week trial VT     Type 2 diabetes mellitus  Last HbA1c of 7.8 on 06/18. PTA regimen of metformin. Recent admission Lantus had been increased to 18u BID, carb coverage with meals of 1:10 with breakfast/lunch, 1:15 with dinner.  -Hold PTA metformin.  -While TF held relative hypoglycemia, see above; 7/22/2022 TF to restart however slowly; see #1 on restart TF.    -Restarted on Lantus 12 units in the morning and 14 at night.     History of  hypertension   Held PTA meds initially to allow permissive hypertension.  - Resumed PTA lisinopril-hydrochlorothiazide 7/12.  - Goal is normotension per Neurology.  - Blood pressure rising, 150s/90s.  Increase lisinopril; on combo ACEi/HCTZ given dry mouth start on lisinopril only, at increased dose. CR WNL, quyen in AM.   Monitor BP.       Hidradenitis / Right axillary wound  WOCN consult previous recs:   Right axilla wound: Daily  and PRN.  - Cleanse wound with NS or wound cleanser (omit this step if patient cleans wound in shower).  - Dry and protect surrounding skin with no sting barrier film wipe #[id number].  - Apply a small amount of iodesorb gel #[id number] to bandaid.  - If Band-Aid needs to be changed more than once a day. Apply iodesorb gel #[id number] to adaptic #[id number] and cover with Mepilex #[id number].     Generalized pain  PTA on gabapentin 300 mg 3 times daily.    - Holding gabapentin.   - Continue as needed Tylenol as needed. Avoid opioids.  -Prior hospitalist added PRN ibuprofen.  -No pain complaints.       Diet: NPO per Anesthesia Guidelines for Procedure/Surgery Except for: Meds, Other; Specify: please stop tube feedings  Adult Formula Drip Feeding: Continuous Jevity 1.5; Other - Specify in Comment; Goal Rate: 60; mL/hr; Medication - Feeding Tube Flush Frequency: At least 15-30 mL water before and after medication administration and with tube clogging; Amount to Se...    DVT Prophylaxis: No changes  Cortez Catheter: Not present  Central Lines: None  Cardiac Monitoring: None  Code Status: Full Code      Disposition Plan      Expected Discharge Date: 08/02/2022    Discharge Delays: *Medically Ready for Discharge  Placement - TCU  Placement - LTC    Discharge Comments: [facility]      The patient's care was discussed with the Bedside Nurse and Patient     Planning on discharge most likely on August 2 with a TCU bed availability as per social work    Nazia Avila MD  Hospitalist Service  [facility]  "[facility]  Securely message with the [employer] Web Console (learn more here)  Text page via [email or web address] Paging/Directory         Clinically Significant Risk Factors Present on Admission                      ______________________________________________________________________    Interval History   Continues to be frustrated with the slow progress and inability to eat and drink.  Patient is resting comfortably in bed.  No acute events in the last 24 hours.  Patient is more alert and awake today    Data reviewed today: I reviewed all medications, new labs and imaging results over the last 24 hours. I personally reviewed no images or EKG's today.    Physical Exam   BP (!) 139/98 (BP Location: Right arm)   Pulse 105   Temp 98.9  F (37.2  C) (Axillary)   Resp 16   Ht 1.676 m (5' 6\")   Wt 64.4 kg (141 lb 15.6 oz)   SpO2 98%   BMI 22.92 kg/m         Gen- pleasant lying in bed, alert and awake, not in acute distress  HEENT-expressive aphasia.  Right sided gaze  Neck- supple  CVS- I+II+ no m/r/g  RS- CTAB  Abdo- soft, no tenderness . No g/r/r   Ext- no edema   CNS-left-sided hemiparesis.  Expressive aphasia.  Able to nod and write on paper with right hand      Data   Recent Labs   Lab 07/30/22  1220 07/30/22  0740 07/30/22  0425 07/29/22  1152 07/29/22  1008 07/28/22  1159 07/28/22  0827 07/27/22  0914 07/27/22  0817 07/26/22  1036 07/26/22  0837 07/25/22  1309 07/25/22  0832 07/24/22  1251 07/24/22  0741   WBC  --   --   --   --   --   --   --   --   --   --  10.6  --  12.3*  --  12.7*   HGB  --   --   --   --   --   --   --   --   --   --  10.1*  --  9.8*  --  9.7*   MCV  --   --   --   --   --   --   --   --   --   --  93  --  93  --  96   PLT  --   --   --   --   --   --   --   --   --   --  401  --  379  --  353   NA  --   --   --   --   --   --   --   --   --   --  141  --  138  --   --    POTASSIUM  --   --   --   --  4.0  --  4.0  --  3.9  --  3.7   < > 3.3*  --   --    CHLORIDE  --   --   --   --   --   --   " --   --   --   --  106  --  105  --   --    CO2  --   --   --   --   --   --   --   --   --   --  26  --  28  --   --    BUN  --   --   --   --   --   --   --   --   --   --  17  --  12  --   --    CR  --   --   --   --   --   --   --   --   --   --  0.56*  --  0.51*  --   --    ANIONGAP  --   --   --   --   --   --   --   --   --   --  9  --  5  --   --    DAT  --   --   --   --   --   --   --   --   --   --  9.3  --  9.3  --   --    * 199* 220*   < >  --    < >  --    < >  --    < > 215*   < > 206*   < >  --    ALBUMIN  --   --   --   --   --   --   --   --   --   --  2.7*  --   --   --   --    PROTTOTAL  --   --   --   --   --   --   --   --   --   --  7.3  --   --   --   --    BILITOTAL  --   --   --   --   --   --   --   --   --   --  0.3  --   --   --   --    ALKPHOS  --   --   --   --   --   --   --   --   --   --  65  --   --   --   --    ALT  --   --   --   --   --   --   --   --   --   --  34  --   --   --   --    AST  --   --   --   --   --   --   --   --   --   --  17  --   --   --   --     < > = values in this interval not displayed.

## 2022-07-31 ENCOUNTER — APPOINTMENT (OUTPATIENT)
Dept: SPEECH THERAPY | Facility: CLINIC | Age: 52
DRG: 065 | End: 2022-07-31
Payer: COMMERCIAL

## 2022-07-31 LAB
GLUCOSE BLDC GLUCOMTR-MCNC: 174 MG/DL (ref 70–99)
GLUCOSE BLDC GLUCOMTR-MCNC: 192 MG/DL (ref 70–99)
GLUCOSE BLDC GLUCOMTR-MCNC: 197 MG/DL (ref 70–99)
GLUCOSE BLDC GLUCOMTR-MCNC: 208 MG/DL (ref 70–99)
GLUCOSE BLDC GLUCOMTR-MCNC: 217 MG/DL (ref 70–99)
GLUCOSE BLDC GLUCOMTR-MCNC: 255 MG/DL (ref 70–99)

## 2022-07-31 PROCEDURE — 92526 ORAL FUNCTION THERAPY: CPT | Mod: GN

## 2022-07-31 PROCEDURE — 250N000013 HC RX MED GY IP 250 OP 250 PS 637: Performed by: INTERNAL MEDICINE

## 2022-07-31 PROCEDURE — 120N000001 HC R&B MED SURG/OB

## 2022-07-31 PROCEDURE — 250N000013 HC RX MED GY IP 250 OP 250 PS 637: Performed by: HOSPITALIST

## 2022-07-31 PROCEDURE — 92507 TX SP LANG VOICE COMM INDIV: CPT | Mod: GN

## 2022-07-31 PROCEDURE — 99232 SBSQ HOSP IP/OBS MODERATE 35: CPT | Performed by: INTERNAL MEDICINE

## 2022-07-31 RX ADMIN — PAROXETINE HYDROCHLORIDE 10 MG: 10 SUSPENSION ORAL at 08:58

## 2022-07-31 RX ADMIN — ACETAMINOPHEN 975 MG: 325 SUSPENSION ORAL at 00:22

## 2022-07-31 RX ADMIN — CLOPIDOGREL BISULFATE 75 MG: 75 TABLET ORAL at 08:57

## 2022-07-31 RX ADMIN — INSULIN ASPART 3 UNITS: 100 INJECTION, SOLUTION INTRAVENOUS; SUBCUTANEOUS at 19:38

## 2022-07-31 RX ADMIN — ACETAMINOPHEN 975 MG: 325 SUSPENSION ORAL at 16:38

## 2022-07-31 RX ADMIN — INSULIN GLARGINE 14 UNITS: 100 INJECTION, SOLUTION SUBCUTANEOUS at 22:33

## 2022-07-31 RX ADMIN — IBUPROFEN 400 MG: 200 SUSPENSION ORAL at 10:35

## 2022-07-31 RX ADMIN — ASPIRIN 325 MG ORAL TABLET 325 MG: 325 PILL ORAL at 08:57

## 2022-07-31 RX ADMIN — INSULIN ASPART 5 UNITS: 100 INJECTION, SOLUTION INTRAVENOUS; SUBCUTANEOUS at 08:59

## 2022-07-31 RX ADMIN — ATORVASTATIN CALCIUM 80 MG: 80 TABLET, FILM COATED ORAL at 08:57

## 2022-07-31 RX ADMIN — INSULIN ASPART 3 UNITS: 100 INJECTION, SOLUTION INTRAVENOUS; SUBCUTANEOUS at 16:38

## 2022-07-31 RX ADMIN — INSULIN ASPART 4 UNITS: 100 INJECTION, SOLUTION INTRAVENOUS; SUBCUTANEOUS at 03:23

## 2022-07-31 RX ADMIN — AMLODIPINE BESYLATE 2.5 MG: 2.5 TABLET ORAL at 22:33

## 2022-07-31 RX ADMIN — ACETAMINOPHEN 975 MG: 325 SUSPENSION ORAL at 08:57

## 2022-07-31 RX ADMIN — MIRTAZAPINE 15 MG: 15 TABLET, FILM COATED ORAL at 22:33

## 2022-07-31 RX ADMIN — INSULIN ASPART 3 UNITS: 100 INJECTION, SOLUTION INTRAVENOUS; SUBCUTANEOUS at 12:13

## 2022-07-31 RX ADMIN — Medication 40 MG: at 08:58

## 2022-07-31 RX ADMIN — LISINOPRIL 40 MG: 40 TABLET ORAL at 08:57

## 2022-07-31 ASSESSMENT — ACTIVITIES OF DAILY LIVING (ADL)
ADLS_ACUITY_SCORE: 58
ADLS_ACUITY_SCORE: 54
ADLS_ACUITY_SCORE: 58
ADLS_ACUITY_SCORE: 58
ADLS_ACUITY_SCORE: 54
ADLS_ACUITY_SCORE: 54
ADLS_ACUITY_SCORE: 58
ADLS_ACUITY_SCORE: 58

## 2022-07-31 NOTE — PLAN OF CARE
Alert but unable to assess orientation due to nonverbal. Neuros Left side completely 0/5 strength, decreases sensation on the lest side, aphasic/non verbal, and decreased sensation on left, pt does not cooperate with all neuro check. Turn and repo q2. Incontinent of bowel bladder. TF running at goal rate.Pain in shoulder scheduled tylenol and PRN ibuprofen given x1. Plan to continue to monitor tonight, likely discharge on Tuesday.

## 2022-07-31 NOTE — PROGRESS NOTES
[facility]    Medicine Progress Note - Hospitalist Service    Date of Admission:  7/3/2022    Assessment & Plan            Multiple ischemic CVAs involving MCA, KWADWO with left-sided weakness and concern for progression  Hx Medication noncompliance  Polysubstance abuse  Initial CVA occurred in 03/2022 with right KWADWO, large territory defect and residual left-sided weakness recommended to discharge to ARU however declined and discharged AMA. Subsequently returned 6/18 with progressive weakness, again referred to TCU however declined and returned home. Hospitalized 6/24 with worsening left-sided weakness, paresthesias, numbness with findings of new nonhemorrhagic infarction within right prefrontal gyrus and deep MCA watershed infarct within the right centrum semiovale of the right frontal and parietal lobes. Patient was evaluated by stroke neurology each admission, most recently felt symptoms were suggestive of new infarct rather than hypoperfused tissue. Recommended liberal SBP goal of 140-160 and DAPT with plavix/ for 90 days. Patient most recently left AMA on 6/30 after being recommended for TCU. At time of discharge, it appears the plavix was discontinued in an effort to limit medications and encourage compliance.   * Returned to ED 7/3/22 with reports of failing at home. It was unclear on admission if patient's left-sided symptoms had persisted or worsened due to presence of polysubstances and limited ability to participate in physical exam. Admitted to cocaine use as recently as 7/2. UTox on admission positive for cocaine, amphetamines.   * 7/4 exam noted with 1/5 LUE/LLE strength, limited ability to communicate verbally but cognitively able to answer yes/no questions with gestures, shakes of head. reported change in function. Repeat CTH revealed rounded focus of hypoattenuation within right corona radiata/centrum semiovale, MRI brain 6/25 indicated restricted diffusion within this  "region however area of hypoattenuation is larger than region of diffusion restriction concerning for progression of infarct.y  - Neuro note 7/6- \"recommend SAMMPRIS trial guided DAPT (+clopidogrel 75 x90 days) in an attempt to stabilize the M1 segment and prevent inferior division infarct. \"   - Continue statin.  - Reconsult speech therapy; G-tube placed on 7/08, TF per [place] otherwise NPO  - Holding tube feeds  given constipation and discomfort at g tube site, 7/20/2022, initiated bowel program, IVF NS D5 while TF on hold  -7/22/2022 denies abdominal pain, nausea, emesis, restart TF slowly at 20 cc an hour with goal at 60 cc an hour [increased by 20 cc an hour/24 hours to goal rate], continue free water per NG.   -7/24/2022 at 4:00 PM today we will resume prior TF goal rate at 60 cc an hour,   - resumed a.m. and p.m. Lantus today; adjust insulin accordingly.  Patient's blood sugar is running in the 200s we will increase the morning dose of Lantus to 16 units starting tomorrow  Stroke team was contacted 7/25/22 as new RN was coming on shift and noticed that there was 0/5 strength to b/l upper and lower extremities. Unsure on acuity but had been documented by prior nursing team of 1-2/5 strength to LLE. New repeat MRI showed a new R internal capsule infarct, and chronic R MCA/ICA occlusions and b/l KWADWO occlusion v stenosis.     Palliative care consultation requested for possible goals of care discussion as patient was not participating with therapies at times.  And was making comments that at times please let me die.  Felt like this is most likely situational in the setting of hospitalization as he does not like staying in the hospital and left AMA multiple times before this hospitalization.  Also had extensive stroke with dense hemiplegia of the left side currently and new onset severe aphasia and dysphagia needing PEG tube contributing to these comments.  Palliative care recommended ongoing counseling therapy " at his care facility after discharge about his situation and illness and for underlying polysubstance abuse.       Depression: Currently on Remeron 15 mg at bedtime.    Started on Paxil 10 mg via G-tube daily to help with depression with the recent extensive stroke and situational depression    Abdominal pain  Constipation  --increase senna-colace to 2 tabs BID, MiraLAX  -ct protonix IV daily  - No further abdominal pain, needs adherence to bowel program to prevent constipation.  Nursing states BM on current regimen.      Bleeding gums, halitosis  - suspect reflux given degree of constipation. Checked plts, INR - both okay. Will consult OMFS if symptoms continue but pt denies tooth pain.  -Nursing reports extremely dry mouth, hold Zyrtec, Magic mouthwash, monitor  -7/22/2022 on exam 1 area on left upper incisor old nonactive bleeding consistent with gingivitis, continue oral cares.  Hemoglobin and INR stable.  No further bleeding.     SIRS syndrome  Leukocytosis  UTI secondary to indwelling fair   On admission Patient with mild leukocytosis 12-13, procalcitonin on admission negative. Low-grade fever and sinus tachycardia persisting today. UA without evidence of infection. CXR neg. ?due to polysubstance abuse with amphetamines, cocaine in UTox.  - Low threshold to obtain blood cultures, initiate broad-spectrum abx   -7/22/2022 WBC increased to 20.3k, afebrile, no signs of localizing infection except for focal gingivitis, fair.    UC=E. coli, Fair exchanged, start Rocephin 5-day.  Later this week trial VT     Type 2 diabetes mellitus  Last HbA1c of 7.8 on 06/18. PTA regimen of metformin. Recent admission Lantus had been increased to 18u BID, carb coverage with meals of 1:10 with breakfast/lunch, 1:15 with dinner.  -Hold PTA metformin.  -While TF held relative hypoglycemia, see above; 7/22/2022 TF to restart however slowly; see #1 on restart TF.    -Restarted on Lantus 12 units in the morning and 14 at  night.  Increase Lantus to 16 units in the morning for better blood sugar control today     History of hypertension   Held PTA meds initially to allow permissive hypertension.  - Resumed PTA lisinopril-hydrochlorothiazide 7/12.  - Goal is normotension per Neurology.  - Blood pressure rising, 150s/90s.  Increase lisinopril; on combo ACEi/HCTZ given dry mouth start on lisinopril only, at increased dose. CR WNL, quyen in AM.   Monitor BP.       Hidradenitis / Right axillary wound  WOCN consult previous recs:   Right axilla wound: Daily  and PRN.  - Cleanse wound with NS or wound cleanser (omit this step if patient cleans wound in shower).  - Dry and protect surrounding skin with no sting barrier film wipe #[id number].  - Apply a small amount of iodesorb gel #[id number] to bandaid.  - If Band-Aid needs to be changed more than once a day. Apply iodesorb gel #[id number] to adaptic #[id number] and cover with Mepilex #[id number].     Generalized pain  PTA on gabapentin 300 mg 3 times daily.    - Holding gabapentin.   - Continue as needed Tylenol as needed. Avoid opioids.  -Prior hospitalist added PRN ibuprofen.  -No pain complaints.       Diet: NPO per Anesthesia Guidelines for Procedure/Surgery Except for: Meds, Other; Specify: please stop tube feedings  Adult Formula Drip Feeding: Continuous Jevity 1.5; Other - Specify in Comment; Goal Rate: 60; mL/hr; Medication - Feeding Tube Flush Frequency: At least 15-30 mL water before and after medication administration and with tube clogging; Amount to Se...    DVT Prophylaxis: No changes  Cortez Catheter: Not present  Central Lines: None  Cardiac Monitoring: None  Code Status: Full Code      Disposition Plan      Expected Discharge Date: 08/02/2022, 10:00 AM  Discharge Delays: *Medically Ready for Discharge  Placement - TCU  Placement - LTC    [facility]charge Comments: [facility]      The patient's care was discussed with the Bedside Nurse and Patient     Planning on discharge most likely on August 2 with  "a TCU bed availability as per social work    Nazia Avila MD  Hospitalist Service  [facility]  Securely message with the [employer] Web Console (learn more here)  Text page via [employer] Paging/Directory         Clinically Significant Risk Factors Present on Admission                      ______________________________________________________________________    Interval History     Patient is resting comfortably in bed.  No acute events in the last 24 hours.  Patient is more alert and awake today    Data reviewed today: I reviewed all medications, new labs and imaging results over the last 24 hours. I personally reviewed no images or EKG's today.    Physical Exam   BP (!) 139/92   Pulse 106   Temp 98.5  F (36.9  C) (Oral)   Resp 18   Ht 1.676 m (5' 6\")   Wt 64.4 kg (141 lb 15.6 oz)   SpO2 99%   BMI 22.92 kg/m         Gen- pleasant lying in bed, alert and awake, not in acute distress  HEENT-expressive aphasia.  Right sided gaze  Neck- supple  CVS- I+II+ no m/r/g  RS- CTAB  Abdo- soft, no tenderness . No g/r/r   Ext- no edema   CNS-left-sided hemiparesis.  Expressive aphasia.  Able to nod and write on paper with right hand      Data   Recent Labs   Lab 07/31/22  1133 07/31/22  0749 07/31/22  0322 07/29/22  1152 07/29/22  1008 07/28/22  1159 07/28/22  0827 07/27/22  0914 07/27/22  0817 07/26/22  1036 07/26/22  0837 07/25/22  1309 07/25/22  0832   WBC  --   --   --   --   --   --   --   --   --   --  10.6  --  12.3*   HGB  --   --   --   --   --   --   --   --   --   --  10.1*  --  9.8*   MCV  --   --   --   --   --   --   --   --   --   --  93  --  93   PLT  --   --   --   --   --   --   --   --   --   --  401  --  379   NA  --   --   --   --   --   --   --   --   --   --  141  --  138   POTASSIUM  --   --   --   --  4.0  --  4.0  --  3.9  --  3.7   < > 3.3*   CHLORIDE  --   --   --   --   --   --   --   --   --   --  106  --  105   CO2  --   --   --   --   --   --   --   --   --   --  26  --  " 28   BUN  --   --   --   --   --   --   --   --   --   --  17  --  12   CR  --   --   --   --   --   --   --   --   --   --  0.56*  --  0.51*   ANIONGAP  --   --   --   --   --   --   --   --   --   --  9  --  5   DAT  --   --   --   --   --   --   --   --   --   --  9.3  --  9.3   * 255* 217*   < >  --    < >  --    < >  --    < > 215*   < > 206*   ALBUMIN  --   --   --   --   --   --   --   --   --   --  2.7*  --   --    PROTTOTAL  --   --   --   --   --   --   --   --   --   --  7.3  --   --    BILITOTAL  --   --   --   --   --   --   --   --   --   --  0.3  --   --    ALKPHOS  --   --   --   --   --   --   --   --   --   --  65  --   --    ALT  --   --   --   --   --   --   --   --   --   --  34  --   --    AST  --   --   --   --   --   --   --   --   --   --  17  --   --     < > = values in this interval not displayed.

## 2022-07-31 NOTE — PLAN OF CARE
Goal Outcome Evaluation:     No acute changes overnight.     Reason for Admission: Multiple R CVA's  Cognitive/Mentation: A/Ox self.   Neuros/CMS: LUE 1/5. LLE 0/5. Decreased sensation to L side. R gaze preference. L- neglect. Aphasic/Non-verbal/ Nods Yes/No.  VS: VSS on RA  GI: BS active / Incontinent/Loose Stool this shift.   : Incontinent/WNL  Pulmonary: LS diminished/clear  Pain: Shoulder pain, improved with scheduled Tylenol.   Activity: Assist x 2 with lift. T&Rq2h  Diet: NPO. PEG intact. TF infusing @ goal of 60/hr with 120 mL flush q4hr.   Aggression Stoplight Score: Green  Therapies recs: TCU on 8/2/22

## 2022-08-01 ENCOUNTER — APPOINTMENT (OUTPATIENT)
Dept: PHYSICAL THERAPY | Facility: CLINIC | Age: 52
DRG: 065 | End: 2022-08-01
Payer: COMMERCIAL

## 2022-08-01 ENCOUNTER — APPOINTMENT (OUTPATIENT)
Dept: SPEECH THERAPY | Facility: CLINIC | Age: 52
DRG: 065 | End: 2022-08-01
Payer: COMMERCIAL

## 2022-08-01 LAB
ANION GAP SERPL CALCULATED.3IONS-SCNC: 4 MMOL/L (ref 3–14)
BUN SERPL-MCNC: 20 MG/DL (ref 7–30)
CALCIUM SERPL-MCNC: 9.7 MG/DL (ref 8.5–10.1)
CHLORIDE BLD-SCNC: 104 MMOL/L (ref 94–109)
CO2 SERPL-SCNC: 29 MMOL/L (ref 20–32)
CREAT SERPL-MCNC: 0.65 MG/DL (ref 0.66–1.25)
GFR SERPL CREATININE-BSD FRML MDRD: >90 ML/MIN/1.73M2
GLUCOSE BLD-MCNC: 229 MG/DL (ref 70–99)
GLUCOSE BLDC GLUCOMTR-MCNC: 145 MG/DL (ref 70–99)
GLUCOSE BLDC GLUCOMTR-MCNC: 161 MG/DL (ref 70–99)
GLUCOSE BLDC GLUCOMTR-MCNC: 212 MG/DL (ref 70–99)
GLUCOSE BLDC GLUCOMTR-MCNC: 230 MG/DL (ref 70–99)
GLUCOSE BLDC GLUCOMTR-MCNC: 239 MG/DL (ref 70–99)
GLUCOSE BLDC GLUCOMTR-MCNC: 244 MG/DL (ref 70–99)
GLUCOSE BLDC GLUCOMTR-MCNC: 245 MG/DL (ref 70–99)
MAGNESIUM SERPL-MCNC: 2.3 MG/DL (ref 1.6–2.3)
PHOSPHATE SERPL-MCNC: 3.5 MG/DL (ref 2.5–4.5)
POTASSIUM BLD-SCNC: 4 MMOL/L (ref 3.4–5.3)
SODIUM SERPL-SCNC: 137 MMOL/L (ref 133–144)

## 2022-08-01 PROCEDURE — 36415 COLL VENOUS BLD VENIPUNCTURE: CPT | Performed by: INTERNAL MEDICINE

## 2022-08-01 PROCEDURE — 0054A HC ADMIN COVID VAC PFIZER TRS-SUCR, BOOSTER: CPT | Performed by: INTERNAL MEDICINE

## 2022-08-01 PROCEDURE — 250N000012 HC RX MED GY IP 250 OP 636 PS 637: Performed by: HOSPITALIST

## 2022-08-01 PROCEDURE — 80048 BASIC METABOLIC PNL TOTAL CA: CPT | Performed by: INTERNAL MEDICINE

## 2022-08-01 PROCEDURE — 97530 THERAPEUTIC ACTIVITIES: CPT | Mod: GP | Performed by: PHYSICAL THERAPIST

## 2022-08-01 PROCEDURE — 92526 ORAL FUNCTION THERAPY: CPT | Mod: GN | Performed by: SPEECH-LANGUAGE PATHOLOGIST

## 2022-08-01 PROCEDURE — 99232 SBSQ HOSP IP/OBS MODERATE 35: CPT | Performed by: INTERNAL MEDICINE

## 2022-08-01 PROCEDURE — 250N000013 HC RX MED GY IP 250 OP 250 PS 637: Performed by: HOSPITALIST

## 2022-08-01 PROCEDURE — 250N000013 HC RX MED GY IP 250 OP 250 PS 637: Performed by: INTERNAL MEDICINE

## 2022-08-01 PROCEDURE — 83735 ASSAY OF MAGNESIUM: CPT | Performed by: INTERNAL MEDICINE

## 2022-08-01 PROCEDURE — 92507 TX SP LANG VOICE COMM INDIV: CPT | Mod: GN | Performed by: SPEECH-LANGUAGE PATHOLOGIST

## 2022-08-01 PROCEDURE — 84100 ASSAY OF PHOSPHORUS: CPT | Performed by: INTERNAL MEDICINE

## 2022-08-01 PROCEDURE — 97110 THERAPEUTIC EXERCISES: CPT | Mod: GP | Performed by: PHYSICAL THERAPIST

## 2022-08-01 PROCEDURE — XW023V7 INTRODUCTION OF COVID-19 VACCINE DOSE 3 INTO MUSCLE, PERCUTANEOUS APPROACH, NEW TECHNOLOGY GROUP 7: ICD-10-PCS | Performed by: INTERNAL MEDICINE

## 2022-08-01 PROCEDURE — 120N000001 HC R&B MED SURG/OB

## 2022-08-01 PROCEDURE — 91305 HC RX IP 250 OP 636: CPT | Performed by: INTERNAL MEDICINE

## 2022-08-01 PROCEDURE — 250N000011 HC RX IP 250 OP 636: Performed by: INTERNAL MEDICINE

## 2022-08-01 RX ORDER — AMOXICILLIN 250 MG
1 CAPSULE ORAL 2 TIMES DAILY
Status: DISCONTINUED | OUTPATIENT
Start: 2022-08-01 | End: 2022-08-02 | Stop reason: HOSPADM

## 2022-08-01 RX ORDER — DIPHENHYDRAMINE HYDROCHLORIDE 50 MG/ML
50 INJECTION INTRAMUSCULAR; INTRAVENOUS
Status: DISCONTINUED | OUTPATIENT
Start: 2022-08-02 | End: 2022-08-02 | Stop reason: HOSPADM

## 2022-08-01 RX ORDER — DIPHENHYDRAMINE HCL 25 MG
50 CAPSULE ORAL
Status: DISCONTINUED | OUTPATIENT
Start: 2022-08-02 | End: 2022-08-02 | Stop reason: HOSPADM

## 2022-08-01 RX ADMIN — INSULIN GLARGINE 14 UNITS: 100 INJECTION, SOLUTION SUBCUTANEOUS at 21:02

## 2022-08-01 RX ADMIN — Medication 40 MG: at 08:34

## 2022-08-01 RX ADMIN — INSULIN ASPART 3 UNITS: 100 INJECTION, SOLUTION INTRAVENOUS; SUBCUTANEOUS at 17:09

## 2022-08-01 RX ADMIN — ACETAMINOPHEN 975 MG: 325 SUSPENSION ORAL at 08:34

## 2022-08-01 RX ADMIN — PAROXETINE HYDROCHLORIDE 10 MG: 10 SUSPENSION ORAL at 08:34

## 2022-08-01 RX ADMIN — INSULIN ASPART 1 UNITS: 100 INJECTION, SOLUTION INTRAVENOUS; SUBCUTANEOUS at 00:25

## 2022-08-01 RX ADMIN — CLOPIDOGREL BISULFATE 75 MG: 75 TABLET ORAL at 08:34

## 2022-08-01 RX ADMIN — ATORVASTATIN CALCIUM 80 MG: 80 TABLET, FILM COATED ORAL at 08:34

## 2022-08-01 RX ADMIN — MIRTAZAPINE 15 MG: 15 TABLET, FILM COATED ORAL at 21:02

## 2022-08-01 RX ADMIN — ACETAMINOPHEN 975 MG: 325 SUSPENSION ORAL at 00:25

## 2022-08-01 RX ADMIN — INSULIN ASPART 4 UNITS: 100 INJECTION, SOLUTION INTRAVENOUS; SUBCUTANEOUS at 20:34

## 2022-08-01 RX ADMIN — BNT162B2 30 MCG: 0.23 INJECTION, SUSPENSION INTRAMUSCULAR at 20:29

## 2022-08-01 RX ADMIN — INSULIN ASPART 5 UNITS: 100 INJECTION, SOLUTION INTRAVENOUS; SUBCUTANEOUS at 12:36

## 2022-08-01 RX ADMIN — LISINOPRIL 40 MG: 40 TABLET ORAL at 08:34

## 2022-08-01 RX ADMIN — INSULIN ASPART 4 UNITS: 100 INJECTION, SOLUTION INTRAVENOUS; SUBCUTANEOUS at 04:45

## 2022-08-01 RX ADMIN — ACETAMINOPHEN 975 MG: 325 SUSPENSION ORAL at 17:09

## 2022-08-01 RX ADMIN — INSULIN ASPART 5 UNITS: 100 INJECTION, SOLUTION INTRAVENOUS; SUBCUTANEOUS at 08:35

## 2022-08-01 RX ADMIN — AMLODIPINE BESYLATE 2.5 MG: 2.5 TABLET ORAL at 21:02

## 2022-08-01 RX ADMIN — ASPIRIN 325 MG ORAL TABLET 325 MG: 325 PILL ORAL at 08:34

## 2022-08-01 ASSESSMENT — ACTIVITIES OF DAILY LIVING (ADL)
ADLS_ACUITY_SCORE: 58
ADLS_ACUITY_SCORE: 54
ADLS_ACUITY_SCORE: 54
ADLS_ACUITY_SCORE: 58
ADLS_ACUITY_SCORE: 58
ADLS_ACUITY_SCORE: 54
ADLS_ACUITY_SCORE: 58
ADLS_ACUITY_SCORE: 54
ADLS_ACUITY_SCORE: 54
ADLS_ACUITY_SCORE: 58

## 2022-08-01 NOTE — PLAN OF CARE
Goal Outcome Evaluation:      Reason for Admission: Multiple R CVA's  Cognitive/Mentation: A/Ox self.   Neuros/CMS: LUE 0/5. LLE 1/5. Decreased sensation to L side. R gaze preference. L- neglect. Aphasic/Non-verbal/ Nods Yes/No.  VS: VSS on RA  GI: BS active / Incontinent/Smear overnight.  : Incontinent/WNL  Pulmonary: LS diminished/clear  Pain: Shoulder pain, improved with scheduled Tylenol.   Activity: Assist x 2 with lift. T&Rq2h  Diet: NPO. PEG intact. TF infusing @ goal of 60/hr with 120 mL flush q4hr. / frequent oral cares.   Aggression Stoplight Tool Score: Green  Discharge likely tomorrow to NH.

## 2022-08-01 NOTE — PROGRESS NOTES
Care Management Follow Up    Length of Stay (days): 28    Expected Discharge Date: 08/02/2022     Concerns to be Addressed:       Patient plan of care discussed at interdisciplinary rounds: Yes    Anticipated Discharge Disposition: [facility]     Education Provided on the Discharge Plan:  Yes, Pt's mother updated of discharge plan last week  ELMA spoke with Faye at [facility] to confirm bed for tomorrow am. Transportation is set up for 10:00am per request.  Faye requests that pt have his booster today prior to discharge. Faye also given station number and name of today's bedside RN for report.    Patient/Family in Agreement with the Plan: yes       Private pay costs discussed: transportation costs should be covered as pt is in need of stretcher transport. PCS completed and on pt chart.  Additional Information:  Hospitalist paged to request booster for pt today and update of pt's discharge tomorrow early. Diagnosis requested for all medications ordered per Faye with [facility].  PCS form completed, faxed and placed on pt chart.  PAS completed and on pt chart. ELMA contacted E and confirmed stretcher ride for tomorrow am at 10:00am    MAYANK Morris  [facility]  Care Transitions  [phone]

## 2022-08-01 NOTE — PROGRESS NOTES
[facility]    Medicine Progress Note - Hospitalist Service    Date of Admission:  7/3/2022    Assessment & Plan            Multiple ischemic CVAs involving MCA, KWADWO with left-sided weakness and concern for progression  Hx Medication noncompliance  Polysubstance abuse  Initial CVA occurred in 03/2022 with right KWADWO, large territory defect and residual left-sided weakness recommended to discharge to ARU however declined and discharged AMA. Subsequently returned 6/18 with progressive weakness, again referred to TCU however declined and returned home. Hospitalized 6/24 with worsening left-sided weakness, paresthesias, numbness with findings of new nonhemorrhagic infarction within right prefrontal gyrus and deep MCA watershed infarct within the right centrum semiovale of the right frontal and parietal lobes. Patient was evaluated by stroke neurology each admission, most recently felt symptoms were suggestive of new infarct rather than hypoperfused tissue. Recommended liberal SBP goal of 140-160 and DAPT with plavix/ for 90 days. Patient most recently left AMA on 6/30 after being recommended for TCU. At time of discharge, it appears the plavix was discontinued in an effort to limit medications and encourage compliance.   * Returned to ED 7/3/22 with reports of failing at home. It was unclear on admission if patient's left-sided symptoms had persisted or worsened due to presence of polysubstances and limited ability to participate in physical exam. Admitted to cocaine use as recently as 7/2. UTox on admission positive for cocaine, amphetamines.   * 7/4 exam noted with 1/5 LUE/LLE strength, limited ability to communicate verbally but cognitively able to answer yes/no questions with gestures, shakes of head. reported change in function. Repeat CTH revealed rounded focus of hypoattenuation within right corona radiata/centrum semiovale, MRI brain 6/25 indicated restricted diffusion within this  "region however area of hypoattenuation is larger than region of diffusion restriction concerning for progression of infarct.y  - Neuro note 7/6- \"recommend SAMMPRIS trial guided DAPT (+clopidogrel 75 x90 days) in an attempt to stabilize the M1 segment and prevent inferior division infarct. \"   - Continue statin.  - Reconsult speech therapy; G-tube placed on 7/08, TF per [place] otherwise NPO  - Holding tube feeds  given constipation and discomfort at g tube site, 7/20/2022, initiated bowel program, IVF NS D5 while TF on hold  -7/22/2022 denies abdominal pain, nausea, emesis, restart TF slowly at 20 cc an hour with goal at 60 cc an hour [increased by 20 cc an hour/24 hours to goal rate], continue free water per NG.   -7/24/2022 at 4:00 PM today we will resume prior TF goal rate at 60 cc an hour,   - resumed a.m. and p.m. Lantus today; adjust insulin accordingly.  Patient's blood sugar is running in the 200s we will increase the morning dose of Lantus to 16 units starting tomorrow  Stroke team was contacted 7/25/22 as new RN was coming on shift and noticed that there was 0/5 strength to b/l upper and lower extremities. Unsure on acuity but had been documented by prior nursing team of 1-2/5 strength to LLE. New repeat MRI showed a new R internal capsule infarct, and chronic R MCA/ICA occlusions and b/l KWADWO occlusion v stenosis.     Palliative care consultation requested for possible goals of care discussion as patient was not participating with therapies at times.  And was making comments that at times please let me die.  Felt like this is most likely situational in the setting of hospitalization as he does not like staying in the hospital and left AMA multiple times before this hospitalization.  Also had extensive stroke with dense hemiplegia of the left side currently and new onset severe aphasia and dysphagia needing PEG tube contributing to these comments.  Palliative care recommended ongoing counseling therapy " at his care facility after discharge about his situation and illness and for underlying polysubstance abuse.       Depression: Currently on Remeron 15 mg at bedtime.    Started on Paxil 10 mg via G-tube daily to help with depression with the recent extensive stroke and situational depression    Abdominal pain  Constipation  --increase senna-colace to 2 tabs BID, MiraLAX  -ct protonix IV daily  - No further abdominal pain, needs adherence to bowel program to prevent constipation.    Pt having multiple loose stools a day so weaning the bowel regimen now on senna 1 tablet PO BID       Bleeding gums, halitosis  - suspect reflux given degree of constipation. Checked plts, INR - both okay. Will consult OMFS if symptoms continue but pt denies tooth pain.  -Nursing reports extremely dry mouth, hold Zyrtec, Magic mouthwash, monitor  -7/22/2022 on exam 1 area on left upper incisor old nonactive bleeding consistent with gingivitis, continue oral cares.  Hemoglobin and INR stable.  No further bleeding.     SIRS syndrome  Leukocytosis  UTI secondary to indwelling fair   On admission Patient with mild leukocytosis 12-13, procalcitonin on admission negative. Low-grade fever and sinus tachycardia persisting today. UA without evidence of infection. CXR neg. ?due to polysubstance abuse with amphetamines, cocaine in UTox.  - Low threshold to obtain blood cultures, initiate broad-spectrum abx   -7/22/2022 WBC increased to 20.3k, afebrile, no signs of localizing infection except for focal gingivitis, fair.    UC=E. coli, Fair exchanged, start Rocephin 5-day.  Later this week trial VT     Type 2 diabetes mellitus  Last HbA1c of 7.8 on 06/18. PTA regimen of metformin. Recent admission Lantus had been increased to 18u BID, carb coverage with meals of 1:10 with breakfast/lunch, 1:15 with dinner.  -Hold PTA metformin.  -While TF held relative hypoglycemia, see above; 7/22/2022 TF to restart however slowly; see #1 on restart TF.     -Restarted on Lantus 12 units in the morning and 14 at night.  Increase Lantus to 16 units in the morning for better blood sugar control today     History of hypertension   Held PTA meds initially to allow permissive hypertension.  - Resumed PTA lisinopril-hydrochlorothiazide 7/12.  - Goal is normotension per Neurology.  - Blood pressure rising, 150s/90s.  Increase lisinopril; on combo ACEi/HCTZ given dry mouth start on lisinopril only, at increased dose. CR WNL, quyen in AM.   Monitor BP.      Added norvasc 2.5mg at bedtime for better BP cntrol      Hidradenitis / Right axillary wound  WOCN consult previous recs:   Right axilla wound: Daily  and PRN.  - Cleanse wound with NS or wound cleanser (omit this step if patient cleans wound in shower).  - Dry and protect surrounding skin with no sting barrier film wipe #[id number].  - Apply a small amount of iodesorb gel #[id number] to bandaid.  - If Band-Aid needs to be changed more than once a day. Apply iodesorb gel #[id number] to adaptic #[id number] and cover with Mepilex #[id number].     Generalized pain  PTA on gabapentin 300 mg 3 times daily.    - Holding gabapentin.   - Continue as needed Tylenol as needed. Avoid opioids.  -Prior hospitalist added PRN ibuprofen.  -No pain complaints.       Diet: NPO per Anesthesia Guidelines for Procedure/Surgery Except for: Meds, Other; Specify: please stop tube feedings  Adult Formula Drip Feeding: Continuous Jevity 1.5; Other - Specify in Comment; Goal Rate: 60; mL/hr; Medication - Feeding Tube Flush Frequency: At least 15-30 mL water before and after medication administration and with tube clogging; Amount to Se...    DVT Prophylaxis: No changes  Cortez Catheter: Not present  Central Lines: None  Cardiac Monitoring: None  Code Status: Full Code      Disposition Plan      Expected Discharge Date: 08/02/2022, 10:00 AM  Discharge Delays: *Medically Ready for Discharge  *Early Discharge Anticipated    [facility] Comments: [facility]      The patient's  "care was discussed with the Bedside Nurse and Patient     Planning on discharge most likely on August 2 tomorrow  with a TCU bed availability as per social work    Nazia Avila MD  Hospitalist Service  [facility]  Securely message with the Vocera Web Console (learn more here)  Text page via [employer] Paging/Directory         Clinically Significant Risk Factors Present on Admission                      ______________________________________________________________________    Interval History     Patient is resting comfortably in bed.  No acute events in the last 24 hours. Having loose stools so reduced senna dose     Data reviewed today: I reviewed all medications, new labs and imaging results over the last 24 hours. I personally reviewed no images or EKG's today.    Physical Exam   /85 (BP Location: Right arm)   Pulse 97   Temp 98.4  F (36.9  C) (Oral)   Resp 18   Ht 1.676 m (5' 6\")   Wt 64.4 kg (141 lb 15.6 oz)   SpO2 98%   BMI 22.92 kg/m         Gen- pleasant lying in bed, alert and awake, not in acute distress  HEENT-expressive aphasia.  Right sided gaze  Neck- supple  CVS- I+II+ no m/r/g  RS- CTAB  Abdo- soft, no tenderness . No g/r/r   Ext- no edema   CNS-left-sided hemiparesis.  Expressive aphasia.  Able to nod and write on paper with right hand      Data   Recent Labs   Lab 08/01/22  1213 08/01/22  0833 08/01/22  0825 07/29/22  1152 07/29/22  1008 07/28/22  1159 07/28/22  0827 07/26/22  1036 07/26/22  0837   WBC  --   --   --   --   --   --   --   --  10.6   HGB  --   --   --   --   --   --   --   --  10.1*   MCV  --   --   --   --   --   --   --   --  93   PLT  --   --   --   --   --   --   --   --  401   NA  --   --  137  --   --   --   --   --  141   POTASSIUM  --   --  4.0  --  4.0  --  4.0   < > 3.7   CHLORIDE  --   --  104  --   --   --   --   --  106   CO2  --   --  29  --   --   --   --   --  26   BUN  --   --  20  --   --   --   --   --  17   CR  --   --  0.65*  -- "   --   --   --   --  0.56*   ANIONGAP  --   --  4  --   --   --   --   --  9   DAT  --   --  9.7  --   --   --   --   --  9.3   * 245* 229*   < >  --    < >  --    < > 215*   ALBUMIN  --   --   --   --   --   --   --   --  2.7*   PROTTOTAL  --   --   --   --   --   --   --   --  7.3   BILITOTAL  --   --   --   --   --   --   --   --  0.3   ALKPHOS  --   --   --   --   --   --   --   --  65   ALT  --   --   --   --   --   --   --   --  34   AST  --   --   --   --   --   --   --   --  17    < > = values in this interval not displayed.

## 2022-08-02 VITALS
BODY MASS INDEX: 22.82 KG/M2 | OXYGEN SATURATION: 98 % | DIASTOLIC BLOOD PRESSURE: 85 MMHG | TEMPERATURE: 98 F | WEIGHT: 141.98 LBS | RESPIRATION RATE: 14 BRPM | HEART RATE: 94 BPM | SYSTOLIC BLOOD PRESSURE: 129 MMHG | HEIGHT: 66 IN

## 2022-08-02 LAB
GLUCOSE BLDC GLUCOMTR-MCNC: 179 MG/DL (ref 70–99)
GLUCOSE BLDC GLUCOMTR-MCNC: 201 MG/DL (ref 70–99)
GLUCOSE BLDC GLUCOMTR-MCNC: 210 MG/DL (ref 70–99)
GLUCOSE BLDC GLUCOMTR-MCNC: 222 MG/DL (ref 70–99)

## 2022-08-02 PROCEDURE — 99239 HOSP IP/OBS DSCHRG MGMT >30: CPT | Performed by: INTERNAL MEDICINE

## 2022-08-02 PROCEDURE — 250N000013 HC RX MED GY IP 250 OP 250 PS 637: Performed by: HOSPITALIST

## 2022-08-02 PROCEDURE — 250N000013 HC RX MED GY IP 250 OP 250 PS 637: Performed by: INTERNAL MEDICINE

## 2022-08-02 RX ORDER — ASPIRIN 325 MG
325 TABLET ORAL DAILY
Qty: 100 TABLET | Refills: 1 | DISCHARGE
Start: 2022-08-02

## 2022-08-02 RX ORDER — PAROXETINE 10 MG/5ML
10 SUSPENSION ORAL DAILY
Qty: 250 ML | Refills: 0 | DISCHARGE
Start: 2022-08-02

## 2022-08-02 RX ORDER — MIRTAZAPINE 15 MG/1
15 TABLET, FILM COATED ORAL AT BEDTIME
Qty: 30 TABLET | Refills: 0 | DISCHARGE
Start: 2022-08-02

## 2022-08-02 RX ORDER — INSULIN ASPART 100 [IU]/ML
INJECTION, SOLUTION INTRAVENOUS; SUBCUTANEOUS
Qty: 15 ML | Refills: 0 | DISCHARGE
Start: 2022-08-02

## 2022-08-02 RX ORDER — AMLODIPINE BESYLATE 2.5 MG/1
2.5 TABLET ORAL AT BEDTIME
Qty: 30 TABLET | Refills: 0 | DISCHARGE
Start: 2022-08-02

## 2022-08-02 RX ORDER — POLYETHYLENE GLYCOL 3350 17 G/17G
17 POWDER, FOR SOLUTION ORAL 2 TIMES DAILY PRN
Qty: 20 PACKET | Refills: 0 | DISCHARGE
Start: 2022-08-02

## 2022-08-02 RX ORDER — CLOPIDOGREL BISULFATE 75 MG/1
75 TABLET ORAL DAILY
Qty: 90 TABLET | DISCHARGE
Start: 2022-08-02 | End: 2022-10-31

## 2022-08-02 RX ORDER — LISINOPRIL 40 MG/1
40 TABLET ORAL DAILY
Qty: 30 TABLET | Refills: 0 | DISCHARGE
Start: 2022-08-02

## 2022-08-02 RX ORDER — IBUPROFEN 100 MG/5ML
400 SUSPENSION, ORAL (FINAL DOSE FORM) ORAL EVERY 6 HOURS PRN
Qty: 118 ML | Refills: 0 | DISCHARGE
Start: 2022-08-02

## 2022-08-02 RX ORDER — ACETAMINOPHEN 325 MG/10.15ML
975 LIQUID ORAL EVERY 8 HOURS
DISCHARGE
Start: 2022-08-02 | End: 2022-08-17

## 2022-08-02 RX ORDER — AMOXICILLIN 250 MG
1 CAPSULE ORAL 2 TIMES DAILY
DISCHARGE
Start: 2022-08-02

## 2022-08-02 RX ADMIN — ACETAMINOPHEN 975 MG: 325 SUSPENSION ORAL at 00:10

## 2022-08-02 RX ADMIN — SENNOSIDES AND DOCUSATE SODIUM 1 TABLET: 50; 8.6 TABLET ORAL at 08:34

## 2022-08-02 RX ADMIN — ATORVASTATIN CALCIUM 80 MG: 80 TABLET, FILM COATED ORAL at 08:34

## 2022-08-02 RX ADMIN — ASPIRIN 325 MG ORAL TABLET 325 MG: 325 PILL ORAL at 08:33

## 2022-08-02 RX ADMIN — CLOPIDOGREL BISULFATE 75 MG: 75 TABLET ORAL at 08:34

## 2022-08-02 RX ADMIN — Medication 40 MG: at 08:29

## 2022-08-02 RX ADMIN — INSULIN ASPART 3 UNITS: 100 INJECTION, SOLUTION INTRAVENOUS; SUBCUTANEOUS at 03:37

## 2022-08-02 RX ADMIN — INSULIN ASPART 2 UNITS: 100 INJECTION, SOLUTION INTRAVENOUS; SUBCUTANEOUS at 00:12

## 2022-08-02 RX ADMIN — INSULIN ASPART 4 UNITS: 100 INJECTION, SOLUTION INTRAVENOUS; SUBCUTANEOUS at 08:24

## 2022-08-02 RX ADMIN — LISINOPRIL 40 MG: 40 TABLET ORAL at 08:35

## 2022-08-02 RX ADMIN — PAROXETINE HYDROCHLORIDE 10 MG: 10 SUSPENSION ORAL at 08:54

## 2022-08-02 RX ADMIN — ACETAMINOPHEN 975 MG: 325 SUSPENSION ORAL at 08:30

## 2022-08-02 ASSESSMENT — ACTIVITIES OF DAILY LIVING (ADL)
ADLS_ACUITY_SCORE: 58

## 2022-08-02 NOTE — PROGRESS NOTES
Discharge education provided to patient. Patient verbalized understanding of discharge instructions and upcoming appointments. Patient left with all personal belongings. Patient discharging to LTC and transportation provided by [facility] via stretcher.

## 2022-08-02 NOTE — PROGRESS NOTES
Care Management Discharge Note    Discharge Date: 08/02/2022       Discharge Disposition: Skilled Nursing Facility    Discharge Services: Transportation Services    Discharge DME:      Discharge Transportation:      Private pay costs discussed: transportation costs    PAS Confirmation Code: [id number]  Patient/family educated on Medicare website which has current facility and service quality ratings: yes    Education Provided on the Discharge Plan:    Persons Notified of Discharge Plans: patient, family, care team  Patient/Family in Agreement with the Plan: yes    Handoff Referral Completed:  Additional Information:  Received discharge orders. Writer faxed orders to [facility] via DOD. Transport confirmed for today at 1000. PCS on chart.    ZAKIA Real, LGSW    [facility]

## 2022-08-02 NOTE — DISCHARGE SUMMARY
[facility]  Discharge Summary        Josue Parisi MRN# [id number]   YOB: 1970 Age: 52 year old     Date of Admission:  7/3/2022  Date of Discharge:  8/2/2022  Admitting Physician:  Martine Julian MD  Discharge Physician: Nazia Avila MD  Discharging Service: Hospitalist     Primary Provider: Von Aguirre  Primary Care Physician Phone Number: [phone]         Discharge Diagnoses/Problem Oriented Hospital Course (Providers):    Josue Parisi was admitted on 7/3/2022 by Martine Julian MD and I would refer you to their history and physical.  The following problems were addressed during his hospitalization:    Multiple ischemic CVAs involving MCA, KWADWO with left-sided weakness and concern for progression  Hx Medication noncompliance  Polysubstance abuse  Initial CVA occurred in 03/2022 with right KWADWO, large territory defect and residual left-sided weakness recommended to discharge to ARU however declined and discharged AMA. Subsequently returned 6/18 with progressive weakness, again referred to TCU however declined and returned home. Hospitalized 6/24 with worsening left-sided weakness, paresthesias, numbness with findings of new nonhemorrhagic infarction within right prefrontal gyrus and deep MCA watershed infarct within the right centrum semiovale of the right frontal and parietal lobes. Patient was evaluated by stroke neurology each admission, most recently felt symptoms were suggestive of new infarct rather than hypoperfused tissue. Recommended liberal SBP goal of 140-160 and DAPT with plavix/ for 90 days. Patient most recently left AMA on 6/30 after being recommended for TCU. At time of discharge, it appears the plavix was discontinued in an effort to limit medications and encourage compliance.   * Returned to ED 7/3/22 with reports of failing at home. It was unclear on admission if patient's left-sided symptoms had persisted or worsened due to  "presence of polysubstances and limited ability to participate in physical exam. Admitted to cocaine use as recently as 7/2. UTox on admission positive for cocaine, amphetamines.   * 7/4 exam noted with 1/5 LUE/LLE strength, limited ability to communicate verbally but cognitively able to answer yes/no questions with gestures, shakes of head. reported change in function. Repeat CTH revealed rounded focus of hypoattenuation within right corona radiata/centrum semiovale, MRI brain 6/25 indicated restricted diffusion within this region however area of hypoattenuation is larger than region of diffusion restriction concerning for progression of infarct.y  - Neuro note 7/6- \"recommend SAMMPRIS trial guided DAPT (+clopidogrel 75 x90 days) in an attempt to stabilize the M1 segment and prevent inferior division infarct. \"   - Continue statin.  - Reconsult speech therapy; G-tube placed on 7/08, TF per [place] otherwise NPO  - Holding tube feeds  given constipation and discomfort at g tube site, 7/20/2022, initiated bowel program, IVF NS D5 while TF on hold  -7/22/2022 denies abdominal pain, nausea, emesis, restart TF slowly at 20 cc an hour with goal at 60 cc an hour [increased by 20 cc an hour/24 hours to goal rate], continue free water per NG.   -7/24/2022 at 4:00 PM today we will resume prior TF goal rate at 60 cc an hour,   - resumed a.m. and p.m. Lantus today; adjust insulin accordingly.  Patient's blood sugar is running in the 200s we will increase the morning dose of Lantus to 20 units starting today   Stroke team was contacted 7/25/22 as new RN was coming on shift and noticed that there was 0/5 strength to b/l upper and lower extremities. Unsure on acuity but had been documented by prior nursing team of 1-2/5 strength to LLE. New repeat MRI showed a new R internal capsule infarct, and chronic R MCA/ICA occlusions and b/l KWADWO occlusion v stenosis.      Palliative care consultation requested for possible goals of care " discussion as patient was not participating with therapies at times.  And was making comments that at times please let me die.  Felt like this is most likely situational in the setting of hospitalization as he does not like staying in the hospital and left AMA multiple times before this hospitalization.  Also had extensive stroke with dense hemiplegia of the left side currently and new onset severe aphasia and dysphagia needing PEG tube contributing to these comments.  Palliative care recommended ongoing counseling therapy at his care facility after discharge about his situation and illness and for underlying polysubstance abuse.        Depression: Currently on Remeron 15 mg at bedtime.    Started on Paxil 10 mg via G-tube daily to help with depression with the recent extensive stroke and situational depression     Abdominal pain  Constipation  --increase senna-colace to 2 tabs BID, MiraLAX  -ct protonix PO  daily  - No further abdominal pain, needs adherence to bowel program to prevent constipation.    Pt having multiple loose stools a day so weaning the bowel regimen now on senna 1 tablet PO BID       Bleeding gums, halitosis  - suspect reflux given degree of constipation. Checked plts, INR - both okay. Will consult OMFS if symptoms continue but pt denies tooth pain.  -Nursing reports extremely dry mouth, hold Zyrtec, Magic mouthwash, monitor  -7/22/2022 on exam 1 area on left upper incisor old nonactive bleeding consistent with gingivitis, continue oral cares.  Hemoglobin and INR stable.  No further bleeding.     SIRS syndrome  Leukocytosis  UTI secondary to indwelling fair   On admission Patient with mild leukocytosis 12-13, procalcitonin on admission negative. Low-grade fever and sinus tachycardia persisting today. UA without evidence of infection. CXR neg. ?due to polysubstance abuse with amphetamines, cocaine in UTox.  - Low threshold to obtain blood cultures, initiate broad-spectrum abx   -7/22/2022 WBC  increased to 20.3k, afebrile, no signs of localizing infection except for focal gingivitis, fair.    UC=E. coli, Fair exchanged, start Rocephin 5-day.  Later this week trial VT     Type 2 diabetes mellitus  Last HbA1c of 7.8 on 06/18. PTA regimen of metformin. Recent admission Lantus had been increased to 18u BID, carb coverage with meals of 1:10 with breakfast/lunch, 1:15 with dinner.  -Hold PTA metformin.  -While TF held relative hypoglycemia, see above; 7/22/2022 TF to restart however slowly; see #1 on restart TF.    -Restarted on Lantus 12 units in the morning and 14 at night.  Increase Lantus to 20 units in the morning for better blood sugar control today     History of hypertension   Held PTA meds initially to allow permissive hypertension.  - Resumed PTA lisinopril-hydrochlorothiazide 7/12.  - Goal is normotension per Neurology.  - Blood pressure rising, 150s/90s.  Increase lisinopril; on combo ACEi/HCTZ given dry mouth start on lisinopril only, at increased dose. CR WNL, quyen in AM.   Monitor BP.       Added norvasc 2.5mg at bedtime for better BP cntrol      Hidradenitis / Right axillary wound  WOCN consult previous recs:   Right axilla wound: Daily  and PRN.  - Cleanse wound with NS or wound cleanser (omit this step if patient cleans wound in shower).  - Dry and protect surrounding skin with no sting barrier film wipe #[id number].  - Apply a small amount of iodesorb gel #[id number] to bandaid.  - If Band-Aid needs to be changed more than once a day. Apply iodesorb gel #[id number] to adaptic #[id number] and cover with Mepilex #[id number].     Generalized pain  PTA on gabapentin 300 mg 3 times daily.    - Holding gabapentin.   - Continue as needed Tylenol as needed. Avoid opioids.  -Prior hospitalist added PRN ibuprofen.  -No pain complaints.           Diet: NPO per Anesthesia Guidelines for Procedure/Surgery Except for: Meds, Other; Specify: please stop tube feedings  Adult Formula Drip Feeding: Continuous Jevity 1.5; Other -  Specify in Comment; Goal Rate: 60; mL/hr; Medication - Feeding Tube Flush Frequency: At least 15-30 mL water before and after medication administration and with tube clogging; Amount to Se...  Free water flushes 120ml q 4hours via G tube   DVT Prophylaxis: aspirin and plavix and PCds   Cortez Catheter: Not present  Central Lines: None  Cardiac Monitoring: None  Code Status: Full Code          Disposition Plan        Expected Discharge Date: 08/02/2022, 10:00 AM  Discharge Delays: *Medically Ready for Discharge pt is in stable condition for discharge     Discharge Comments: [facility]         The patient's care was discussed with the Bedside Nurse and Patient           Nazia Avila MD  Pager [phone]  Hospitalist [facility]                 Code Status:      Full Code         Important Results:      See below          Pending Results:        Unresulted Labs Ordered in the Past 30 Days of this Admission     No orders found from 6/3/2022 to 7/4/2022.               Discharge Instructions and Follow-Up:      Follow-up Appointments     Follow-up and recommended labs and tests       Per Neurology note (signed off on 7/10): follow up  - in the next 4 week(s) with PCP  - follow up with general neurology in 4 weeks  F/u with NH physician in 1week. REcheck CBC, CMP                  Discharge Disposition:      Discharged to short-term care facility         Discharge Medications:        Current Discharge Medication List      START taking these medications    Details   acetaminophen (TYLENOL) 325 MG/10.15ML solution 30.45 mLs (975 mg) by Per Feeding Tube route every 8 hours for 15 days Then change to PRN for mild pain    Associated Diagnoses: Acute CVA (cerebrovascular accident) (H)      amLODIPine (NORVASC) 2.5 MG tablet Take 1 tablet (2.5 mg) by mouth At Bedtime  Qty: 30 tablet, Refills: 0    Associated Diagnoses: Acute CVA (cerebrovascular accident) (H); Hypertensive urgency       clopidogrel (PLAVIX) 75 MG tablet 1 tablet (75 mg) by Oral or Feeding Tube route daily for 90 days Stop after 90 days then continue regular aspirin daily  Qty: 90 tablet    Associated Diagnoses: Acute CVA (cerebrovascular accident) (H)      ibuprofen (ADVIL/MOTRIN) 100 MG/5ML suspension 20 mLs (400 mg) by Oral or Feeding Tube route every 6 hours as needed for moderate pain Use sparingly as pt is on aspirin and plavix increases the risk of bleeding  Qty: 118 mL, Refills: 0    Associated Diagnoses: Acute CVA (cerebrovascular accident) (H)      insulin aspart (NOVOLOG FLEXPEN) 100 UNIT/ML pen Novolog Flexpen  For every 4 hour Glucose:   140 - 189 give 1 unit  190 - 239 give 2 units  240 - 289 give 3 units   290 - 339 give 4 units   = or > 340 give 5 units  Qty: 15 mL, Refills: 0    Associated Diagnoses: Type 2 diabetes mellitus with hyperglycemia, without long-term current use of insulin (H)      lisinopril (ZESTRIL) 40 MG tablet Take 1 tablet (40 mg) by mouth daily  Qty: 30 tablet, Refills: 0    Associated Diagnoses: Hypertensive urgency      magnesium hydroxide (MILK OF MAGNESIA) 400 MG/5ML suspension 30 mLs by Oral or Feeding Tube route daily as needed for constipation (Give today 7/21/22)  Qty: 118 mL, Refills: 0    Associated Diagnoses: Acute CVA (cerebrovascular accident) (H)      mirtazapine (REMERON) 15 MG tablet 1 tablet (15 mg) by Oral or Feeding Tube route At Bedtime  Qty: 30 tablet, Refills: 0    Associated Diagnoses: Depression, unspecified depression type      pantoprazole (PROTONIX) 2 mg/mL SUSP suspension 20 mLs (40 mg) by Oral or Feeding Tube route every morning (before breakfast)  Qty: 200 mL, Refills: 0    Associated Diagnoses: Acute CVA (cerebrovascular accident) (H)      PARoxetine (PAXIL) 10 MG/5ML suspension 5 mLs (10 mg) by Per Feeding Tube route daily  Qty: 250 mL, Refills: 0    Associated Diagnoses: Depression, unspecified depression type      polyethylene glycol (MIRALAX) 17 g packet 17 g  by Oral or Feeding Tube route 2 times daily as needed for constipation (constipation)  Qty: 20 packet, Refills: 0    Associated Diagnoses: Acute CVA (cerebrovascular accident) (H)      senna-docusate (SENOKOT-S/PERICOLACE) 8.6-50 MG tablet 1 tablet by Oral or Feeding Tube route 2 times daily    Associated Diagnoses: Acute CVA (cerebrovascular accident) (H)         CONTINUE these medications which have CHANGED    Details   aspirin (ASA) 325 MG tablet 1 tablet (325 mg) by Per Feeding Tube route daily  Qty: 100 tablet, Refills: 1    Associated Diagnoses: Acute CVA (cerebrovascular accident) (H)      !! insulin glargine (LANTUS PEN) 100 UNIT/ML pen Inject 14 Units Subcutaneous At Bedtime  Qty: 15 mL, Refills: 0    Comments: If Lantus is not covered by insurance, may substitute Basaglar or Semglee or other insulin glargine product per insurance preference at same dose and frequency.    Associated Diagnoses: Type 2 diabetes mellitus with hyperglycemia, without long-term current use of insulin (H)      !! insulin glargine (LANTUS PEN) 100 UNIT/ML pen Inject 20 Units Subcutaneous every morning (before breakfast)  Qty: 15 mL, Refills: 0    Comments: If Lantus is not covered by insurance, may substitute Basaglar or Semglee or other insulin glargine product per insurance preference at same dose and frequency.    Associated Diagnoses: Type 2 diabetes mellitus with hyperglycemia, without long-term current use of insulin (H)       !! - Potential duplicate medications found. Please discuss with provider.      CONTINUE these medications which have NOT CHANGED    Details   atorvastatin (LIPITOR) 80 MG tablet Take 1 tablet (80 mg) by mouth daily  Qty: 30 tablet, Refills: 1    Associated Diagnoses: Acute cerebrovascular accident (CVA) due to ischemia (H)      blood glucose (NO BRAND SPECIFIED) test strip Use to test blood sugar 1 times daily or as directed.  Qty: 30 strip, Refills: 0    Associated Diagnoses: Type 2 diabetes mellitus  "with hyperglycemia, without long-term current use of insulin (H)      blood glucose monitoring (NO BRAND SPECIFIED) meter device kit Use to test blood sugar 1 times daily, first thing in the morning.  Qty: 1 kit, Refills: 0    Associated Diagnoses: Type 2 diabetes mellitus with hyperglycemia, without long-term current use of insulin (H)      chlorhexidine (HIBICLENS) 4 % liquid Apply topically daily as needed for wound care      Lancets 30G MISC 120 30 gauge lancets (substitute as needed)  Qty: 120 each, Refills: 0    Associated Diagnoses: Type 2 diabetes mellitus with hyperglycemia, without long-term current use of insulin (H)         STOP taking these medications       ALPRAZolam (XANAX) 1 MG tablet Comments:   Reason for Stopping:         cetirizine (ZYRTEC) 10 MG tablet Comments:   Reason for Stopping:         gabapentin (NEURONTIN) 300 MG capsule Comments:   Reason for Stopping:         lisinopril-hydrochlorothiazide (ZESTORETIC) 20-25 MG tablet Comments:   Reason for Stopping:         metFORMIN (GLUCOPHAGE) 1000 MG tablet Comments:   Reason for Stopping:         sodium chloride (OCEAN) 0.65 % nasal spray Comments:   Reason for Stopping:                    Allergies:       No Known Allergies         Consultations This Hospital Stay:      Consultation during this admission received from neurology, nutrition and PT, OT, speech and , care coordinator          Condition and Physical Exam on Discharge:      Discharge condition: Stable   Discharge vitals: Blood pressure 129/85, pulse 94, temperature 98  F (36.7  C), temperature source Oral, resp. rate 14, height 1.676 m (5' 6\"), weight 64.4 kg (141 lb 15.6 oz), SpO2 98 %.     Constitutional: Alert, awake, NAD    Lungs: CTA b/l    Cardiovascular: RRR   Abdomen: Soft, NT, ND, BS+   Skin: Warm and dry . Left sided hemiparesis    Other:            Discharge Orders for Skilled Facility (from Discharge Orders):        After Care Instructions     Activity - Up " with nursing assistance      Diet      Follow this diet upon discharge: Orders Placed This Encounter      Adult Formula Drip Feeding: Continuous Jevity 1.5; Other - Specify in Comment; Goal Rate: 60; mL/hr; Medication - Feeding Tube Flush Frequency: At least 15-30 mL water before and after medication administration and with tube clogging; Amount to Se...Free water flushes 120ml every 4hours via G tube       NPO per Anesthesia Guidelines for Procedure/Surgery Except for: Meds, Other; S         Fall precautions      General info for SNF      Length of Stay Estimate: Short Term Care: Estimated # of Days 31-90  Condition at Discharge: Stable  Level of care:skilled   Rehabilitation Potential: Good  Admission H&P remains valid and up-to-date: Yes  Recent Chemotherapy: N/A  Use Nursing Home Standing Orders: Yes         Glucose monitor nursing POCT      Every 4hours         Mantoux instructions      Give two-step Mantoux (PPD) Per Facility Policy Yes             Future tests that were ordered for you     Stroke Hospital Follow Up      [facility] will call you to coordinate care as prescribed by your provider. If you don t hear from a representative within 2 business days, please call [phone].                    Rehab orders for Skilled Facility (from Discharge Orders):      Referrals     Future Labs/Procedures    Occupational Therapy Adult Consult     Comments:    Evaluate and treat as clinically indicated.    Reason:  REcent CVA with left sided weakness and Aphasia    Physical Therapy Adult Consult     Comments:    Evaluate and treat as clinically indicated.    Reason: REcent CVA with left sided weakness and Aphasia    Speech Language Path Adult Consult     Comments:    Evaluate and treat as clinically indicated.    Reason: REcent CVA with left sided weakness and Aphasia and dysphagia             Discharge Time:      Greater than 30 minutes.        Image Results From This Hospital Stay (For Non-EPIC  Providers):        Results for orders placed or performed during the hospital encounter of 07/03/22   XR Chest Port 1 View    Narrative    EXAM: XR CHEST PORT 1 VIEW  LOCATION: [facility]  DATE/TIME: 7/4/2022 2:10 PM    INDICATION: Low-grade fever.  COMPARISON: Chest x-ray 5/6/2022.      Impression    IMPRESSION: Negative chest.   CT Head w/o Contrast    Narrative    EXAM: CT HEAD W/O CONTRAST  LOCATION: [facility]  DATE/TIME: 7/4/2022 12:44 PM    INDICATION: prior cvas, question worsening left sided weakness  COMPARISON: Head CT June 24, 2022. Brain MRI June 25, 2022  TECHNIQUE: Routine CT Head without IV contrast. Multiplanar reformats. Dose reduction techniques were used.    FINDINGS:  INTRACRANIAL CONTENTS: No evidence of acute intracranial hemorrhage or mass consistent effect.  Rounded focus of hypoattenuation within the right corona radiata//centrum semiovale, new since head CT June 24, 2022. On brain MRI June 2015 there is   restricted diffusion within this region, consistent with acute ischemia, however the region of hypoattenuation and is larger than the region of diffusion restriction which is concerning for progression of infarct. Brain MRI could be used to better   characterize.  Evolving subacute infarcts within the right cerebral hemisphere is demonstrated. Chronic infarcts within the bilateral frontal lobes are demonstrated. The ventricles and sulci are prominent consistent with moderate brain parenchymal volume   loss. The basilar cisterns are patent.    VISUALIZED ORBITS/SINUSES/MASTOIDS: The globes are unremarkable. The partially imaged paranasal sinuses, mastoid air cells and middle ear cavities are unremarkable.     BONES/SOFT TISSUES: The visualized skull base and calvarium are unremarkable.      Impression    IMPRESSION:    1.  Rounded focus of hypoattenuation within the right corona radiata//centrum semiovale, new since head CT June 24, 2022.  On brain MRI June 25, 2022 there was restricted diffusion within this region, consistent with acute ischemia, however the region of   hypoattenuation and is larger than the region of diffusion restriction which is concerning for progression of infarct. Brain MRI could be used to better characterize.    2.  No evidence of acute intracranial hemorrhage or mass effect.  3.  Moderate nonspecific white matter changes.  4.  Moderate brain parenchymal volume loss.   MR Brain w/o Contrast    Narrative    EXAM: MR BRAIN W/O CONTRAST  LOCATION: [facility]  DATE/TIME: 7/4/2022 9:23 PM    INDICATION: Evaluate for interval change in known stroke  COMPARISON: CT head 07/04/2022, MRI brain 06/24/2022  TECHNIQUE: Routine multiplanar multisequence head MRI without intravenous contrast.    FINDINGS:  INTRACRANIAL CONTENTS:   Prior MRI brain 06/24/2022 demonstrated a small cortical based infarct within the right posterior frontal lobe high convexity, multiple patchy acute infarcts right frontoparietal white matter watershed distribution, and punctate acute infarct right   anterior corpus callosum genu. These have evolved and appear as subacute infarcts on current exam.    Large new acute infarct within the right posterior frontal lobe, right parietal lobe, right pre and postcentral gyrus, right insula, and right corpus callosum anterior body. This acute infarct is predominantly in the right MCA territory with small areas   extending into the right KWADWO territory.     No midline shift. No susceptibility on GRE to suggest microhemorrhage. Scattered nonspecific T2/FLAIR hyperintensities within the cerebral white matter most consistent with mild chronic microvascular ischemic change. Mild to moderate generalized   cerebral atrophy. No hydrocephalus. Normal position of the cerebellar tonsils.     Left anterior cingulate gyrus and left corpus callosum genu chronic infarcts left KWADWO territory. Left basal ganglia  small chronic infarct. Left inferior cerebellum punctate chronic infarct.    SELLA: No abnormality accounting for technique.    OSSEOUS STRUCTURES/SOFT TISSUES: Normal marrow signal. The major intracranial vascular flow voids are maintained. Multiple small and prominent lymph nodes within the neck.    ORBITS: No abnormality accounting for technique.     SINUSES/MASTOIDS: Mild mucosal thickening scattered about the paranasal sinuses. Left sphenoid sinus small air-fluid level. Please correlate for acute sinusitis. No middle ear or mastoid effusion.       Impression    IMPRESSION:  1.  Right MCA territory and portions of the right KWADWO territory demonstrate a new large acute infarct superimposed on subacute infarcts described above.  2.  No susceptibility on GRE to suggest microhemorrhage.  3.  Chronic intracranial changes described above.  4.  Left sphenoid sinus small air-fluid level. Please correlate for acute sinusitis.   CT Head w/o Contrast    Narrative    EXAM: CT HEAD W/O CONTRAST  LOCATION: [facility]  DATE/TIME: 7/5/2022 5:17 PM    INDICATION: Acute/subacute infarction right cerebral hemisphere  COMPARISON: MRI head 07/04/2022  TECHNIQUE: Routine CT Head without IV contrast. Multiplanar reformats. Dose reduction techniques were used.    FINDINGS:  INTRACRANIAL CONTENTS: Moderate to large hypodensity centered in the right frontoparietal region, corresponding to restricted diffusion on prior MRI and consistent with acute/subacute infarction. No definite associated space occupying parenchymal   hemorrhage. Mild associated mass effect without significant midline shift. Mild chronic encephalomalacia parasagittal left frontal lobe anteriorly. Normal parenchymal attenuation. Normal ventricles and sulci.     VISUALIZED ORBITS/SINUSES/MASTOIDS: No intraorbital abnormality. Mild mucosal thickening left maxillary sinus. No middle ear or mastoid effusion.    BONES/SOFT TISSUES: No acute  abnormality.      Impression    IMPRESSION:  1.  Moderate to large hypodensity centered in the right frontoparietal region, corresponding to restricted diffusion on prior MRI and consistent with acute/subacute infarction.   2.  No definite associated space occupying parenchymal hemorrhage.   3.  Mild associated mass effect without significant midline shift.   CTA Head Neck with Contrast    Narrative    EXAM: CTA HEAD NECK W CONTRAST  LOCATION: [facility]  DATE/TIME: 7/5/2022 5:18 PM    INDICATION: Acute infarction; known R cervical ICA R M1 tandem occlusions  COMPARISON: 06/24/2022  CONTRAST: 75 mL Isovue 370  TECHNIQUE: Head and neck CT angiogram with IV contrast. Axial helical CT images of the head and neck vessels obtained during the arterial phase of intravenous contrast administration. Axial 2D reconstructed images and multiplanar 3D MIP reconstructed   images of the head and neck vessels were performed by the technologist. Dose reduction techniques were used. All stenosis measurements made according to NASCET criteria unless otherwise specified.    FINDINGS:   HEAD CTA:  ANTERIOR CIRCULATION: Redemonstration of marked narrowing to occlusion the midportion of right M1, similar to prior. Mild asymmetry in the appearance of MCA distributions with mildly less opacified branches on the right, similar to prior. Mild   atherosclerotic changes cavernous and supraclinoid ICA on the left. Mildly attenuated opacification of anterior cerebral artery distribution, similar to prior. Well developed right posterior cerebral artery.    POSTERIOR CIRCULATION: No stenosis/occlusion, aneurysm, or high flow vascular malformation. Dominant left and smaller right vertebral artery contribute to a normal basilar artery.     DURAL VENOUS SINUSES: Expected enhancement of the major dural venous sinuses.    NECK CTA:  RIGHT CAROTID: Redemonstration of occlusion of right ICA in its proximal aspect.    LEFT  CAROTID: No measurable stenosis or dissection.    VERTEBRAL ARTERIES: No focal stenosis or dissection. Dominant left and smaller right vertebral arteries.    AORTIC ARCH: Classic aortic arch anatomy with no significant stenosis at the origin of the great vessels.    NONVASCULAR STRUCTURES: Unremarkable.      Impression    IMPRESSION:     HEAD CTA:   1.  Redemonstration of marked narrowing to occlusion the midportion of right M1, similar to prior.   2.  Mild asymmetry in the appearance of MCA distributions with mildly less opacified branches on the right, similar to prior.   3.  Mild atherosclerotic changes cavernous and supraclinoid ICA on the left.   4.  Mildly attenuated opacification of anterior cerebral artery distribution, similar to prior.  5.  Remaining intracranial circulation appears normal.    NECK CTA:  1.  Redemonstration of occlusion of right ICA in its proximal aspect.  2.  No hemodynamically significant narrowing of the left ICA.  3.  Normal vertebral arteries.   XR Feeding Tube Placement    Narrative    FLUOROSCOPY GUIDED FEEDING TUBE PLACEMENT July 6, 2022 12:15 PM     HISTORY: Stroke. Feeding tube needed for nutrition.     COMPARISON: None.    TECHNIQUE: After injection of Xylocaine gel into the nose, a feeding  tube was advanced under fluoroscopic guidance.    FLUOROSCOPY TIME: 1.5 minutes.    SPOT FILMS: One.    FINDINGS: The feeding tube is advanced with the tip in the distal  duodenum. A small amount of barium was injected to define anatomy.      Impression    IMPRESSION: Uncomplicated feeding tube placement with tip in the  distal duodenum.    NJ EVERETT MD         SYSTEM ID:  [id number]   XR Feeding Tube Placement    Narrative    FLUOROSCOPY GUIDED FEEDING TUBE PLACEMENT  7/7/2022 3:06 PM     HISTORY: stroke. please also place bridal Feeding tube needed for  nutrition.    COMPARISON: 7/6/2022.    TECHNIQUE: After injection of Xylocaine gel into the nose, a feeding  tube was advanced under  fluoroscopic guidance.    FLUOROSCOPY TIME: 5.3 minutes.    SPOT FILMS: 1    FINDINGS: The feeding tube is advanced with the tip in the pylorus.  The patient was combative and further attempts to advance the tube  were not made. A small amount of barium was injected to define  anatomy.      Impression    IMPRESSION: Feeding tube placement with tip in the pylorus. The  patient was combative and further attempts to advance the tube were  not made.    MARCELO HORTA MD         SYSTEM ID:  [id number]   IR Gastrostomy Tube Percutaneous Plcmnt    Narrative    INTERVENTIONAL RADIOLOGY GASTROSTOMY TUBE PERCUTANEOUS PLACEMENT July 8, 2022 at 1225 hours      CLINICAL HISTORY/INDICATION: Stroke. Long-term need for nutrition    MODERATE SEDATION: Versed 1 mg IV; Fentanyl 50 mcg IV. During the time  out, immediately prior to the administration of medications, the  patient was reassessed for adequacy to receive conscious sedation.   Under physician supervision, Versed and fentanyl were administered for  moderate sedation. Pulse oximetry, heart rate and blood pressure were  continuously monitored by an independent trained observer. The  physician spent 4 minutes of face-to-face sedation time with the  patient.    CONTRAST: 20 mL Omnipaque 240 into the gastric lumen.    REFERENCED AIR KERMA: 5.12 mGy.  FLUOROSCOPY TIME: 1 minutes.     ESTIMATED BLOOD LOSS:  Minimal.    COMPLICATIONS:  None.    PROCEDURES AND FINDINGS: Following a discussion of the risks,  benefits, indications, and alternatives to treatment, informed consent  was obtained. The patient was brought to the interventional radiology  suite and placed supine on the table. A limited ultrasound of the  abdomen was performed to localize the liver, which was marked on the  skin. The nasal jejunal tube was pulled back so the tip is in the  stomach. The abdomen was then prepped and draped in a routine sterile  fashion. A timeout was performed per hospital universal  "protocol  policy to ensure correct patient, site, and procedure to be performed.    The stomach was then insufflated with air via the indwelling  nasogastric tube. A suitable site for percutaneous access into the  stomach was marked, and local anesthesia was obtained with 1%  Lidocaine. Needle access into the stomach was obtained. Position was  confirmed by aspiration of gas and dripping of contrast into the  gastric lumen. A T fastener was deployed through the needle, and the  anterior stomach wall was tacked to the anterior abdominal wall. This  procedure was repeated with a second T fastener. A 1 cm incision was  made between the T fasteners, and access into the stomach was obtained  using an 18 G needle. Position was confirmed by aspirating gas and  dripping contrast into the gastric lumen.     A 0.035\" Amplatz wire was then advanced across the needle into the  gastric lumen. The needle was removed and the tract was serially  dilated. Finally, a peel-away sheath was placed and through the sheath  and over the wire, an 18 Gabonese gastrostomy tube was placed into the  stomach. Position was confirmed by dripping contrast through the tube.  The retention balloon was insufflated with 10 mL of dilute contrast.  The retention disk was cinched to the skin and the T fasteners were  secured to the skin. A sterile dressing was applied, and the tube was  capped. The nasogastric tube was removed.     Throughout the procedure, the patient was monitored by a radiology  nurse for cardiac rhythm and oxygen saturation which remained stable.  The patient tolerated the procedure well and left interventional  radiology in stable condition.      Impression    IMPRESSION: Placement of a 18 Gabonese gastrostomy tube, as detailed  above.    JOYCE YI DO         SYSTEM ID:  [id number]   CT Abdomen Pelvis w Contrast    Narrative    CT ABDOMEN PELVIS WITH CONTRAST 7/20/2022 9:19 AM    CLINICAL HISTORY: Abdominal pain and " distention.    TECHNIQUE: CT scan of the abdomen and pelvis was performed following  injection of IV contrast. Multiplanar reformats were obtained. Dose  reduction techniques were used.  CONTRAST: 78mL Isovue-370    COMPARISON: None.    FINDINGS:   LOWER CHEST: Normal.    HEPATOBILIARY: Normal.    PANCREAS: Normal.    SPLEEN: Normal.    ADRENAL GLANDS: Normal.    KIDNEYS/BLADDER: Small renal cysts. No hydronephrosis.    BOWEL: Large amount of stool in the colon. Percutaneous gastrostomy  tube. No dilated loops of bowel.    PELVIC ORGANS: Cortez catheter in the bladder. The prostate gland is  normal in size.    ADDITIONAL FINDINGS: Moderate atherosclerotic calcification.    MUSCULOSKELETAL: Normal.      Impression    IMPRESSION: Large amount of stool throughout the colon may indicate a  degree of constipation. No bowel obstruction.     MAMTA DORADO MD         SYSTEM ID:  [id number]   MR Brain w/o & w Contrast    Narrative    EXAM: MR BRAIN W/O and W CONTRAST  LOCATION: [facility]  DATE/TIME: 7/25/2022 8:42 PM    INDICATION: Worsening left-sided weakness. New stroke.  COMPARISON: MRI brain dated 07/04/2022  CONTRAST: 10mL Gadavist  TECHNIQUE: Routine multiplanar multisequence head MRI without and with intravenous contrast.    FINDINGS:  INTRACRANIAL CONTENTS: Again seen is an evolving region of subacute ischemic injury involving the right frontal lobe, right parietal lobe, right corona radiata, right insular region and right anterior genu of the corpus callosum. Many of these regions   demonstrate associated cortical/subcortical enhancement compatible with subacute ischemic injury. There is a new region of increased DWI signal with subtle decreased ADC signal involving the right posterior limb of the internal capsule. Additionally   there is increased subtle DWI signal involving the right thalamus with equivocal diminished ADC signal. Chronic left thalamic lacunar type infarction. Chronic  left corona radiata lacunar type infarction. Chronic region of encephalomalacia and gliosis   identified involving the right frontal lobe. Additionally there is a chronic region of encephalomalacia and gliosis identified involving the left anterior paraventricular frontal lobe. No acute hemorrhage identified. No midline shift. No abnormal   extra-axial fluid collection. Scattered nonspecific T2/FLAIR hyperintensities within the cerebral white matter most consistent with mild chronic microvascular ischemic change. Mild generalized cerebral atrophy. No hydrocephalus. Normal position of the   cerebellar tonsils.    SELLA: No abnormality accounting for technique.    OSSEOUS STRUCTURES/SOFT TISSUES: Normal marrow signal. The major intracranial vascular flow voids are maintained.     ORBITS: No abnormality accounting for technique.     SINUSES/MASTOIDS: Mucosal thickening is noted involving the left maxillary sinus. No middle ear or mastoid effusion.       Impression    IMPRESSION:  1.  New region of abnormal DWI signal within the right posterior limb of internal capsule, concerning for acute/subacute ischemic injury.  2.  Equivocal small acute/subacute infarct within the right thalamus.  3.  Evolving subacute infarct involving the right MCA distribution, as seen on MRI dated 07/04/2022.  4.  Chronic ischemic changes, as above.  5.  No acute hemorrhage.   MRA Brain ([place]) wo Contrast    Narrative    EXAM: MRA BRAIN ([place]) W/O CONTRAST  LOCATION: [facility]  DATE/TIME: 7/25/2022 8:42 PM    INDICATION: Worsening left-sided weakness. Rule out new stroke.  COMPARISON: CTA head and neck dated 7/5/2020.  TECHNIQUE: 3D time-of-flight head MRA without intravenous contrast.    FINDINGS:  ANTERIOR CIRCULATION: No significant flow-related signal identified involving the right petrous and cavernous internal carotid artery. There is minimal distal reconstitution of flow of the right  supraclinoid ICA. The proximal right M1 middle cerebral   artery is patent, however there is loss of flow-related signal identified involving the mid to distal right M1 middle cerebral artery. The branching right M2 anterior middle cerebral artery which supplies the right frontal operculum appears patent. There   is reconstitution of flow of the ascending and posterior division right middle cerebral artery vasculature beyond the right MCA bifurcation. The right MCA bifurcation does not appear to demonstrate contrast flow-related signal. The distal right M3/M4   branches of the right middle cerebral artery vasculature, particularly involving the ascending and posterior divisions are not well seen, possibly due to slow flow. Minimal flow-related signal identified involving the right A1 anterior cerebral artery,   similar to prior examination. Minimal flow-related signal is noted involving the proximal bilateral A2 anterior cerebral arteries, similar to prior CTA. The remaining segments of the right anterior cerebral artery are patent. The remaining segments of   the left anterior cerebral artery are not well visualized. The left MCA vasculature is patent. No aneurysm. No high flow vascular malformation. Standard [place] anatomy.    POSTERIOR CIRCULATION: No stenosis/occlusion, aneurysm, or high flow vascular malformation. Dominant left and smaller right vertebral artery contribute to a normal basilar artery.       Impression    IMPRESSION:  1.  Similar-appearing findings of marked narrowing versus occlusion involving the mid to distal right M1 middle cerebral artery with reconstitution of flow beyond the right MCA bifurcation. The distal right MCA vasculature demonstrates poor flow-related   signal, possibly related to slow flow versus stenosis versus occlusion.  2.  Multifocal regions of loss of flow-related signal involving the bilateral anterior cerebral arteries, left worse than right. This could be  related to multifocal stenosis, multifocal occlusion with reconstitution of flow or artifact. No definite   superimposed acute [place] territory infarcts identified on MRI brain.   3.  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   MRA Neck (Carotids) wo & w Contrast    Narrative    EXAM: MRA NECK (CAROTIDS) W/O and W CONTRAST  LOCATION: [facility]  DATE/TIME: 7/25/2022 8:44 PM    INDICATION: Worsening left-sided weakness. Stroke.  COMPARISON: CTA head and neck dated 7/5/2022.  CONTRAST: 10 mL Gadavist  TECHNIQUE: Neck MRA without and with IV contrast. Stenosis measurements made according to NASCET criteria unless otherwise specified.    FINDINGS:  RIGHT CAROTID: Again seen is a long segment occlusion arising from the right proximal ICA and extending throughout its course in the neck.    LEFT CAROTID: No measurable stenosis or dissection.    VERTEBRAL ARTERIES: No focal stenosis or dissection. Dominant left and smaller right vertebral arteries.    AORTIC ARCH: Classic aortic arch anatomy with no significant stenosis at the origin of the great vessels.      Impression    IMPRESSION:  1.  Redemonstrated long segment occlusion involving the right ICA.             Most Recent Lab Results In EPIC (For Non-EPIC Providers):    Most Recent 3 CBC's:  Recent Labs   Lab Test 07/26/22  0837 07/25/22  0832 07/24/22  0741   WBC 10.6 12.3* 12.7*   HGB 10.1* 9.8* 9.7*   MCV 93 93 96    379 353      Most Recent 3 BMP's:  Recent Labs   Lab Test 08/02/22  0757 08/02/22  0336 08/02/22  0023 08/01/22  0833 08/01/22  0825 07/29/22  1152 07/29/22  1008 07/28/22  1159 07/28/22  0827 07/26/22  1036 07/26/22  0837 07/25/22  1309 07/25/22  0832   NA  --   --   --   --  137  --   --   --   --   --  141  --  138   POTASSIUM  --   --   --   --  4.0  --  4.0  --  4.0   < > 3.7   < > 3.3*   CHLORIDE  --   --   --   --  104  --   --   --   --    --  106  --  105   CO2  --   --   --   --  29  --   --   --   --   --  26  --  28   BUN  --   --   --   --  20  --   --   --   --   --  17  --  12   CR  --   --   --   --  0.65*  --   --   --   --   --  0.56*  --  0.51*   ANIONGAP  --   --   --   --  4  --   --   --   --   --  9  --  5   DAT  --   --   --   --  9.7  --   --   --   --   --  9.3  --  9.3   * 201* 210*   < > 229*   < >  --    < >  --    < > 215*   < > 206*    < > = values in this interval not displayed.     Most Recent 3 Troponin's:  Recent Labs   Lab Test 04/18/21  1553   TROPI <0.015     Most Recent 3 INR's:  Recent Labs   Lab Test 07/22/22  0813 07/19/22  1152 06/24/22  1805   INR 1.18* 1.04 1.08     Most Recent 2 LFT's:  Recent Labs   Lab Test 07/26/22  0837 07/19/22  1152   AST 17 30   ALT 34 46   ALKPHOS 65 58   BILITOTAL 0.3 0.5     Most Recent Cholesterol Panel:  Recent Labs   Lab Test 06/24/22  1805   CHOL 102   LDL 53   HDL 30*   TRIG 95     Most Recent 6 Bacteria Isolates From Any Culture (See EPIC Reports for Culture Details):No lab results found.  Most Recent TSH, T4 and HgbA1c:  Recent Labs   Lab Test 06/18/22  2150   TSH 1.26   A1C 7.8*

## 2022-08-02 NOTE — PLAN OF CARE
Speech Language Therapy Discharge Summary    Reason for therapy discharge:    Discharged to long term care facility.    Progress towards therapy goal(s). See goals on Care Plan in [facility] electronic health record for goal details.  Goals not met.  Barriers to achieving goals:   discharge from facility.    Therapy recommendation(s):    Continued therapy is recommended.  Rationale/Recommendations:  Continue ST needs for dysphagia management and communication for severe apraxia. Discharged NPO with FT.

## 2022-08-03 ENCOUNTER — PATIENT OUTREACH (OUTPATIENT)
Dept: CARE COORDINATION | Facility: CLINIC | Age: 52
End: 2022-08-03

## 2022-08-03 DIAGNOSIS — Z71.89 OTHER SPECIFIED COUNSELING: ICD-10-CM

## 2022-08-03 NOTE — PROGRESS NOTES
Physical Therapy Discharge Summary    Reason for therapy discharge:    Discharged to long term care facility.    Progress towards therapy goal(s). See goals on Care Plan in [facility] electronic health record for goal details.  Goals not met.  Barriers to achieving goals:   discharge from facility.    Therapy recommendation(s):    Continued therapy is recommended.  Rationale/Recommendations:  Potential for transfers and gait with intensive rehab..

## 2022-08-03 NOTE — PROGRESS NOTES
[facility]    Background: Care Coordination referral placed from [facility] discharge report for reason of patient meeting criteria for a TCM outreach call by [facility] team.    Assessment: Upon chart review, CCRC Team member will cancel/close the referral for TCM outreach due to reason below:    Patient is not established within [facility]. Upon chart review, CCRC Team member noted patient discharged to TCU    Plan: Care Coordination referral for TCM outreach canceled.      Marni Combs  [facility] [place], [facility]    *Connected Care Resource Team does NOT follow patient ongoing. Referrals are identified based on internal discharge reports and the outreach is to ensure patient has an understanding of their discharge instructions.

## 2022-08-04 ENCOUNTER — LAB REQUISITION (OUTPATIENT)
Dept: LAB | Facility: CLINIC | Age: 52
End: 2022-08-04
Payer: COMMERCIAL

## 2022-08-04 DIAGNOSIS — E11.65 TYPE 2 DIABETES MELLITUS WITH HYPERGLYCEMIA (H): ICD-10-CM

## 2022-08-08 LAB
ANION GAP SERPL CALCULATED.3IONS-SCNC: 12 MMOL/L (ref 7–15)
BASOPHILS # BLD AUTO: 0.1 10E3/UL (ref 0–0.2)
BASOPHILS NFR BLD AUTO: 0 %
BUN SERPL-MCNC: 24.3 MG/DL (ref 6–20)
CALCIUM SERPL-MCNC: 10.2 MG/DL (ref 8.6–10)
CHLORIDE SERPL-SCNC: 100 MMOL/L (ref 98–107)
CREAT SERPL-MCNC: 0.61 MG/DL (ref 0.67–1.17)
DEPRECATED HCO3 PLAS-SCNC: 26 MMOL/L (ref 22–29)
EOSINOPHIL # BLD AUTO: 0.5 10E3/UL (ref 0–0.7)
EOSINOPHIL NFR BLD AUTO: 4 %
ERYTHROCYTE [DISTWIDTH] IN BLOOD BY AUTOMATED COUNT: 12.9 % (ref 10–15)
GFR SERPL CREATININE-BSD FRML MDRD: >90 ML/MIN/1.73M2
GLUCOSE SERPL-MCNC: 195 MG/DL (ref 70–99)
HCT VFR BLD AUTO: 36 % (ref 40–53)
HGB BLD-MCNC: 11.3 G/DL (ref 13.3–17.7)
IMM GRANULOCYTES # BLD: 0 10E3/UL
IMM GRANULOCYTES NFR BLD: 0 %
LYMPHOCYTES # BLD AUTO: 2.3 10E3/UL (ref 0.8–5.3)
LYMPHOCYTES NFR BLD AUTO: 20 %
MCH RBC QN AUTO: 29.8 PG (ref 26.5–33)
MCHC RBC AUTO-ENTMCNC: 31.4 G/DL (ref 31.5–36.5)
MCV RBC AUTO: 95 FL (ref 78–100)
MONOCYTES # BLD AUTO: 1 10E3/UL (ref 0–1.3)
MONOCYTES NFR BLD AUTO: 9 %
NEUTROPHILS # BLD AUTO: 7.7 10E3/UL (ref 1.6–8.3)
NEUTROPHILS NFR BLD AUTO: 67 %
NRBC # BLD AUTO: 0 10E3/UL
NRBC BLD AUTO-RTO: 0 /100
PLATELET # BLD AUTO: 288 10E3/UL (ref 150–450)
POTASSIUM SERPL-SCNC: 4 MMOL/L (ref 3.4–5.3)
RBC # BLD AUTO: 3.79 10E6/UL (ref 4.4–5.9)
SODIUM SERPL-SCNC: 138 MMOL/L (ref 136–145)
WBC # BLD AUTO: 11.6 10E3/UL (ref 4–11)

## 2022-08-08 PROCEDURE — P9604 ONE-WAY ALLOW PRORATED TRIP: HCPCS | Mod: ORL | Performed by: INTERNAL MEDICINE

## 2022-08-08 PROCEDURE — 36415 COLL VENOUS BLD VENIPUNCTURE: CPT | Mod: ORL | Performed by: INTERNAL MEDICINE

## 2022-08-08 PROCEDURE — 80048 BASIC METABOLIC PNL TOTAL CA: CPT | Mod: ORL | Performed by: INTERNAL MEDICINE

## 2022-08-08 PROCEDURE — 85025 COMPLETE CBC W/AUTO DIFF WBC: CPT | Mod: ORL | Performed by: INTERNAL MEDICINE

## 2022-08-11 ENCOUNTER — LAB REQUISITION (OUTPATIENT)
Dept: LAB | Facility: CLINIC | Age: 52
End: 2022-08-11
Payer: COMMERCIAL

## 2022-08-11 DIAGNOSIS — K59.00 CONSTIPATION, UNSPECIFIED: ICD-10-CM

## 2022-08-11 DIAGNOSIS — I69.391 DYSPHAGIA FOLLOWING CEREBRAL INFARCTION: ICD-10-CM

## 2022-08-11 DIAGNOSIS — F32.A DEPRESSION, UNSPECIFIED: ICD-10-CM

## 2022-08-11 DIAGNOSIS — D64.9 ANEMIA, UNSPECIFIED: ICD-10-CM

## 2022-08-11 DIAGNOSIS — M62.81 MUSCLE WEAKNESS (GENERALIZED): ICD-10-CM

## 2022-08-11 DIAGNOSIS — E11.65 TYPE 2 DIABETES MELLITUS WITH HYPERGLYCEMIA (H): ICD-10-CM

## 2022-08-11 DIAGNOSIS — R32 UNSPECIFIED URINARY INCONTINENCE: ICD-10-CM

## 2022-08-15 ENCOUNTER — LAB REQUISITION (OUTPATIENT)
Dept: LAB | Facility: CLINIC | Age: 52
End: 2022-08-15
Payer: COMMERCIAL

## 2022-08-15 DIAGNOSIS — I69.391 DYSPHAGIA FOLLOWING CEREBRAL INFARCTION: ICD-10-CM

## 2022-08-15 DIAGNOSIS — I63.9 CEREBRAL INFARCTION, UNSPECIFIED (H): ICD-10-CM

## 2022-08-15 DIAGNOSIS — D64.9 ANEMIA, UNSPECIFIED: ICD-10-CM

## 2022-08-15 DIAGNOSIS — E11.65 TYPE 2 DIABETES MELLITUS WITH HYPERGLYCEMIA (H): ICD-10-CM

## 2022-08-15 LAB
ALBUMIN SERPL BCG-MCNC: 3.7 G/DL (ref 3.5–5.2)
ANION GAP SERPL CALCULATED.3IONS-SCNC: 11 MMOL/L (ref 7–15)
BASOPHILS # BLD AUTO: 0 10E3/UL (ref 0–0.2)
BASOPHILS NFR BLD AUTO: 0 %
BUN SERPL-MCNC: 25.5 MG/DL (ref 6–20)
CALCIUM SERPL-MCNC: 9.8 MG/DL (ref 8.6–10)
CHLORIDE SERPL-SCNC: 99 MMOL/L (ref 98–107)
CREAT SERPL-MCNC: 0.69 MG/DL (ref 0.67–1.17)
DEPRECATED HCO3 PLAS-SCNC: 27 MMOL/L (ref 22–29)
EOSINOPHIL # BLD AUTO: 0.4 10E3/UL (ref 0–0.7)
EOSINOPHIL NFR BLD AUTO: 4 %
ERYTHROCYTE [DISTWIDTH] IN BLOOD BY AUTOMATED COUNT: 12.5 % (ref 10–15)
GFR SERPL CREATININE-BSD FRML MDRD: >90 ML/MIN/1.73M2
GLUCOSE SERPL-MCNC: 180 MG/DL (ref 70–99)
HBA1C MFR BLD: 7.3 %
HCT VFR BLD AUTO: 32.9 % (ref 40–53)
HGB BLD-MCNC: 10.4 G/DL (ref 13.3–17.7)
IMM GRANULOCYTES # BLD: 0 10E3/UL
IMM GRANULOCYTES NFR BLD: 0 %
LYMPHOCYTES # BLD AUTO: 2.4 10E3/UL (ref 0.8–5.3)
LYMPHOCYTES NFR BLD AUTO: 23 %
MAGNESIUM SERPL-MCNC: 2 MG/DL (ref 1.7–2.3)
MCH RBC QN AUTO: 30.1 PG (ref 26.5–33)
MCHC RBC AUTO-ENTMCNC: 31.6 G/DL (ref 31.5–36.5)
MCV RBC AUTO: 95 FL (ref 78–100)
MONOCYTES # BLD AUTO: 1.1 10E3/UL (ref 0–1.3)
MONOCYTES NFR BLD AUTO: 11 %
NEUTROPHILS # BLD AUTO: 6.3 10E3/UL (ref 1.6–8.3)
NEUTROPHILS NFR BLD AUTO: 62 %
NRBC # BLD AUTO: 0 10E3/UL
NRBC BLD AUTO-RTO: 0 /100
PHOSPHATE SERPL-MCNC: 3.3 MG/DL (ref 2.5–4.5)
PLATELET # BLD AUTO: 355 10E3/UL (ref 150–450)
POTASSIUM SERPL-SCNC: 4 MMOL/L (ref 3.4–5.3)
RBC # BLD AUTO: 3.46 10E6/UL (ref 4.4–5.9)
SODIUM SERPL-SCNC: 137 MMOL/L (ref 136–145)
WBC # BLD AUTO: 10.2 10E3/UL (ref 4–11)

## 2022-08-15 PROCEDURE — 80069 RENAL FUNCTION PANEL: CPT | Mod: ORL | Performed by: INTERNAL MEDICINE

## 2022-08-15 PROCEDURE — 36415 COLL VENOUS BLD VENIPUNCTURE: CPT | Mod: ORL | Performed by: INTERNAL MEDICINE

## 2022-08-15 PROCEDURE — P9603 ONE-WAY ALLOW PRORATED MILES: HCPCS | Mod: ORL | Performed by: INTERNAL MEDICINE

## 2022-08-15 PROCEDURE — 83735 ASSAY OF MAGNESIUM: CPT | Mod: ORL | Performed by: INTERNAL MEDICINE

## 2022-08-15 PROCEDURE — 83036 HEMOGLOBIN GLYCOSYLATED A1C: CPT | Mod: ORL | Performed by: INTERNAL MEDICINE

## 2022-08-15 PROCEDURE — 85025 COMPLETE CBC W/AUTO DIFF WBC: CPT | Mod: ORL | Performed by: INTERNAL MEDICINE

## 2022-08-24 ENCOUNTER — LAB REQUISITION (OUTPATIENT)
Dept: LAB | Facility: CLINIC | Age: 52
End: 2022-08-24
Payer: COMMERCIAL

## 2022-08-24 DIAGNOSIS — I63.9 CEREBRAL INFARCTION, UNSPECIFIED (H): ICD-10-CM

## 2022-08-29 LAB
ANION GAP SERPL CALCULATED.3IONS-SCNC: 10 MMOL/L (ref 7–15)
BASOPHILS # BLD AUTO: 0 10E3/UL (ref 0–0.2)
BASOPHILS NFR BLD AUTO: 0 %
BUN SERPL-MCNC: 13.7 MG/DL (ref 6–20)
CALCIUM SERPL-MCNC: 9.1 MG/DL (ref 8.6–10)
CHLORIDE SERPL-SCNC: 104 MMOL/L (ref 98–107)
CREAT SERPL-MCNC: 0.63 MG/DL (ref 0.67–1.17)
DEPRECATED HCO3 PLAS-SCNC: 25 MMOL/L (ref 22–29)
EOSINOPHIL # BLD AUTO: 0.2 10E3/UL (ref 0–0.7)
EOSINOPHIL NFR BLD AUTO: 2 %
ERYTHROCYTE [DISTWIDTH] IN BLOOD BY AUTOMATED COUNT: 14.4 % (ref 10–15)
GFR SERPL CREATININE-BSD FRML MDRD: >90 ML/MIN/1.73M2
GLUCOSE SERPL-MCNC: 153 MG/DL (ref 70–99)
HCT VFR BLD AUTO: 21.9 % (ref 40–53)
HGB BLD-MCNC: 6.5 G/DL (ref 13.3–17.7)
IMM GRANULOCYTES # BLD: 0 10E3/UL
IMM GRANULOCYTES NFR BLD: 0 %
LYMPHOCYTES # BLD AUTO: 1.9 10E3/UL (ref 0.8–5.3)
LYMPHOCYTES NFR BLD AUTO: 18 %
MCH RBC QN AUTO: 29.1 PG (ref 26.5–33)
MCHC RBC AUTO-ENTMCNC: 29.7 G/DL (ref 31.5–36.5)
MCV RBC AUTO: 98 FL (ref 78–100)
MONOCYTES # BLD AUTO: 0.8 10E3/UL (ref 0–1.3)
MONOCYTES NFR BLD AUTO: 8 %
NEUTROPHILS # BLD AUTO: 7.7 10E3/UL (ref 1.6–8.3)
NEUTROPHILS NFR BLD AUTO: 72 %
NRBC # BLD AUTO: 0 10E3/UL
NRBC BLD AUTO-RTO: 0 /100
PLATELET # BLD AUTO: 542 10E3/UL (ref 150–450)
POTASSIUM SERPL-SCNC: 3.7 MMOL/L (ref 3.4–5.3)
RBC # BLD AUTO: 2.23 10E6/UL (ref 4.4–5.9)
SODIUM SERPL-SCNC: 139 MMOL/L (ref 136–145)
WBC # BLD AUTO: 10.7 10E3/UL (ref 4–11)

## 2022-08-29 PROCEDURE — 80048 BASIC METABOLIC PNL TOTAL CA: CPT | Mod: ORL | Performed by: INTERNAL MEDICINE

## 2022-08-29 PROCEDURE — P9604 ONE-WAY ALLOW PRORATED TRIP: HCPCS | Mod: ORL | Performed by: INTERNAL MEDICINE

## 2022-08-29 PROCEDURE — 36415 COLL VENOUS BLD VENIPUNCTURE: CPT | Mod: ORL | Performed by: INTERNAL MEDICINE

## 2022-08-29 PROCEDURE — 85025 COMPLETE CBC W/AUTO DIFF WBC: CPT | Mod: ORL | Performed by: INTERNAL MEDICINE

## 2022-10-25 ENCOUNTER — LAB REQUISITION (OUTPATIENT)
Dept: LAB | Facility: CLINIC | Age: 52
End: 2022-10-25
Payer: COMMERCIAL

## 2022-10-25 DIAGNOSIS — I69.321 DYSPHASIA FOLLOWING CEREBRAL INFARCTION: ICD-10-CM

## 2022-10-31 LAB
ANION GAP SERPL CALCULATED.3IONS-SCNC: 12 MMOL/L (ref 7–15)
BASOPHILS # BLD AUTO: 0 10E3/UL (ref 0–0.2)
BASOPHILS NFR BLD AUTO: 0 %
BUN SERPL-MCNC: 11.2 MG/DL (ref 6–20)
CALCIUM SERPL-MCNC: 9.4 MG/DL (ref 8.6–10)
CHLORIDE SERPL-SCNC: 95 MMOL/L (ref 98–107)
CREAT SERPL-MCNC: 0.39 MG/DL (ref 0.67–1.17)
DEPRECATED HCO3 PLAS-SCNC: 32 MMOL/L (ref 22–29)
EOSINOPHIL # BLD AUTO: 0.1 10E3/UL (ref 0–0.7)
EOSINOPHIL NFR BLD AUTO: 1 %
ERYTHROCYTE [DISTWIDTH] IN BLOOD BY AUTOMATED COUNT: 17.2 % (ref 10–15)
GFR SERPL CREATININE-BSD FRML MDRD: >90 ML/MIN/1.73M2
GLUCOSE SERPL-MCNC: 103 MG/DL (ref 70–99)
HCT VFR BLD AUTO: 27.4 % (ref 40–53)
HGB BLD-MCNC: 8 G/DL (ref 13.3–17.7)
IMM GRANULOCYTES # BLD: 0.1 10E3/UL
IMM GRANULOCYTES NFR BLD: 1 %
LYMPHOCYTES # BLD AUTO: 2.2 10E3/UL (ref 0.8–5.3)
LYMPHOCYTES NFR BLD AUTO: 12 %
MCH RBC QN AUTO: 25 PG (ref 26.5–33)
MCHC RBC AUTO-ENTMCNC: 29.2 G/DL (ref 31.5–36.5)
MCV RBC AUTO: 86 FL (ref 78–100)
MONOCYTES # BLD AUTO: 1.3 10E3/UL (ref 0–1.3)
MONOCYTES NFR BLD AUTO: 7 %
NEUTROPHILS # BLD AUTO: 15.3 10E3/UL (ref 1.6–8.3)
NEUTROPHILS NFR BLD AUTO: 79 %
NRBC # BLD AUTO: 0 10E3/UL
NRBC BLD AUTO-RTO: 0 /100
PLATELET # BLD AUTO: 448 10E3/UL (ref 150–450)
POTASSIUM SERPL-SCNC: 3.3 MMOL/L (ref 3.4–5.3)
RBC # BLD AUTO: 3.2 10E6/UL (ref 4.4–5.9)
SODIUM SERPL-SCNC: 139 MMOL/L (ref 136–145)
WBC # BLD AUTO: 19.1 10E3/UL (ref 4–11)

## 2022-10-31 PROCEDURE — 85025 COMPLETE CBC W/AUTO DIFF WBC: CPT | Mod: ORL | Performed by: NURSE PRACTITIONER

## 2022-10-31 PROCEDURE — 80048 BASIC METABOLIC PNL TOTAL CA: CPT | Mod: ORL | Performed by: NURSE PRACTITIONER

## 2022-10-31 PROCEDURE — P9603 ONE-WAY ALLOW PRORATED MILES: HCPCS | Mod: ORL | Performed by: NURSE PRACTITIONER

## 2022-10-31 PROCEDURE — 36415 COLL VENOUS BLD VENIPUNCTURE: CPT | Mod: ORL | Performed by: NURSE PRACTITIONER

## 2023-01-05 NOTE — PLAN OF CARE
EKG was handed to [facility]. End of Shift Summary  For vital signs and complete assessments, please see documentation flowsheets.      Pertinent Assessments: A/Ox4.VSS ex HTN. Pt up w/ SBA for safety but challenging the bed alarm on multiple occassions this shift. Frustrated and resistent w/ cares at times. Pt refusing tele and heparin drip, MD notified - will change to subcutaneous lovenox. Demanding PIV be removed. Writer and charge RN both educated pt on importance of maintaining IV access in the event of emergency - pt still wishing for IV removal. IV removed and MD notified. Neuros intact. , 300. PIV SL. Tele: SR.      Treatment Plan: Lovenox & aspirin, gabapentin for L leg neuropathic pain. stroke neuro following. Discharge timeline TBD.

## 2024-10-03 NOTE — PROGRESS NOTES
Diagnosis:   S/P arthroscopy of left shoulder (Z98.890)   (full supraspinatus tear and partial infraspinatus tear/repair)     Post op dx:  Left Shoulder Arthroscopy Rotator Cuff Repair Arthroscopic Biceps Tenodesis Subacromial Decompression       Referring Provider: Beverly Womack Date of Evaluation:    9/25/24    Precautions:   Activity Precautions: Passive ROMAT, 1 lb WB x 4 weeks. Begin PT POD #1.     Next MD visit:   none scheduled  Date of Surgery: 9/24/25   Insurance Primary/Secondary: [employer] / N/A     # Auth Visits: 15 visits till 11/24            Subjective: Pt states her pain is not too bad if she is taking pain medicine.     Pain: 0/10  at rest currently with pain medicine, 2/10 this morning      Objective:     PROM: (* denotes performed with pain)  Shoulder    Flexion: L 150  Abduction: L 125  ER in scap plane: L 60  ER at 0 deg abd: 55  ER at at 90 abd:  70  IR in scap plane: L 75       Assessment: Pt progressed to AAROM shoulder exercises with good tolerance and no major shoulder hiking overcompensation. She makes improvements in passive shoulder mobility with good tolerance. Her HEP is updated.        Goals:   To be met in 6-8 weeks post op   Pt will report improved ability to sleep without waking due to shoulder pain  Pt will improve R shoulder flexion PROM to >150 degrees to be able to reach into shoulder height cabinets when cleared for AROM  Pt will improve R shoulder abduction PROM to >130 degrees to improve ability to don deodorant, don/doff shirts, and wash hair when cleared for AROM  Pt will increase shoulder PROM ER to >80 degrees at 90 deg abduction to reach and fasten seatbelt when cleared for AROM  Pt will be independent and compliant with comprehensive HEP to maintain progress achieved in PT      To be met in 8-10 weeks post op   Pt will improve shoulder flexion AROM to >150 degrees to be able to reach into overhead cabinets without pain or restriction   Pt will improve shoulder  Resumed care from 15:00 to 19:00:    VSS.  AOX4.  Breathing is unlabored and pt denies SOB.  Mod cho diet with good appetite.  SBA as pt refuses all mobility equipment and bed alarms.  Pt also refuses IV placement so he has no IV.  MD paged and is aware. Pt denies pain.    K+ 3.4; replacement performed and recheck at 20:30        PT and PT following.    Tele:    abduction AROM to >130 degrees to improve ability to don deodorant, don/doff shirts, and wash hair   Pt will increase shoulder AROM ER to 80 degrees to be able to reach and fasten seatbelt   Pt will increase shoulder AROM IR to 70 degrees to be able to reach in back pocket, tuck in shirt, and turn steering wheel without pain  Pt will be independent and compliant with comprehensive HEP to maintain progress achieved in PT      To be met 4-6 months post op (total visits of PT: ~35)  Pt will improve shoulder strength throughout to 4+/5 to improve function with carrying groceries and wash floors and vacuum    Pt will demonstrate increased mid/low trap strength to 4/5 to promote improved shoulder mechanics and stabilization with lifting and reaching   Pt will be independent and compliant with comprehensive HEP to maintain progress achieved in PT        Plan: Progress PROM and AAROM as tolerated  Date: 9/27/2024  TX#: 2/15 Date: 10/1/24               TX#: 3/15 Date:10/4/24                 TX#: 4/15 Date:                 TX#: 5/ Date:   Tx#: 6/   TherEx: 30 min  -scap retractions x20  -AAROM elbow flexion: x20  -pendulums every direction x5 min  -table slides shoulder flexion: x20  -PROM to tolerance      Hold  Self ER stretch at wall w/ cane  Deltoid and RTC isometrics   TherEx: 45 min  -FR table slides shoulder flexion and abduction: x20 each  -Self ER stretch at wall w/ cane: 10 sec x10 reps  -sub-max Deltoid and RTC isometrics: 5 sec, x10 each  -supine IR/ER in scap plane w/ cane: 10 sec x10 reps  -PROM to tolerance TherEx: 45 min  -FR table slides shoulder flexion and abduction: x20 each  -standing AAROM flexion and abduction: w/ stick vertical x20 each  -Pulleys's: 2'/2'  -Self ER stretch at wall w/ cane: 10 sec x5 reps  -sub-max Deltoid and RTC isometrics: 5 sec, x15 each  -Supine AAROM flexion hands together  -supine IR/ER in scap plane w/ cane: 10 sec x10 reps  -PROM to tolerance         Modalities: 15 min  Game  ready x15 min (L) shoulder post tx  Modalities: 15 min  Game ready x15 min (L) shoulder post tx  Modalities: 10 min  Game ready x15 min (L) shoulder post tx                    HEP:     Access Code: [email or web address]  URL: [email or web address]  Date: 10/04/2024  Prepared by: Batsheva Beckwith    Exercises  - Seated Bilateral Shoulder Flexion Towel Slide at Table Top  - 3 x daily - 7 x weekly - 2 sets - 10 reps  - Seated Shoulder Abduction Towel Slide at Table Top  - 3 x daily - 7 x weekly - 2 sets - 10 reps  - Shoulder Flexion Overhead with Dowel  - 3 x daily - 7 x weekly - 3 sets - 10 reps  - Standing Shoulder Abduction AAROM with Dowel  - 3 x daily - 7 x weekly - 3 sets - 10 reps  - Standing Shoulder External Rotation AAROM with Dowel  - 3 x daily - 7 x weekly - 3 sets - 10-20 seconds hold  - Supine Shoulder Flexion AAROM with Hands Clasped  - 3 x daily - 7 x weekly - 3 sets - 10 reps  - Supine Shoulder External Rotation in 45 Degrees Abduction AAROM with Dowel  - 3 x daily - 7 x weekly - 3 sets - 10 reps - 10-20 seconds hold  - Standing Isometric Shoulder Flexion with Doorway - Arm Bent  - 1 x daily - 7 x weekly - 10 reps - 5 seconds hold  - Standing Isometric Shoulder Abduction with Doorway - Arm Bent  - 1 x daily - 7 x weekly - 10 reps - 5 seconds hold  - Isometric Shoulder Extension at Wall  - 1 x daily - 7 x weekly - 10 reps - 5 seconds hold  - Standing Isometric Shoulder Internal Rotation at Doorway  - 1 x daily - 7 x weekly - 10 reps - 5 seconds hold  - Standing Isometric Shoulder External Rotation with Doorway  - 1 x daily - 7 x weekly - 10 reps - 5 seconds hold    Charges: TherEx: 3 units, Vasopneumatic device: x1 unit         Total Timed Treatment: 45 min  Total Treatment Time: 55 min

## (undated) RX ORDER — LIDOCAINE HYDROCHLORIDE 20 MG/ML
JELLY TOPICAL
Status: DISPENSED
Start: 2022-07-06

## (undated) RX ORDER — LIDOCAINE HYDROCHLORIDE 10 MG/ML
INJECTION, SOLUTION INFILTRATION; PERINEURAL
Status: DISPENSED
Start: 2022-07-08

## (undated) RX ORDER — LIDOCAINE HYDROCHLORIDE 20 MG/ML
JELLY TOPICAL
Status: DISPENSED
Start: 2022-07-07

## (undated) RX ORDER — FENTANYL CITRATE 50 UG/ML
INJECTION, SOLUTION INTRAMUSCULAR; INTRAVENOUS
Status: DISPENSED
Start: 2022-07-08